# Patient Record
Sex: MALE | Race: BLACK OR AFRICAN AMERICAN | NOT HISPANIC OR LATINO | URBAN - METROPOLITAN AREA
[De-identification: names, ages, dates, MRNs, and addresses within clinical notes are randomized per-mention and may not be internally consistent; named-entity substitution may affect disease eponyms.]

---

## 2021-03-02 ENCOUNTER — EMERGENCY (EMERGENCY)
Facility: HOSPITAL | Age: 36
LOS: 1 days | Discharge: HOME | End: 2021-03-02
Attending: EMERGENCY MEDICINE
Payer: COMMERCIAL

## 2021-03-02 VITALS
SYSTOLIC BLOOD PRESSURE: 196 MMHG | TEMPERATURE: 98 F | DIASTOLIC BLOOD PRESSURE: 137 MMHG | WEIGHT: 235.01 LBS | RESPIRATION RATE: 16 BRPM | OXYGEN SATURATION: 100 % | HEIGHT: 73 IN | HEART RATE: 83 BPM

## 2021-03-02 VITALS
RESPIRATION RATE: 18 BRPM | TEMPERATURE: 98 F | DIASTOLIC BLOOD PRESSURE: 111 MMHG | HEART RATE: 80 BPM | OXYGEN SATURATION: 98 % | SYSTOLIC BLOOD PRESSURE: 164 MMHG

## 2021-03-02 LAB
ALBUMIN SERPL ELPH-MCNC: 4.5 G/DL — SIGNIFICANT CHANGE UP (ref 3.3–5)
ALP SERPL-CCNC: 81 U/L — SIGNIFICANT CHANGE UP (ref 40–120)
ALT FLD-CCNC: 27 U/L — SIGNIFICANT CHANGE UP (ref 10–45)
ANION GAP SERPL CALC-SCNC: 10 MMOL/L — SIGNIFICANT CHANGE UP (ref 5–17)
ANION GAP SERPL CALC-SCNC: 8 MMOL/L — SIGNIFICANT CHANGE UP (ref 5–17)
APPEARANCE UR: CLEAR — SIGNIFICANT CHANGE UP
AST SERPL-CCNC: 29 U/L — SIGNIFICANT CHANGE UP (ref 10–40)
BASOPHILS # BLD AUTO: 0.02 K/UL — SIGNIFICANT CHANGE UP (ref 0–0.2)
BASOPHILS NFR BLD AUTO: 0.2 % — SIGNIFICANT CHANGE UP (ref 0–2)
BILIRUB SERPL-MCNC: 0.5 MG/DL — SIGNIFICANT CHANGE UP (ref 0.2–1.2)
BILIRUB UR-MCNC: NEGATIVE — SIGNIFICANT CHANGE UP
BUN SERPL-MCNC: 15 MG/DL — SIGNIFICANT CHANGE UP (ref 7–23)
BUN SERPL-MCNC: 16 MG/DL — SIGNIFICANT CHANGE UP (ref 7–23)
CALCIUM SERPL-MCNC: 10 MG/DL — SIGNIFICANT CHANGE UP (ref 8.4–10.5)
CALCIUM SERPL-MCNC: 9.4 MG/DL — SIGNIFICANT CHANGE UP (ref 8.4–10.5)
CHLORIDE SERPL-SCNC: 100 MMOL/L — SIGNIFICANT CHANGE UP (ref 96–108)
CHLORIDE SERPL-SCNC: 103 MMOL/L — SIGNIFICANT CHANGE UP (ref 96–108)
CO2 SERPL-SCNC: 25 MMOL/L — SIGNIFICANT CHANGE UP (ref 22–31)
CO2 SERPL-SCNC: 27 MMOL/L — SIGNIFICANT CHANGE UP (ref 22–31)
COLOR SPEC: SIGNIFICANT CHANGE UP
CREAT SERPL-MCNC: 1.46 MG/DL — HIGH (ref 0.5–1.3)
CREAT SERPL-MCNC: 1.53 MG/DL — HIGH (ref 0.5–1.3)
DIFF PNL FLD: NEGATIVE — SIGNIFICANT CHANGE UP
EOSINOPHIL # BLD AUTO: 0.02 K/UL — SIGNIFICANT CHANGE UP (ref 0–0.5)
EOSINOPHIL NFR BLD AUTO: 0.2 % — SIGNIFICANT CHANGE UP (ref 0–6)
GAS PNL BLDV: SIGNIFICANT CHANGE UP
GLUCOSE SERPL-MCNC: 112 MG/DL — HIGH (ref 70–99)
GLUCOSE SERPL-MCNC: 114 MG/DL — HIGH (ref 70–99)
GLUCOSE UR QL: NEGATIVE — SIGNIFICANT CHANGE UP
HCT VFR BLD CALC: 44.4 % — SIGNIFICANT CHANGE UP (ref 39–50)
HGB BLD-MCNC: 14.5 G/DL — SIGNIFICANT CHANGE UP (ref 13–17)
IMM GRANULOCYTES NFR BLD AUTO: 0.2 % — SIGNIFICANT CHANGE UP (ref 0–1.5)
KETONES UR-MCNC: NEGATIVE — SIGNIFICANT CHANGE UP
LEUKOCYTE ESTERASE UR-ACNC: NEGATIVE — SIGNIFICANT CHANGE UP
LIDOCAIN IGE QN: 27 U/L — SIGNIFICANT CHANGE UP (ref 7–60)
LYMPHOCYTES # BLD AUTO: 0.86 K/UL — LOW (ref 1–3.3)
LYMPHOCYTES # BLD AUTO: 9.5 % — LOW (ref 13–44)
MCHC RBC-ENTMCNC: 27.3 PG — SIGNIFICANT CHANGE UP (ref 27–34)
MCHC RBC-ENTMCNC: 32.7 GM/DL — SIGNIFICANT CHANGE UP (ref 32–36)
MCV RBC AUTO: 83.6 FL — SIGNIFICANT CHANGE UP (ref 80–100)
MONOCYTES # BLD AUTO: 0.59 K/UL — SIGNIFICANT CHANGE UP (ref 0–0.9)
MONOCYTES NFR BLD AUTO: 6.5 % — SIGNIFICANT CHANGE UP (ref 2–14)
NEUTROPHILS # BLD AUTO: 7.56 K/UL — HIGH (ref 1.8–7.4)
NEUTROPHILS NFR BLD AUTO: 83.4 % — HIGH (ref 43–77)
NITRITE UR-MCNC: NEGATIVE — SIGNIFICANT CHANGE UP
NRBC # BLD: 0 /100 WBCS — SIGNIFICANT CHANGE UP (ref 0–0)
PH UR: 8 — SIGNIFICANT CHANGE UP (ref 5–8)
PLATELET # BLD AUTO: 287 K/UL — SIGNIFICANT CHANGE UP (ref 150–400)
POTASSIUM SERPL-MCNC: 4.1 MMOL/L — SIGNIFICANT CHANGE UP (ref 3.5–5.3)
POTASSIUM SERPL-MCNC: 5.1 MMOL/L — SIGNIFICANT CHANGE UP (ref 3.5–5.3)
POTASSIUM SERPL-SCNC: 4.1 MMOL/L — SIGNIFICANT CHANGE UP (ref 3.5–5.3)
POTASSIUM SERPL-SCNC: 5.1 MMOL/L — SIGNIFICANT CHANGE UP (ref 3.5–5.3)
PROT SERPL-MCNC: 8.6 G/DL — HIGH (ref 6–8.3)
PROT UR-MCNC: ABNORMAL
RBC # BLD: 5.31 M/UL — SIGNIFICANT CHANGE UP (ref 4.2–5.8)
RBC # FLD: 13.4 % — SIGNIFICANT CHANGE UP (ref 10.3–14.5)
SODIUM SERPL-SCNC: 135 MMOL/L — SIGNIFICANT CHANGE UP (ref 135–145)
SODIUM SERPL-SCNC: 138 MMOL/L — SIGNIFICANT CHANGE UP (ref 135–145)
SP GR SPEC: 1.02 — SIGNIFICANT CHANGE UP (ref 1.01–1.02)
UROBILINOGEN FLD QL: NEGATIVE — SIGNIFICANT CHANGE UP
WBC # BLD: 9.07 K/UL — SIGNIFICANT CHANGE UP (ref 3.8–10.5)
WBC # FLD AUTO: 9.07 K/UL — SIGNIFICANT CHANGE UP (ref 3.8–10.5)

## 2021-03-02 PROCEDURE — 74176 CT ABD & PELVIS W/O CONTRAST: CPT

## 2021-03-02 PROCEDURE — 82330 ASSAY OF CALCIUM: CPT

## 2021-03-02 PROCEDURE — 81001 URINALYSIS AUTO W/SCOPE: CPT

## 2021-03-02 PROCEDURE — 80048 BASIC METABOLIC PNL TOTAL CA: CPT

## 2021-03-02 PROCEDURE — 83690 ASSAY OF LIPASE: CPT

## 2021-03-02 PROCEDURE — 96374 THER/PROPH/DIAG INJ IV PUSH: CPT

## 2021-03-02 PROCEDURE — 76705 ECHO EXAM OF ABDOMEN: CPT

## 2021-03-02 PROCEDURE — 83605 ASSAY OF LACTIC ACID: CPT

## 2021-03-02 PROCEDURE — 82803 BLOOD GASES ANY COMBINATION: CPT

## 2021-03-02 PROCEDURE — 85025 COMPLETE CBC W/AUTO DIFF WBC: CPT

## 2021-03-02 PROCEDURE — 85014 HEMATOCRIT: CPT

## 2021-03-02 PROCEDURE — 93010 ELECTROCARDIOGRAM REPORT: CPT

## 2021-03-02 PROCEDURE — 84132 ASSAY OF SERUM POTASSIUM: CPT

## 2021-03-02 PROCEDURE — 87086 URINE CULTURE/COLONY COUNT: CPT

## 2021-03-02 PROCEDURE — 76770 US EXAM ABDO BACK WALL COMP: CPT

## 2021-03-02 PROCEDURE — 82435 ASSAY OF BLOOD CHLORIDE: CPT

## 2021-03-02 PROCEDURE — 74176 CT ABD & PELVIS W/O CONTRAST: CPT | Mod: 26,ME

## 2021-03-02 PROCEDURE — 84295 ASSAY OF SERUM SODIUM: CPT

## 2021-03-02 PROCEDURE — G1004: CPT

## 2021-03-02 PROCEDURE — 93005 ELECTROCARDIOGRAM TRACING: CPT

## 2021-03-02 PROCEDURE — 82947 ASSAY GLUCOSE BLOOD QUANT: CPT

## 2021-03-02 PROCEDURE — 85018 HEMOGLOBIN: CPT

## 2021-03-02 PROCEDURE — 99285 EMERGENCY DEPT VISIT HI MDM: CPT

## 2021-03-02 PROCEDURE — 76705 ECHO EXAM OF ABDOMEN: CPT | Mod: 26

## 2021-03-02 PROCEDURE — 99284 EMERGENCY DEPT VISIT MOD MDM: CPT | Mod: 25

## 2021-03-02 PROCEDURE — 80053 COMPREHEN METABOLIC PANEL: CPT

## 2021-03-02 RX ORDER — KETOROLAC TROMETHAMINE 30 MG/ML
15 SYRINGE (ML) INJECTION ONCE
Refills: 0 | Status: DISCONTINUED | OUTPATIENT
Start: 2021-03-02 | End: 2021-03-02

## 2021-03-02 RX ORDER — SODIUM CHLORIDE 9 MG/ML
1000 INJECTION INTRAMUSCULAR; INTRAVENOUS; SUBCUTANEOUS ONCE
Refills: 0 | Status: COMPLETED | OUTPATIENT
Start: 2021-03-02 | End: 2021-03-02

## 2021-03-02 RX ORDER — HYDROCHLOROTHIAZIDE 25 MG
25 TABLET ORAL ONCE
Refills: 0 | Status: COMPLETED | OUTPATIENT
Start: 2021-03-02 | End: 2021-03-02

## 2021-03-02 RX ORDER — VALSARTAN 80 MG/1
320 TABLET ORAL ONCE
Refills: 0 | Status: COMPLETED | OUTPATIENT
Start: 2021-03-02 | End: 2021-03-02

## 2021-03-02 RX ORDER — AMLODIPINE BESYLATE 2.5 MG/1
10 TABLET ORAL ONCE
Refills: 0 | Status: COMPLETED | OUTPATIENT
Start: 2021-03-02 | End: 2021-03-02

## 2021-03-02 RX ADMIN — AMLODIPINE BESYLATE 10 MILLIGRAM(S): 2.5 TABLET ORAL at 13:37

## 2021-03-02 RX ADMIN — SODIUM CHLORIDE 1000 MILLILITER(S): 9 INJECTION INTRAMUSCULAR; INTRAVENOUS; SUBCUTANEOUS at 11:18

## 2021-03-02 RX ADMIN — Medication 15 MILLIGRAM(S): at 11:18

## 2021-03-02 RX ADMIN — Medication 25 MILLIGRAM(S): at 13:36

## 2021-03-02 RX ADMIN — VALSARTAN 320 MILLIGRAM(S): 80 TABLET ORAL at 14:20

## 2021-03-02 NOTE — ED PROVIDER NOTE - PHYSICAL EXAMINATION
PGY3/MD Suzanna.   VITALS: reviewed  GEN: No apparent distress, A & O x 4  HEAD/EYES: NC/AT, PERRL, EOMI, anicteric sclerae, no conjunctival pallor  ENT: mucus membranes moist, oropharynx WNL, trachea midline, no JVD, neck is supple  RESP: lungs CTA with equal breath sounds bilaterally, chest wall nontender and atraumatic  CV: heart with reg rhythm S1, S2, no murmur; distal pulses intact and symmetric bilaterally  ABDOMEN: normoactive bowel sounds, soft, nondistended, nontender + RUQ tenderness, no Tello's  : no CVA tenderness  MSK: extremities atraumatic and nontender, no edema, no asymmetry.  SKIN: warm, dry, no rash, no bruising, no cyanosis. color appropriate for ethnicity  NEURO: alert, mentating appropriately, no facial asymmetry.  PSYCH: Affect appropriate

## 2021-03-02 NOTE — ED PROVIDER NOTE - CLINICAL SUMMARY MEDICAL DECISION MAKING FREE TEXT BOX
PGY3/MD Suzanna. 36 yo M, with HTN, p/w right flank pain, RUQ, dxx includes: gb stone, cholecystitis, renal colick, pancreatitis, gastritis, PUD, but given high blood pressure and epigastric-RUQ pain, will get U/S to r/o AAA or rapture. ua, cbc, cmp, lipase,

## 2021-03-02 NOTE — ED ADULT NURSE REASSESSMENT NOTE - NS ED NURSE REASSESS COMMENT FT1
Tayo RN, pt verbalizes understanding to f/u with PCP and return to ED for any worsening symptoms. Patient d/c home w/ written and verbal instructions. Pt verbalized understanding. IV d/c - No redness or swelling. Pt hypertensive MD Driscoll aware and OK with DC

## 2021-03-02 NOTE — ED PROVIDER NOTE - PATIENT PORTAL LINK FT
You can access the FollowMyHealth Patient Portal offered by [facility] by registering at the following website: [email or web address]. By joining [employer]’s FollowMyHealth portal, you will also be able to view your health information using other applications (apps) compatible with our system.

## 2021-03-02 NOTE — ED PROVIDER NOTE - ATTENDING CONTRIBUTION TO CARE
Patient presenting complaining of sudden onset R flank pains which awoke him from sleep.  No similar prior pains.  Does not radiate, worse with movement, no fevers, chills, nausea, vomiting.  Reporting feels sharp "like someone kicked me in the side".  Has been noncompliant with antihypertensives for some time.    A 14 point review of systems is negative except as in HPI or otherwise documented.    Exam:  General: Patient well appearing but uncomfortable, significantly hypertensive otherwise vital signs within normal limits  HEENT: airway patent with moist mucous membranes  Cardiac: RRR S1/S2 with strong and equal peripheral pulses  Respiratory: lungs clear without respiratory distress  GI: abdomen soft, negative murphys, tender to palpation into R flank, non distended  Neuro: no gross neurologic deficits  Skin: warm, well perfused  Psych: normal mood and affect    Differential includes renal colic, biliary colic, less likely aortic catastrophe - plan for labs, UA, pain management, bedside sono, possible CT.

## 2021-03-02 NOTE — ED ADULT NURSE REASSESSMENT NOTE - NS ED NURSE REASSESS COMMENT FT1
Medication not available in pyxis. Spoke with pharmacist who states medication will be sent to  [facility] . Awaiting medication arrival.

## 2021-03-02 NOTE — ED PROVIDER NOTE - PROGRESS NOTE DETAILS
PGY3/MD Suzanna. passed stone, pt is not in pain now, not infectious, no indication for Uro consult or follow up. cre 1.5, likely from chronic change given mild hydro and passed stone, pt needs PCP follow up for high blood pressure and CKD, pt agrees with the plan for out pt follow up, return precaution was given. I have given the pt strict return and follow up precautions. The patient has been provided with a copy of all pertinent results. The patient has been informed of all concerning signs and symptoms to return to Emergency Department, the necessity to follow up with PMD/Clinic/follow up provided within 2-3 days was explained, and the patient reports understanding of above with capacity and insight.

## 2021-03-02 NOTE — ED PROVIDER NOTE - NSFOLLOWUPINSTRUCTIONS_ED_ALL_ED_FT
- high blood pressure, your diastolic pressure is higher than 120, needs medication to control the blood pressure to reduce the chance to have any cardiovascular complications. please follow up with your Primary Care Doctor regarding your pressure control  - Please follow up with your Creatinine level with your Primary Care Doctor too    (1) Follow up with your primary care physician as discussed. In addition, we did not find evidence of a life threatening illness on your testing here today, but listed below are the specialists that will be necessary to see as an outpatient to continue the workup.  Please call the numbers listed below or [phone] to set up the necessary appointments  or call [phone] to make an appointment with the clinic.  (2) You were seen for right side kidney stone, already passed. A copy of your resulted labs, imaging, and findings have been provided to you.  (3) Return immediately to the emergency department for new, persistent, or worsening symptoms or signs. Return immediately to the emergency department if you have chest pain, shortness of breath, loss of consciousness, or any other new concerns.

## 2021-03-02 NOTE — ED ADULT NURSE NOTE - FINAL NURSING ELECTRONIC SIGNATURE
Increased Pain/Transportation Risk (There is always a risk of traffic delays resulting in deterioration of condition.)/Deterioration of Condition En Route 02-Mar-2021 15:27

## 2021-03-02 NOTE — ED PROVIDER NOTE - OBJECTIVE STATEMENT
PGY3/MD Suzanna. 34 yo M with HTN, not compliant, p/w right flank pain, started this morning, pt woke up with the pain. No nausoeus or vomit, no improvement after taking alleve, denies association with foods, no dark stool. No fever, chills. denies cough or uri symptoms.    PCP: Angelic Lemus, PGY3/MD Suzanna. 36 yo M with HTN (amlodipine 10mg, HCTZ 25mg, Losartan 320mg, Atrovastatin), not compliant, p/w right flank pain, started this morning, pt woke up with the pain. No nausoeus or vomit, no improvement after taking alleve, denies association with foods, no dark stool. No fever, chills. denies cough or uri symptoms.    PCP: Angelic Lemus, PGY3/MD Suzanna. 36 yo M with HTN (amlodipine 10mg, HCTZ 25mg, Valsartan 320mg, Atrovastatin), not compliant, p/w right flank pain, started this morning, pt woke up with the pain. No nausoeus or vomit, no improvement after taking alleve, denies association with foods, no dark stool. No fever, chills. denies cough or uri symptoms.    PCP: Angelic Lemus,

## 2021-03-02 NOTE — ED ADULT NURSE NOTE - OBJECTIVE STATEMENT
Opt out
34y/o male aaox4 hx HTN  presents to the ED ambulatory  from home c/o R flank pain . Pt states that this morning sudden began  flank pain w/ radiation to the abdominal area, took Advil at 3 am and Motrin x1tab 500mg at 9 am w/o any relief, at this time pt  Denies no hematuria, no burning/painful urination, fever, chills, n/v, weakness, abd pain, diarrhea/constipation, numbness/tingling, in no respiratory distress, no CP,  Safety and comfort measures initiated- bed placed in lowest position and side rails raised. Pt oriented to call bell system.

## 2021-03-03 LAB
CULTURE RESULTS: NO GROWTH — SIGNIFICANT CHANGE UP
SPECIMEN SOURCE: SIGNIFICANT CHANGE UP

## 2021-09-06 ENCOUNTER — INPATIENT (INPATIENT)
Facility: HOSPITAL | Age: 36
LOS: 1 days | Discharge: HOME | DRG: 178 | End: 2021-09-08
Attending: STUDENT IN AN ORGANIZED HEALTH CARE EDUCATION/TRAINING PROGRAM | Admitting: HOSPITALIST
Payer: COMMERCIAL

## 2021-09-06 VITALS
DIASTOLIC BLOOD PRESSURE: 91 MMHG | TEMPERATURE: 103 F | HEIGHT: 73 IN | RESPIRATION RATE: 18 BRPM | WEIGHT: 244.93 LBS | SYSTOLIC BLOOD PRESSURE: 142 MMHG | OXYGEN SATURATION: 97 % | HEART RATE: 116 BPM

## 2021-09-06 DIAGNOSIS — N17.9 ACUTE KIDNEY FAILURE, UNSPECIFIED: ICD-10-CM

## 2021-09-06 LAB
ALBUMIN SERPL ELPH-MCNC: 4.3 G/DL — SIGNIFICANT CHANGE UP (ref 3.3–5)
ALP SERPL-CCNC: 60 U/L — SIGNIFICANT CHANGE UP (ref 40–120)
ALT FLD-CCNC: 40 U/L — SIGNIFICANT CHANGE UP (ref 10–45)
ANION GAP SERPL CALC-SCNC: 13 MMOL/L — SIGNIFICANT CHANGE UP (ref 5–17)
APPEARANCE UR: CLEAR — SIGNIFICANT CHANGE UP
APTT BLD: 29.5 SEC — SIGNIFICANT CHANGE UP (ref 27.5–35.5)
AST SERPL-CCNC: 35 U/L — SIGNIFICANT CHANGE UP (ref 10–40)
BACTERIA # UR AUTO: NEGATIVE — SIGNIFICANT CHANGE UP
BASE EXCESS BLDV CALC-SCNC: 4 MMOL/L — HIGH (ref -2–2)
BASOPHILS # BLD AUTO: 0.01 K/UL — SIGNIFICANT CHANGE UP (ref 0–0.2)
BASOPHILS NFR BLD AUTO: 0.3 % — SIGNIFICANT CHANGE UP (ref 0–2)
BILIRUB SERPL-MCNC: 0.4 MG/DL — SIGNIFICANT CHANGE UP (ref 0.2–1.2)
BILIRUB UR-MCNC: NEGATIVE — SIGNIFICANT CHANGE UP
BUN SERPL-MCNC: 20 MG/DL — SIGNIFICANT CHANGE UP (ref 7–23)
CA-I SERPL-SCNC: 1.17 MMOL/L — SIGNIFICANT CHANGE UP (ref 1.15–1.33)
CALCIUM SERPL-MCNC: 9.8 MG/DL — SIGNIFICANT CHANGE UP (ref 8.4–10.5)
CHLORIDE BLDV-SCNC: 96 MMOL/L — SIGNIFICANT CHANGE UP (ref 96–108)
CHLORIDE SERPL-SCNC: 96 MMOL/L — SIGNIFICANT CHANGE UP (ref 96–108)
CO2 BLDV-SCNC: 33 MMOL/L — HIGH (ref 22–26)
CO2 SERPL-SCNC: 24 MMOL/L — SIGNIFICANT CHANGE UP (ref 22–31)
COLOR SPEC: YELLOW — SIGNIFICANT CHANGE UP
CREAT SERPL-MCNC: 2.21 MG/DL — HIGH (ref 0.5–1.3)
DIFF PNL FLD: ABNORMAL
EOSINOPHIL # BLD AUTO: 0 K/UL — SIGNIFICANT CHANGE UP (ref 0–0.5)
EOSINOPHIL NFR BLD AUTO: 0 % — SIGNIFICANT CHANGE UP (ref 0–6)
EPI CELLS # UR: 1 /HPF — SIGNIFICANT CHANGE UP
GAS PNL BLDV: 132 MMOL/L — LOW (ref 136–145)
GAS PNL BLDV: SIGNIFICANT CHANGE UP
GLUCOSE BLDV-MCNC: 155 MG/DL — HIGH (ref 70–99)
GLUCOSE SERPL-MCNC: 152 MG/DL — HIGH (ref 70–99)
GLUCOSE UR QL: NEGATIVE — SIGNIFICANT CHANGE UP
HCO3 BLDV-SCNC: 31 MMOL/L — HIGH (ref 22–29)
HCT VFR BLD CALC: 46.7 % — SIGNIFICANT CHANGE UP (ref 39–50)
HCT VFR BLDA CALC: 47 % — SIGNIFICANT CHANGE UP (ref 39–51)
HGB BLD CALC-MCNC: 15.5 G/DL — SIGNIFICANT CHANGE UP (ref 12.6–17.4)
HGB BLD-MCNC: 14.9 G/DL — SIGNIFICANT CHANGE UP (ref 13–17)
HYALINE CASTS # UR AUTO: 0 /LPF — SIGNIFICANT CHANGE UP (ref 0–7)
INR BLD: 1.15 RATIO — SIGNIFICANT CHANGE UP (ref 0.88–1.16)
KETONES UR-MCNC: NEGATIVE — SIGNIFICANT CHANGE UP
LACTATE BLDV-MCNC: 1.8 MMOL/L — SIGNIFICANT CHANGE UP (ref 0.7–2)
LEUKOCYTE ESTERASE UR-ACNC: NEGATIVE — SIGNIFICANT CHANGE UP
LIDOCAIN IGE QN: 43 U/L — SIGNIFICANT CHANGE UP (ref 7–60)
LYMPHOCYTES # BLD AUTO: 1.13 K/UL — SIGNIFICANT CHANGE UP (ref 1–3.3)
LYMPHOCYTES # BLD AUTO: 29.9 % — SIGNIFICANT CHANGE UP (ref 13–44)
MCHC RBC-ENTMCNC: 26 PG — LOW (ref 27–34)
MCHC RBC-ENTMCNC: 31.9 GM/DL — LOW (ref 32–36)
MCV RBC AUTO: 81.6 FL — SIGNIFICANT CHANGE UP (ref 80–100)
MONOCYTES # BLD AUTO: 0.37 K/UL — SIGNIFICANT CHANGE UP (ref 0–0.9)
MONOCYTES NFR BLD AUTO: 9.8 % — SIGNIFICANT CHANGE UP (ref 2–14)
NEUTROPHILS # BLD AUTO: 2.27 K/UL — SIGNIFICANT CHANGE UP (ref 1.8–7.4)
NEUTROPHILS NFR BLD AUTO: 60 % — SIGNIFICANT CHANGE UP (ref 43–77)
NITRITE UR-MCNC: NEGATIVE — SIGNIFICANT CHANGE UP
NRBC # BLD: 0 /100 WBCS — SIGNIFICANT CHANGE UP (ref 0–0)
PCO2 BLDV: 55 MMHG — SIGNIFICANT CHANGE UP (ref 42–55)
PH BLDV: 7.36 — SIGNIFICANT CHANGE UP (ref 7.32–7.43)
PH UR: 6 — SIGNIFICANT CHANGE UP (ref 5–8)
PLATELET # BLD AUTO: 229 K/UL — SIGNIFICANT CHANGE UP (ref 150–400)
PO2 BLDV: 16 MMHG — LOW (ref 25–45)
POTASSIUM BLDV-SCNC: 3.8 MMOL/L — SIGNIFICANT CHANGE UP (ref 3.5–5.1)
POTASSIUM SERPL-MCNC: 4.1 MMOL/L — SIGNIFICANT CHANGE UP (ref 3.5–5.3)
POTASSIUM SERPL-SCNC: 4.1 MMOL/L — SIGNIFICANT CHANGE UP (ref 3.5–5.3)
PROT SERPL-MCNC: 8.2 G/DL — SIGNIFICANT CHANGE UP (ref 6–8.3)
PROT UR-MCNC: >600
PROTHROM AB SERPL-ACNC: 13.7 SEC — HIGH (ref 10.6–13.6)
RAPID RVP RESULT: DETECTED
RBC # BLD: 5.72 M/UL — SIGNIFICANT CHANGE UP (ref 4.2–5.8)
RBC # FLD: 13.8 % — SIGNIFICANT CHANGE UP (ref 10.3–14.5)
RBC CASTS # UR COMP ASSIST: 1 /HPF — SIGNIFICANT CHANGE UP (ref 0–4)
SAO2 % BLDV: 17.5 % — LOW (ref 67–88)
SARS-COV-2 RNA SPEC QL NAA+PROBE: DETECTED
SODIUM SERPL-SCNC: 133 MMOL/L — LOW (ref 135–145)
SP GR SPEC: 1.03 — HIGH (ref 1.01–1.02)
UROBILINOGEN FLD QL: NEGATIVE — SIGNIFICANT CHANGE UP
WBC # BLD: 3.78 K/UL — LOW (ref 3.8–10.5)
WBC # FLD AUTO: 3.78 K/UL — LOW (ref 3.8–10.5)
WBC UR QL: 3 /HPF — SIGNIFICANT CHANGE UP (ref 0–5)

## 2021-09-06 PROCEDURE — 93010 ELECTROCARDIOGRAM REPORT: CPT

## 2021-09-06 PROCEDURE — 74176 CT ABD & PELVIS W/O CONTRAST: CPT | Mod: 26,MA

## 2021-09-06 PROCEDURE — 71045 X-RAY EXAM CHEST 1 VIEW: CPT | Mod: 26

## 2021-09-06 PROCEDURE — 99285 EMERGENCY DEPT VISIT HI MDM: CPT

## 2021-09-06 RX ORDER — SODIUM CHLORIDE 9 MG/ML
1000 INJECTION, SOLUTION INTRAVENOUS ONCE
Refills: 0 | Status: COMPLETED | OUTPATIENT
Start: 2021-09-06 | End: 2021-09-06

## 2021-09-06 RX ORDER — KETOROLAC TROMETHAMINE 30 MG/ML
15 SYRINGE (ML) INJECTION ONCE
Refills: 0 | Status: DISCONTINUED | OUTPATIENT
Start: 2021-09-06 | End: 2021-09-06

## 2021-09-06 RX ORDER — ACETAMINOPHEN 500 MG
975 TABLET ORAL ONCE
Refills: 0 | Status: COMPLETED | OUTPATIENT
Start: 2021-09-06 | End: 2021-09-06

## 2021-09-06 RX ADMIN — SODIUM CHLORIDE 1000 MILLILITER(S): 9 INJECTION, SOLUTION INTRAVENOUS at 16:15

## 2021-09-06 RX ADMIN — Medication 15 MILLIGRAM(S): at 16:15

## 2021-09-06 RX ADMIN — Medication 975 MILLIGRAM(S): at 17:24

## 2021-09-06 NOTE — ED PROVIDER NOTE - CLINICAL SUMMARY MEDICAL DECISION MAKING FREE TEXT BOX
Patient is a 35 year old male with past medical history of nephrolithiasis and hypertension presents to the Emergency Department with chief complaint of fever.  Patient received EKG, plain films of chest, CT abdomen and pelvis without contrast, and labs.  Labs were significant for acute kidney injury.  Plain films suspicious for left lower lobe opacity. Patient is a 35 year old male with past medical history of nephrolithiasis and hypertension presents to the Emergency Department with chief complaint of fever.  Patient received EKG, plain films of chest, CT abdomen and pelvis without contrast, and labs.  Labs were significant for acute kidney injury.  Plain films suspicious for left lower lobe opacity.  Patient COVID-19 positive.

## 2021-09-06 NOTE — ED PROVIDER NOTE - OBJECTIVE STATEMENT
Patient is a 35 year old male with past medical history significant for hypertension and nephrolithiasis presents to the Emergency Department with chief complaint of fever.  Patient states for the past 1-2 days he has had diffuse body aches, most prominent in his bilateral lower back.  Patient describes symptoms as mild and associated with tactile fever.  Patient rates symptoms a 4/10 in severity and denies any alleviating or exacerbating factors, such as exertion.  Patient states he is unvaccinated against COVID-19.  Patient denies any nausea, vomiting, or other physical complaints.  No sick contacts or recent travel.

## 2021-09-06 NOTE — ED PROVIDER NOTE - ATTENDING CONTRIBUTION TO CARE
Attending MD Kumar: I personally have seen and examined this patient.  Fellow note reviewed and agree on plan of care and except where noted.  See below for details.     seen in Gold 4    35M with PMH/PSH including HTN nephrolithiasis presents to the ED with fever, body aches.  Reports unvaccinated for COVID.  Denies abdominal pain, nausea, vomiting, diarrhea, bloody or black stools. Denies dysuria, hematuria, change in urinary habits including frequency, urgency. Reports lower back pain, indicates across lumbar area.  Denies shortness of breath.  A ten (10) point review of systems was negative other than as stated in the HPI or elsewhere in the chart.     Exam:   General: NAD, obese  HENT: head NCAT, airway patent with moist mucous membranes  Eyes: no conjunctival injection, anicteric  Lungs: lungs CTAB with good inspiratory effort, no wheezing, no rhonchi, no rales  Cardiac: +S1S2, no m/r/g  GI: abdomen soft with +BS, NT, exam limited secondary to body habitus  : no CVAT  MSK: FROM at neck, no tenderness to midline palpation, no stepoffs along length of spine, no calf tenderness, swelling, erythema or warmth  Neuro: moving all extremities spontaneously, sensory grossly intact, no gross neuro deficits  Psych: normal mood and affect     A/P: 35M with fevers, lower back pain, suspect COVID/viral illness, will obtain labs for renal function, will obtain CTAP for stone hunt but lower suspicion, will keep iso, will obtain CXR, will give IVFs, if COVID will give dexa, will keep on monitor, obtain COVID labs, EKG, reassess

## 2021-09-06 NOTE — ED ADULT NURSE NOTE - ED CARDIAC RHYTHM
Pt arrives to ED with fall 1 weeks ago. Since there continued right flank pain which increases with attempt to get out of bed or moving, no pain when sitting. Pt also stated has constipation x1 week and difficulty urinating since fall, urination \"comes in spurts\". Pt has hearing aids with him, jeans and shoes. Only article removed is shirt.    regular

## 2021-09-06 NOTE — ED PROVIDER NOTE - WET READ LAUNCH FT
There are no Wet Read(s) to document. Electrodesiccation Text: The wound bed was treated with electrodesiccation after the biopsy was performed.

## 2021-09-06 NOTE — ED PROVIDER NOTE - PHYSICAL EXAMINATION
PHYSICAL EXAM:  GENERAL: NAD, lying in bed comfortably  HEAD:  Atraumatic, Normocephalic  EYES: EOMI, PERRLA, conjunctiva and sclera clear  ENT: No erythema/pallor/petechiae/lesions; TMs clear b/l  NECK: Supple, No JVD  LUNG: CTA b/l; no r/r/w  HEART: RRR, +S1/S2; No m/r/g  ABDOMEN: soft, NT/ND; BS audible   EXTREMITIES:  2+ Peripheral Pulses, brisk cap refill. No clubbing, cyanosis, or edema  NERVOUS SYSTEM:  AAOx3, speech clear. No sensory/motor deficits   MSK: FROM all 4 extremities, full and equal strength  SKIN: No rashes or lesions

## 2021-09-06 NOTE — ED ADULT NURSE REASSESSMENT NOTE - NS ED NURSE REASSESS COMMENT FT1
pt c/o sudden onset of lightheadedness, drop in BP noted, pt taken to gold, dampened cloth given to pt, pale lips

## 2021-09-06 NOTE — ED PROVIDER NOTE - NS ED ROS FT
CONSTITUTIONAL: + fever  EYES/ENT: No visual changes;  No vertigo or throat pain   NECK: No pain or stiffness  RESPIRATORY: No cough, wheezing, hemoptysis; No shortness of breath  CARDIOVASCULAR: No chest pain or palpitations  GASTROINTESTINAL: No abdominal or epigastric pain. No nausea, vomiting, or hematemesis; No diarrhea or constipation. No melena or hematochezia.  GENITOURINARY: No dysuria, frequency or hematuria  NEUROLOGICAL: No numbness or weakness  SKIN: No itching, burning, rashes, or lesions   MSK: +back pain  All other review of systems is negative unless indicated above.

## 2021-09-06 NOTE — ED PROVIDER NOTE - CARE PLAN
1 Principal Discharge DX:	MEHDI (acute kidney injury)  Secondary Diagnosis:	COVID-19 virus infection

## 2021-09-06 NOTE — ED ADULT NURSE NOTE - OBJECTIVE STATEMENT
34 yo male PMH HTN, unvaccinated for COVID, presents to ED c/o of HA and back pain x 2 days. Denies trauma or vigorous exercise, states HA is more on rt side, and that back pain feels similar to the last time he had a kidney stone ( which passed on it's own.) Also confirms mild cough with green sputum. PERRL 3R bilaterally, EOMI, motor, strength and sensation intact in upper and lower extremities, abd soft, nondistended nontender, negative CVA tenderness. CM applied, EKG done, code sepsis order set started. Denies CP, SOB, n/v/d, , abdominal pain, urinary symptoms, weakness, numbness, tingling in upper and lower extremities, blurry vision. VSS updated on plan of care.

## 2021-09-07 ENCOUNTER — TRANSCRIPTION ENCOUNTER (OUTPATIENT)
Age: 36
End: 2021-09-07

## 2021-09-07 DIAGNOSIS — Z29.9 ENCOUNTER FOR PROPHYLACTIC MEASURES, UNSPECIFIED: ICD-10-CM

## 2021-09-07 DIAGNOSIS — R51.9 HEADACHE, UNSPECIFIED: ICD-10-CM

## 2021-09-07 DIAGNOSIS — N17.9 ACUTE KIDNEY FAILURE, UNSPECIFIED: ICD-10-CM

## 2021-09-07 DIAGNOSIS — I10 ESSENTIAL (PRIMARY) HYPERTENSION: ICD-10-CM

## 2021-09-07 DIAGNOSIS — U07.1 COVID-19: ICD-10-CM

## 2021-09-07 LAB
A1C WITH ESTIMATED AVERAGE GLUCOSE RESULT: 5.8 % — HIGH (ref 4–5.6)
ANION GAP SERPL CALC-SCNC: 12 MMOL/L — SIGNIFICANT CHANGE UP (ref 5–17)
BUN SERPL-MCNC: 18 MG/DL — SIGNIFICANT CHANGE UP (ref 7–23)
CALCIUM SERPL-MCNC: 9.1 MG/DL — SIGNIFICANT CHANGE UP (ref 8.4–10.5)
CHLORIDE SERPL-SCNC: 98 MMOL/L — SIGNIFICANT CHANGE UP (ref 96–108)
CK MB BLD-MCNC: 0.3 % — SIGNIFICANT CHANGE UP (ref 0–3.5)
CK MB CFR SERPL CALC: 1.1 NG/ML — SIGNIFICANT CHANGE UP (ref 0–6.7)
CK SERPL-CCNC: 405 U/L — HIGH (ref 30–200)
CO2 SERPL-SCNC: 28 MMOL/L — SIGNIFICANT CHANGE UP (ref 22–31)
CREAT ?TM UR-MCNC: 221 MG/DL — SIGNIFICANT CHANGE UP
CREAT SERPL-MCNC: 1.56 MG/DL — HIGH (ref 0.5–1.3)
CULTURE RESULTS: NO GROWTH — SIGNIFICANT CHANGE UP
ESTIMATED AVERAGE GLUCOSE: 120 MG/DL — HIGH (ref 68–114)
GLUCOSE SERPL-MCNC: 97 MG/DL — SIGNIFICANT CHANGE UP (ref 70–99)
HCT VFR BLD CALC: 44.1 % — SIGNIFICANT CHANGE UP (ref 39–50)
HGB BLD-MCNC: 14.2 G/DL — SIGNIFICANT CHANGE UP (ref 13–17)
MAGNESIUM SERPL-MCNC: 2 MG/DL — SIGNIFICANT CHANGE UP (ref 1.6–2.6)
MCHC RBC-ENTMCNC: 25.9 PG — LOW (ref 27–34)
MCHC RBC-ENTMCNC: 32.2 GM/DL — SIGNIFICANT CHANGE UP (ref 32–36)
MCV RBC AUTO: 80.5 FL — SIGNIFICANT CHANGE UP (ref 80–100)
NRBC # BLD: 0 /100 WBCS — SIGNIFICANT CHANGE UP (ref 0–0)
OSMOLALITY UR: 843 MOS/KG — SIGNIFICANT CHANGE UP (ref 300–900)
PHOSPHATE SERPL-MCNC: 3.1 MG/DL — SIGNIFICANT CHANGE UP (ref 2.5–4.5)
PLATELET # BLD AUTO: 214 K/UL — SIGNIFICANT CHANGE UP (ref 150–400)
POTASSIUM SERPL-MCNC: 3.7 MMOL/L — SIGNIFICANT CHANGE UP (ref 3.5–5.3)
POTASSIUM SERPL-SCNC: 3.7 MMOL/L — SIGNIFICANT CHANGE UP (ref 3.5–5.3)
PROT ?TM UR-MCNC: 147 MG/DL — HIGH (ref 0–12)
PROT/CREAT UR-RTO: 0.7 RATIO — HIGH (ref 0–0.2)
RBC # BLD: 5.48 M/UL — SIGNIFICANT CHANGE UP (ref 4.2–5.8)
RBC # FLD: 13.7 % — SIGNIFICANT CHANGE UP (ref 10.3–14.5)
SODIUM SERPL-SCNC: 138 MMOL/L — SIGNIFICANT CHANGE UP (ref 135–145)
SODIUM UR-SCNC: 117 MMOL/L — SIGNIFICANT CHANGE UP
SPECIMEN SOURCE: SIGNIFICANT CHANGE UP
TROPONIN T, HIGH SENSITIVITY RESULT: 12 NG/L — SIGNIFICANT CHANGE UP (ref 0–51)
UUN UR-MCNC: 1476 MG/DL — SIGNIFICANT CHANGE UP
WBC # BLD: 4.31 K/UL — SIGNIFICANT CHANGE UP (ref 3.8–10.5)
WBC # FLD AUTO: 4.31 K/UL — SIGNIFICANT CHANGE UP (ref 3.8–10.5)

## 2021-09-07 PROCEDURE — 99223 1ST HOSP IP/OBS HIGH 75: CPT

## 2021-09-07 PROCEDURE — 93010 ELECTROCARDIOGRAM REPORT: CPT

## 2021-09-07 PROCEDURE — 70450 CT HEAD/BRAIN W/O DYE: CPT | Mod: 26

## 2021-09-07 RX ORDER — AZITHROMYCIN 500 MG/1
500 TABLET, FILM COATED ORAL ONCE
Refills: 0 | Status: COMPLETED | OUTPATIENT
Start: 2021-09-07 | End: 2021-09-07

## 2021-09-07 RX ORDER — CEFTRIAXONE 500 MG/1
1000 INJECTION, POWDER, FOR SOLUTION INTRAMUSCULAR; INTRAVENOUS EVERY 24 HOURS
Refills: 0 | Status: DISCONTINUED | OUTPATIENT
Start: 2021-09-07 | End: 2021-09-07

## 2021-09-07 RX ORDER — SODIUM CHLORIDE 9 MG/ML
1000 INJECTION INTRAMUSCULAR; INTRAVENOUS; SUBCUTANEOUS
Refills: 0 | Status: DISCONTINUED | OUTPATIENT
Start: 2021-09-07 | End: 2021-09-08

## 2021-09-07 RX ORDER — ENOXAPARIN SODIUM 100 MG/ML
40 INJECTION SUBCUTANEOUS EVERY 12 HOURS
Refills: 0 | Status: DISCONTINUED | OUTPATIENT
Start: 2021-09-07 | End: 2021-09-08

## 2021-09-07 RX ORDER — ALBUTEROL 90 UG/1
2 AEROSOL, METERED ORAL EVERY 4 HOURS
Refills: 0 | Status: DISCONTINUED | OUTPATIENT
Start: 2021-09-07 | End: 2021-09-08

## 2021-09-07 RX ORDER — INFLUENZA VIRUS VACCINE 15; 15; 15; 15 UG/.5ML; UG/.5ML; UG/.5ML; UG/.5ML
0.5 SUSPENSION INTRAMUSCULAR ONCE
Refills: 0 | Status: DISCONTINUED | OUTPATIENT
Start: 2021-09-07 | End: 2021-09-08

## 2021-09-07 RX ORDER — AMLODIPINE BESYLATE 2.5 MG/1
10 TABLET ORAL DAILY
Refills: 0 | Status: DISCONTINUED | OUTPATIENT
Start: 2021-09-08 | End: 2021-09-08

## 2021-09-07 RX ORDER — AMLODIPINE BESYLATE 2.5 MG/1
5 TABLET ORAL ONCE
Refills: 0 | Status: COMPLETED | OUTPATIENT
Start: 2021-09-07 | End: 2021-09-07

## 2021-09-07 RX ORDER — ACETAMINOPHEN 500 MG
2 TABLET ORAL
Qty: 0 | Refills: 0 | DISCHARGE
Start: 2021-09-07

## 2021-09-07 RX ORDER — ACETAMINOPHEN 500 MG
650 TABLET ORAL EVERY 4 HOURS
Refills: 0 | Status: DISCONTINUED | OUTPATIENT
Start: 2021-09-07 | End: 2021-09-08

## 2021-09-07 RX ORDER — AMLODIPINE BESYLATE 2.5 MG/1
5 TABLET ORAL DAILY
Refills: 0 | Status: DISCONTINUED | OUTPATIENT
Start: 2021-09-07 | End: 2021-09-07

## 2021-09-07 RX ADMIN — ENOXAPARIN SODIUM 40 MILLIGRAM(S): 100 INJECTION SUBCUTANEOUS at 06:30

## 2021-09-07 RX ADMIN — AMLODIPINE BESYLATE 5 MILLIGRAM(S): 2.5 TABLET ORAL at 12:13

## 2021-09-07 RX ADMIN — Medication 600 MILLIGRAM(S): at 18:10

## 2021-09-07 RX ADMIN — Medication 650 MILLIGRAM(S): at 17:57

## 2021-09-07 RX ADMIN — AMLODIPINE BESYLATE 5 MILLIGRAM(S): 2.5 TABLET ORAL at 06:30

## 2021-09-07 RX ADMIN — CEFTRIAXONE 100 MILLIGRAM(S): 500 INJECTION, POWDER, FOR SOLUTION INTRAMUSCULAR; INTRAVENOUS at 02:28

## 2021-09-07 RX ADMIN — Medication 650 MILLIGRAM(S): at 18:50

## 2021-09-07 RX ADMIN — SODIUM CHLORIDE 1000 MILLILITER(S): 9 INJECTION, SOLUTION INTRAVENOUS at 00:00

## 2021-09-07 RX ADMIN — SODIUM CHLORIDE 125 MILLILITER(S): 9 INJECTION INTRAMUSCULAR; INTRAVENOUS; SUBCUTANEOUS at 17:58

## 2021-09-07 RX ADMIN — ENOXAPARIN SODIUM 40 MILLIGRAM(S): 100 INJECTION SUBCUTANEOUS at 17:58

## 2021-09-07 RX ADMIN — AZITHROMYCIN 500 MILLIGRAM(S): 500 TABLET, FILM COATED ORAL at 02:28

## 2021-09-07 NOTE — H&P ADULT - PROBLEM SELECTOR PLAN 1
w/o hypoxia and dyspnea; CXR w/ hazy opacities within the left lower lung field.   - currently maintaining spO2 on room temp; does not qualify for dexamethasone or remdesivir  - VTE ppx: enoxaparin subq bid  - monitor, supplemental spO2 if needed  - albuterol prn if short of breath

## 2021-09-07 NOTE — PROVIDER CONTACT NOTE (OTHER) - BACKGROUND
34y/o male admitted for Acute renal failure found to be Covid positive
Pt p/w fever, headache. Medhx HTN.

## 2021-09-07 NOTE — PROVIDER CONTACT NOTE (OTHER) - ACTION/TREATMENT ORDERED:
Pt will be monitored overnight, concerns will be addressed
Hospitalist at bedside at this time. Will continue to monitor Pt.

## 2021-09-07 NOTE — H&P ADULT - PROBLEM SELECTOR PLAN 5
possible CAP w/ pt's presenting sx, unclear if only covid. Not hypoxic, but has had productive cough, chills and febrile here w/ tachycardia, meeting sepsis criteria. [facility] elev.  - will give one time dose azithromycin/ceftriaxone for CAP coverage, consider ID consult in am DVT ppx: enoxaparin subq bid

## 2021-09-07 NOTE — CHART NOTE - NSCHARTNOTEFT_GEN_A_CORE
35 yr old male w/ PMH of hypertension and nephrolithiasis presents w/ productive cough, fatigue, and headaches admitted for MEHDI now resolved    MEHDI  -Resolved with holding hctz/ARB  -Resume Valsartan-Amlodipine on Dc  -Hold HCTZ for 5 days  -Outpt PMD f/u after covid resolves and then get renal referral    Abnormal EKG-read as early repolarization normal variant, no CP and trops negative and no family history  -Outpt f/u with PMD for echo, not urgent     Headaches  Likely covid related but has HTN and need to hold antihypertensive on Dc so will f/u head CT read to ensure no stroke/bleed.  Increased risk for this with covid      If head Ct negative would DC home holding HCTZ for 5 days and f/u with PMD for renal referral and echo as outpt.    Discussed with med NP and pt.   Discharge time 45 minutes 35 yr old male w/ PMH of hypertension and nephrolithiasis presents w/ productive cough, fatigue, and headaches admitted for MEHDI now resolved    MEHDI  -Resolved with holding hctz/ARB  -Resume Valsartan-Amlodipine on Dc  -Hold HCTZ for 5 days  -Outpt PMD f/u after covid resolves and then get renal referral    Abnormal EKG-read as early repolarization normal variant, no CP and trops negative and no family history  -Outpt f/u with PMD for echo, not urgent     Headaches  Likely covid related but has HTN and need to hold antihypertensive on Dc so will f/u head CT read to ensure no stroke/bleed.  Increased risk for this with covid      If head Ct negative would DC home holding HCTZ for 5 days and f/u with PMD for renal referral and echo as outpt.  PT ended up staying due to lack of reliable transportation and needing food at home.  Will likely DC in am    Discussed with med NP and pt.

## 2021-09-07 NOTE — DISCHARGE NOTE PROVIDER - HOSPITAL COURSE
35 yr old male w/ PMH of hypertension and nephrolithiasis   presented w/ productive cough, fatigue, and headaches.  Pt reported he was in his usual state of health until 9/3/21 when he started feeling congested and productive coughing.  Pt also notes onset of R temporal-parietal headache on same day as other symptoms (9/3/21) He described it as "sharp" pain that lasts a few seconds but is high in intensity, rating the pain a 10/10. Denied radiation of pain elsewhere. Reported pain has been coming and going since onset nothing makes it worse. No photophobia. No changes in vision, no focal weakness. No prior history of headaches.   Covid unvaccinated.  Also endorsed low back pain in ED. Felt like his prior pain w/ a kidney stone. No changes in urine, no inc frequency or pain w/ urination. No blood in urine. No recent trauma, falls. - CT Abdomen / Pelvis done  hypoxia and dyspnea; CXR w/ hazy opacities within the left lower lung field.   ·  R/O Community acquired pneumonia.   -  possible CAP w/ pt's presenting sx, unclear if only covid. Not hypoxic, but has had productive cough, chills and febrile here w/ tachycardia, meeting sepsis criteria. [facility] elev.  - gave one time dose azithromycin/ceftriaxone for CAP coverage  - COVID PCR + (patient reports Unvaccinated)  - maintained spO2 on room air; did not qualify for dexamethasone or remdesivir    · AL (acute kidney injury) - Likely has underlying CKD stage 3; prior sCr 1.53, on admission 2.23; unclear etiology of CKD, may have component of hypertensive nephropathy, and al secondary to reduced po intake.   - received IVF in ED, encouraged oral hydration - follow up creat (next day) 1.56  - held home valsartan and hctz secondary to AL    · Headache.- severe, sharp pain, R parieto-temporal region of new onset. No associated symptoms. New, no prior hx of headache.   - CT head to r/o intracranial pathology completed: [   ]  - Pain resolved, no acute changes in neuro exam including visual changes    ·  Hypertension.   ·  Plan: - home med: norvasc-valsartan  mg in ED  - holding valsartan and hctz due to AL  -            35 yr old male w/ PMH of hypertension and nephrolithiasis   presented w/ productive cough, fatigue, and headaches.  Pt reported he was in his usual state of health until 9/3/21 when he started feeling congested and productive coughing.  Pt also notes onset of R temporal-parietal headache on same day as other symptoms (9/3/21) He described it as "sharp" pain that lasts a few seconds but is high in intensity, rating the pain a 10/10. Denied radiation of pain elsewhere. Reported pain has been coming and going since onset nothing makes it worse. No photophobia. No changes in vision, no focal weakness. No prior history of headaches.   Covid unvaccinated.  Also endorsed low back pain in ED. Felt like his prior pain w/ a kidney stone. No changes in urine, no inc frequency or pain w/ urination. No blood in urine. No recent trauma, falls. - CT Abdomen / Pelvis done  hypoxia and dyspnea; CXR w/ hazy opacities within the left lower lung field.   ·  R/O Community acquired pneumonia.   -  possible CAP w/ pt's presenting sx, unclear if only covid. Not hypoxic, but has had productive cough, chills and febrile here w/ tachycardia, meeting sepsis criteria. [facility] elev.  - gave one time dose azithromycin/ceftriaxone for CAP coverage  - COVID PCR + (patient reports Unvaccinated)  - maintained spO2 on room air; did not qualify for dexamethasone or remdesivir    · AL (acute kidney injury) - Likely has underlying CKD stage 3; prior sCr 1.53, on admission 2.23; unclear etiology of CKD, may have component of hypertensive nephropathy, and al secondary to reduced po intake.   - received IVF in ED, encouraged oral hydration - follow up creat (next day) 1.56  - held home valsartan and hctz secondary to AL    · Headache.- severe, sharp pain, R parieto-temporal region of new onset. No associated symptoms. New, no prior hx of headache.   - CT head to r/o intracranial pathology completed: Unremarkable noncontrast head CT done on 9/7/21  - Pain resolved, no acute changes in neuro exam including visual changes    ·  Hypertension.   ·  Plan: - home med: norvasc-valsartan  mg in ED  - holding valsartan and hctz due to AL  -            35 yr old male w/ PMH of hypertension and nephrolithiasis   presented w/ productive cough, fatigue, and headaches.  Pt reported he was in his usual state of health until 9/3/21 when he started feeling congested and productive coughing.  Pt also notes onset of R temporal-parietal headache on same day as other symptoms (9/3/21) He described it as "sharp" pain that lasts a few seconds but is high in intensity, rating the pain a 10/10. Denied radiation of pain elsewhere. Reported pain has been coming and going since onset nothing makes it worse. No photophobia. No changes in vision, no focal weakness. No prior history of headaches.   Covid unvaccinated.  Also endorsed low back pain in ED. Felt like his prior pain w/ a kidney stone. No changes in urine, no inc frequency or pain w/ urination. No blood in urine. No recent trauma, falls. - CT Abdomen / Pelvis done  hypoxia and dyspnea; CXR w/ hazy opacities within the left lower lung field.   ·  R/O Community acquired pneumonia.   -  possible CAP w/ pt's presenting sx, unclear if only covid. Not hypoxic, but has had productive cough, chills and febrile here w/ tachycardia, meeting sepsis criteria. [facility] elev.  - gave one time dose azithromycin/ceftriaxone for CAP coverage  - COVID PCR + (patient reports Unvaccinated)  - maintained spO2 on room air; did not qualify for dexamethasone or remdesivir    · AL (acute kidney injury) - Likely has underlying CKD stage 3; prior sCr 1.53, on admission 2.23; unclear etiology of CKD, may have component of hypertensive nephropathy, and al secondary to reduced po intake.   - received IVF in ED, encouraged oral hydration - follow up creat (next day) 1.56  - held home valsartan and hctz secondary to AL ok to resume on DC    · Headache.- severe, sharp pain, R parieto-temporal region of new onset. No associated symptoms. New, no prior hx of headache.   - CT head to r/o intracranial pathology completed: Unremarkable noncontrast head CT done on 9/7/21  - Pain resolved, no acute changes in neuro exam including visual changes    ·  Hypertension.   ·  Plan: - home med: norvasc-valsartan  mg in ED  - holding valsartan and hctz due to AL  - resume on DC

## 2021-09-07 NOTE — H&P ADULT - PROBLEM SELECTOR PLAN 2
Likely has underlying CKD stage 3; prior sCr 1.53, now 2.23; uncler etiology, has underlying hypertension  - will obtain urine studies, urine protein/creatine ratio  - received IVF in ED, encouraged oral hydration  - repeat BMP in am  - holding home valsartan and hctz Likely has underlying CKD stage 3; prior sCr 1.53, now 2.23; unclear etiology of CKD, may have component of hypertensive nephropathy, and al secondary to reduced po intake.   - will obtain urine studies, urine protein/creatine ratio  - received IVF in ED, encouraged oral hydration  - repeat BMP in am  - holding home valsartan and hctz

## 2021-09-07 NOTE — PATIENT PROFILE ADULT - NSPROEXTENSIONSOFSELF_GEN_A_NUR
Patient Seen in: [facility] AND [facility] Emergency Department      History   Patient presents with:  Cough/URI    Stated Complaint: r/o coronavirus    HPI    59-year-old female presents the ER with complaint of cough and congestion.   Patient also states that s and Rhythm: Normal rate and regular rhythm. Pulses: Normal pulses. Heart sounds: Normal heart sounds. Pulmonary:      Effort: Pulmonary effort is normal.      Breath sounds: Normal breath sounds.    Abdominal:      General: Bowel sounds are norm orders.    RAINBOW DRAW BLUE   RAINBOW DRAW LAVENDER   RAINBOW DRAW LIGHT GREEN   RAINBOW DRAW GOLD   URINE CULTURE, ROUTINE                  MDM     59-year-old female with multiple complaints presents the ER because she thinks she has exposed to [employer] none

## 2021-09-07 NOTE — H&P ADULT - NSHPPHYSICALEXAM_GEN_ALL_CORE
PHYSICAL EXAM:   GENERAL: Alert. Not confused. No acute distress. Not thin. Not cachectic. Not obese.  HEAD:  Atraumatic. Normocephalic.  EYES: EOMI. PERRLA. Normal conjunctiva/sclera.  ENT: Neck supple. No JVD. Moist oral mucosa. Not edentulous. No thrush.  LYMPH: Normal supraclavicular/cervical lymph nodes.   CARDIAC: Mildly tachycardic. Regular rhythm. Not irregularly irregular. S1. S2. No murmur. No rub. No distant heart sounds.  LUNG/CHEST: CTAB. BS equal bilaterally. No wheezes. No rales. No rhonchi.  ABDOMEN: Soft. No tenderness. No distension. No fluid wave. Normal bowel sounds.  BACK: No midline/vertebral tenderness. No flank tenderness.  VASCULAR: +2 b/l radial pulses. Palpable DP pulses.  EXTREMITIES:  No clubbing. No cyanosis. No edema. Moving all 4.  NEUROLOGY: A&Ox3. Non-focal exam. Cranial nerves intact. Normal speech. Sensation intact.  PSYCH: Normal behavior. Normal affect.  SKIN: No jaundice. No erythema. No rash/lesion.  Vascular Access:     ICU Vital Signs Last 24 Hrs  T(C): 37.5 (07 Sep 2021 01:06), Max: 39.3 (06 Sep 2021 14:13)  T(F): 99.5 (07 Sep 2021 01:06), Max: 102.8 (06 Sep 2021 14:13)  HR: 99 (07 Sep 2021 01:06) (88 - 116)  BP: 169/106 (07 Sep 2021 01:06) (109/67 - 169/106)  BP(mean): --  ABP: --  ABP(mean): --  RR: 18 (07 Sep 2021 01:06) (16 - 20)  SpO2: 98% (07 Sep 2021 01:06) (95% - 100%)      I&O's Summary

## 2021-09-07 NOTE — DISCHARGE NOTE PROVIDER - NSDCCPCAREPLAN_GEN_ALL_CORE_FT
PRINCIPAL DISCHARGE DIAGNOSIS  Diagnosis: MEHDI (acute kidney injury)  Assessment and Plan of Treatment:       SECONDARY DISCHARGE DIAGNOSES  Diagnosis: Hypertension  Assessment and Plan of Treatment:     Diagnosis: Headache  Assessment and Plan of Treatment:     Diagnosis: COVID-19 virus infection  Assessment and Plan of Treatment:      PRINCIPAL DISCHARGE DIAGNOSIS  Diagnosis: MEHDI (acute kidney injury)  Assessment and Plan of Treatment: Resolved. OK to resume home meds      SECONDARY DISCHARGE DIAGNOSES  Diagnosis: COVID-19 virus infection  Assessment and Plan of Treatment: Quarantine until next Thursday 9/16.    Diagnosis: Hypertension  Assessment and Plan of Treatment: Resume home meds as before    Diagnosis: Headache  Assessment and Plan of Treatment: CT head negative, likely related to COVID.     PRINCIPAL DISCHARGE DIAGNOSIS  Diagnosis: MEHDI (acute kidney injury)  Assessment and Plan of Treatment: Resolved. OK to resume home meds      SECONDARY DISCHARGE DIAGNOSES  Diagnosis: COVID-19 virus infection  Assessment and Plan of Treatment: Quarantine until next Thursday 9/16.    Diagnosis: Hypertension  Assessment and Plan of Treatment: Resume home meds as before    Diagnosis: Headache  Assessment and Plan of Treatment: CT head negative, likely related to COVID and fevers.

## 2021-09-07 NOTE — PROVIDER CONTACT NOTE (OTHER) - ASSESSMENT
Pt a&ox4. Pt complaining of slight dizziness/ headache. No n/v, denies any other pain or discomfort.
Pt is asymptomatic, denies chest pain, repeated heart rate 104BPM, POC was to discharge home today

## 2021-09-07 NOTE — DISCHARGE NOTE PROVIDER - NSDCPNSUBOBJ_GEN_ALL_CORE
PT admitted for MEHDI on CKD from dehydration and HCTZ while having COVID19.  Now resolved with IVF and holding HCTZ.  OK to resume BP meds, CT head negative.  Advised strict quarantine until next Thursday which he agrees to.  Also recommended getting the vaccine after he recovers which he is not amenable to. Discharge time 45 minutes.

## 2021-09-07 NOTE — H&P ADULT - HISTORY OF PRESENT ILLNESS
35 yr old male w/ PMH of hypertension and nephrolithiasis presents w/ productive cough, fatigue, and headaches.  Pt notes he was in his usual state of health until Friday when he started feeling like he has congestion in his chest and trouble clearing it. He notes onset of cough then which has persisted to today. He denies associated sore throat, shortness of breath, chest pain. Initially, cough productive of green sputum. Also denies sinus pressure.  Pt also notes onset of R temporal-parietal headache on Friday. It feels like a "sharp" pain that lasts a few seconds but is high in intensity, rating the pain a 10/10. Denies radiation of pain elsewhere. Notes pain has been coming and going since Friday, nothing makes it worse. No photophobia. No changes in vision, no focal weakness. No prior history of headaches.   Notes today he has not eaten until he was in the ED (had a turkey sandwich) and felt dehydrated and also had reduced po intake but denies lack of appetite, N, V/D. Also denies abdominal pain. Notes in ED felt very lightheaded and dizzy but currently feels improved.   Has not taken his BP medications today.   No recent travel. No known sick contacts. Covid unvaccinated.    Also endorsed low back pain in ED. Felt like his prior pain w/ a kidney stone. No changes in urine, no inc frequency or pain w/ urination. No blood in urine. No recent trauma, falls.

## 2021-09-07 NOTE — H&P ADULT - PROBLEM SELECTOR PLAN 3
- home med: norvasc-valsartan  mg in ED  - holding valsartan and hctz due to MEHDI  - will start norvasc 5 mg; will uptitrate as pt had transient hypotension in ED in setting of infection severe, sharp pain, R parieto-temporal region of new onset. No associated symptoms. New, no prior hx of headache.   - CT head to r/o intracranial pathology  - tylenol prn pain

## 2021-09-07 NOTE — H&P ADULT - PROBLEM SELECTOR PLAN 4
DVT ppx: enoxaparin subq bid - home med: norvasc-valsartan  mg in ED  - holding valsartan and hctz due to MEHDI  - will start norvasc 5 mg; will uptitrate as pt had transient hypotension in ED in setting of infection

## 2021-09-07 NOTE — DISCHARGE NOTE PROVIDER - NSDCFUADDINST_GEN_ALL_CORE_FT
Make appointment(s) to follow up with your healthcare provider(s)  If you do not have a "kidney specialist" (nephrologist) - please follow up at the [facility] nephrology clinic (address/ phone above)  Bring all discharge paperwork including discharge medication list to follow up appointment(s)  You tested POSITIVE for COVID on 9/6/21 - you should quarantine for at least 10 days   Please consider getting the COVID Vaccine 28 days after you initially tested positive (Positive 9/6/21 so would be eligible on November 4th 2021 for vaccine) Make appointment(s) to follow up with your healthcare provider(s)  If you do not have a "kidney specialist" (nephrologist) - please follow up at the [facility] nephrology clinic (address/ phone above)  Your blood pressure medications can effect your kidney function - it is important to have them checked.  Bring all discharge paperwork including discharge medication list to follow up appointment(s)  You tested POSITIVE for COVID on 9/6/21 - you should quarantine for at least 10 days   Please consider getting the COVID Vaccine 28 days after you initially tested positive (Positive 9/6/21 so would be eligible on November 4th 2021 for vaccine)

## 2021-09-07 NOTE — H&P ADULT - NSHPLABSRESULTS_GEN_ALL_CORE
Personally reviewed labs and imaging.                          14.9   3.78  )-----------( 229      ( 06 Sep 2021 16:23 )             46.7     133<L>  |  96  |  20  ----------------------------<  152<H>  4.1   |  24  |  2.21<H>    Ca    9.8      06 Sep 2021 16:23    TPro  8.2  /  Alb  4.3  /  TBili  0.4  /  DBili  x   /  AST  35  /  ALT  40  /  AlkPhos  60  09-06    LIVER FUNCTIONS - ( 06 Sep 2021 16:23 )  Alb: 4.3 g/dL / Pro: 8.2 g/dL / ALK PHOS: 60 U/L / ALT: 40 U/L / AST: 35 U/L / GGT: x           PT/INR - ( 06 Sep 2021 16:23 )   PT: 13.7 sec;   INR: 1.15 ratio    PTT - ( 06 Sep 2021 16:23 )  PTT:29.5 sec    Urinalysis Basic - ( 06 Sep 2021 17:33 )  Color: Yellow / Appearance: Clear / S.032 / pH: x  Gluc: x / Ketone: Negative  / Bili: Negative / Urobili: Negative   Blood: x / Protein: >600 / Nitrite: Negative   Leuk Esterase: Negative / RBC: 1 /hpf / WBC 3 /HPF   Sq Epi: x / Non Sq Epi: 1 /hpf / Bacteria: Negative    CT abd/pel:   No hydroureteronephrosis or radiopaque urinary tract stone. Recommend clinical correlation to assess urinary tract infection.  Nonspecific groundglass opacities at the visualized lung bases, which can be seen in infectious (including COVID-19) and noninfectious processes. Recommend correlation with history and clinical testing for further evaluation.  Additional findings as described.    CXR: Hazy opacities within the left lower lung field, which may present atelectasis or pneumonia. Personally reviewed labs and imaging.                          14.9   3.78  )-----------( 229      ( 06 Sep 2021 16:23 )             46.7     133<L>  |  96  |  20  ----------------------------<  152<H>  4.1   |  24  |  2.21<H>    Ca    9.8      06 Sep 2021 16:23    TPro  8.2  /  Alb  4.3  /  TBili  0.4  /  DBili  x   /  AST  35  /  ALT  40  /  AlkPhos  60  09-    LIVER FUNCTIONS - ( 06 Sep 2021 16:23 )  Alb: 4.3 g/dL / Pro: 8.2 g/dL / ALK PHOS: 60 U/L / ALT: 40 U/L / AST: 35 U/L / GGT: x           PT/INR - ( 06 Sep 2021 16:23 )   PT: 13.7 sec;   INR: 1.15 ratio    PTT - ( 06 Sep 2021 16:23 )  PTT:29.5 sec    Urinalysis Basic - ( 06 Sep 2021 17:33 )  Color: Yellow / Appearance: Clear / S.032 / pH: x  Gluc: x / Ketone: Negative  / Bili: Negative / Urobili: Negative   Blood: x / Protein: >600 / Nitrite: Negative   Leuk Esterase: Negative / RBC: 1 /hpf / WBC 3 /HPF   Sq Epi: x / Non Sq Epi: 1 /hpf / Bacteria: Negative    CT abd/pel:   No hydroureteronephrosis or radiopaque urinary tract stone. Recommend clinical correlation to assess urinary tract infection.  Nonspecific groundglass opacities at the visualized lung bases, which can be seen in infectious (including COVID-19) and noninfectious processes. Recommend correlation with history and clinical testing for further evaluation.  Additional findings as described.    CXR: Hazy opacities within the left lower lung field, which may present atelectasis or pneumonia.    EKG w/ deep QRS in V1-V4, LVH pattern associated with ST segment alteration/ elevation in anterior-lateral leads V1-V4, no J point elevation, similar to EKG in 2021

## 2021-09-07 NOTE — DISCHARGE NOTE PROVIDER - NSFOLLOWUPCLINICS_GEN_ALL_ED_FT
[facility]  Nephrology  [address] [place] [address], [address] [place] [address]  Phone: [phone]  Fax:

## 2021-09-07 NOTE — H&P ADULT - PROBLEM SELECTOR PLAN 6
possible CAP w/ pt's presenting sx, unclear if only covid. Not hypoxic, but has had productive cough, chills and febrile here w/ tachycardia, meeting sepsis criteria. Pro monica elev.  - will give one time dose azithromycin/ceftriaxone for CAP coverage, consider ID consult in am

## 2021-09-07 NOTE — H&P ADULT - NSHPREVIEWOFSYSTEMS_GEN_ALL_CORE
REVIEW OF SYSTEMS:  CONSTITUTIONAL: No weakness. No fevers. No chills. No rigors. No weight loss. No night sweats. No poor appetite.  EYES: No blurry or double vision. No eye pain.  ENT: No hearing difficulty. No vertigo. No dysphagia. No sore throat. No Sinusitis/rhinorrhea.   NECK: No pain. No stiffness/rigidity.  CARDIAC: No chest pain. No palpitations. No lightheadedness. No syncope.  RESPIRATORY: + productive cough. No SOB. No hemoptysis.  GASTROINTESTINAL: No abdominal pain. No nausea. No vomiting. No hematemesis. No diarrhea. No constipation. No melena. No hematochezia.  GENITOURINARY: No dysuria. No frequency. No hesitancy. No hematuria. No oliguria.  NEUROLOGICAL: No numbness/tingling. No focal weakness. No urinary or fecal incontinence. No headache. No unsteady gait.  BACK: No back pain. No flank pain.  EXTREMITIES: No lower extremity edema. Full ROM. No joint pain.  SKIN: No rashes. No itching. No other lesions.  PSYCHIATRIC: No depression. No anxiety. No SI/HI.  ALLERGIC: No lip swelling. No hives.  All other review of systems is negative unless indicated above.  Unless indicated above, unable to assess ROS 2/2

## 2021-09-07 NOTE — DISCHARGE NOTE PROVIDER - NSDCMRMEDTOKEN_GEN_ALL_CORE_FT
amlodipine-valsartan 10 mg-320 mg oral tablet: 1 tab(s) orally once a day  hydroCHLOROthiazide 25 mg oral tablet: 1 tab(s) orally once a day   acetaminophen 325 mg oral tablet: 2 tab(s) orally every 4 hours, As needed, Temp greater or equal to 38C (100.4F)  amlodipine-valsartan 10 mg-320 mg oral tablet: 1 tab(s) orally once a day  hydroCHLOROthiazide 25 mg oral tablet: 1 tab(s) orally once a day

## 2021-09-08 ENCOUNTER — TRANSCRIPTION ENCOUNTER (OUTPATIENT)
Age: 36
End: 2021-09-08

## 2021-09-08 VITALS — TEMPERATURE: 99 F

## 2021-09-08 LAB
ANION GAP SERPL CALC-SCNC: 14 MMOL/L — SIGNIFICANT CHANGE UP (ref 5–17)
BUN SERPL-MCNC: 13 MG/DL — SIGNIFICANT CHANGE UP (ref 7–23)
CALCIUM SERPL-MCNC: 9.1 MG/DL — SIGNIFICANT CHANGE UP (ref 8.4–10.5)
CHLORIDE SERPL-SCNC: 98 MMOL/L — SIGNIFICANT CHANGE UP (ref 96–108)
CO2 SERPL-SCNC: 23 MMOL/L — SIGNIFICANT CHANGE UP (ref 22–31)
COVID-19 SPIKE DOMAIN AB INTERP: NEGATIVE — SIGNIFICANT CHANGE UP
COVID-19 SPIKE DOMAIN ANTIBODY RESULT: 0.4 U/ML — SIGNIFICANT CHANGE UP
CREAT SERPL-MCNC: 1.4 MG/DL — HIGH (ref 0.5–1.3)
GLUCOSE SERPL-MCNC: 93 MG/DL — SIGNIFICANT CHANGE UP (ref 70–99)
HCT VFR BLD CALC: 43.3 % — SIGNIFICANT CHANGE UP (ref 39–50)
HGB BLD-MCNC: 14 G/DL — SIGNIFICANT CHANGE UP (ref 13–17)
MCHC RBC-ENTMCNC: 26.1 PG — LOW (ref 27–34)
MCHC RBC-ENTMCNC: 32.3 GM/DL — SIGNIFICANT CHANGE UP (ref 32–36)
MCV RBC AUTO: 80.8 FL — SIGNIFICANT CHANGE UP (ref 80–100)
NRBC # BLD: 0 /100 WBCS — SIGNIFICANT CHANGE UP (ref 0–0)
PLATELET # BLD AUTO: 197 K/UL — SIGNIFICANT CHANGE UP (ref 150–400)
POTASSIUM SERPL-MCNC: 4.5 MMOL/L — SIGNIFICANT CHANGE UP (ref 3.5–5.3)
POTASSIUM SERPL-SCNC: 4.5 MMOL/L — SIGNIFICANT CHANGE UP (ref 3.5–5.3)
RBC # BLD: 5.36 M/UL — SIGNIFICANT CHANGE UP (ref 4.2–5.8)
RBC # FLD: 13.7 % — SIGNIFICANT CHANGE UP (ref 10.3–14.5)
SARS-COV-2 IGG+IGM SERPL QL IA: 0.4 U/ML — SIGNIFICANT CHANGE UP
SARS-COV-2 IGG+IGM SERPL QL IA: NEGATIVE — SIGNIFICANT CHANGE UP
SODIUM SERPL-SCNC: 135 MMOL/L — SIGNIFICANT CHANGE UP (ref 135–145)
WBC # BLD: 3.97 K/UL — SIGNIFICANT CHANGE UP (ref 3.8–10.5)
WBC # FLD AUTO: 3.97 K/UL — SIGNIFICANT CHANGE UP (ref 3.8–10.5)

## 2021-09-08 PROCEDURE — 99239 HOSP IP/OBS DSCHRG MGMT >30: CPT

## 2021-09-08 RX ORDER — ACETAMINOPHEN 500 MG
1000 TABLET ORAL ONCE
Refills: 0 | Status: COMPLETED | OUTPATIENT
Start: 2021-09-08 | End: 2021-09-08

## 2021-09-08 RX ADMIN — Medication 1000 MILLIGRAM(S): at 06:53

## 2021-09-08 RX ADMIN — AMLODIPINE BESYLATE 10 MILLIGRAM(S): 2.5 TABLET ORAL at 05:27

## 2021-09-08 RX ADMIN — Medication 650 MILLIGRAM(S): at 12:55

## 2021-09-08 RX ADMIN — Medication 400 MILLIGRAM(S): at 05:27

## 2021-09-08 RX ADMIN — ENOXAPARIN SODIUM 40 MILLIGRAM(S): 100 INJECTION SUBCUTANEOUS at 05:27

## 2021-09-08 RX ADMIN — Medication 650 MILLIGRAM(S): at 12:25

## 2021-09-08 NOTE — DISCHARGE NOTE NURSING/CASE MANAGEMENT/SOCIAL WORK - PATIENT PORTAL LINK FT
You can access the FollowMyHealth Patient Portal offered by [facility] by registering at the following website: [email or web address]. By joining [employer]’s FollowMyHealth portal, you will also be able to view your health information using other applications (apps) compatible with our system.

## 2021-09-08 NOTE — CHART NOTE - NSCHARTNOTEFT_GEN_A_CORE
Medicine PA Fever Note    Notified by RN patient with temperature of 102F orally. Pt is without complaints at this time. Patient is alert, NAD. Denies HA, CP, SOB, cough, N/V, or abd pain. Of note, patient with low grade fever yesterday (9/7) to 100.2F.    VITAL SIGNS:  T(C): 38.9 (09-08-21 @ 04:43), Max: 38.9 (09-08-21 @ 04:43)  HR: 104 (09-08-21 @ 04:43) (86 - 187)  BP: 157/90 (09-08-21 @ 04:43) (140/93 - 157/90)  RR: 18 (09-08-21 @ 04:43) (18 - 18)  SpO2: 97% (09-08-21 @ 04:43) (97% - 98%)  Wt(kg): --      LABORATORY:                        14.2   4.31  )-----------( 214      ( 07 Sep 2021 07:00 )             44.1       09-07    138  |  98  |  18  ----------------------------<  97  3.7   |  28  |  1.56<H>    Ca    9.1      07 Sep 2021 13:42  Phos  3.1     09-07  Mg     2.0     09-07    TPro  8.2  /  Alb  4.3  /  TBili  0.4  /  DBili  x   /  AST  35  /  ALT  40  /  AlkPhos  60  09-06      MICROBIOLOGY:   Culture - Blood (09.06.21 @ 21:02)   Specimen Source: .Blood Blood-Peripheral   Culture Results: No growth to date.     `Culture - Urine (09.06.21 @ 21:06)   Specimen Source: Clean Catch Clean Catch (Midstream)   Culture Results: No growth     RADIOLOGY:  `< from: Xray Chest 1 View- PORTABLE-Urgent (09.06.21 @ 16:46) >    IMPRESSION:  Clear lungs.    < end of copied text >      PHYSICAL EXAM:  GENERAL: Alert. Not confused. No acute distress. Not thin. Not cachectic. Not obese.  CARDIAC: Mildly tachycardic. Regular rhythm. Not irregularly irregular. S1. S2. No murmur. No rub. No distant heart sounds.  LUNG/CHEST: CTABL. BS equal bilaterally. No wheezes. No rales. No rhonchi.  ABDOMEN: Soft. No tenderness. No distension. No fluid wave. Normal bowel sounds.  BACK: No midline/vertebral tenderness. No flank tenderness.  VASCULAR: +2 b/l radial pulses. Palpable DP pulses.  EXTREMITIES:  No clubbing. No cyanosis. No edema. Moving all 4.  NEUROLOGY: A&Ox3. Non-focal exam. Cranial nerves intact. Normal speech. Sensation intact.  SKIN: No jaundice. No erythema. No rash/lesion.      ASSESSMENT/PLAN:   HPI:  35 yr old male w/ PMH of hypertension and nephrolithiasis presents w/ productive cough, fatigue, and headaches. Found to be COVID positive on admission. Patient now with fever to 102F.     1. Fever d/t COVID-19 infection  - Tylenol and cooling measures PRN for pyrexia  - BC x2 sent at 9:30PM on 9/6 with no growth to date, UA/UC from 9/6 with no growth  - CXR reviewed from 9/6 showing clear lungs  - Will continue to monitor patient overnight  - F/U primary team in [place] Basilio PA-C   Medicine  [id number] Medicine PA Fever Note    Notified by RN patient with temperature of 102F orally. Pt is without complaints at this time. Patient is alert, NAD. Denies HA, CP, SOB, cough, N/V, or abd pain. Of note, patient with low grade fever yesterday (9/7) to 100.2F.    VITAL SIGNS:  T(C): 38.9 (09-08-21 @ 04:43), Max: 38.9 (09-08-21 @ 04:43)  HR: 104 (09-08-21 @ 04:43) (86 - 187)  BP: 157/90 (09-08-21 @ 04:43) (140/93 - 157/90)  RR: 18 (09-08-21 @ 04:43) (18 - 18)  SpO2: 97% (09-08-21 @ 04:43) (97% - 98%)  Wt(kg): --      LABORATORY:                        14.2   4.31  )-----------( 214      ( 07 Sep 2021 07:00 )             44.1       09-07    138  |  98  |  18  ----------------------------<  97  3.7   |  28  |  1.56<H>    Ca    9.1      07 Sep 2021 13:42  Phos  3.1     09-07  Mg     2.0     09-07    TPro  8.2  /  Alb  4.3  /  TBili  0.4  /  DBili  x   /  AST  35  /  ALT  40  /  AlkPhos  60  09-06      MICROBIOLOGY:   Culture - Blood (09.06.21 @ 21:02)   Specimen Source: .Blood Blood-Peripheral   Culture Results: No growth to date.     `Culture - Urine (09.06.21 @ 21:06)   Specimen Source: Clean Catch Clean Catch (Midstream)   Culture Results: No growth     RADIOLOGY:  `< from: Xray Chest 1 View- PORTABLE-Urgent (09.06.21 @ 16:46) >    IMPRESSION:  Clear lungs.    < end of copied text >      PHYSICAL EXAM:  GENERAL: Alert. Not confused. No acute distress. Not thin. Not cachectic. Not obese.  CARDIAC: Mildly tachycardic. Regular rhythm. Not irregularly irregular. S1. S2. No murmur. No rub. No distant heart sounds.  LUNG/CHEST: CTABL. BS equal bilaterally. No wheezes. No rales. No rhonchi.  ABDOMEN: Soft. No tenderness. No distension. No fluid wave. Normal bowel sounds.  BACK: No midline/vertebral tenderness. No flank tenderness.  VASCULAR: +2 b/l radial pulses. Palpable DP pulses.  EXTREMITIES:  No clubbing. No cyanosis. No edema. Moving all 4.  NEUROLOGY: A&Ox3. Non-focal exam. Cranial nerves intact. Normal speech. Sensation intact.  SKIN: No jaundice. No erythema. No rash/lesion.      ASSESSMENT/PLAN:   HPI:  35 yr old male w/ PMH of hypertension and nephrolithiasis presents w/ productive cough, fatigue, and headaches. Found to be COVID positive on admission. Patient now with fever to 102F.     1. Fever d/t COVID-19 infection  - Tylenol and cooling measures PRN for pyrexia  - BC x2 sent at 9:00PM on 9/6 with no growth to date, UA/UC from 9/6 with no growth  - CXR reviewed from 9/6 showing clear lungs  - Will continue to monitor patient overnight  - F/U primary team in [place] Basilio PA-C   Medicine  [id number] Medicine PA Fever Note    Notified by RN patient with temperature of 102F orally. Pt is without complaints at this time. Patient is alert, NAD. Denies HA, CP, SOB, cough, N/V, or abd pain. Additional vital signs stable. Of note, patient with low grade fever yesterday (9/7) to 100.2F.    VITAL SIGNS:  T(C): 38.9 (09-08-21 @ 04:43), Max: 38.9 (09-08-21 @ 04:43)  HR: 104 (09-08-21 @ 04:43) (86 - 187)  BP: 157/90 (09-08-21 @ 04:43) (140/93 - 157/90)  RR: 18 (09-08-21 @ 04:43) (18 - 18)  SpO2: 97% (09-08-21 @ 04:43) (97% - 98%)  Wt(kg): --      LABORATORY:                        14.2   4.31  )-----------( 214      ( 07 Sep 2021 07:00 )             44.1       09-07    138  |  98  |  18  ----------------------------<  97  3.7   |  28  |  1.56<H>    Ca    9.1      07 Sep 2021 13:42  Phos  3.1     09-07  Mg     2.0     09-07    TPro  8.2  /  Alb  4.3  /  TBili  0.4  /  DBili  x   /  AST  35  /  ALT  40  /  AlkPhos  60  09-06      MICROBIOLOGY:   Culture - Blood (09.06.21 @ 21:02)   Specimen Source: .Blood Blood-Peripheral   Culture Results: No growth to date.     `Culture - Urine (09.06.21 @ 21:06)   Specimen Source: Clean Catch Clean Catch (Midstream)   Culture Results: No growth     RADIOLOGY:  `< from: Xray Chest 1 View- PORTABLE-Urgent (09.06.21 @ 16:46) >    IMPRESSION:  Clear lungs.    < end of copied text >      PHYSICAL EXAM:  GENERAL: Alert. Not confused. No acute distress. Not thin. Not cachectic. Not obese.  CARDIAC: Mildly tachycardic. Regular rhythm. Not irregularly irregular. S1. S2. No murmur. No rub. No distant heart sounds.  LUNG/CHEST: CTABL. BS equal bilaterally. No wheezes. No rales. No rhonchi.  ABDOMEN: Soft. No tenderness. No distension. No fluid wave. Normal bowel sounds.  BACK: No midline/vertebral tenderness. No flank tenderness.  VASCULAR: +2 b/l radial pulses. Palpable DP pulses.  EXTREMITIES:  No clubbing. No cyanosis. No edema. Moving all 4.  NEUROLOGY: A&Ox3. Non-focal exam. Cranial nerves intact. Normal speech. Sensation intact.  SKIN: No jaundice. No erythema. No rash/lesion.      ASSESSMENT/PLAN:   HPI:  35 yr old male w/ PMH of hypertension and nephrolithiasis presents w/ productive cough, fatigue, and headaches. Found to be COVID positive on admission. Patient now with fever to 102F.     1. Fever d/t COVID-19 infection  - Tylenol and cooling measures PRN for pyrexia  - BC x2 sent at 9:00PM on 9/6 with no growth to date, UA/UC from 9/6 with no growth  - CXR reviewed from 9/6 showing clear lungs  - Will continue to monitor patient overnight  - F/U primary team in AM    Breanna Basilio PA-C   Medicine  [id number]

## 2021-09-09 ENCOUNTER — TRANSCRIPTION ENCOUNTER (OUTPATIENT)
Age: 36
End: 2021-09-09

## 2021-09-09 PROCEDURE — 85014 HEMATOCRIT: CPT

## 2021-09-09 PROCEDURE — 85610 PROTHROMBIN TIME: CPT

## 2021-09-09 PROCEDURE — 85018 HEMOGLOBIN: CPT

## 2021-09-09 PROCEDURE — 83735 ASSAY OF MAGNESIUM: CPT

## 2021-09-09 PROCEDURE — 82962 GLUCOSE BLOOD TEST: CPT

## 2021-09-09 PROCEDURE — G0378: CPT

## 2021-09-09 PROCEDURE — 83605 ASSAY OF LACTIC ACID: CPT

## 2021-09-09 PROCEDURE — 84156 ASSAY OF PROTEIN URINE: CPT

## 2021-09-09 PROCEDURE — 85379 FIBRIN DEGRADATION QUANT: CPT

## 2021-09-09 PROCEDURE — 84295 ASSAY OF SERUM SODIUM: CPT

## 2021-09-09 PROCEDURE — 83036 HEMOGLOBIN GLYCOSYLATED A1C: CPT

## 2021-09-09 PROCEDURE — 85027 COMPLETE CBC AUTOMATED: CPT

## 2021-09-09 PROCEDURE — 82550 ASSAY OF CK (CPK): CPT

## 2021-09-09 PROCEDURE — 87040 BLOOD CULTURE FOR BACTERIA: CPT

## 2021-09-09 PROCEDURE — 82435 ASSAY OF BLOOD CHLORIDE: CPT

## 2021-09-09 PROCEDURE — 82570 ASSAY OF URINE CREATININE: CPT

## 2021-09-09 PROCEDURE — 84300 ASSAY OF URINE SODIUM: CPT

## 2021-09-09 PROCEDURE — 87086 URINE CULTURE/COLONY COUNT: CPT

## 2021-09-09 PROCEDURE — 74176 CT ABD & PELVIS W/O CONTRAST: CPT | Mod: MA

## 2021-09-09 PROCEDURE — 99285 EMERGENCY DEPT VISIT HI MDM: CPT | Mod: 25

## 2021-09-09 PROCEDURE — 84145 PROCALCITONIN (PCT): CPT

## 2021-09-09 PROCEDURE — 83615 LACTATE (LD) (LDH) ENZYME: CPT

## 2021-09-09 PROCEDURE — 93005 ELECTROCARDIOGRAM TRACING: CPT

## 2021-09-09 PROCEDURE — 84540 ASSAY OF URINE/UREA-N: CPT

## 2021-09-09 PROCEDURE — 82330 ASSAY OF CALCIUM: CPT

## 2021-09-09 PROCEDURE — 82947 ASSAY GLUCOSE BLOOD QUANT: CPT

## 2021-09-09 PROCEDURE — 80053 COMPREHEN METABOLIC PANEL: CPT

## 2021-09-09 PROCEDURE — 96374 THER/PROPH/DIAG INJ IV PUSH: CPT

## 2021-09-09 PROCEDURE — 80048 BASIC METABOLIC PNL TOTAL CA: CPT

## 2021-09-09 PROCEDURE — 82553 CREATINE MB FRACTION: CPT

## 2021-09-09 PROCEDURE — 71045 X-RAY EXAM CHEST 1 VIEW: CPT

## 2021-09-09 PROCEDURE — 84132 ASSAY OF SERUM POTASSIUM: CPT

## 2021-09-09 PROCEDURE — 84100 ASSAY OF PHOSPHORUS: CPT

## 2021-09-09 PROCEDURE — 85025 COMPLETE CBC W/AUTO DIFF WBC: CPT

## 2021-09-09 PROCEDURE — 83935 ASSAY OF URINE OSMOLALITY: CPT

## 2021-09-09 PROCEDURE — 81001 URINALYSIS AUTO W/SCOPE: CPT

## 2021-09-09 PROCEDURE — 85730 THROMBOPLASTIN TIME PARTIAL: CPT

## 2021-09-09 PROCEDURE — 82803 BLOOD GASES ANY COMBINATION: CPT

## 2021-09-09 PROCEDURE — 84484 ASSAY OF TROPONIN QUANT: CPT

## 2021-09-09 PROCEDURE — 86140 C-REACTIVE PROTEIN: CPT

## 2021-09-09 PROCEDURE — 83690 ASSAY OF LIPASE: CPT

## 2021-09-09 PROCEDURE — 0225U NFCT DS DNA&RNA 21 SARSCOV2: CPT

## 2021-09-09 PROCEDURE — 70450 CT HEAD/BRAIN W/O DYE: CPT

## 2021-09-09 PROCEDURE — 86769 SARS-COV-2 COVID-19 ANTIBODY: CPT

## 2021-09-11 LAB
CULTURE RESULTS: SIGNIFICANT CHANGE UP
CULTURE RESULTS: SIGNIFICANT CHANGE UP
SPECIMEN SOURCE: SIGNIFICANT CHANGE UP
SPECIMEN SOURCE: SIGNIFICANT CHANGE UP

## 2021-09-17 ENCOUNTER — TRANSCRIPTION ENCOUNTER (OUTPATIENT)
Age: 36
End: 2021-09-17

## 2022-09-16 ENCOUNTER — EMERGENCY (EMERGENCY)
Facility: HOSPITAL | Age: 37
LOS: 1 days | Discharge: HOME | End: 2022-09-16
Attending: EMERGENCY MEDICINE | Admitting: EMERGENCY MEDICINE

## 2022-09-16 VITALS
SYSTOLIC BLOOD PRESSURE: 215 MMHG | HEIGHT: 72.1 IN | DIASTOLIC BLOOD PRESSURE: 148 MMHG | RESPIRATION RATE: 18 BRPM | TEMPERATURE: 99 F | OXYGEN SATURATION: 100 % | HEART RATE: 97 BPM

## 2022-09-16 PROCEDURE — 99284 EMERGENCY DEPT VISIT MOD MDM: CPT | Mod: 25

## 2022-09-16 PROCEDURE — 93010 ELECTROCARDIOGRAM REPORT: CPT

## 2022-09-16 RX ORDER — METOPROLOL TARTRATE 50 MG
25 TABLET ORAL ONCE
Refills: 0 | Status: COMPLETED | OUTPATIENT
Start: 2022-09-16 | End: 2022-09-16

## 2022-09-16 RX ADMIN — Medication 25 MILLIGRAM(S): at 23:54

## 2022-09-16 NOTE — ED PROVIDER NOTE - PATIENT PORTAL LINK FT
You can access the FollowMyHealth Patient Portal offered by [facility] by registering at the following website: [email or web address]. By joining [employer]’s FollowMyHealth portal, you will also be able to view your health information using other applications (apps) compatible with our system.

## 2022-09-16 NOTE — ED PROVIDER NOTE - NSFOLLOWUPINSTRUCTIONS_ED_ALL_ED_FT
Viral Respiratory Infection    A viral respiratory infection is an illness that affects parts of the body used for breathing, like the lungs, nose, and throat. It is caused by a germ called a virus. Symptoms can include runny nose, coughing, sneezing, fatigue, body aches, sore throat, fever, or headache. Over the counter medicine can be used to manage the symptoms but the infection typically goes away on its own in 5 to 10 days.     SEEK IMMEDIATE MEDICAL CARE IF YOU HAVE ANY OF THE FOLLOWING SYMPTOMS: shortness of breath, chest pain, fever over 10 days, or lightheadedness/dizziness. The results of any blood tests and imaging studies completed during your visit today were discussed and explained to you and a copy provided with your discharge instructions. Please follow up with your primary care doctor within 48 hours.     Hypertension    Hypertension, commonly called high blood pressure, is when the force of blood pumping through your arteries is too strong. Hypertension forces your heart to work harder to pump blood. Your arteries may become narrow or stiff. Having untreated or uncontrolled hypertension for a long period of time can cause heart attack, stroke, kidney disease, and other problems. If started on a medication, take exactly as prescribed by your health care professional. Maintain a healthy lifestyle and follow up with your primary care physician.    SEEK IMMEDIATE MEDICAL CARE IF YOU HAVE ANY OF THE FOLLOWING SYMPTOMS: severe headache, confusion, chest pain, abdominal pain, vomiting, or shortness of breath.

## 2022-09-16 NOTE — ED PROVIDER NOTE - OBJECTIVE STATEMENT
37 y/o male w/ PMH HTN c/o 2 day history of generalized body aches, nausea, fevers, and chills. Denies vomiting, dizziness, chest pain, SOB, abdominal pain, dysuria, hematuria. Pt has been non-compliant w/ his BP meds.

## 2022-09-16 NOTE — ED PROVIDER NOTE - CLINICAL SUMMARY MEDICAL DECISION MAKING FREE TEXT BOX
37 y/o male w/ PMH HTN c/o 2 day history of generalized body aches, nausea, fevers, and chills. Likely URI/COVID. Will screen for PNA, provide tylenol/motrin, RVP, and provide BP meds w/ likely d/c w/ close PCP f/u.

## 2022-09-16 NOTE — ED PROVIDER NOTE - ATTENDING CONTRIBUTION TO CARE
Afebrile. Awake and Alert. Lungs CTA. Heart RRR. Abdomen soft NTND. CN II-XII grossly intact. Moves all extremities without lateralization.    Subjective fever, no clear source by Sx  r/o viral syndrome  r/o PNA    HTN  No CP, HA, SOB, no clinical signs of urgency  Pt uncertain about BP meds, per system has been prescribe amlodipine, valsartan, HCTZ in past

## 2022-09-16 NOTE — ED ADULT NURSE NOTE - OBJECTIVE STATEMENT
Pt arrives to ED rm 28 A&Ox4 and ambulatory c/o headaches and generalized body aches for the past week. Pt endorses 2 episodes of vomiting in the last 24 hours, pt is not actively vomiting at this point in time, abd is nontender and nondistended upon palpation. Pt is noted to be hypertensive, has not taken medication in several months, NSR on cardiac monitor. Pt denies blurred vision, sick contact, chest pain, SOB. NSR on cardiac monitor. Respirations are even and unlabored.

## 2022-09-16 NOTE — ED PROVIDER NOTE - PROGRESS NOTE DETAILS
Grey DEUTSCH: Pt is stable and ready for discharge. Strict return precautions given. All questions answered.

## 2022-09-16 NOTE — ED PROVIDER NOTE - NS ED ROS FT
GENERAL: + fever or chills  EYES: no change in vision   HEENT: no trouble swallowing or speaking   CARDIAC: no chest pain   PULMONARY: no cough or SOB  GI:  No abdominal pain  : No changes in urination   SKIN: no rashes   NEURO: no headache   MSK: No joint pain. +Body aches    All other ROS negative unless otherwise specified in HPI.

## 2022-09-16 NOTE — ED PROVIDER NOTE - PHYSICAL EXAMINATION
GENERAL: NAD  HEENT:  Atraumatic  CHEST/LUNG: Chest rise equal bilaterally  HEART: Regular rate and rhythm  ABDOMEN: Soft, Nontender, Nondistended  EXTREMITIES:  Extremities warm  PSYCH: A&Ox3  SKIN: No obvious rashes or lesions  NEUROLOGY: strength and sensation intact in all extremities. CN 2 - 12 intact. Finger to nose test intact. No pronator drift. Ambulatory without difficulty.

## 2022-09-16 NOTE — ED ADULT TRIAGE NOTE - CHIEF COMPLAINT QUOTE
general body aches    pt c/o generalized body aches and subjective fever for 2 days.  denies sob, cp, abd pain, n/v/d/.  pmhx htn and hi chol.  is covid vaccinated.. hasn't taken meds for 2-3 months.  bp 215/148

## 2022-09-17 VITALS — SYSTOLIC BLOOD PRESSURE: 222 MMHG | DIASTOLIC BLOOD PRESSURE: 154 MMHG

## 2022-09-17 PROBLEM — I10 ESSENTIAL (PRIMARY) HYPERTENSION: Chronic | Status: ACTIVE | Noted: 2021-09-07

## 2022-09-17 LAB
ALBUMIN SERPL ELPH-MCNC: 4.5 G/DL — SIGNIFICANT CHANGE UP (ref 3.3–5)
ALP SERPL-CCNC: 82 U/L — SIGNIFICANT CHANGE UP (ref 40–120)
ALT FLD-CCNC: 20 U/L — SIGNIFICANT CHANGE UP (ref 4–41)
ANION GAP SERPL CALC-SCNC: 14 MMOL/L — SIGNIFICANT CHANGE UP (ref 7–14)
APPEARANCE UR: CLEAR — SIGNIFICANT CHANGE UP
AST SERPL-CCNC: 21 U/L — SIGNIFICANT CHANGE UP (ref 4–40)
B PERT DNA SPEC QL NAA+PROBE: SIGNIFICANT CHANGE UP
B PERT+PARAPERT DNA PNL SPEC NAA+PROBE: SIGNIFICANT CHANGE UP
BACTERIA # UR AUTO: NEGATIVE — SIGNIFICANT CHANGE UP
BASOPHILS # BLD AUTO: 0.02 K/UL — SIGNIFICANT CHANGE UP (ref 0–0.2)
BASOPHILS NFR BLD AUTO: 0.2 % — SIGNIFICANT CHANGE UP (ref 0–2)
BILIRUB SERPL-MCNC: 0.7 MG/DL — SIGNIFICANT CHANGE UP (ref 0.2–1.2)
BILIRUB UR-MCNC: NEGATIVE — SIGNIFICANT CHANGE UP
BORDETELLA PARAPERTUSSIS (RAPRVP): SIGNIFICANT CHANGE UP
BUN SERPL-MCNC: 22 MG/DL — SIGNIFICANT CHANGE UP (ref 7–23)
C PNEUM DNA SPEC QL NAA+PROBE: SIGNIFICANT CHANGE UP
CALCIUM SERPL-MCNC: 9.8 MG/DL — SIGNIFICANT CHANGE UP (ref 8.4–10.5)
CHLORIDE SERPL-SCNC: 94 MMOL/L — LOW (ref 98–107)
CO2 SERPL-SCNC: 24 MMOL/L — SIGNIFICANT CHANGE UP (ref 22–31)
COLOR SPEC: YELLOW — SIGNIFICANT CHANGE UP
CREAT SERPL-MCNC: 1.59 MG/DL — HIGH (ref 0.5–1.3)
DIFF PNL FLD: ABNORMAL
EGFR: 57 ML/MIN/1.73M2 — LOW
EOSINOPHIL # BLD AUTO: 0 K/UL — SIGNIFICANT CHANGE UP (ref 0–0.5)
EOSINOPHIL NFR BLD AUTO: 0 % — SIGNIFICANT CHANGE UP (ref 0–6)
EPI CELLS # UR: 8 /HPF — HIGH (ref 0–5)
FLUAV SUBTYP SPEC NAA+PROBE: SIGNIFICANT CHANGE UP
FLUBV RNA SPEC QL NAA+PROBE: SIGNIFICANT CHANGE UP
GLUCOSE SERPL-MCNC: 110 MG/DL — HIGH (ref 70–99)
GLUCOSE UR QL: NEGATIVE — SIGNIFICANT CHANGE UP
HADV DNA SPEC QL NAA+PROBE: SIGNIFICANT CHANGE UP
HCOV 229E RNA SPEC QL NAA+PROBE: SIGNIFICANT CHANGE UP
HCOV HKU1 RNA SPEC QL NAA+PROBE: SIGNIFICANT CHANGE UP
HCOV NL63 RNA SPEC QL NAA+PROBE: SIGNIFICANT CHANGE UP
HCOV OC43 RNA SPEC QL NAA+PROBE: SIGNIFICANT CHANGE UP
HCT VFR BLD CALC: 48.8 % — SIGNIFICANT CHANGE UP (ref 39–50)
HGB BLD-MCNC: 15.8 G/DL — SIGNIFICANT CHANGE UP (ref 13–17)
HMPV RNA SPEC QL NAA+PROBE: SIGNIFICANT CHANGE UP
HPIV1 RNA SPEC QL NAA+PROBE: SIGNIFICANT CHANGE UP
HPIV2 RNA SPEC QL NAA+PROBE: SIGNIFICANT CHANGE UP
HPIV3 RNA SPEC QL NAA+PROBE: SIGNIFICANT CHANGE UP
HPIV4 RNA SPEC QL NAA+PROBE: SIGNIFICANT CHANGE UP
HYALINE CASTS # UR AUTO: 1 /LPF — SIGNIFICANT CHANGE UP (ref 0–7)
IANC: 8.43 K/UL — HIGH (ref 1.8–7.4)
IMM GRANULOCYTES NFR BLD AUTO: 0.4 % — SIGNIFICANT CHANGE UP (ref 0–0.9)
KETONES UR-MCNC: ABNORMAL
LEUKOCYTE ESTERASE UR-ACNC: NEGATIVE — SIGNIFICANT CHANGE UP
LYMPHOCYTES # BLD AUTO: 1.67 K/UL — SIGNIFICANT CHANGE UP (ref 1–3.3)
LYMPHOCYTES # BLD AUTO: 14.9 % — SIGNIFICANT CHANGE UP (ref 13–44)
M PNEUMO DNA SPEC QL NAA+PROBE: SIGNIFICANT CHANGE UP
MCHC RBC-ENTMCNC: 26.6 PG — LOW (ref 27–34)
MCHC RBC-ENTMCNC: 32.4 GM/DL — SIGNIFICANT CHANGE UP (ref 32–36)
MCV RBC AUTO: 82.3 FL — SIGNIFICANT CHANGE UP (ref 80–100)
MONOCYTES # BLD AUTO: 1.05 K/UL — HIGH (ref 0–0.9)
MONOCYTES NFR BLD AUTO: 9.4 % — SIGNIFICANT CHANGE UP (ref 2–14)
NEUTROPHILS # BLD AUTO: 8.43 K/UL — HIGH (ref 1.8–7.4)
NEUTROPHILS NFR BLD AUTO: 75.1 % — SIGNIFICANT CHANGE UP (ref 43–77)
NITRITE UR-MCNC: NEGATIVE — SIGNIFICANT CHANGE UP
NRBC # BLD: 0 /100 WBCS — SIGNIFICANT CHANGE UP (ref 0–0)
NRBC # FLD: 0 K/UL — SIGNIFICANT CHANGE UP (ref 0–0)
PH UR: 6.5 — SIGNIFICANT CHANGE UP (ref 5–8)
PLATELET # BLD AUTO: 331 K/UL — SIGNIFICANT CHANGE UP (ref 150–400)
POTASSIUM SERPL-MCNC: 4 MMOL/L — SIGNIFICANT CHANGE UP (ref 3.5–5.3)
POTASSIUM SERPL-SCNC: 4 MMOL/L — SIGNIFICANT CHANGE UP (ref 3.5–5.3)
PROT SERPL-MCNC: 8.9 G/DL — HIGH (ref 6–8.3)
PROT UR-MCNC: ABNORMAL
RAPID RVP RESULT: SIGNIFICANT CHANGE UP
RBC # BLD: 5.93 M/UL — HIGH (ref 4.2–5.8)
RBC # FLD: 13.7 % — SIGNIFICANT CHANGE UP (ref 10.3–14.5)
RBC CASTS # UR COMP ASSIST: 21 /HPF — HIGH (ref 0–4)
RSV RNA SPEC QL NAA+PROBE: SIGNIFICANT CHANGE UP
RV+EV RNA SPEC QL NAA+PROBE: SIGNIFICANT CHANGE UP
SARS-COV-2 RNA SPEC QL NAA+PROBE: SIGNIFICANT CHANGE UP
SODIUM SERPL-SCNC: 132 MMOL/L — LOW (ref 135–145)
SP GR SPEC: 1.03 — SIGNIFICANT CHANGE UP (ref 1.01–1.05)
UROBILINOGEN FLD QL: ABNORMAL
WBC # BLD: 11.21 K/UL — HIGH (ref 3.8–10.5)
WBC # FLD AUTO: 11.21 K/UL — HIGH (ref 3.8–10.5)
WBC UR QL: 3 /HPF — SIGNIFICANT CHANGE UP (ref 0–5)

## 2022-09-17 PROCEDURE — 71046 X-RAY EXAM CHEST 2 VIEWS: CPT | Mod: 26

## 2022-09-17 RX ORDER — AMLODIPINE AND VALSARTAN 5; 320 MG/1; MG/1
1 TABLET, FILM COATED ORAL
Qty: 14 | Refills: 0
Start: 2022-09-17 | End: 2022-09-30

## 2022-09-17 RX ORDER — VALSARTAN 80 MG/1
320 TABLET ORAL ONCE
Refills: 0 | Status: COMPLETED | OUTPATIENT
Start: 2022-09-17 | End: 2022-09-17

## 2022-09-17 RX ORDER — AMLODIPINE AND VALSARTAN 5; 320 MG/1; MG/1
1 TABLET, FILM COATED ORAL
Qty: 0 | Refills: 0 | DISCHARGE

## 2022-09-17 RX ORDER — AMLODIPINE BESYLATE 2.5 MG/1
10 TABLET ORAL ONCE
Refills: 0 | Status: COMPLETED | OUTPATIENT
Start: 2022-09-17 | End: 2022-09-17

## 2022-09-17 RX ORDER — HYDROCHLOROTHIAZIDE 25 MG
25 TABLET ORAL ONCE
Refills: 0 | Status: COMPLETED | OUTPATIENT
Start: 2022-09-17 | End: 2022-09-17

## 2022-09-17 RX ADMIN — Medication 25 MILLIGRAM(S): at 02:48

## 2022-09-17 RX ADMIN — AMLODIPINE BESYLATE 10 MILLIGRAM(S): 2.5 TABLET ORAL at 02:48

## 2022-09-17 RX ADMIN — VALSARTAN 320 MILLIGRAM(S): 80 TABLET ORAL at 02:49

## 2022-09-17 NOTE — ED ADULT NURSE REASSESSMENT NOTE - NS ED NURSE REASSESS COMMENT FT1
Upon reassessment of BP, pt remains hypertensive. MD Edmonds and MD Vora notified of updated BP. Pt denies chest pain, headache, SOB, blurred vision. Respirations are even and unlabored. Pending dispo
Pt resting comfortably in stretcher, vitals as charted. Pt noted to be hypertensive, MD Edmonds notified. Pt is NSR on cardiac monitor, respirations are even and unlabored. Pt medicated as ordered

## 2022-09-18 ENCOUNTER — INPATIENT (INPATIENT)
Facility: HOSPITAL | Age: 37
LOS: 7 days | Discharge: HOME | End: 2022-09-26
Attending: NEUROLOGICAL SURGERY | Admitting: NEUROLOGICAL SURGERY
Payer: COMMERCIAL

## 2022-09-18 VITALS
TEMPERATURE: 96 F | SYSTOLIC BLOOD PRESSURE: 169 MMHG | HEIGHT: 72.1 IN | OXYGEN SATURATION: 96 % | DIASTOLIC BLOOD PRESSURE: 109 MMHG | RESPIRATION RATE: 18 BRPM | HEART RATE: 110 BPM

## 2022-09-18 DIAGNOSIS — I10 ESSENTIAL (PRIMARY) HYPERTENSION: ICD-10-CM

## 2022-09-18 DIAGNOSIS — I61.5 NONTRAUMATIC INTRACEREBRAL HEMORRHAGE, INTRAVENTRICULAR: ICD-10-CM

## 2022-09-18 DIAGNOSIS — I61.0 NONTRAUMATIC INTRACEREBRAL HEMORRHAGE IN HEMISPHERE, SUBCORTICAL: ICD-10-CM

## 2022-09-18 DIAGNOSIS — G91.9 HYDROCEPHALUS, UNSPECIFIED: ICD-10-CM

## 2022-09-18 DIAGNOSIS — E78.5 HYPERLIPIDEMIA, UNSPECIFIED: ICD-10-CM

## 2022-09-18 LAB
ALBUMIN SERPL ELPH-MCNC: 4.6 G/DL — SIGNIFICANT CHANGE UP (ref 3.3–5)
ALP SERPL-CCNC: 93 U/L — SIGNIFICANT CHANGE UP (ref 40–120)
ALT FLD-CCNC: 41 U/L — SIGNIFICANT CHANGE UP (ref 4–41)
ANION GAP SERPL CALC-SCNC: 14 MMOL/L — SIGNIFICANT CHANGE UP (ref 7–14)
APTT BLD: 27 SEC — SIGNIFICANT CHANGE UP (ref 27–36.3)
APTT BLD: 27.4 SEC — SIGNIFICANT CHANGE UP (ref 27–36.3)
AST SERPL-CCNC: 25 U/L — SIGNIFICANT CHANGE UP (ref 4–40)
B PERT DNA SPEC QL NAA+PROBE: SIGNIFICANT CHANGE UP
B PERT+PARAPERT DNA PNL SPEC NAA+PROBE: SIGNIFICANT CHANGE UP
BASE EXCESS BLDV CALC-SCNC: 4.3 MMOL/L — HIGH (ref -2–3)
BASOPHILS # BLD AUTO: 0.03 K/UL — SIGNIFICANT CHANGE UP (ref 0–0.2)
BASOPHILS NFR BLD AUTO: 0.2 % — SIGNIFICANT CHANGE UP (ref 0–2)
BILIRUB SERPL-MCNC: 1.2 MG/DL — SIGNIFICANT CHANGE UP (ref 0.2–1.2)
BLOOD GAS VENOUS COMPREHENSIVE RESULT: SIGNIFICANT CHANGE UP
BORDETELLA PARAPERTUSSIS (RAPRVP): SIGNIFICANT CHANGE UP
BUN SERPL-MCNC: 33 MG/DL — HIGH (ref 7–23)
C PNEUM DNA SPEC QL NAA+PROBE: SIGNIFICANT CHANGE UP
CALCIUM SERPL-MCNC: 10.1 MG/DL — SIGNIFICANT CHANGE UP (ref 8.4–10.5)
CHLORIDE BLDV-SCNC: 98 MMOL/L — SIGNIFICANT CHANGE UP (ref 96–108)
CHLORIDE SERPL-SCNC: 99 MMOL/L — SIGNIFICANT CHANGE UP (ref 98–107)
CO2 BLDV-SCNC: 31.9 MMOL/L — HIGH (ref 22–26)
CO2 SERPL-SCNC: 25 MMOL/L — SIGNIFICANT CHANGE UP (ref 22–31)
CREAT SERPL-MCNC: 2.26 MG/DL — HIGH (ref 0.5–1.3)
CULTURE RESULTS: SIGNIFICANT CHANGE UP
EGFR: 38 ML/MIN/1.73M2 — LOW
EOSINOPHIL # BLD AUTO: 0.01 K/UL — SIGNIFICANT CHANGE UP (ref 0–0.5)
EOSINOPHIL NFR BLD AUTO: 0.1 % — SIGNIFICANT CHANGE UP (ref 0–6)
FLUAV SUBTYP SPEC NAA+PROBE: SIGNIFICANT CHANGE UP
FLUBV RNA SPEC QL NAA+PROBE: SIGNIFICANT CHANGE UP
GAS PNL BLDV: 135 MMOL/L — LOW (ref 136–145)
GLUCOSE BLDV-MCNC: 125 MG/DL — HIGH (ref 70–99)
GLUCOSE SERPL-MCNC: 117 MG/DL — HIGH (ref 70–99)
HADV DNA SPEC QL NAA+PROBE: SIGNIFICANT CHANGE UP
HCO3 BLDV-SCNC: 30 MMOL/L — HIGH (ref 22–29)
HCOV 229E RNA SPEC QL NAA+PROBE: SIGNIFICANT CHANGE UP
HCOV HKU1 RNA SPEC QL NAA+PROBE: SIGNIFICANT CHANGE UP
HCOV NL63 RNA SPEC QL NAA+PROBE: SIGNIFICANT CHANGE UP
HCOV OC43 RNA SPEC QL NAA+PROBE: SIGNIFICANT CHANGE UP
HCT VFR BLD CALC: 51.2 % — HIGH (ref 39–50)
HCT VFR BLDA CALC: 50 % — SIGNIFICANT CHANGE UP (ref 39–51)
HGB BLD CALC-MCNC: 16.7 G/DL — SIGNIFICANT CHANGE UP (ref 13–17)
HGB BLD-MCNC: 16.4 G/DL — SIGNIFICANT CHANGE UP (ref 13–17)
HMPV RNA SPEC QL NAA+PROBE: SIGNIFICANT CHANGE UP
HPIV1 RNA SPEC QL NAA+PROBE: SIGNIFICANT CHANGE UP
HPIV2 RNA SPEC QL NAA+PROBE: SIGNIFICANT CHANGE UP
HPIV3 RNA SPEC QL NAA+PROBE: SIGNIFICANT CHANGE UP
HPIV4 RNA SPEC QL NAA+PROBE: SIGNIFICANT CHANGE UP
IANC: 9.67 K/UL — HIGH (ref 1.8–7.4)
IMM GRANULOCYTES NFR BLD AUTO: 0.5 % — SIGNIFICANT CHANGE UP (ref 0–0.9)
INR BLD: 1.18 RATIO — HIGH (ref 0.88–1.16)
INR BLD: 1.19 RATIO — HIGH (ref 0.88–1.16)
LACTATE BLDV-MCNC: 3.1 MMOL/L — HIGH (ref 0.5–2)
LACTATE SERPL-SCNC: 2.3 MMOL/L — HIGH (ref 0.5–2)
LYMPHOCYTES # BLD AUTO: 1.73 K/UL — SIGNIFICANT CHANGE UP (ref 1–3.3)
LYMPHOCYTES # BLD AUTO: 13.1 % — SIGNIFICANT CHANGE UP (ref 13–44)
M PNEUMO DNA SPEC QL NAA+PROBE: SIGNIFICANT CHANGE UP
MCHC RBC-ENTMCNC: 26.5 PG — LOW (ref 27–34)
MCHC RBC-ENTMCNC: 32 GM/DL — SIGNIFICANT CHANGE UP (ref 32–36)
MCV RBC AUTO: 82.8 FL — SIGNIFICANT CHANGE UP (ref 80–100)
MONOCYTES # BLD AUTO: 1.73 K/UL — HIGH (ref 0–0.9)
MONOCYTES NFR BLD AUTO: 13.1 % — SIGNIFICANT CHANGE UP (ref 2–14)
NEUTROPHILS # BLD AUTO: 9.67 K/UL — HIGH (ref 1.8–7.4)
NEUTROPHILS NFR BLD AUTO: 73 % — SIGNIFICANT CHANGE UP (ref 43–77)
NRBC # BLD: 0 /100 WBCS — SIGNIFICANT CHANGE UP (ref 0–0)
NRBC # FLD: 0 K/UL — SIGNIFICANT CHANGE UP (ref 0–0)
PCO2 BLDV: 49 MMHG — SIGNIFICANT CHANGE UP (ref 42–55)
PH BLDV: 7.4 — SIGNIFICANT CHANGE UP (ref 7.32–7.43)
PLATELET # BLD AUTO: 357 K/UL — SIGNIFICANT CHANGE UP (ref 150–400)
PO2 BLDV: 27 MMHG — SIGNIFICANT CHANGE UP
POTASSIUM BLDV-SCNC: 3.4 MMOL/L — LOW (ref 3.5–5.1)
POTASSIUM SERPL-MCNC: 3.3 MMOL/L — LOW (ref 3.5–5.3)
POTASSIUM SERPL-SCNC: 3.3 MMOL/L — LOW (ref 3.5–5.3)
PROCALCITONIN SERPL-MCNC: 0.16 NG/ML — HIGH (ref 0.02–0.1)
PROT SERPL-MCNC: 9.1 G/DL — HIGH (ref 6–8.3)
PROTHROM AB SERPL-ACNC: 13.7 SEC — HIGH (ref 10.5–13.4)
PROTHROM AB SERPL-ACNC: 13.8 SEC — HIGH (ref 10.5–13.4)
RAPID RVP RESULT: SIGNIFICANT CHANGE UP
RBC # BLD: 6.18 M/UL — HIGH (ref 4.2–5.8)
RBC # FLD: 14.3 % — SIGNIFICANT CHANGE UP (ref 10.3–14.5)
RSV RNA SPEC QL NAA+PROBE: SIGNIFICANT CHANGE UP
RV+EV RNA SPEC QL NAA+PROBE: SIGNIFICANT CHANGE UP
SAO2 % BLDV: 36.1 % — SIGNIFICANT CHANGE UP
SARS-COV-2 RNA SPEC QL NAA+PROBE: SIGNIFICANT CHANGE UP
SODIUM SERPL-SCNC: 138 MMOL/L — SIGNIFICANT CHANGE UP (ref 135–145)
SPECIMEN SOURCE: SIGNIFICANT CHANGE UP
TSH SERPL-MCNC: 0.82 UIU/ML — SIGNIFICANT CHANGE UP (ref 0.27–4.2)
WBC # BLD: 13.24 K/UL — HIGH (ref 3.8–10.5)
WBC # FLD AUTO: 13.24 K/UL — HIGH (ref 3.8–10.5)

## 2022-09-18 PROCEDURE — 93010 ELECTROCARDIOGRAM REPORT: CPT

## 2022-09-18 PROCEDURE — 99291 CRITICAL CARE FIRST HOUR: CPT | Mod: GC

## 2022-09-18 PROCEDURE — 70496 CT ANGIOGRAPHY HEAD: CPT | Mod: 26,MA

## 2022-09-18 PROCEDURE — 70450 CT HEAD/BRAIN W/O DYE: CPT | Mod: 26,MA

## 2022-09-18 PROCEDURE — 99291 CRITICAL CARE FIRST HOUR: CPT | Mod: 25

## 2022-09-18 PROCEDURE — 70498 CT ANGIOGRAPHY NECK: CPT | Mod: 26,MA

## 2022-09-18 PROCEDURE — 72125 CT NECK SPINE W/O DYE: CPT | Mod: 26,MA

## 2022-09-18 PROCEDURE — 71045 X-RAY EXAM CHEST 1 VIEW: CPT | Mod: 26

## 2022-09-18 PROCEDURE — 99223 1ST HOSP IP/OBS HIGH 75: CPT

## 2022-09-18 RX ORDER — DEXTROSE 50 % IN WATER 50 %
15 SYRINGE (ML) INTRAVENOUS ONCE
Refills: 0 | Status: DISCONTINUED | OUTPATIENT
Start: 2022-09-18 | End: 2022-09-18

## 2022-09-18 RX ORDER — DEXTROSE 50 % IN WATER 50 %
12.5 SYRINGE (ML) INTRAVENOUS ONCE
Refills: 0 | Status: DISCONTINUED | OUTPATIENT
Start: 2022-09-18 | End: 2022-09-18

## 2022-09-18 RX ORDER — SODIUM CHLORIDE 9 MG/ML
1000 INJECTION INTRAMUSCULAR; INTRAVENOUS; SUBCUTANEOUS
Refills: 0 | Status: DISCONTINUED | OUTPATIENT
Start: 2022-09-18 | End: 2022-09-21

## 2022-09-18 RX ORDER — LEVETIRACETAM 250 MG/1
500 TABLET, FILM COATED ORAL EVERY 12 HOURS
Refills: 0 | Status: DISCONTINUED | OUTPATIENT
Start: 2022-09-18 | End: 2022-09-19

## 2022-09-18 RX ORDER — DEXTROSE 50 % IN WATER 50 %
25 SYRINGE (ML) INTRAVENOUS ONCE
Refills: 0 | Status: DISCONTINUED | OUTPATIENT
Start: 2022-09-18 | End: 2022-09-18

## 2022-09-18 RX ORDER — GLUCAGON INJECTION, SOLUTION 0.5 MG/.1ML
1 INJECTION, SOLUTION SUBCUTANEOUS ONCE
Refills: 0 | Status: DISCONTINUED | OUTPATIENT
Start: 2022-09-18 | End: 2022-09-18

## 2022-09-18 RX ORDER — SODIUM CHLORIDE 9 MG/ML
1000 INJECTION, SOLUTION INTRAVENOUS ONCE
Refills: 0 | Status: COMPLETED | OUTPATIENT
Start: 2022-09-18 | End: 2022-09-18

## 2022-09-18 RX ORDER — INFLUENZA VIRUS VACCINE 15; 15; 15; 15 UG/.5ML; UG/.5ML; UG/.5ML; UG/.5ML
0.5 SUSPENSION INTRAMUSCULAR ONCE
Refills: 0 | Status: COMPLETED | OUTPATIENT
Start: 2022-09-18 | End: 2022-09-18

## 2022-09-18 RX ORDER — ACETAMINOPHEN 500 MG
1000 TABLET ORAL ONCE
Refills: 0 | Status: COMPLETED | OUTPATIENT
Start: 2022-09-18 | End: 2022-09-18

## 2022-09-18 RX ORDER — NICARDIPINE HYDROCHLORIDE 30 MG/1
5 CAPSULE, EXTENDED RELEASE ORAL
Qty: 40 | Refills: 0 | Status: DISCONTINUED | OUTPATIENT
Start: 2022-09-18 | End: 2022-09-22

## 2022-09-18 RX ORDER — LABETALOL HCL 100 MG
10 TABLET ORAL ONCE
Refills: 0 | Status: COMPLETED | OUTPATIENT
Start: 2022-09-18 | End: 2022-09-18

## 2022-09-18 RX ORDER — INSULIN LISPRO 100/ML
VIAL (ML) SUBCUTANEOUS EVERY 6 HOURS
Refills: 0 | Status: DISCONTINUED | OUTPATIENT
Start: 2022-09-18 | End: 2022-09-18

## 2022-09-18 RX ORDER — SODIUM CHLORIDE 9 MG/ML
1000 INJECTION, SOLUTION INTRAVENOUS
Refills: 0 | Status: DISCONTINUED | OUTPATIENT
Start: 2022-09-18 | End: 2022-09-18

## 2022-09-18 RX ORDER — LEVETIRACETAM 250 MG/1
1000 TABLET, FILM COATED ORAL ONCE
Refills: 0 | Status: COMPLETED | OUTPATIENT
Start: 2022-09-18 | End: 2022-09-18

## 2022-09-18 RX ADMIN — NICARDIPINE HYDROCHLORIDE 25 MG/HR: 30 CAPSULE, EXTENDED RELEASE ORAL at 18:52

## 2022-09-18 RX ADMIN — NICARDIPINE HYDROCHLORIDE 25 MG/HR: 30 CAPSULE, EXTENDED RELEASE ORAL at 22:31

## 2022-09-18 RX ADMIN — LEVETIRACETAM 400 MILLIGRAM(S): 250 TABLET, FILM COATED ORAL at 23:58

## 2022-09-18 RX ADMIN — SODIUM CHLORIDE 100 MILLILITER(S): 9 INJECTION INTRAMUSCULAR; INTRAVENOUS; SUBCUTANEOUS at 19:37

## 2022-09-18 RX ADMIN — SODIUM CHLORIDE 100 MILLILITER(S): 9 INJECTION INTRAMUSCULAR; INTRAVENOUS; SUBCUTANEOUS at 22:31

## 2022-09-18 RX ADMIN — Medication 400 MILLIGRAM(S): at 18:53

## 2022-09-18 NOTE — PATIENT PROFILE ADULT - FUNCTIONAL ASSESSMENT - BASIC MOBILITY 6.
3-calculated by average/Not able to assess (calculate score using [facility] averaging method)

## 2022-09-18 NOTE — ED PROVIDER NOTE - NS ED ROS FT
CONST: +fevers, +chills  EYES:  +vision changes  ENT: no sore throat  CV: no chest pain, no leg swelling  RESP: no shortness of breath, no cough  ABD: no abdominal pain, no nausea, no vomiting, no diarrhea  : no dysuria, no flank pain, no hematuria  MSK: no back pain, no extremity pain  NEURO: +syncope  SKIN:  no rash

## 2022-09-18 NOTE — H&P ADULT - HISTORY OF PRESENT ILLNESS
Patient is a 37 YO right handed male with PMH of HTN, HLD, who has not taken his prescribed medications for approximately 3 months. On 9/16, patient presented to [facility] ED with 2 day history of generalized body aches, nausea, fevers, and chills. He was treated symptomatically and advised to resume his antihypertensive agents. Patient was at the pharmacy earlier today filling his prescriptions when he suffered a syncopal episode, patient noted to have had an episode of urinary incontinence. Patient brought to ED by EMS, BP on arrival was 194/143. Patient C/O neck pain and blurry vision. Per patient's sister he has been complaining of fatigue.

## 2022-09-18 NOTE — ED ADULT TRIAGE NOTE - CHIEF COMPLAINT QUOTE
EMS called to [facility] for syncopal episode. States he was seen at [facility] yesterday for HTN and was going to get his medications. c/o blurry vision, headache and extreme weakness. Pt. appears very lethargic in triage. Denies cp/sob.  Pmhx: HTN

## 2022-09-18 NOTE — H&P ADULT - ATTENDING COMMENTS
Spontaneous left BG IPH with intraventricular extension. While he is very young for hypertensive hemorrhage, he has a long-standing history of uncontrolled HTN and was severely hypertensive on presentation. Would still get CTA and MRI/A to rule out underlying vascular lesion. Will perform formal cerebral angiogram at follow up as an outpatient. Currently does not appear to need surgical intervention, would monitor in ICU setting and pressure controlled. Possibility of needing EVD.

## 2022-09-18 NOTE — H&P ADULT - NSHPPHYSICALEXAM_GEN_ALL_CORE
WDWN male, alert, uncomfortable appearing  Vital Signs Last 24 Hrs  T(C): 35.8 (18 Sep 2022 16:46), Max: 35.8 (18 Sep 2022 16:46)  T(F): 96.5 (18 Sep 2022 16:46), Max: 96.5 (18 Sep 2022 16:46)  HR: 112 (18 Sep 2022 21:17) (101 - 120)  BP: 131/72 (18 Sep 2022 21:17) (124/91 - 210/148)  BP(mean): 120 (18 Sep 2022 19:30) (120 - 164)  RR: 20 (18 Sep 2022 19:25) (16 - 22)  SpO2: 100% (18 Sep 2022 19:25) (96% - 100%)    Parameters below as of 18 Sep 2022 19:25  Patient On (Oxygen Delivery Method): room air    Neurologic: AAO X 3  PERRLA, EOMI  CN 2-12 grossly intact  RODRIGUEZ strength 5/5  No drift or dysmetria  SILT  Gait normal, no ataxia  HEENT: NC/AT/NT  Neck: Supple, nontender  Heart: RRR  Lungs: CTA, good air entry  Abdomen: Soft, NT, ND, no rebound or guarding  MSK: FROM in all extremities, nontender  Skin: Normal color and turgor  Vascular: Good pulses, cap refill< 2 seconds  Psychiatric: Cooperative, normal affect

## 2022-09-18 NOTE — ED PROVIDER NOTE - PHYSICAL EXAMINATION
Physical Exam:  Gen: tired appearing, awake alert   HEENT: normal conjunctiva, oral mucosa dry  Lung: CTAB, no respiratory distress, no wheezes/rhonchi/rales B/L, speaking in full sentences  CV: Regular tachycardic  Abd: soft, NT, ND, no guarding, no rigidity, no rebound tenderness  MSK: no visible deformities  Skin: Warm, well perfused, no rash, no leg swelling  ~Leonard Watt MD (PGY-3)

## 2022-09-18 NOTE — CONSULT NOTE ADULT - ASSESSMENT
36y M with uncontrolled HTN (non-compliant w/ meds), who presented for syncope and found to have L BG hemorrhage w/ associated IVH and hydrocephalus      LKW: 9/14/22  NIHSS:  0  Baseline MRS: 0   Not a tPA candidate due to acute hemorrhage  Not a thrombectomy candidate due to  pending vessel imaging    CT head: acute L BG hemorrhage w/ extension in L lateral ventricle and associated hydrocephalus  CTA/P: pending    Impression: hypertensive emergency w/ renal and brain end-organ damage. Brain damage manifested as primary hemorrhage and was symptomatic w/ blurry vision.    Mechanism: likely small vessel disease    Recommendations:  [] aggressive BP control (goal SBP 120s-150s per neurosurgery). C/w nicardipine gtt.  [] avoid any anti-platelet or anti-coagulation agents i/s/o acute hemorrhage  [] HgbA1C, fasting lipid panel, CBC, CMP, troponin  [] fu CTA head/neck w/ contrast (r/o vascular malformations)  [] obtain MR brain w/ w/o contrast   [] keep HOB at 30 degrees  [] appreciate ICU and neurosurgery evaluations  [] load w/ Keppra 1g IV 1x stat. Start on 500mg BID.    - Tight glucose control (long-term goal HgbA1c < 6%)  - Stroke education and counseling  - Neuro-checks q1h while awaiting repeat head imaging  - Dysphagia screen. If fails, speech/swallow eval  - aspiration, fall precautions  - STAT CT head non-contrast for change in neuro exam.   - PT/ OT / DVT ppx per primary team     Discussed with stroke fellow  Dr. Fritz Lockett     and under supervision of attending Dr. Florencio Boyer       regarding decision against candidacy for tPA/ thrombectomy. Will be formally staffed on morning rounds with attending. Recommendations will be complete once signed by attending. 36y M with uncontrolled HTN (non-compliant w/ meds), who presented for syncope and found to have L BG hemorrhage w/ associated IVH and hydrocephalus      LKW: 9/14/22  NIHSS:  0  Baseline MRS: 0   Not a tPA candidate due to acute hemorrhage  Not a thrombectomy candidate due to  pending vessel imaging    CT head: acute L BG hemorrhage w/ extension in L lateral ventricle and associated hydrocephalus  CTA/P: pending    Impression: hypertensive emergency w/ renal and brain end-organ damage. Brain damage manifested as primary hemorrhage and was symptomatic w/ blurry vision.    Mechanism: likely small vessel disease 2/2 uncontrolled HTN    Recommendations:  [] aggressive BP control (goal SBP 120s-150s per neurosurgery). C/w nicardipine gtt.  [] avoid any anti-platelet or anti-coagulation agents i/s/o acute hemorrhage  [] HgbA1C, fasting lipid panel, CBC, CMP, troponin  [] fu CTA head/neck w/ contrast (r/o vascular malformations). Needs repeat head imaging in 4h.  [] obtain MR brain w/ w/o contrast   [] keep HOB at 30 degrees  [] appreciate ICU and neurosurgery evaluations  [] load w/ Keppra 1g IV 1x stat. Start on 500mg BID.  [] obtain UTox    - Tight glucose control (long-term goal HgbA1c < 6%)  - Stroke education and counseling  - Neuro-checks q1h while awaiting repeat head imaging  - Dysphagia screen. If fails, speech/swallow eval  - aspiration, fall precautions  - STAT CT head non-contrast for change in neuro exam.   - PT/ OT / DVT ppx per primary team     Discussed with stroke fellow  Dr. Fritz Lockett     and under supervision of attending Dr. Florencio Boyer       regarding decision against candidacy for tPA/ thrombectomy. Will be formally staffed on morning rounds with attending. Recommendations will be complete once signed by attending. 36y M with uncontrolled HTN (non-compliant w/ meds), who presented for syncope and found to have L BG hemorrhage w/ associated IVH and hydrocephalus      LKW: 9/14/22  NIHSS:  0  Baseline MRS: 0   Not a tPA candidate due to acute hemorrhage  Not a thrombectomy candidate due to  pending vessel imaging    CT head: acute L BG hemorrhage w/ extension in L lateral ventricle and associated hydrocephalus  CTA/P: pending    Impression: hypertensive emergency w/ renal and brain end-organ damage. Brain damage manifested as primary hemorrhage and was symptomatic w/ blurry vision.    Mechanism: likely small vessel disease 2/2 uncontrolled HTN    Recommendations:  [] aggressive BP control (goal SBP 120s-150s per neurosurgery). C/w nicardipine gtt.  [] avoid any anti-platelet or anti-coagulation agents i/s/o acute hemorrhage  [] AVOID anti-cholesterol medication, as can WORSEN primary hemorrhage  [] HgbA1C, fasting lipid panel, CBC, CMP, troponin  [] fu CTA head/neck w/ contrast (r/o vascular malformations). Needs repeat head imaging in 4h.  [] obtain MR brain w/ w/o contrast   [] keep HOB at 30 degrees  [] appreciate ICU and neurosurgery evaluations  [] load w/ Keppra 1g IV 1x stat. Start on 500mg BID.  [] obtain UTox  [] would advise transfer to [facility] NSC[facility] for close monitoring    - Tight glucose control (long-term goal HgbA1c < 6%)  - Stroke education and counseling  - Neuro-checks q1h while awaiting repeat head imaging  - Dysphagia screen. If fails, speech/swallow eval  - aspiration, fall precautions  - STAT CT head non-contrast for change in neuro exam.   - PT/ OT / DVT ppx per primary team     Discussed with stroke fellow  Dr. Fritz Lockett     and under supervision of attending Dr. Florencio Boyer       regarding decision against candidacy for tPA/ thrombectomy. Will be formally staffed on morning rounds with attending. Recommendations will be complete once signed by attending.

## 2022-09-18 NOTE — ED ADULT NURSE NOTE - NSIMPLEMENTINTERV_GEN_ALL_ED
Implemented All Fall Risk Interventions:  [place] to call system. Call bell, personal items and telephone within reach. Instruct patient to call for assistance. Room bathroom lighting operational. Non-slip footwear when patient is off stretcher. Physically safe environment: no spills, clutter or unnecessary equipment. Stretcher in lowest position, wheels locked, appropriate side rails in place. Provide visual cue, wrist band, yellow gown, etc. Monitor gait and stability. Monitor for mental status changes and reorient to person, place, and time. Review medications for side effects contributing to fall risk. Reinforce activity limits and safety measures with patient and family.

## 2022-09-18 NOTE — H&P ADULT - PROBLEM SELECTOR PLAN 1
Admit to SICU  Neurologic checks and vital signs Q1H  Maintain SBP<150  Maintain serum Na 140-150  Stroke neurology consult called  Interval CT in AM, sooner if any deterioration in neurologic exam  Keep NPO overnight

## 2022-09-18 NOTE — H&P ADULT - NSHPLABSRESULTS_GEN_ALL_CORE
16.4   13.24 )-----------( 357      ( 18 Sep 2022 17:50 )             51.2     09-18    138  |  99  |  33<H>  ----------------------------<  117<H>  3.3<L>   |  25  |  2.26<H>    Ca    10.1      18 Sep 2022 17:50    TPro  9.1<H>  /  Alb  4.6  /  TBili  1.2  /  DBili  x   /  AST  25  /  ALT  41  /  AlkPhos  93  09-18    PT/INR - ( 18 Sep 2022 19:40 )   PT: 13.8 sec;   INR: 1.19 ratio         PTT - ( 18 Sep 2022 19:40 )  PTT:27.0 sec    < from: CT Head No Cont (09.18.22 @ 18:40) >    CT HEAD:    An acute parenchymal hematoma involving the left basal ganglia centered   in the left caudate head is present, measuring 2.6 cm transverse, 1.7 cm   AP, 2 cm cephalocaudad. The hemorrhage extends into the left lentiform   nucleus. Surrounding edema and mass effect is present.    Associated extensive acute intraventricular hemorrhage fills the left   lateral ventricle. Moderate intraventricular hemorrhage involves the   third ventricle. The lateral ventricles are mildly dilated consistent   with early acute hydrocephalus.    The regional soft tissues and osseous structures are unremarkable. The   visualized paranasal sinuses and tympanic/mastoid cavities are clear.    There is no evidence for displaced skull fracture.    < end of copied text >    CTA Head, preliminary report: No aneurysms or AVM

## 2022-09-18 NOTE — ED PROVIDER NOTE - OBJECTIVE STATEMENT
36M PMH HTN presenting after syncope. Was at [facility] ED last night for chills/fevers, nausea. Was at pharmacy today to  meds when he felt tired and had LOC with urinary incontinence. No tongue/cheek biting or bowel incontinence. Has some neck pain and blurry vision currently. No chest pain, SOB, abd pain. No etoh, drug, tobacco use 36M PMH HTN presenting after syncope. Was at [facility] ED last night for chills/fevers, nausea. Was at pharmacy today to  meds when he felt tired and had LOC with urinary incontinence. No tongue/cheek biting or bowel incontinence. Has some neck pain and blurry vision currently. No chest pain, SOB, abd pain. No etoh, drug, tobacco use    Pt lives w/ his sister Dana Wang who stated pt has been very tired recently. No hx of syncope or siezures in the past. [phone]

## 2022-09-18 NOTE — PATIENT PROFILE ADULT - FALL HARM RISK - HARM RISK INTERVENTIONS

## 2022-09-18 NOTE — CONSULT NOTE ADULT - ASSESSMENT
Assessment  36y M with uncontrolled HTN (non-compliant w/ meds), who presented for syncope and found to have L BG hemorrhage w/ associated IVH and hydrocephalus. SICU consulted for blood pressure control and q1hr neuro checks.    PLAN:  Neuro:   - q1hr neuro checks  - repeat ct head w/o contrast tomorrow am  - CTA shows no vasc malformations  - MRI brain w/ w/o contrast  - Keppra 1g iv x1 stat. 500mg bid    Respiratory:  - Saturating >93% on ra    Cardiovascular:  - c/w nicardipine gtt.   - transition to po anti-htn as tolerated  - maintain sbp 120-150's    Gastrointestinal:  - NPO    /Renal:  - NS @ 100cc/hr  - monitor Na. Na goal 140-150.  - Strict I&O's  - Replete electrolytes prn    HEmatologic:  - Monitor H/H    Infectious DIsease:  - Afebrile    Endo:  - Monitor blood sugar. FS q6hr.  - f/u A1C    Dispo: SICU

## 2022-09-18 NOTE — H&P ADULT - PROBLEM SELECTOR PLAN 3
Admit to SICU  Neurologic checks and vital signs Q1H  Maintain SBP<150  Maintain serum Na 140-150  Stroke neurology consult called  Interval CT in AM, sooner if any deterioration in neurologic exam  Keep NPO overnight  Will place external ventricular drain if hydrocephalus worsens

## 2022-09-18 NOTE — ED PROVIDER NOTE - PROGRESS NOTE DETAILS
Shukri DEUTSCH (PGY-3)  CT showing ventricular hemorrhage. spoke to nsgy who is recommending BP control sBP<140. requesting CTA head/neck to eval for vascular etiology. nsgy team will come see pt in ED Bp improved, nrsg, sicu neur have seen patient.  CTA required to eval for aneurysm or avm which will .  Aware of elevated Cr, benefits outweigh risks

## 2022-09-18 NOTE — CONSULT NOTE ADULT - SUBJECTIVE AND OBJECTIVE BOX
Neurology - Consult Note    -  Spectra: [phone] ([facility]), [phone] ([facility])  -    HPI: Patient ABHISHEK RIDDLE is a 36y (1985) man with a PMHx significant for HTN      Review of Systems:  INCOMPLETE   CONSTITUTIONAL: No fevers or chills  EYES AND ENT: No visual changes or no throat pain   NECK: No pain or stiffness  RESPIRATORY: No hemoptysis or shortness of breath  CARDIOVASCULAR: No chest pain or palpitations  GASTROINTESTINAL: No melena or hematochezia  GENITOURINARY: No dysuria or hematuria  NEUROLOGICAL: +As stated in HPI above  SKIN: No itching, burning, rashes, or lesions   All other review of systems is negative unless indicated above.    Allergies:      PMHx/PSHx/Family Hx: As above, otherwise see below   High cholesterol    Hypertension        Social Hx:  No current use of tobacco, alcohol, or illicit drugs  Lives with ***    Medications:  MEDICATIONS  (STANDING):  niCARdipine Infusion 5 mG/Hr (25 mL/Hr) IV Continuous <Continuous>  sodium chloride 0.9%. 1000 milliLiter(s) (100 mL/Hr) IV Continuous <Continuous>    MEDICATIONS  (PRN):      Vitals:  T(C): 35.8 (09-18-22 @ 16:46), Max: 35.8 (09-18-22 @ 16:46)  HR: 117 (09-18-22 @ 19:25) (102 - 118)  BP: 171/115 (09-18-22 @ 19:25) (169/109 - 210/148)  RR: 20 (09-18-22 @ 19:25) (16 - 22)  SpO2: 100% (09-18-22 @ 19:25) (96% - 100%)    Physical Examination: INCOMPLETE  General - NAD  Cardiovascular - Peripheral pulses palpable, no edema  Eyes - Fundoscopy with flat, sharp optic discs and no hemorrhage or exudates; Fundoscopy not well visualized; Fundoscopy not performed due to safety precautions in the setting of the COVID-19 pandemic    Neurologic Exam:  Mental status - Awake, Alert, Oriented to person, place, and time. Speech fluent, repetition and naming intact. Follows simple and complex commands. Attention/concentration, recent and remote memory (including registration and recall), and fund of knowledge intact    Cranial nerves - PERRLA, VFF, EOMI, face sensation (V1-V3) intact b/l, facial strength intact without asymmetry b/l, hearing intact b/l, palate with symmetric elevation, trapezius OR sternocleidomastiod 5/5 strength b/l, tongue midline on protrusion with full lateral movement    Motor - Normal bulk and tone throughout. No pronator drift.  Strength testing            Deltoid      Biceps      Triceps     Wrist Extension    Wrist Flexion     Interossei         R            5                 5               5                     5                              5                        5                 5  L             5                 5               5                     5                              5                        5                 5              Hip Flexion    Hip Extension    Knee Flexion    Knee Extension    Dorsiflexion    Plantar Flexion  R              5                           5                       5                           5                            5                          5  L              5                           5                        5                           5                            5                          5    Sensation - Light touch/temperature OR pain/vibration intact throughout    DTR's -             Biceps      Triceps     Brachioradialis      Patellar    Ankle    Toes/plantar response  R             2+             2+                  2+                       2+            2+                 Down  L              2+             2+                 2+                        2+           2+                 Down    Coordination - Finger to Nose intact b/l. No tremors appreciated    Gait and station - Normal casual gait. Romberg (-)    Labs:                        16.4   13.24 )-----------( 357      ( 18 Sep 2022 17:50 )             51.2     09-16    132<L>  |  94<L>  |  22  ----------------------------<  110<H>  4.0   |  24  |  1.59<H>    Ca    9.8      16 Sep 2022 23:56    TPro  8.9<H>  /  Alb  4.5  /  TBili  0.7  /  DBili  x   /  AST  21  /  ALT  20  /  AlkPhos  82  09-16    CAPILLARY BLOOD GLUCOSE      POCT Blood Glucose.: 103 mg/dL (18 Sep 2022 18:18)    LIVER FUNCTIONS - ( 16 Sep 2022 23:56 )  Alb: 4.5 g/dL / Pro: 8.9 g/dL / ALK PHOS: 82 U/L / ALT: 20 U/L / AST: 21 U/L / GGT: x             Culture - Urine (collected 17 Sep 2022 00:22)  Source: Clean Catch Clean Catch (Midstream)  Final Report (18 Sep 2022 05:32):    <10,000 CFU/mL Normal Urogenital [place]      PT/INR - ( 18 Sep 2022 17:50 )   PT: 13.7 sec;   INR: 1.18 ratio         PTT - ( 18 Sep 2022 17:50 )  PTT:27.4 sec  CSF:                  Radiology:     Neurology - Consult Note    -  Spectra: [phone] ([facility]), [phone] ([facility])  -    HPI: Patient ABHISHEK RIDDLE is a 36y (1985) man with a PMHx significant for uncontrolled HTN, who presented w/ CC of syncope. Patient started feeling unwell 4 days ago. He was getting onto a train and noticed b/l blurry vision. These symptoms eventually subsided. However, over the next few days, he became concerned by body aches, vomiting, nausea, fevers/chills. Presented to [facility] ED 2 nights ago and work-up was significant for hyponatremia 132, elevated Cr 1.6, urine with significant protein and trace ketones, negative RVP, negative UCx, clear lungs on CXR. Vital signs showed . Patient was discharged w/ close PCP follow-up and instructions to take anti-hypertensives as prescribed. Patient has been non-compliant w/ meds for past 3 months.     Today, he was at [facility] to  prescriptions. He had been having b/l blurry vision and also severe stabbing posterior neck pain all day. He was speaking to a lady at the counter and heard people shouting as he experienced LOC. No head trauma. Patient had urinary incontinence but no tongue biting. No preceding aura. Staff called EMS and patient regained consciousness while in the ambulance. Brought to ED for further evaluation. CTH showed L BG hemorrhage w/ extension in L lateral ventricle and associated hydrocephalus. Vitals were hypertensive w/ BP peaking at 210/148 and tachycardic to 110. Labs significant for CBC w/ leukocytosis 13, VBG w/ lactate 3.1, awaiting CMP, coag wnl. Patient on nicardipine gtt and BP carefully downtrended, now wnl.    Went to bedside to assess patient. Sister and aunt also present. Patient providing HPI as above. Reporting 10/10 stabbing posterior neck pain; got some relief w/ IV Tylenol. Vision has now normalized. Prefers to keep eyes closed and feeling very tired. Denies personal or family history of seizure or stroke.      Review of Systems:    CONSTITUTIONAL: No fevers or chills  EYES AND ENT: no throat pain; +blurry vision   NECK: +posterior neck pain  RESPIRATORY: No hemoptysis or shortness of breath  CARDIOVASCULAR: No chest pain or palpitations  GASTROINTESTINAL: No melena or hematochezia  GENITOURINARY: No dysuria or hematuria; +urinary incontinence  NEUROLOGICAL: +As stated in HPI above  SKIN: No itching, burning, rashes, or lesions   All other review of systems is negative unless indicated above.    Allergies: NKDA      PMHx/PSHx/Family Hx: As above, otherwise see below   High cholesterol    Hypertension        Social Hx:  Smokes tobacco once per month  Occasional alcohol drinker  Denies illicit drug use  Works in [employer] as manager    Medications:  MEDICATIONS  (STANDING):  niCARdipine Infusion 5 mG/Hr (25 mL/Hr) IV Continuous <Continuous>  sodium chloride 0.9%. 1000 milliLiter(s) (100 mL/Hr) IV Continuous <Continuous>    MEDICATIONS  (PRN):      Vitals:  T(C): 35.8 (09-18-22 @ 16:46), Max: 35.8 (09-18-22 @ 16:46)  HR: 117 (09-18-22 @ 19:25) (102 - 118)  BP: 171/115 (09-18-22 @ 19:25) (169/109 - 210/148)  RR: 20 (09-18-22 @ 19:25) (16 - 22)  SpO2: 100% (09-18-22 @ 19:25) (96% - 100%)    Physical Examination:   General - NAD, lethargic appearing young male  Cardiovascular - Peripheral pulses palpable, no edema  Eyes -  Fundoscopy not performed due to safety precautions in the setting of the COVID-19 pandemic    Neurologic Exam:  Mental status - Awake, Alert, Oriented to person, place, and time. Speech fluent, repetition and naming intact. Follows simple and complex commands. Attention/concentration, recent and remote memory (including registration and recall), and fund of knowledge intact    Cranial nerves - PERRLA, VFF, EOMI, face sensation (V1-V3) intact b/l, facial strength intact without asymmetry b/l, hearing intact b/l, palate with symmetric elevation, trapezius 5/5 strength b/l, tongue midline on protrusion with full lateral movement    Motor - Normal bulk and tone throughout. No pronator drift.  Strength testing            Deltoid      Biceps      Triceps     Wrist Extension    Wrist Flexion     Interossei         R            5                 5               5                     5                              5                        5                 5  L             5                 5               5                     5                              5                        5                 5              Hip Flexion    Hip Extension    Knee Flexion    Knee Extension    Dorsiflexion    Plantar Flexion  R              5                           5                       5                           5                            5                          5  L              5                           5                        5                           5                            5                          5    Sensation - Light touch/temperature intact throughout    DTR's -             Biceps      Triceps     Brachioradialis      Patellar    Ankle    Toes/plantar response  R             2+             2+                  2+                       2+            2+                 Down  L              2+             2+                 2+                        2+           2+                 Down    Coordination - Finger to Nose intact b/l. No tremors appreciated    Gait and station - slow and steady gait, slight swaying and wide-based appearance, ambulates unassisted    Labs:                        16.4   13.24 )-----------( 357      ( 18 Sep 2022 17:50 )             51.2     09-16    132<L>  |  94<L>  |  22  ----------------------------<  110<H>  4.0   |  24  |  1.59<H>    Ca    9.8      16 Sep 2022 23:56    TPro  8.9<H>  /  Alb  4.5  /  TBili  0.7  /  DBili  x   /  AST  21  /  ALT  20  /  AlkPhos  82  09-16    CAPILLARY BLOOD GLUCOSE      POCT Blood Glucose.: 103 mg/dL (18 Sep 2022 18:18)    LIVER FUNCTIONS - ( 16 Sep 2022 23:56 )  Alb: 4.5 g/dL / Pro: 8.9 g/dL / ALK PHOS: 82 U/L / ALT: 20 U/L / AST: 21 U/L / GGT: x             Culture - Urine (collected 17 Sep 2022 00:22)  Source: Clean Catch Clean Catch (Midstream)  Final Report (18 Sep 2022 05:32):    <10,000 CFU/mL Normal Urogenital [place]      PT/INR - ( 18 Sep 2022 17:50 )   PT: 13.7 sec;   INR: 1.18 ratio         PTT - ( 18 Sep 2022 17:50 )  PTT:27.4 sec                  Radiology:  HEAD CT:    An acute parenchymal hematoma involves the left basal ganglia with   associated acute intraventricular hemorrhage and hydrocephalus.    This could reflect a hypertensive hemorrhage, hemorrhagic transformation   of an infarct, or hemorrhage into an underlying tumoror vascular   malformation. A follow-up brain MRI with and without contrast and brain   MRA are recommended for further workup.    CERVICAL SPINE CT:    No acute cervical spine fracture or evidence of traumatic malalignment.

## 2022-09-18 NOTE — CONSULT NOTE ADULT - SUBJECTIVE AND OBJECTIVE BOX
SICU Consultation Note  =====================================================  HPI: 36y y/o male with PMHx of HTN, HLD who has not taken his anti-hypertensive medications for approximately 3 months, presents to [facility] ED after a syncopal episode. Patient noted to have an episode of urinary incontinence at the time. Lost consciousness for few minutes and woke up in the ambulance, no head trauma. BP on arrival was 194/143, and was started on a cardene gtt. Patient has been c/o neck pain, headache, and b/l blurry vision for 4 days as per sister. Vision has now normalized. 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 SICU was consulted for bp control and q1hr neuro checks.       Allergies:   PAST MEDICAL & SURGICAL HISTORY:  High cholesterol  Hypertension      FAMILY HISTORY:  FH: diabetes mellitus      SOCIAL HISTORY: Patient is engaged. States rarely uses tobacco, alcohol. Denies recreational drug use.     ADVANCE DIRECTIVES: Full Code      REVIEW OF SYSTEMS:     Constitutional Symptoms: Denies weight loss/gain, fever/chills, sweats, appetite changes. c/o headache, fatigue.   Integumentary: Denies color changes, itch, rash, sores, hives, moles, alopecia  Eyes: Denies dryness, pain, cataracts, glasses/contacts, photophobia. + blurry vision  ENT: Denies tinnitus, hearing difficulty, ear discharge/pain/obstruction, sinusitis, PND, epistaxis, sore throat, dysphagia, canker sores +neck pain.  Cardiovascular: Denies chest pain upon exertion, palpitations.  Respiratory: Denies hemoptysis or sob.   Musculoskeletal: Denies pain, weakness, cramps, joint pain, swelling, arthritis, strain/sprain/fracture, scoliosis, atrophy  Gastrointestinal: Denies bloating, nausea, vomiting, heartburn, diarrhea, constipation, abdominal pain, hematemesis, BRBPR  Neurological: Denies seizures, vertigo, coordination, memory loss, paralysis, tremors, ataxia, numbness. +syncope, black out  Genitourinary: Denies dysuria, hematuria, frequency, burning, urgency. + urinary incontinence.  Endocrine: Denies diabetes, excessive hunger/thirst/urination.  Hematologic/Lymphatic: Denies bleeding disorders, anemia  Psychiatric: Denies depression, anxiety, hallucinations, suicidal ideation, tension, mood changes, substance abuse, addictions    HOME MEDICATIONS:   --------------------------------------------------------------------------------------  Home Medications:  acetaminophen 325 mg oral tablet: 2 tab(s) orally every 4 hours, As needed, Temp greater or equal to 38C (100.4F) (07 Sep 2021 16:11)    --------------------------------------------------------------------------------------    CURRENT MEDICATIONS:   --------------------------------------------------------------------------------------  Neurologic Medications    Respiratory Medications    Cardiovascular Medications  niCARdipine Infusion 5 mG/Hr IV Continuous <Continuous>    Gastrointestinal Medications  sodium chloride 0.9%. 1000 milliLiter(s) IV Continuous <Continuous>    Genitourinary Medications    Hematologic/Oncologic Medications    Antimicrobial/Immunologic Medications    Endocrine/Metabolic Medications    Topical/Other Medications    --------------------------------------------------------------------------------------    VITAL SIGNS, INS/OUTS (last 24 hours):  --------------------------------------------------------------------------------------  I&O's Detail    --------------------------------------------------------------------------------------    EXAM  NEUROLOGY  Exam: Normal, NAD, lethargic appearing. A&Ox3. Follow commands. Face sensation (V1-V3) intact b/l, facial strength intact without asymmetry b/l, hearing intact b/l, palate with symmetric elevation, trapezius 5/5 strength b/l, tongue midline on protrusion with full lateral movement    HEENT  Exam: Normocephalic, atraumatic.  EOMI. PERRLA.     RESPIRATORY  Exam: Lungs clear to auscultation, Normal expansion/effort.      CARDIOVASCULAR  Exam: S1, S2.  sinus tachy    GI/NUTRITION  Exam: Abdomen soft, Non-tender, Non-distended.     VASCULAR  Exam: Extremities warm, pink, well-perfused.     MUSCULOSKELETAL  Exam: All extremities moving spontaneously without limitations. Strength 5/5 in all extremities. No pronator drift.     SKIN:  Exam: Good skin turgor, no skin breakdown.     METABOLIC/FLUIDS/ELECTROLYTES  sodium chloride 0.9%. 1000 milliLiter(s) IV Continuous <Continuous>      HEMATOLOGIC  [x] DVT Prophylaxis:   Transfusions:	[] PRBC	[] Platelets		[] FFP	[] Cryoprecipitate    INFECTIOUS DISEASE  Antimicrobials/Immunologic Medications:      Tubes/Lines/Drains   [x] Peripheral IV  [] Central Venous Line     	[] R	[] L	[] IJ	[] Fem	[] SC	Date Placed:   [] Arterial Line		[] R	[] L	[] Fem	[] Rad	[] Ax	Date Placed:   [] PICC:         	[] Midline		[] Mediport  [] Urinary Catheter		Date Placed:     LABS  --------------------------------------------------------------------------------------  CBC ( 17:50)                          16.4                     13.24<H>  )--------------(  357        73.0  % Neuts, 13.1  % Lymphs, ANC: 9.67<H>                          51.2<H>    BMP ( 17:50)       138     |  99      |  33<H> 			Ca++ --      Ca 10.1         ---------------------------------( 117<H>		Mg --           3.3<L>  |  25      |  2.26<H>			Ph --        LFTs ( @ 17:50)      TPro 9.1<H> / Alb 4.6 / TBili 1.2 / DBili -- / AST 25 / ALT 41 / AlkPhos 93    Coags ( @ 19:40)  aPTT 27.0 / INR 1.19<H> / PT 13.8<H>  Coags ( @ 17:50)  aPTT 27.4 / INR 1.18<H> / PT 13.7<H>        VBG ( 18:20)     7.40 / 49 / 27 / 30<H> / 4.3<H> / 36.1%      Lactate: 3.1<H>    Urinalysis ( @ 00:22):     Color: Yellow / Appearance: Clear / S.032 / pH: 6.5 / Gluc: Negative / Ketones: Trace<!> / Bili: Negative / Urobili: 3 mg/dL<!> / Protein :300 mg/dL<!> / Nitrites: Negative / Leuk.Est: Negative / RBC: 21<H> / WBC: 3 / Sq Epi:  / Non Sq Epi: 8<H> / Bacteria Negative       -> Clean Catch Clean Catch (Midstream) Culture ( @ 00:22)     NG    NG    <10,000 CFU/mL Normal Urogenital [place]      --------------------------------------------------------------------------------------    OTHER LABS    IMAGING RESULTS    CTA BRAIN/NECK    Redemonstrated acute left basal ganglia parenchymal hematoma with associated intraventricular hemorrhage and hydrocephalus.    INTERNAL CAROTID ARTERIES: The intracranial segments of the ICA are patent bilaterally without flow limiting stenosis or occlusion.    ANTERIOR CEREBRAL ARTERIES: No flow-limiting stenosis or occlusion. Anterior communicating artery is unremarkable without aneurysm.    MIDDLE CEREBRAL ARTERIES: No flow-limiting stenosis or occlusion. Motion limited evaluation of the left carotid bifurcation.    POSTERIOR CEREBRAL ARTERIES: No flow-limiting stenosis or occlusion. Posterior communicating arteries are visualized bilaterally.    VERTEBRAL ARTERIES: Normal in course and caliber without flow-limiting stenosis or occlusion. Origin of the vertebral arteries are unremarkable.    CAROTID ARTERIES:  Right: Normal in course and caliber without flow-limiting stenosis or occlusion. Motion limited evaluation of the carotid bifurcation.  Left: Normal in course and caliber without flow-limiting stenosis or occlusion. Motion limited evaluation of the carotid bifurcation    AORTIC ARCH: Origin of the great vessels are unremarkable.    SOFT TISSUE: Within normal limits.    VISUALIZED LUNG APICES: Within normal limits.    IMPRESSION:    CTA BRAIN/NECK: No significant flow-limiting stenosis or evidence of acute dissection within the cervical carotid or vertebral arteries.    No intracranial large vessel occlusion or overt evidence of vascular malformation.

## 2022-09-18 NOTE — ED ADULT NURSE REASSESSMENT NOTE - NS ED NURSE REASSESS COMMENT FT1
Report received from juancarlos Wang @20:10. Patient A&O, lethargic, resting on stretcher, on Nicardipine infusion, Float RADA Gonzalez with patient for 1:1 care. Report given to SICU RAAD Rowe.

## 2022-09-18 NOTE — ED ADULT NURSE NOTE - OBJECTIVE STATEMENT
Received pt to [facility] s/p syncopal episode. Pt was at pharmacy today when he felt tired and had LOC with urinary incontinence. PT denies hx of seizures. pt denies chest pain, abdominal pain, etoh or drug use. Pt c/o neck pain, back of head pain and blurry vision. Pt reports hx of HTN. Pt changed into gown and placed on cardiac monitor showing sinus tachycardia. Pt arrives with #18g IV to LAC, additional #18g IV placed to RAC, lab work collected as ordered. MD at bedside for eval, will continue to monitor.

## 2022-09-18 NOTE — H&P ADULT - ASSESSMENT
35 YO male with Hx of HTN, noncompliant with prescribed medications, HLD brought to ED following a syncopal episode, markedly hypertensive on arrival to ED, with a left basal ganglia hemorrhage, intraventricular extension, and early hydrocephalus. Patient does not require neurosurgical intervention at this time, however he is at high risk of worsening hydrocephalus and may require placement of an external ventricular drain.

## 2022-09-18 NOTE — ED ADULT NURSE NOTE - CHIEF COMPLAINT QUOTE
EMS called to [facility] for syncopal episode. States he was seen at [facility] yesterday for HTN and was going to get his medications. c/o blurry vision, headache and extreme weakness. Pt. appears very lethargic in triage. Denies cp/sob.  Pmhx: HTN

## 2022-09-18 NOTE — ED PROVIDER NOTE - CLINICAL SUMMARY MEDICAL DECISION MAKING FREE TEXT BOX
36M PMH HTN presenting after syncope w/ urinary incontinence. Was at [facility] ED last night for chills/fevers, nausea. Neuro intact, has blurry vision curently, lungs ctab, abd NT  Eval for possible intracranial lesion vs. sepsis vs. postictal from seizure  Plan: sepsis labs, tsh, prolactin, CTH, ivf, ua/ucx, reassess symptoms

## 2022-09-18 NOTE — ED PROVIDER NOTE - ATTENDING CONTRIBUTION TO CARE
36 year old presents with syncope from [facility]. patient sleepy but answering questions appropriately.  appears to have urinated on himself. concern for seizure vs infections vs ich vs subdural renal failure covid. cmp cbc ua cxr, head ct. Patient found to have intraventricular hemorrhage.  will start cardene drip with bp goal of 140's.  Nrsg cx.  admit to ICU

## 2022-09-19 DIAGNOSIS — N18.9 CHRONIC KIDNEY DISEASE, UNSPECIFIED: ICD-10-CM

## 2022-09-19 LAB
ANION GAP SERPL CALC-SCNC: 11 MMOL/L — SIGNIFICANT CHANGE UP (ref 7–14)
ANION GAP SERPL CALC-SCNC: 12 MMOL/L — SIGNIFICANT CHANGE UP (ref 7–14)
APPEARANCE UR: CLEAR — SIGNIFICANT CHANGE UP
BACTERIA # UR AUTO: NEGATIVE — SIGNIFICANT CHANGE UP
BILIRUB UR-MCNC: NEGATIVE — SIGNIFICANT CHANGE UP
BLD GP AB SCN SERPL QL: NEGATIVE — SIGNIFICANT CHANGE UP
BUN SERPL-MCNC: 28 MG/DL — HIGH (ref 7–23)
BUN SERPL-MCNC: 34 MG/DL — HIGH (ref 7–23)
CALCIUM SERPL-MCNC: 8.9 MG/DL — SIGNIFICANT CHANGE UP (ref 8.4–10.5)
CALCIUM SERPL-MCNC: 9 MG/DL — SIGNIFICANT CHANGE UP (ref 8.4–10.5)
CHLORIDE SERPL-SCNC: 103 MMOL/L — SIGNIFICANT CHANGE UP (ref 98–107)
CHLORIDE SERPL-SCNC: 104 MMOL/L — SIGNIFICANT CHANGE UP (ref 98–107)
CHLORIDE UR-SCNC: 47 MMOL/L — SIGNIFICANT CHANGE UP
CO2 SERPL-SCNC: 22 MMOL/L — SIGNIFICANT CHANGE UP (ref 22–31)
CO2 SERPL-SCNC: 23 MMOL/L — SIGNIFICANT CHANGE UP (ref 22–31)
COLOR SPEC: YELLOW — SIGNIFICANT CHANGE UP
CREAT ?TM UR-MCNC: 240 MG/DL — SIGNIFICANT CHANGE UP
CREAT SERPL-MCNC: 1.64 MG/DL — HIGH (ref 0.5–1.3)
CREAT SERPL-MCNC: 1.75 MG/DL — HIGH (ref 0.5–1.3)
DIFF PNL FLD: NEGATIVE — SIGNIFICANT CHANGE UP
EGFR: 51 ML/MIN/1.73M2 — LOW
EGFR: 55 ML/MIN/1.73M2 — LOW
EPI CELLS # UR: 1 /HPF — SIGNIFICANT CHANGE UP (ref 0–5)
GLUCOSE SERPL-MCNC: 110 MG/DL — HIGH (ref 70–99)
GLUCOSE SERPL-MCNC: 110 MG/DL — HIGH (ref 70–99)
GLUCOSE UR QL: NEGATIVE — SIGNIFICANT CHANGE UP
HCT VFR BLD CALC: 45.9 % — SIGNIFICANT CHANGE UP (ref 39–50)
HGB BLD-MCNC: 15.2 G/DL — SIGNIFICANT CHANGE UP (ref 13–17)
HYALINE CASTS # UR AUTO: 3 /LPF — SIGNIFICANT CHANGE UP (ref 0–7)
KETONES UR-MCNC: NEGATIVE — SIGNIFICANT CHANGE UP
LACTATE SERPL-SCNC: 0.9 MMOL/L — SIGNIFICANT CHANGE UP (ref 0.5–2)
LEUKOCYTE ESTERASE UR-ACNC: NEGATIVE — SIGNIFICANT CHANGE UP
MAGNESIUM SERPL-MCNC: 2.4 MG/DL — SIGNIFICANT CHANGE UP (ref 1.6–2.6)
MAGNESIUM SERPL-MCNC: 2.5 MG/DL — SIGNIFICANT CHANGE UP (ref 1.6–2.6)
MCHC RBC-ENTMCNC: 26.8 PG — LOW (ref 27–34)
MCHC RBC-ENTMCNC: 33.1 GM/DL — SIGNIFICANT CHANGE UP (ref 32–36)
MCV RBC AUTO: 80.8 FL — SIGNIFICANT CHANGE UP (ref 80–100)
NITRITE UR-MCNC: NEGATIVE — SIGNIFICANT CHANGE UP
NRBC # BLD: 0 /100 WBCS — SIGNIFICANT CHANGE UP (ref 0–0)
NRBC # FLD: 0 K/UL — SIGNIFICANT CHANGE UP (ref 0–0)
PH UR: 6 — SIGNIFICANT CHANGE UP (ref 5–8)
PHOSPHATE SERPL-MCNC: 3 MG/DL — SIGNIFICANT CHANGE UP (ref 2.5–4.5)
PHOSPHATE SERPL-MCNC: 3.8 MG/DL — SIGNIFICANT CHANGE UP (ref 2.5–4.5)
PLATELET # BLD AUTO: 316 K/UL — SIGNIFICANT CHANGE UP (ref 150–400)
POTASSIUM SERPL-MCNC: 3.4 MMOL/L — LOW (ref 3.5–5.3)
POTASSIUM SERPL-MCNC: 3.6 MMOL/L — SIGNIFICANT CHANGE UP (ref 3.5–5.3)
POTASSIUM SERPL-SCNC: 3.4 MMOL/L — LOW (ref 3.5–5.3)
POTASSIUM SERPL-SCNC: 3.6 MMOL/L — SIGNIFICANT CHANGE UP (ref 3.5–5.3)
POTASSIUM UR-SCNC: 21.7 MMOL/L — SIGNIFICANT CHANGE UP
PROLACTIN SERPL-MCNC: 16.7 NG/ML — SIGNIFICANT CHANGE UP (ref 4.1–18.4)
PROT ?TM UR-MCNC: 45 MG/DL — SIGNIFICANT CHANGE UP
PROT UR-MCNC: ABNORMAL
PROT/CREAT UR-RTO: 0.2 RATIO — SIGNIFICANT CHANGE UP (ref 0–0.2)
RBC # BLD: 5.68 M/UL — SIGNIFICANT CHANGE UP (ref 4.2–5.8)
RBC # FLD: 14.3 % — SIGNIFICANT CHANGE UP (ref 10.3–14.5)
RBC CASTS # UR COMP ASSIST: 4 /HPF — SIGNIFICANT CHANGE UP (ref 0–4)
RH IG SCN BLD-IMP: POSITIVE — SIGNIFICANT CHANGE UP
SODIUM SERPL-SCNC: 137 MMOL/L — SIGNIFICANT CHANGE UP (ref 135–145)
SODIUM SERPL-SCNC: 138 MMOL/L — SIGNIFICANT CHANGE UP (ref 135–145)
SODIUM UR-SCNC: 67 MMOL/L — SIGNIFICANT CHANGE UP
SP GR SPEC: >1.05 (ref 1.01–1.05)
UROBILINOGEN FLD QL: ABNORMAL
WBC # BLD: 11.98 K/UL — HIGH (ref 3.8–10.5)
WBC # FLD AUTO: 11.98 K/UL — HIGH (ref 3.8–10.5)
WBC UR QL: 4 /HPF — SIGNIFICANT CHANGE UP (ref 0–5)

## 2022-09-19 PROCEDURE — 70450 CT HEAD/BRAIN W/O DYE: CPT | Mod: 26,77

## 2022-09-19 PROCEDURE — 70450 CT HEAD/BRAIN W/O DYE: CPT | Mod: 26

## 2022-09-19 PROCEDURE — 99222 1ST HOSP IP/OBS MODERATE 55: CPT

## 2022-09-19 PROCEDURE — 99291 CRITICAL CARE FIRST HOUR: CPT | Mod: 25

## 2022-09-19 PROCEDURE — 99232 SBSQ HOSP IP/OBS MODERATE 35: CPT

## 2022-09-19 RX ORDER — POTASSIUM CHLORIDE 20 MEQ
20 PACKET (EA) ORAL
Refills: 0 | Status: COMPLETED | OUTPATIENT
Start: 2022-09-19 | End: 2022-09-19

## 2022-09-19 RX ORDER — AMLODIPINE BESYLATE 2.5 MG/1
10 TABLET ORAL DAILY
Refills: 0 | Status: DISCONTINUED | OUTPATIENT
Start: 2022-09-19 | End: 2022-09-26

## 2022-09-19 RX ORDER — ACETAMINOPHEN 500 MG
1000 TABLET ORAL EVERY 6 HOURS
Refills: 0 | Status: DISCONTINUED | OUTPATIENT
Start: 2022-09-19 | End: 2022-09-19

## 2022-09-19 RX ORDER — VALSARTAN 80 MG/1
320 TABLET ORAL DAILY
Refills: 0 | Status: DISCONTINUED | OUTPATIENT
Start: 2022-09-19 | End: 2022-09-26

## 2022-09-19 RX ORDER — LEVETIRACETAM 250 MG/1
500 TABLET, FILM COATED ORAL
Refills: 0 | Status: DISCONTINUED | OUTPATIENT
Start: 2022-09-19 | End: 2022-09-26

## 2022-09-19 RX ORDER — ACETAMINOPHEN 500 MG
1000 TABLET ORAL EVERY 6 HOURS
Refills: 0 | Status: COMPLETED | OUTPATIENT
Start: 2022-09-19 | End: 2022-09-20

## 2022-09-19 RX ORDER — SODIUM CHLORIDE 9 MG/ML
1 INJECTION INTRAMUSCULAR; INTRAVENOUS; SUBCUTANEOUS THREE TIMES A DAY
Refills: 0 | Status: DISCONTINUED | OUTPATIENT
Start: 2022-09-19 | End: 2022-09-21

## 2022-09-19 RX ADMIN — AMLODIPINE BESYLATE 10 MILLIGRAM(S): 2.5 TABLET ORAL at 09:53

## 2022-09-19 RX ADMIN — LEVETIRACETAM 400 MILLIGRAM(S): 250 TABLET, FILM COATED ORAL at 05:23

## 2022-09-19 RX ADMIN — LEVETIRACETAM 500 MILLIGRAM(S): 250 TABLET, FILM COATED ORAL at 17:08

## 2022-09-19 RX ADMIN — Medication 1000 MILLIGRAM(S): at 12:00

## 2022-09-19 RX ADMIN — VALSARTAN 320 MILLIGRAM(S): 80 TABLET ORAL at 09:48

## 2022-09-19 RX ADMIN — Medication 1000 MILLIGRAM(S): at 18:00

## 2022-09-19 RX ADMIN — Medication 1000 MILLIGRAM(S): at 11:29

## 2022-09-19 RX ADMIN — Medication 20 MILLIEQUIVALENT(S): at 09:56

## 2022-09-19 RX ADMIN — Medication 20 MILLIEQUIVALENT(S): at 05:23

## 2022-09-19 RX ADMIN — SODIUM CHLORIDE 100 MILLILITER(S): 9 INJECTION INTRAMUSCULAR; INTRAVENOUS; SUBCUTANEOUS at 20:35

## 2022-09-19 RX ADMIN — SODIUM CHLORIDE 1 GRAM(S): 9 INJECTION INTRAMUSCULAR; INTRAVENOUS; SUBCUTANEOUS at 20:32

## 2022-09-19 RX ADMIN — Medication 1000 MILLIGRAM(S): at 17:07

## 2022-09-19 RX ADMIN — NICARDIPINE HYDROCHLORIDE 25 MG/HR: 30 CAPSULE, EXTENDED RELEASE ORAL at 20:36

## 2022-09-19 NOTE — CONSULT NOTE ADULT - SUBJECTIVE AND OBJECTIVE BOX
[facility] DIVISION OF KIDNEY DISEASES AND HYPERTENSION -- [phone]  -- INITIAL CONSULT NOTE  --------------------------------------------------------------------------------  HPI:    Pt. is a 36 y.o. M w/ PMHx of HTN, HLD who had not taken his anti-hypertensive medications for approximately 3 months, presents to [facility] ED after a syncopal episode. Nephrology consulted for history of CKD. Pt. denies any history of CKD. Denies ever being referred to see a kidney doctor. Denies any blood or bubbles in the urine. Pt. denies taking any NSAIDs. Pt. being monitored in SICU for frequent neurochecks. given CT showing basal ganglia infarct with intraventricular extension and hydrocephalus and BP control on Nicardipine gtt.        PAST HISTORY  --------------------------------------------------------------------------------  PAST MEDICAL & SURGICAL HISTORY:  High cholesterol      Hypertension        FAMILY HISTORY:  FH: diabetes mellitus      PAST SOCIAL HISTORY:    ALLERGIES & MEDICATIONS  --------------------------------------------------------------------------------  Allergies    No Known Allergies    Intolerances      Standing Inpatient Medications  amLODIPine   Tablet 10 milliGRAM(s) Oral daily  influenza   Vaccine 0.5 milliLiter(s) IntraMuscular once  levETIRAcetam 500 milliGRAM(s) Oral two times a day  niCARdipine Infusion 5 mG/Hr IV Continuous <Continuous>  sodium chloride 0.9%. 1000 milliLiter(s) IV Continuous <Continuous>  valsartan 320 milliGRAM(s) Oral daily    PRN Inpatient Medications  acetaminophen     Tablet .. 1000 milliGRAM(s) Oral every 6 hours PRN      REVIEW OF SYSTEMS  --------------------------------------------------------------------------------  Gen: No fevers/chills  Skin: No rashes  Head/Eyes/Ears: Normal hearing,   Respiratory: No dyspnea, cough  CV: No chest pain  GI: No abdominal pain, diarrhea  : No dysuria, hematuria  MSK: No edema    All other systems were reviewed and are negative, except as noted.    VITALS/PHYSICAL EXAM  --------------------------------------------------------------------------------  T(C): 36.9 (09-19-22 @ 12:00), Max: 37.2 (09-19-22 @ 08:00)  HR: 102 (09-19-22 @ 15:00) (98 - 120)  BP: 155/99 (09-19-22 @ 15:00) (124/91 - 210/148)  RR: 20 (09-19-22 @ 15:00) (9 - 22)  SpO2: 95% (09-19-22 @ 15:00) (91% - 100%)  Wt(kg): --  Height (cm): 183.1 (09-18-22 @ 16:46)  Weight (kg): 102 (09-18-22 @ 22:00)  BMI (kg/m2): 30.4 (09-18-22 @ 22:00)  BSA (m2): 2.24 (09-18-22 @ 22:00)      09-18-22 @ 07:01  -  09-19-22 @ 07:00  --------------------------------------------------------  IN: 1387.5 mL / OUT: 600 mL / NET: 787.5 mL    09-19-22 @ 07:01  -  09-19-22 @ 15:12  --------------------------------------------------------  IN: 1325 mL / OUT: 450 mL / NET: 875 mL      Physical Exam:  	Gen: NAD  	HEENT: MMM  	Pulm: CTA B/L- anteriroly  	CV: S1S2  	Abd: Soft, +BS   	Ext: No LE edema B/L  	Neuro: Awake  	Skin: Warm and dry  	Vascular access: peripheral    LABS/STUDIES  --------------------------------------------------------------------------------              15.2   11.98 >-----------<  316      [09-19-22 @ 03:24]              45.9     137  |  103  |  34  ----------------------------<  110      [09-19-22 @ 03:24]  3.4   |  22  |  1.75        Ca     9.0     [09-19-22 @ 03:24]      Mg     2.40     [09-19-22 @ 03:24]      Phos  3.8     [09-19-22 @ 03:24]    TPro  9.1  /  Alb  4.6  /  TBili  1.2  /  DBili  x   /  AST  25  /  ALT  41  /  AlkPhos  93  [09-18-22 @ 17:50]    PT/INR: PT 13.8 , INR 1.19       [09-18-22 @ 19:40]  PTT: 27.0       [09-18-22 @ 19:40]      Creatinine Trend:  SCr 1.75 [09-19 @ 03:24]  SCr 2.26 [09-18 @ 17:50]  SCr 1.59 [09-16 @ 23:56]    Urinalysis - [09-19-22 @ 04:27]      Color Yellow / Appearance Clear / SG >1.050 / pH 6.0      Gluc Negative / Ketone Negative  / Bili Negative / Urobili 3 mg/dL       Blood Negative / Protein 100 mg/dL / Leuk Est Negative / Nitrite Negative      RBC 4 / WBC 4 / Hyaline 3 / Gran  / Sq Epi  / Non Sq Epi 1 / Bacteria Negative      TSH 0.82      [09-18-22 @ 17:50]         [facility] DIVISION OF KIDNEY DISEASES AND HYPERTENSION -- [phone]  -- INITIAL CONSULT NOTE  --------------------------------------------------------------------------------  HPI:    Pt. is a 36 y.o. M w/ PMHx of HTN, HLD who had not taken his anti-hypertensive medications for approximately 3 months, presents to [facility] ED after a syncopal episode. Nephrology consulted for history of CKD. Pt. denies any history of CKD. Denies ever being referred to see a kidney doctor. Denies any blood or bubbles in the urine. Pt. denies taking any NSAIDs. Pt. being monitored in SICU for frequent neurochecks. given CT showing basal ganglia infarct with intraventricular extension and hydrocephalus and BP control on Nicardipine gtt.        PAST HISTORY  --------------------------------------------------------------------------------  PAST MEDICAL & SURGICAL HISTORY:  High cholesterol      Hypertension        FAMILY HISTORY:  FH: diabetes mellitus      PAST SOCIAL HISTORY: No smoking, drugs or alcohol use.    ALLERGIES & MEDICATIONS  --------------------------------------------------------------------------------  Allergies    No Known Allergies    Intolerances      Standing Inpatient Medications  amLODIPine   Tablet 10 milliGRAM(s) Oral daily  influenza   Vaccine 0.5 milliLiter(s) IntraMuscular once  levETIRAcetam 500 milliGRAM(s) Oral two times a day  niCARdipine Infusion 5 mG/Hr IV Continuous <Continuous>  sodium chloride 0.9%. 1000 milliLiter(s) IV Continuous <Continuous>  valsartan 320 milliGRAM(s) Oral daily    PRN Inpatient Medications  acetaminophen     Tablet .. 1000 milliGRAM(s) Oral every 6 hours PRN      REVIEW OF SYSTEMS  --------------------------------------------------------------------------------  Gen: No fevers/chills  Skin: No rashes  Head/Eyes/Ears: Normal hearing,   Respiratory: No dyspnea, cough  CV: No chest pain  GI: No abdominal pain, diarrhea  : No dysuria, hematuria  MSK: No edema    All other systems were reviewed and are negative, except as noted.    VITALS/PHYSICAL EXAM  --------------------------------------------------------------------------------  T(C): 36.9 (09-19-22 @ 12:00), Max: 37.2 (09-19-22 @ 08:00)  HR: 102 (09-19-22 @ 15:00) (98 - 120)  BP: 155/99 (09-19-22 @ 15:00) (124/91 - 210/148)  RR: 20 (09-19-22 @ 15:00) (9 - 22)  SpO2: 95% (09-19-22 @ 15:00) (91% - 100%)  Wt(kg): --  Height (cm): 183.1 (09-18-22 @ 16:46)  Weight (kg): 102 (09-18-22 @ 22:00)  BMI (kg/m2): 30.4 (09-18-22 @ 22:00)  BSA (m2): 2.24 (09-18-22 @ 22:00)      09-18-22 @ 07:01  -  09-19-22 @ 07:00  --------------------------------------------------------  IN: 1387.5 mL / OUT: 600 mL / NET: 787.5 mL    09-19-22 @ 07:01  -  09-19-22 @ 15:12  --------------------------------------------------------  IN: 1325 mL / OUT: 450 mL / NET: 875 mL      Physical Exam:  	Gen: NAD  	HEENT: CHERELLE  	Pulm: CTA B/L- anteriroly  	CV: S1S2  	Abd: Soft, +BS   	Ext: No LE edema B/L  	Neuro: Awake  	Skin: Warm and dry  	Vascular access: peripheral    LABS/STUDIES  --------------------------------------------------------------------------------              15.2   11.98 >-----------<  316      [09-19-22 @ 03:24]              45.9     137  |  103  |  34  ----------------------------<  110      [09-19-22 @ 03:24]  3.4   |  22  |  1.75        Ca     9.0     [09-19-22 @ 03:24]      Mg     2.40     [09-19-22 @ 03:24]      Phos  3.8     [09-19-22 @ 03:24]    TPro  9.1  /  Alb  4.6  /  TBili  1.2  /  DBili  x   /  AST  25  /  ALT  41  /  AlkPhos  93  [09-18-22 @ 17:50]    PT/INR: PT 13.8 , INR 1.19       [09-18-22 @ 19:40]  PTT: 27.0       [09-18-22 @ 19:40]      Creatinine Trend:  SCr 1.75 [09-19 @ 03:24]  SCr 2.26 [09-18 @ 17:50]  SCr 1.59 [09-16 @ 23:56]    Urinalysis - [09-19-22 @ 04:27]      Color Yellow / Appearance Clear / SG >1.050 / pH 6.0      Gluc Negative / Ketone Negative  / Bili Negative / Urobili 3 mg/dL       Blood Negative / Protein 100 mg/dL / Leuk Est Negative / Nitrite Negative      RBC 4 / WBC 4 / Hyaline 3 / Gran  / Sq Epi  / Non Sq Epi 1 / Bacteria Negative      TSH 0.82      [09-18-22 @ 17:50]

## 2022-09-19 NOTE — PROGRESS NOTE ADULT - ASSESSMENT
35 YO male with Hx of HTN, noncompliant with prescribed medications, HLD brought to ED following a syncopal episode, markedly hypertensive on arrival to ED, with a left basal ganglia hemorrhage, intraventricular extension, and early hydrocephalus. Patient does not require neurosurgical intervention at this time, however he is at high risk of worsening hydrocephalus and may require placement of an external ventricular drain.    9/19: Alert and nonfocal exam but not as well oriented as on initial exam. BP controlled on nicardipine infusion. Repeat head CT this AM

## 2022-09-19 NOTE — PROGRESS NOTE ADULT - SUBJECTIVE AND OBJECTIVE BOX
HPI:  Patient is a 37 YO right handed male with PMH of HTN, HLD, who has not taken his prescribed medications for approximately 3 months. On 9/16, patient presented to [facility] ED with 2 day history of generalized body aches, nausea, fevers, and chills. He was treated symptomatically and advised to resume his antihypertensive agents. Patient was at the pharmacy earlier today filling his prescriptions when he suffered a syncopal episode, patient noted to have had an episode of urinary incontinence. Patient brought to ED by EMS, BP on arrival was 194/143. Patient C/O neck pain and blurry vision. Per patient's sister he has been complaining of fatigue.   (18 Sep 2022 21:07)    Patient less oriented  No complaints  ICU Vital Signs Last 24 Hrs  T(C): 36.6 (19 Sep 2022 00:00), Max: 36.6 (19 Sep 2022 00:00)  T(F): 97.8 (19 Sep 2022 00:00), Max: 97.8 (19 Sep 2022 00:00)  HR: 108 (19 Sep 2022 02:00) (101 - 120)  BP: 143/96 (19 Sep 2022 02:00) (124/91 - 210/148)  BP(mean): 110 (19 Sep 2022 02:00) (98 - 164)  ABP: --  ABP(mean): --  RR: 16 (19 Sep 2022 02:00) (9 - 22)  SpO2: 95% (19 Sep 2022 02:00) (91% - 100%)    O2 Parameters below as of 19 Sep 2022 00:00  Patient On (Oxygen Delivery Method): room air    Awake and alert  Oriented to self, hospital, year. Unable to name month without prompt  PERRLA, EOMI  CN 2-12 grossly intact  RODRIGUEZ strength 5/5  No dysmetria or drift    MEDICATIONS  (STANDING):  influenza   Vaccine 0.5 milliLiter(s) IntraMuscular once  levETIRAcetam  IVPB 500 milliGRAM(s) IV Intermittent every 12 hours  niCARdipine Infusion 5 mG/Hr (25 mL/Hr) IV Continuous <Continuous>  sodium chloride 0.9%. 1000 milliLiter(s) (100 mL/Hr) IV Continuous <Continuous>    MEDICATIONS  (PRN):                          16.4   13.24 )-----------( 357      ( 18 Sep 2022 17:50 )             51.2     09-18    138  |  99  |  33<H>  ----------------------------<  117<H>  3.3<L>   |  25  |  2.26<H>    Ca    10.1      18 Sep 2022 17:50    TPro  9.1<H>  /  Alb  4.6  /  TBili  1.2  /  DBili  x   /  AST  25  /  ALT  41  /  AlkPhos  93  09-18    < from: CT Angio Neck w/ IV Cont (09.18.22 @ 20:44) >  CTA BRAIN/NECK    Redemonstrated acute left basal ganglia parenchymal hematoma with   associated intraventricular hemorrhage and hydrocephalus.    INTERNAL CAROTID ARTERIES:  The intracranial segments of the ICA are   patent bilaterally without flow limiting stenosis or occlusion.    ANTERIOR CEREBRAL ARTERIES: No flow-limiting stenosis or occlusion.   Anterior communicating artery is unremarkable without aneurysm.    MIDDLE CEREBRAL ARTERIES: No flow-limiting stenosis or occlusion. Motion   limited evaluation of the left carotid bifurcation.    POSTERIOR CEREBRAL ARTERIES: No flow-limiting stenosis or occlusion.   Posterior communicating arteries are visualized bilaterally.    VERTEBRAL ARTERIES:  Normal in course and caliber without flow-limiting   stenosis or occlusion. Origin of the vertebral arteries are unremarkable.    CAROTID ARTERIES:  Right: Normal in course and caliber without flow-limiting stenosis or   occlusion. Motion limited evaluation of the carotid bifurcation.  Left: Normal in course and caliber without flow-limiting stenosis or   occlusion. Motion limited evaluation of the carotid bifurcation    AORTIC ARCH: Origin of the great vessels are unremarkable.    SOFT TISSUE: Within normal limits.    VISUALIZED LUNG APICES: Within normal limits.    IMPRESSION:    CTA BRAIN/NECK: No significant flow-limiting stenosis or evidence of   acute dissection within the cervical carotid or vertebral arteries.    No intracranial large vessel occlusion or overt evidence of vascular   malformation.    < end of copied text >

## 2022-09-19 NOTE — CONSULT NOTE ADULT - PROBLEM SELECTOR RECOMMENDATION 9
Pt. admitted with SCr. of 1.5 which increased to 2.2 and then trended to 1.7 today[facility] Pt. has had SCr. in the 1.5 range since March of 2021 and was last noted to be 1.4 in September of 2021. Follow up urine electrolytes. Please obtain Renal Duplex to assess for echogenicity as well as Renal artery stenosis for secondary hypertension as Pt. on many medications for HTN. Remains on ARB inpatient which is ok for now. On Norvasc as well ad being titrated off Nicardipine gtt? (double CCB?) Would suggest obtaining PRA and PAC first and then starting on Aldactone 12.5mg BID (as starting aldactone before obtaining these levels will greatly alter the results). Continue to monitor Na levels.    If you have any questions, please feel free to contact me  Antoni Livingston  Nephrology Fellow  [phone]; Prefer [employer] TEAMS  (After 5pm or on weekends please page the on-call fellow) Pt. admitted with SCr. of 1.5 which increased to 2.2 and then trended to 1.7 today. [facility] Pt. has had SCr. in the 1.5 range since March of 2021 and was last noted to be 1.4 in September of 2021. Follow up urine electrolytes. Please obtain Renal Duplex to assess for echogenicity as well as Renal artery stenosis for secondary hypertension as Pt. on many medications for HTN. Please obtain UPCr ratio. Remains on ARB inpatient which is ok for now. On Norvasc as well ad being titrated off Nicardipine gtt? (double CCB?) Would suggest obtaining PRA and PAC first and then starting on Aldactone 12.5mg BID (as starting aldactone before obtaining these levels will greatly alter the results). Continue to monitor Na levels.    If you have any questions, please feel free to contact me  Antoni Livingston  Nephrology Fellow  [phone]; Prefer [employer] TEAMS  (After 5pm or on weekends please page the on-call fellow)

## 2022-09-19 NOTE — PROGRESS NOTE ADULT - SUBJECTIVE AND OBJECTIVE BOX
INTERVAL EVENTS/24H EVENTS  - MRI brain w/ w/o contrast  - CTA shows no vasc malformations  - F/U repeat Non contrast Head CT in the AM  - SBP BP goal 120-150  - Keppra 500mg PO BID    HPI: 36y y/o male with PMHx of HTN, HLD who has not taken his anti-hypertensive medications for approximately 3 months, presents to [facility] ED after a syncopal episode. Patient noted to have an episode of urinary incontinence at the time. Lost consciousness for few minutes and woke up in the ambulance, no head trauma. BP on arrival was 194/143, and was started on a cardene gtt. Patient has been c/o neck pain, headache, and b/l blurry vision for 4 days as per sister. Vision has now normalized. 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 SICU was consulted for bp control and q1hr neuro checks.       Allergies:   PAST MEDICAL & SURGICAL HISTORY:  High cholesterol  Hypertension      FAMILY HISTORY:  FH: diabetes mellitus      SOCIAL HISTORY: Patient is engaged. States rarely uses tobacco, alcohol. Denies recreational drug use.     ADVANCE DIRECTIVES: Full Code      REVIEW OF SYSTEMS:     Constitutional Symptoms: Denies weight loss/gain, fever/chills, sweats, appetite changes. c/o headache, fatigue.   Integumentary: Denies color changes, itch, rash, sores, hives, moles, alopecia  Eyes: Denies dryness, pain, cataracts, glasses/contacts, photophobia. + blurry vision  ENT: Denies tinnitus, hearing difficulty, ear discharge/pain/obstruction, sinusitis, PND, epistaxis, sore throat, dysphagia, canker sores +neck pain.  Cardiovascular: Denies chest pain upon exertion, palpitations.  Respiratory: Denies hemoptysis or sob.   Musculoskeletal: Denies pain, weakness, cramps, joint pain, swelling, arthritis, strain/sprain/fracture, scoliosis, atrophy  Gastrointestinal: Denies bloating, nausea, vomiting, heartburn, diarrhea, constipation, abdominal pain, hematemesis, BRBPR  Neurological: Denies seizures, vertigo, coordination, memory loss, paralysis, tremors, ataxia, numbness. +syncope, black out  Genitourinary: Denies dysuria, hematuria, frequency, burning, urgency. + urinary incontinence.  Endocrine: Denies diabetes, excessive hunger/thirst/urination.  Hematologic/Lymphatic: Denies bleeding disorders, anemia  Psychiatric: Denies depression, anxiety, hallucinations, suicidal ideation, tension, mood changes, substance abuse, addictions    HOME MEDICATIONS:   --------------------------------------------------------------------------------------  Home Medications:  acetaminophen 325 mg oral tablet: 2 tab(s) orally every 4 hours, As needed, Temp greater or equal to 38C (100.4F) (07 Sep 2021 16:11)    --------------------------------------------------------------------------------------    CURRENT MEDICATIONS:   --------------------------------------------------------------------------------------  Neurologic Medications    Respiratory Medications    Cardiovascular Medications  niCARdipine Infusion 5 mG/Hr IV Continuous <Continuous>    Gastrointestinal Medications  sodium chloride 0.9%. 1000 milliLiter(s) IV Continuous <Continuous>    Genitourinary Medications    Hematologic/Oncologic Medications    Antimicrobial/Immunologic Medications    Endocrine/Metabolic Medications    Topical/Other Medications    --------------------------------------------------------------------------------------    VITAL SIGNS, INS/OUTS (last 24 hours):  --------------------------------------------------------------------------------------  I&O's Detail    --------------------------------------------------------------------------------------    EXAM  NEUROLOGY  Exam: Normal, NAD, lethargic appearing. A&Ox3. Follow commands. Face sensation (V1-V3) intact b/l, facial strength intact without asymmetry b/l, hearing intact b/l, palate with symmetric elevation, trapezius 5/5 strength b/l, tongue midline on protrusion with full lateral movement    HEENT  Exam: Normocephalic, atraumatic.  EOMI. PERRLA.     RESPIRATORY  Exam: Lungs clear to auscultation, Normal expansion/effort.      CARDIOVASCULAR  Exam: S1, S2.  sinus tachy    GI/NUTRITION  Exam: Abdomen soft, Non-tender, Non-distended.     VASCULAR  Exam: Extremities warm, pink, well-perfused.     MUSCULOSKELETAL  Exam: All extremities moving spontaneously without limitations. Strength 5/5 in all extremities. No pronator drift.     SKIN:  Exam: Good skin turgor, no skin breakdown.     METABOLIC/FLUIDS/ELECTROLYTES  sodium chloride 0.9%. 1000 milliLiter(s) IV Continuous <Continuous>      HEMATOLOGIC  [x] DVT Prophylaxis:   Transfusions:	[] PRBC	[] Platelets		[] FFP	[] Cryoprecipitate    INFECTIOUS DISEASE  Antimicrobials/Immunologic Medications:      Tubes/Lines/Drains   [x] Peripheral IV  [] Central Venous Line     	[] R	[] L	[] IJ	[] Fem	[] SC	Date Placed:   [] Arterial Line		[] R	[] L	[] Fem	[] Rad	[] Ax	Date Placed:   [] PICC:         	[] Midline		[] Mediport  [] Urinary Catheter		Date Placed:     LABS  --------------------------------------------------------------------------------------  CBC ( 17:50)                          16.4                     13.24<H>  )--------------(  357        73.0  % Neuts, 13.1  % Lymphs, ANC: 9.67<H>                          51.2<H>    BMP ( 17:50)       138     |  99      |  33<H> 			Ca++ --      Ca 10.1         ---------------------------------( 117<H>		Mg --           3.3<L>  |  25      |  2.26<H>			Ph --        LFTs ( 17:50)      TPro 9.1<H> / Alb 4.6 / TBili 1.2 / DBili -- / AST 25 / ALT 41 / AlkPhos 93    Coags ( @ 19:40)  aPTT 27.0 / INR 1.19<H> / PT 13.8<H>  Coags ( 17:50)  aPTT 27.4 / INR 1.18<H> / PT 13.7<H>        VBG ( 18:20)     7.40 / 49 / 27 / 30<H> / 4.3<H> / 36.1%      Lactate: 3.1<H>    Urinalysis ( @ 00:22):     Color: Yellow / Appearance: Clear / S.032 / pH: 6.5 / Gluc: Negative / Ketones: Trace<!> / Bili: Negative / Urobili: 3 mg/dL<!> / Protein :300 mg/dL<!> / Nitrites: Negative / Leuk.Est: Negative / RBC: 21<H> / WBC: 3 / Sq Epi:  / Non Sq Epi: 8<H> / Bacteria Negative       -> Clean Catch Clean Catch (Midstream) Culture (09-17 @ 00:22)     NG    NG    <10,000 CFU/mL Normal Urogenital [place]      --------------------------------------------------------------------------------------    OTHER LABS    IMAGING RESULTS    CTA BRAIN/NECK    Redemonstrated acute left basal ganglia parenchymal hematoma with associated intraventricular hemorrhage and hydrocephalus.    INTERNAL CAROTID ARTERIES: The intracranial segments of the ICA are patent bilaterally without flow limiting stenosis or occlusion.    ANTERIOR CEREBRAL ARTERIES: No flow-limiting stenosis or occlusion. Anterior communicating artery is unremarkable without aneurysm.    MIDDLE CEREBRAL ARTERIES: No flow-limiting stenosis or occlusion. Motion limited evaluation of the left carotid bifurcation.    POSTERIOR CEREBRAL ARTERIES: No flow-limiting stenosis or occlusion. Posterior communicating arteries are visualized bilaterally.    VERTEBRAL ARTERIES: Normal in course and caliber without flow-limiting stenosis or occlusion. Origin of the vertebral arteries are unremarkable.    CAROTID ARTERIES:  Right: Normal in course and caliber without flow-limiting stenosis or occlusion. Motion limited evaluation of the carotid bifurcation.  Left: Normal in course and caliber without flow-limiting stenosis or occlusion. Motion limited evaluation of the carotid bifurcation    AORTIC ARCH: Origin of the great vessels are unremarkable.    SOFT TISSUE: Within normal limits.    VISUALIZED LUNG APICES: Within normal limits.    IMPRESSION:    CTA BRAIN/NECK: No significant flow-limiting stenosis or evidence of acute dissection within the cervical carotid or vertebral arteries.    No intracranial large vessel occlusion or overt evidence of vascular malformation.     INTERVAL EVENTS/24H EVENTS  - CTA shows no vasc malformations  - CTH non-con in the AM demonstrated stable L basal ganglia and L intraventricular hemorrhage, unchanged hydrocephalus  - Repeat CTH non-con @ 8PM today  - Keppra 500mg PO BID started  - Nephro consulted  - To elevate head of bed    HPI: 36y y/o male with PMHx of HTN, HLD who has not taken his anti-hypertensive medications for approximately 3 months, presents to [facility] ED after a syncopal episode. Patient noted to have an episode of urinary incontinence at the time. Lost consciousness for few minutes and woke up in the ambulance, no head trauma. BP on arrival was 194/143, and was started on a cardene gtt. Patient has been c/o neck pain, headache, and b/l blurry vision for 4 days as per sister. Vision has now normalized. 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 SICU was consulted for bp control and q1hr neuro checks.       Allergies:   PAST MEDICAL & SURGICAL HISTORY:  High cholesterol  Hypertension      FAMILY HISTORY:  FH: diabetes mellitus      SOCIAL HISTORY: Patient is engaged. States rarely uses tobacco, alcohol. Denies recreational drug use.     ADVANCE DIRECTIVES: Full Code      REVIEW OF SYSTEMS:     Constitutional Symptoms: Denies weight loss/gain, fever/chills, sweats, appetite changes. c/o headache, fatigue.   Integumentary: Denies color changes, itch, rash, sores, hives, moles, alopecia  Eyes: Denies dryness, pain, cataracts, glasses/contacts, photophobia. + blurry vision  ENT: Denies tinnitus, hearing difficulty, ear discharge/pain/obstruction, sinusitis, PND, epistaxis, sore throat, dysphagia, canker sores +neck pain.  Cardiovascular: Denies chest pain upon exertion, palpitations.  Respiratory: Denies hemoptysis or sob.   Musculoskeletal: Denies pain, weakness, cramps, joint pain, swelling, arthritis, strain/sprain/fracture, scoliosis, atrophy  Gastrointestinal: Denies bloating, nausea, vomiting, heartburn, diarrhea, constipation, abdominal pain, hematemesis, BRBPR  Neurological: Denies seizures, vertigo, coordination, memory loss, paralysis, tremors, ataxia, numbness. +syncope, black out  Genitourinary: Denies dysuria, hematuria, frequency, burning, urgency. + urinary incontinence.  Endocrine: Denies diabetes, excessive hunger/thirst/urination.  Hematologic/Lymphatic: Denies bleeding disorders, anemia  Psychiatric: Denies depression, anxiety, hallucinations, suicidal ideation, tension, mood changes, substance abuse, addictions    HOME MEDICATIONS:   --------------------------------------------------------------------------------------  Home Medications:  acetaminophen 325 mg oral tablet: 2 tab(s) orally every 4 hours, As needed, Temp greater or equal to 38C (100.4F) (07 Sep 2021 16:11)    --------------------------------------------------------------------------------------    CURRENT MEDICATIONS:   --------------------------------------------------------------------------------------  Neurologic Medications    Respiratory Medications    Cardiovascular Medications  niCARdipine Infusion 5 mG/Hr IV Continuous <Continuous>    Gastrointestinal Medications  sodium chloride 0.9%. 1000 milliLiter(s) IV Continuous <Continuous>    Genitourinary Medications    Hematologic/Oncologic Medications    Antimicrobial/Immunologic Medications    Endocrine/Metabolic Medications    Topical/Other Medications    --------------------------------------------------------------------------------------    VITAL SIGNS, INS/OUTS (last 24 hours):  --------------------------------------------------------------------------------------  I&O's Detail    --------------------------------------------------------------------------------------    EXAM  NEUROLOGY  Exam: Normal, NAD, lethargic appearing. A&Ox3. Follow commands. Face sensation (V1-V3) intact b/l, facial strength intact without asymmetry b/l, hearing intact b/l, palate with symmetric elevation, trapezius 5/5 strength b/l, tongue midline on protrusion with full lateral movement    HEENT  Exam: Normocephalic, atraumatic.  EOMI. PERRLA.     RESPIRATORY  Exam: Lungs clear to auscultation, Normal expansion/effort.      CARDIOVASCULAR  Exam: S1, S2.  sinus tachy    GI/NUTRITION  Exam: Abdomen soft, Non-tender, Non-distended.     VASCULAR  Exam: Extremities warm, pink, well-perfused.     MUSCULOSKELETAL  Exam: All extremities moving spontaneously without limitations. Strength 5/5 in all extremities. No pronator drift.     SKIN:  Exam: Good skin turgor, no skin breakdown.     METABOLIC/FLUIDS/ELECTROLYTES  sodium chloride 0.9%. 1000 milliLiter(s) IV Continuous <Continuous>      HEMATOLOGIC  [x] DVT Prophylaxis:   Transfusions:	[] PRBC	[] Platelets		[] FFP	[] Cryoprecipitate    INFECTIOUS DISEASE  Antimicrobials/Immunologic Medications:      Tubes/Lines/Drains   [x] Peripheral IV  [] Central Venous Line     	[] R	[] L	[] IJ	[] Fem	[] SC	Date Placed:   [] Arterial Line		[] R	[] L	[] Fem	[] Rad	[] Ax	Date Placed:   [] PICC:         	[] Midline		[] Mediport  [] Urinary Catheter		Date Placed:     LABS  --------------------------------------------------------------------------------------  CBC ( 17:50)                          16.4                     13.24<H>  )--------------(  357        73.0  % Neuts, 13.1  % Lymphs, ANC: 9.67<H>                          51.2<H>    BMP ( @ 17:50)       138     |  99      |  33<H> 			Ca++ --      Ca 10.1         ---------------------------------( 117<H>		Mg --           3.3<L>  |  25      |  2.26<H>			Ph --        LFTs ( @ 17:50)      TPro 9.1<H> / Alb 4.6 / TBili 1.2 / DBili -- / AST 25 / ALT 41 / AlkPhos 93    Coags ( @ 19:40)  aPTT 27.0 / INR 1.19<H> / PT 13.8<H>  Coags ( @ 17:50)  aPTT 27.4 / INR 1.18<H> / PT 13.7<H>        VBG ( 18:20)     7.40 / 49 / 27 / 30<H> / 4.3<H> / 36.1%      Lactate: 3.1<H>    Urinalysis ( @ 00:22):     Color: Yellow / Appearance: Clear / S.032 / pH: 6.5 / Gluc: Negative / Ketones: Trace<!> / Bili: Negative / Urobili: 3 mg/dL<!> / Protein :300 mg/dL<!> / Nitrites: Negative / Leuk.Est: Negative / RBC: 21<H> / WBC: 3 / Sq Epi:  / Non Sq Epi: 8<H> / Bacteria Negative       -> Clean Catch Clean Catch (Midstream) Culture ( @ 00:22)     NG    NG    <10,000 CFU/mL Normal Urogenital [place]      --------------------------------------------------------------------------------------    OTHER LABS    IMAGING RESULTS    CTA BRAIN/NECK    Redemonstrated acute left basal ganglia parenchymal hematoma with associated intraventricular hemorrhage and hydrocephalus.    INTERNAL CAROTID ARTERIES: The intracranial segments of the ICA are patent bilaterally without flow limiting stenosis or occlusion.    ANTERIOR CEREBRAL ARTERIES: No flow-limiting stenosis or occlusion. Anterior communicating artery is unremarkable without aneurysm.    MIDDLE CEREBRAL ARTERIES: No flow-limiting stenosis or occlusion. Motion limited evaluation of the left carotid bifurcation.    POSTERIOR CEREBRAL ARTERIES: No flow-limiting stenosis or occlusion. Posterior communicating arteries are visualized bilaterally.    VERTEBRAL ARTERIES: Normal in course and caliber without flow-limiting stenosis or occlusion. Origin of the vertebral arteries are unremarkable.    CAROTID ARTERIES:  Right: Normal in course and caliber without flow-limiting stenosis or occlusion. Motion limited evaluation of the carotid bifurcation.  Left: Normal in course and caliber without flow-limiting stenosis or occlusion. Motion limited evaluation of the carotid bifurcation    AORTIC ARCH: Origin of the great vessels are unremarkable.    SOFT TISSUE: Within normal limits.    VISUALIZED LUNG APICES: Within normal limits.    IMPRESSION:    CTA BRAIN/NECK: No significant flow-limiting stenosis or evidence of acute dissection within the cervical carotid or vertebral arteries.    No intracranial large vessel occlusion or overt evidence of vascular malformation.

## 2022-09-19 NOTE — CONSULT NOTE ADULT - ATTENDING COMMENTS
I agree with the history, physical, and plan, which I have reviewed and edited where appropriate.  I agree with notes/assessment of health care providers on my service.  I have personally examined the patient.  I was physically present for the key portions of the evaluation and management (E/M) service provided.  I reviewed data and laboratory tests/x-rays and all pertinent electronic images.  The patient is a critical care patient with life threatening hemodynamic and metabolic instability in SICU.  Risk benefit analyses discussed.    The patient is in SICU with diagnosis mentioned in the note.    The plan is specified below.      Assessment  36y M with uncontrolled HTN (non-compliant w/ meds), who presented for syncope and found to have L BG hemorrhage w/ associated IVH and hydrocephalus. SICU consulted for blood pressure control and q1hr neuro checks.    PLAN:  Neuro: hemorrhagic stroke  - q1hr neuro checks  - repeat ct head w/o contrast tomorrow am  - CTA shows no vasc malformations  - MRI brain w/ w/o contrast  - Keppra 1g iv x1 stat. 500mg bid    Respiratory:  - On ra    Cardiovascular: hypertensive emergency   - c/w nicardipine gtt.   - resume home antihypertensive medications  - maintain sbp 120-150's    Gastrointestinal:  - NPO    /Renal:  - NS @ 100cc/hr  - monitor Na. Na goal 140-150.  - Trend BMP Q8     Hematologic:  - Monitor H/H    Infectious DIsease:  - observe off abx     Endo:  - Monitor blood sugar. FS q6hr.  - f/u A1C
VASCULAR NEUROLOGY ATTENDING  The patient is seen and examined the history and imaging are reviewed. I agree with the resident note unless otherwise noted. Patient with presumed hypertensive ICH with IVH stable on repeat imaging. Exam is non lateralizing with exception of reported ataxic gait. Agree with SBP control below 160. Avoid hyponatremia. Will need MRI brain w/wo. Consideration for conventional angiography at that time. Would check utox. Aggressive vascular risk factor modification.
CKD?  Hypertensive emergency    Cont plan per fellows note above  Monitor Is/Os, daily weights and labs  Avoid nephrotoxic agents

## 2022-09-19 NOTE — PROGRESS NOTE ADULT - ASSESSMENT
36y M with uncontrolled HTN (non-compliant w/ meds), who presented for syncope and found to have L BG hemorrhage w/ associated IVH and hydrocephalus. SICU consulted for blood pressure control and q1hr neuro checks.    PLAN:  Neuro:   - q1hr neuro checks  - repeat ct head w/o contrast tomorrow am  - CTA shows no vasc malformations  - MRI brain w/ w/o contrast  - Keppra 1g iv x1 stat. 500mg bid    Respiratory:  - Saturating >93% on ra    Cardiovascular:  - c/w nicardipine gtt.   - transition to po anti-htn as tolerated  - maintain sbp 120-150's    Gastrointestinal:  - NPO    /Renal:  - NS @ 100cc/hr  - monitor Na. Na goal 140-150.  - Strict I&O's  - Replete electrolytes prn    HEmatologic:  - Monitor H/H    Infectious DIsease:  - Afebrile    Endo:  - Monitor blood sugar. FS q6hr.  - f/u A1C    Dispo: SICU 36y M with uncontrolled HTN (non-compliant w/ meds), who presented for syncope and found to have L BG hemorrhage w/ associated IVH and hydrocephalus. SICU consulted for blood pressure control and q1hr neuro checks.    PLAN:  Neuro:   - q1h neuro checks  - CTA shows no vasc malformations  - CTH non-con in the AM demonstrated stable L basal ganglia and L intraventricular hemorrhage, unchanged hydrocephalus  - Repeat CTH non-con @ 8PM today  - Keppra 500mg PO BID   - Keep head of bed elevated    Respiratory:  - Saturating >93% on ra    Cardiovascular:  - c/w nicardipine gtt   - transition to po anti-htn as tolerated  - maintain sbp 120-150's    Gastrointestinal:  - NPO    /Renal:  - NS @ 100cc/hr  - monitor Na. Na goal 140-150.  - Strict I&O's  - Replete electrolytes prn  - Nephro consulted    Hematologic:  - Monitor H/H    Infectious DIsease:  - Afebrile    Endo:  - Monitor blood sugar. FS q6h  - f/u A1C    Dispo: SICU

## 2022-09-19 NOTE — CONSULT NOTE ADULT - ASSESSMENT
Pt. is a 36 y.o. M w/ PMHx of HTN, HLD who had not taken his anti-hypertensive medications for approximately 3 months, presents to [facility] ED after a syncopal episode. Nephrology consulted for history of CKD.

## 2022-09-19 NOTE — PROGRESS NOTE ADULT - PROBLEM SELECTOR PLAN 1
Neurologic checks and vital signs Q1H  Maintain SBP<150  Maintain serum Na 140-150  Stroke neurology consult done  Interval CT in AM, sooner if any deterioration in neurologic exam  Keep NPO overnight

## 2022-09-20 LAB
ALDOST SERPL-MCNC: 7.8 NG/DL — SIGNIFICANT CHANGE UP
AMPHETAMINES UR QL SCN: NEGATIVE NG/ML — SIGNIFICANT CHANGE UP
ANION GAP SERPL CALC-SCNC: 9 MMOL/L — SIGNIFICANT CHANGE UP (ref 7–14)
BARBITURATES, URINE.: NEGATIVE NG/ML — SIGNIFICANT CHANGE UP
BASOPHILS # BLD AUTO: 0.04 K/UL — SIGNIFICANT CHANGE UP (ref 0–0.2)
BASOPHILS NFR BLD AUTO: 0.3 % — SIGNIFICANT CHANGE UP (ref 0–2)
BENZODIAZ UR QL: NEGATIVE NG/ML — SIGNIFICANT CHANGE UP
BUN SERPL-MCNC: 20 MG/DL — SIGNIFICANT CHANGE UP (ref 7–23)
BZE UR QL: NEGATIVE NG/ML — SIGNIFICANT CHANGE UP
CALCIUM SERPL-MCNC: 8.7 MG/DL — SIGNIFICANT CHANGE UP (ref 8.4–10.5)
CANNABINOIDS UR QL SCN: NEGATIVE NG/ML — SIGNIFICANT CHANGE UP
CHLORIDE SERPL-SCNC: 108 MMOL/L — HIGH (ref 98–107)
CO2 SERPL-SCNC: 22 MMOL/L — SIGNIFICANT CHANGE UP (ref 22–31)
CREAT SERPL-MCNC: 1.33 MG/DL — HIGH (ref 0.5–1.3)
EGFR: 71 ML/MIN/1.73M2 — SIGNIFICANT CHANGE UP
EOSINOPHIL # BLD AUTO: 0.05 K/UL — SIGNIFICANT CHANGE UP (ref 0–0.5)
EOSINOPHIL NFR BLD AUTO: 0.4 % — SIGNIFICANT CHANGE UP (ref 0–6)
ETHANOL UR-MCNC: NEGATIVE % — SIGNIFICANT CHANGE UP
GLUCOSE BLDC GLUCOMTR-MCNC: 115 MG/DL — HIGH (ref 70–99)
GLUCOSE SERPL-MCNC: 112 MG/DL — HIGH (ref 70–99)
HCT VFR BLD CALC: 41.9 % — SIGNIFICANT CHANGE UP (ref 39–50)
HGB BLD-MCNC: 13.9 G/DL — SIGNIFICANT CHANGE UP (ref 13–17)
IANC: 7.39 K/UL — SIGNIFICANT CHANGE UP (ref 1.8–7.4)
IMM GRANULOCYTES NFR BLD AUTO: 0.6 % — SIGNIFICANT CHANGE UP (ref 0–0.9)
LYMPHOCYTES # BLD AUTO: 2.58 K/UL — SIGNIFICANT CHANGE UP (ref 1–3.3)
LYMPHOCYTES # BLD AUTO: 22.5 % — SIGNIFICANT CHANGE UP (ref 13–44)
MAGNESIUM SERPL-MCNC: 2.2 MG/DL — SIGNIFICANT CHANGE UP (ref 1.6–2.6)
MCHC RBC-ENTMCNC: 27.4 PG — SIGNIFICANT CHANGE UP (ref 27–34)
MCHC RBC-ENTMCNC: 33.2 GM/DL — SIGNIFICANT CHANGE UP (ref 32–36)
MCV RBC AUTO: 82.5 FL — SIGNIFICANT CHANGE UP (ref 80–100)
METHADONE UR QL SCN: NEGATIVE NG/ML — SIGNIFICANT CHANGE UP
METHAQUALONE UR QL: NEGATIVE NG/ML — SIGNIFICANT CHANGE UP
METHAQUALONE UR-MCNC: NEGATIVE NG/ML — SIGNIFICANT CHANGE UP
MONOCYTES # BLD AUTO: 1.34 K/UL — HIGH (ref 0–0.9)
MONOCYTES NFR BLD AUTO: 11.7 % — SIGNIFICANT CHANGE UP (ref 2–14)
NEUTROPHILS # BLD AUTO: 7.39 K/UL — SIGNIFICANT CHANGE UP (ref 1.8–7.4)
NEUTROPHILS NFR BLD AUTO: 64.5 % — SIGNIFICANT CHANGE UP (ref 43–77)
NRBC # BLD: 0 /100 WBCS — SIGNIFICANT CHANGE UP (ref 0–0)
NRBC # FLD: 0 K/UL — SIGNIFICANT CHANGE UP (ref 0–0)
OPIATES UR QL: NEGATIVE NG/ML — SIGNIFICANT CHANGE UP
PCP UR QL: NEGATIVE NG/ML — SIGNIFICANT CHANGE UP
PHOSPHATE SERPL-MCNC: 2.6 MG/DL — SIGNIFICANT CHANGE UP (ref 2.5–4.5)
PLATELET # BLD AUTO: 276 K/UL — SIGNIFICANT CHANGE UP (ref 150–400)
POTASSIUM SERPL-MCNC: 3.9 MMOL/L — SIGNIFICANT CHANGE UP (ref 3.5–5.3)
POTASSIUM SERPL-SCNC: 3.9 MMOL/L — SIGNIFICANT CHANGE UP (ref 3.5–5.3)
PROPOXYPH UR QL: NEGATIVE NG/ML — SIGNIFICANT CHANGE UP
RBC # BLD: 5.08 M/UL — SIGNIFICANT CHANGE UP (ref 4.2–5.8)
RBC # FLD: 14 % — SIGNIFICANT CHANGE UP (ref 10.3–14.5)
SODIUM SERPL-SCNC: 139 MMOL/L — SIGNIFICANT CHANGE UP (ref 135–145)
WBC # BLD: 11.47 K/UL — HIGH (ref 3.8–10.5)
WBC # FLD AUTO: 11.47 K/UL — HIGH (ref 3.8–10.5)

## 2022-09-20 PROCEDURE — 70450 CT HEAD/BRAIN W/O DYE: CPT | Mod: 26

## 2022-09-20 PROCEDURE — 84165 PROTEIN E-PHORESIS SERUM: CPT | Mod: 26

## 2022-09-20 PROCEDURE — 99233 SBSQ HOSP IP/OBS HIGH 50: CPT | Mod: GC

## 2022-09-20 PROCEDURE — 99291 CRITICAL CARE FIRST HOUR: CPT | Mod: 25

## 2022-09-20 PROCEDURE — 99232 SBSQ HOSP IP/OBS MODERATE 35: CPT

## 2022-09-20 RX ORDER — LABETALOL HCL 100 MG
100 TABLET ORAL EVERY 8 HOURS
Refills: 0 | Status: DISCONTINUED | OUTPATIENT
Start: 2022-09-20 | End: 2022-09-20

## 2022-09-20 RX ORDER — GABAPENTIN 400 MG/1
300 CAPSULE ORAL ONCE
Refills: 0 | Status: COMPLETED | OUTPATIENT
Start: 2022-09-20 | End: 2022-09-20

## 2022-09-20 RX ORDER — ACETAMINOPHEN 500 MG
1000 TABLET ORAL EVERY 6 HOURS
Refills: 0 | Status: DISCONTINUED | OUTPATIENT
Start: 2022-09-20 | End: 2022-09-26

## 2022-09-20 RX ORDER — LABETALOL HCL 100 MG
100 TABLET ORAL EVERY 8 HOURS
Refills: 0 | Status: DISCONTINUED | OUTPATIENT
Start: 2022-09-20 | End: 2022-09-21

## 2022-09-20 RX ORDER — SPIRONOLACTONE 25 MG/1
12.5 TABLET, FILM COATED ORAL
Refills: 0 | Status: DISCONTINUED | OUTPATIENT
Start: 2022-09-20 | End: 2022-09-22

## 2022-09-20 RX ORDER — HYDROMORPHONE HYDROCHLORIDE 2 MG/ML
0.25 INJECTION INTRAMUSCULAR; INTRAVENOUS; SUBCUTANEOUS EVERY 4 HOURS
Refills: 0 | Status: DISCONTINUED | OUTPATIENT
Start: 2022-09-20 | End: 2022-09-22

## 2022-09-20 RX ORDER — HYDROMORPHONE HYDROCHLORIDE 2 MG/ML
0.25 INJECTION INTRAMUSCULAR; INTRAVENOUS; SUBCUTANEOUS ONCE
Refills: 0 | Status: DISCONTINUED | OUTPATIENT
Start: 2022-09-20 | End: 2022-09-20

## 2022-09-20 RX ORDER — HYDROMORPHONE HYDROCHLORIDE 2 MG/ML
0.5 INJECTION INTRAMUSCULAR; INTRAVENOUS; SUBCUTANEOUS ONCE
Refills: 0 | Status: DISCONTINUED | OUTPATIENT
Start: 2022-09-20 | End: 2022-09-20

## 2022-09-20 RX ORDER — LABETALOL HCL 100 MG
10 TABLET ORAL
Refills: 0 | Status: DISCONTINUED | OUTPATIENT
Start: 2022-09-20 | End: 2022-09-26

## 2022-09-20 RX ADMIN — HYDROMORPHONE HYDROCHLORIDE 0.25 MILLIGRAM(S): 2 INJECTION INTRAMUSCULAR; INTRAVENOUS; SUBCUTANEOUS at 18:14

## 2022-09-20 RX ADMIN — SODIUM CHLORIDE 1 GRAM(S): 9 INJECTION INTRAMUSCULAR; INTRAVENOUS; SUBCUTANEOUS at 05:41

## 2022-09-20 RX ADMIN — Medication 1000 MILLIGRAM(S): at 01:19

## 2022-09-20 RX ADMIN — HYDROMORPHONE HYDROCHLORIDE 0.5 MILLIGRAM(S): 2 INJECTION INTRAMUSCULAR; INTRAVENOUS; SUBCUTANEOUS at 03:43

## 2022-09-20 RX ADMIN — Medication 400 MILLIGRAM(S): at 11:51

## 2022-09-20 RX ADMIN — VALSARTAN 320 MILLIGRAM(S): 80 TABLET ORAL at 05:40

## 2022-09-20 RX ADMIN — LEVETIRACETAM 500 MILLIGRAM(S): 250 TABLET, FILM COATED ORAL at 17:08

## 2022-09-20 RX ADMIN — Medication 400 MILLIGRAM(S): at 17:09

## 2022-09-20 RX ADMIN — Medication 400 MILLIGRAM(S): at 00:05

## 2022-09-20 RX ADMIN — HYDROMORPHONE HYDROCHLORIDE 0.5 MILLIGRAM(S): 2 INJECTION INTRAMUSCULAR; INTRAVENOUS; SUBCUTANEOUS at 02:19

## 2022-09-20 RX ADMIN — Medication 10 MILLIGRAM(S): at 11:51

## 2022-09-20 RX ADMIN — LEVETIRACETAM 500 MILLIGRAM(S): 250 TABLET, FILM COATED ORAL at 05:41

## 2022-09-20 RX ADMIN — GABAPENTIN 300 MILLIGRAM(S): 400 CAPSULE ORAL at 17:08

## 2022-09-20 RX ADMIN — HYDROMORPHONE HYDROCHLORIDE 0.5 MILLIGRAM(S): 2 INJECTION INTRAMUSCULAR; INTRAVENOUS; SUBCUTANEOUS at 16:16

## 2022-09-20 RX ADMIN — HYDROMORPHONE HYDROCHLORIDE 0.25 MILLIGRAM(S): 2 INJECTION INTRAMUSCULAR; INTRAVENOUS; SUBCUTANEOUS at 19:16

## 2022-09-20 RX ADMIN — NICARDIPINE HYDROCHLORIDE 25 MG/HR: 30 CAPSULE, EXTENDED RELEASE ORAL at 19:42

## 2022-09-20 RX ADMIN — Medication 100 MILLIGRAM(S): at 22:17

## 2022-09-20 RX ADMIN — Medication 400 MILLIGRAM(S): at 05:40

## 2022-09-20 RX ADMIN — Medication 10 MILLIGRAM(S): at 13:52

## 2022-09-20 RX ADMIN — SPIRONOLACTONE 12.5 MILLIGRAM(S): 25 TABLET, FILM COATED ORAL at 17:45

## 2022-09-20 RX ADMIN — AMLODIPINE BESYLATE 10 MILLIGRAM(S): 2.5 TABLET ORAL at 05:40

## 2022-09-20 RX ADMIN — Medication 1000 MILLIGRAM(S): at 06:17

## 2022-09-20 RX ADMIN — SODIUM CHLORIDE 1 GRAM(S): 9 INJECTION INTRAMUSCULAR; INTRAVENOUS; SUBCUTANEOUS at 21:34

## 2022-09-20 RX ADMIN — Medication 1000 MILLIGRAM(S): at 23:53

## 2022-09-20 RX ADMIN — Medication 1000 MILLIGRAM(S): at 17:45

## 2022-09-20 RX ADMIN — Medication 1000 MILLIGRAM(S): at 12:26

## 2022-09-20 RX ADMIN — HYDROMORPHONE HYDROCHLORIDE 0.5 MILLIGRAM(S): 2 INJECTION INTRAMUSCULAR; INTRAVENOUS; SUBCUTANEOUS at 14:31

## 2022-09-20 NOTE — PROGRESS NOTE ADULT - ASSESSMENT
Pt. is a 36 y.o. M w/ PMHx of HTN, HLD who had not taken his anti-hypertensive medications for approximately 3 months, presents to [facility] ED after a syncopal episode. Nephrology consulted for history of CKD.

## 2022-09-20 NOTE — PROGRESS NOTE ADULT - ASSESSMENT
37 YO male with Hx of HTN, noncompliant with prescribed medications, HLD brought to ED following a syncopal episode, markedly hypertensive on arrival to ED, with a left basal ganglia hemorrhage, intraventricular extension, and early hydrocephalus. Patient does not require neurosurgical intervention at this time, however he is at high risk of worsening hydrocephalus and may require placement of an external ventricular drain.    9/19: Alert and nonfocal exam but not as well oriented as on initial exam. BP controlled on nicardipine infusion. Repeat head CT this AM  9/20: Neurologically intact. SBP maintained<150, on nicardipine gtt. Increased headache yesterday with stable Head CT.

## 2022-09-20 NOTE — PROGRESS NOTE ADULT - SUBJECTIVE AND OBJECTIVE BOX
[facility] DIVISION OF KIDNEY DISEASES AND HYPERTENSION -- FOLLOW UP NOTE  --------------------------------------------------------------------------------  HPI:    Pt. is a 36 y.o. M w/ PMHx of HTN, HLD who had not taken his anti-hypertensive medications for approximately 3 months, presents to [facility] ED after a syncopal episode. Nephrology consulted for history of CKD. Pt. denies any history of CKD. Denies ever being referred to see a kidney doctor. Denies any blood or bubbles in the urine. Pt. denies taking any NSAIDs. Pt. being monitored in SICU for frequent neurochecks. given CT showing basal ganglia infarct with intraventricular extension and hydrocephalus and BP control on Nicardipine gtt    Pt. seen this AM, remains on Nicardipine gtt. SNa increased to 139. Pt. endorses some headache and blurry vision that is unchanged.         PAST HISTORY  --------------------------------------------------------------------------------  No significant changes to PMH, PSH, FHx, SHx, unless otherwise noted    ALLERGIES & MEDICATIONS  --------------------------------------------------------------------------------  Allergies    No Known Allergies    Intolerances      Standing Inpatient Medications  acetaminophen   IVPB .. 1000 milliGRAM(s) IV Intermittent every 6 hours  amLODIPine   Tablet 10 milliGRAM(s) Oral daily  influenza   Vaccine 0.5 milliLiter(s) IntraMuscular once  levETIRAcetam 500 milliGRAM(s) Oral two times a day  niCARdipine Infusion 5 mG/Hr IV Continuous <Continuous>  sodium chloride 1 Gram(s) Oral three times a day  sodium chloride 0.9%. 1000 milliLiter(s) IV Continuous <Continuous>  spironolactone 12.5 milliGRAM(s) Oral two times a day  valsartan 320 milliGRAM(s) Oral daily    PRN Inpatient Medications  labetalol Injectable 10 milliGRAM(s) IV Push every 2 hours PRN      REVIEW OF SYSTEMS  --------------------------------------------------------------------------------  Gen: No fevers/chills  Skin: No rashes  Head/Eyes/Ears: Normal hearing; blurry vision+, H/A+  Respiratory: No dyspnea, cough  CV: No chest pain  GI: No abdominal pain, diarrhea  : No urinary changes  MSK: No edema    All other systems were reviewed and are negative, except as noted.    VITALS/PHYSICAL EXAM  --------------------------------------------------------------------------------  T(C): 37.5 (09-20-22 @ 08:00), Max: 37.5 (09-20-22 @ 08:00)  HR: 102 (09-20-22 @ 09:00) (87 - 116)  BP: 153/95 (09-20-22 @ 09:00) (98/61 - 169/103)  RR: 15 (09-20-22 @ 09:00) (9 - 27)  SpO2: 95% (09-20-22 @ 09:00) (94% - 99%)  Wt(kg): --  Height (cm): 183.1 (09-18-22 @ 16:46)  Weight (kg): 102 (09-18-22 @ 22:00)  BMI (kg/m2): 30.4 (09-18-22 @ 22:00)  BSA (m2): 2.24 (09-18-22 @ 22:00)      09-19-22 @ 07:01  -  09-20-22 @ 07:00  --------------------------------------------------------  IN: 3750 mL / OUT: 2400 mL / NET: 1350 mL    09-20-22 @ 07:01  -  09-20-22 @ 09:56  --------------------------------------------------------  IN: 137.5 mL / OUT: 0 mL / NET: 137.5 mL      Physical Exam:  	Gen: NAD  	HEENT: MMM  	Pulm: CTA B/L- anteriorly  	CV: S1S2  	Abd: Soft, +BS   	Ext: No LE edema B/L  	Neuro: Awake, denies any numbness/tingling or extremity weakness.   	Skin: Warm and dry  	Vascular access: peripheral    LABS/STUDIES  --------------------------------------------------------------------------------              13.9   11.47 >-----------<  276      [09-20-22 @ 02:20]              41.9     139  |  108  |  20  ----------------------------<  112      [09-20-22 @ 02:20]  3.9   |  22  |  1.33        Ca     8.7     [09-20-22 @ 02:20]      Mg     2.20     [09-20-22 @ 02:20]      Phos  2.6     [09-20-22 @ 02:20]    TPro  9.1  /  Alb  4.6  /  TBili  1.2  /  DBili  x   /  AST  25  /  ALT  41  /  AlkPhos  93  [09-18-22 @ 17:50]    PT/INR: PT 13.8 , INR 1.19       [09-18-22 @ 19:40]  PTT: 27.0       [09-18-22 @ 19:40]      Creatinine Trend:  SCr 1.33 [09-20 @ 02:20]  SCr 1.64 [09-19 @ 16:00]  SCr 1.75 [09-19 @ 03:24]  SCr 2.26 [09-18 @ 17:50]  SCr 1.59 [09-16 @ 23:56]    Urinalysis - [09-19-22 @ 04:27]      Color Yellow / Appearance Clear / SG >1.050 / pH 6.0      Gluc Negative / Ketone Negative  / Bili Negative / Urobili 3 mg/dL       Blood Negative / Protein 100 mg/dL / Leuk Est Negative / Nitrite Negative      RBC 4 / WBC 4 / Hyaline 3 / Gran  / Sq Epi  / Non Sq Epi 1 / Bacteria Negative    Urine Creatinine 240      [09-19-22 @ 16:00]  Urine Protein 45      [09-19-22 @ 16:00]  Urine Sodium 67      [09-19-22 @ 16:00]  Urine Potassium 21.7      [09-19-22 @ 16:00]  Urine Chloride 47      [09-19-22 @ 16:00]    TSH 0.82      [09-18-22 @ 17:50]

## 2022-09-20 NOTE — PROGRESS NOTE ADULT - ASSESSMENT
· Assessment      Assessment  36y M with uncontrolled HTN (non-compliant w/ meds), who presented for syncope and found to have L BG hemorrhage w/ associated IVH and hydrocephalus. SICU consulted for blood pressure control and q1hr neuro checks.    PLAN:  Neuro:   - AOx4   - q2hr neuro checks  - repeat ct head w/o contrast tomorrow am  - to undergo MRI/MRA Wednesday 09/21  - CTA shows no vasc malformations  - MRI brain w/ w/o contrast  - Keppra 1g iv x1 stat. 500mg bid    Respiratory:  - Saturating >93% on ra    Cardiovascular:  - c/w nicardipine gtt. BP Goal is 120-150   - transition to po anti-htn as tolerated  - maintain sbp 120-150's    Gastrointestinal:  - NPO    /Renal:  - NS @ 100cc/hr  - monitor Na. Na goal 140-150.  - Strict I&O's  - Replete electrolytes prn    HEmatologic:  - Monitor H/H    Infectious DIsease:  - Afebrile    Endo:  - Monitor blood sugar. FS q6hr.  - f/u A1C    Dispo: SICU     · Assessment      Assessment  36y M with uncontrolled HTN (non-compliant w/ meds), who presented for syncope and found to have L BG hemorrhage w/ associated IVH and hydrocephalus. SICU consulted for blood pressure control and q1hr neuro checks.    PLAN:  Neuro:   - AOx4   - q2hr neuro checks  - CT head w/o contrast today 09/20  - to undergo MRI/MRA tomorrow 09/21  - CTA shows no vasc malformations  - Keppra 500mg bid    Respiratory:  - Saturating >93% on ra    Cardiovascular:  - c/w nicardipine gtt. BP Goal is 120-150   - transition to po anti-htn as tolerated  - maintain sbp 120-150's    Gastrointestinal:  - Clear liquid diet    /Renal:   - NS @ 50cc/hr   - monitor Na. Na goal 140-150.  - Strict I&O's   - Replete electrolytes prn  - f/u abdomen/pelvis duplex    Hematologic:  - Monitor H/H     Infectious DIsease:  - Afebrile    Endo:  - Monitor blood sugar. FS q6hr.  - f/u A1C    Dispo: SICU     · Assessment      Assessment  36y M with uncontrolled HTN (non-compliant w/ meds), who presented for syncope and found to have L BG hemorrhage w/ associated IVH and hydrocephalus. SICU consulted for blood pressure control and q1hr neuro checks.    24 HOUR EVENTS:  - Given 0.5 Dilaudid for 10/10 neck pain at 2 pm   - Follow up CTH - labetolol 10 mg IV push given while in CT scan     PLAN:  Neuro:   - AOx4   - q2hr neuro checks  - CT head w/o contrast today 09/20  - to undergo MRI/MRA tomorrow 09/21  - CTA shows no vasc malformations  - Keppra 500mg bid    Respiratory:  - Saturating >93% on ra    Cardiovascular:  - c/w nicardipine gtt. BP Goal is 120-150   - transition to po anti-htn as tolerated  - maintain sbp 120-150's    Gastrointestinal:  - Clear liquid diet    /Renal:   - NS @ 50cc/hr   - monitor Na. Na goal 140-150.  - Strict I&O's   - Replete electrolytes prn  - f/u abdomen/pelvis duplex    Hematologic:  - Monitor H/H     Infectious DIsease:  - Afebrile    Endo:  - Monitor blood sugar. FS q6hr.  - f/u A1C    Dispo: SICU

## 2022-09-20 NOTE — PROGRESS NOTE ADULT - SUBJECTIVE AND OBJECTIVE BOX
HISTORY  36y Male y/o male with PMHx of HTN, HLD who has not taken his anti-hypertensive medications for approximately 3 months, presents to [facility] ED after a syncopal episode. Patient noted to have an episode of urinary incontinence at the time. Lost consciousness for few minutes and woke up in the ambulance, no head trauma. BP on arrival was 194/143, and was started on a cardene gtt. Patient has been c/o neck pain, headache, and b/l blurry vision for 4 days as per sister. Vision has now normalized. 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 SICU was consulted for bp control and q1hr neuro checks.     24 HOUR EVENTS:  -   - CTA shows no vasc malformations  - CTH non-con in the AM demonstrated stable L basal ganglia and L intraventricular hemorrhage, unchanged hydrocephalus  - Repeat CTH non-con @ 8PM today  - Keppra 500mg PO BID started  - Nephro consulted  - To elevate head of bed    SUBJECTIVE/ROS:  [ ] A ten-point review of systems was otherwise negative except as noted.  [ ] Due to altered mental status/intubation, subjective information were not able to be obtained from the patient. History was obtained, to the extent possible, from review of the chart and collateral sources of information.      NEURO  RASS:     GCS:     CAM ICU:  Exam: awake, alert, oriented  Meds: acetaminophen   IVPB .. 1000 milliGRAM(s) IV Intermittent every 6 hours  levETIRAcetam 500 milliGRAM(s) Oral two times a day    [x] Adequacy of sedation and pain control has been assessed and adjusted      RESPIRATORY  RR: 0 (09-20-22 @ 01:00) (0 - 27)  SpO2: 96% (09-20-22 @ 01:00) (92% - 97%)  Wt(kg): --  Exam: unlabored, clear to auscultation bilaterally  Mechanical Ventilation:   ABG - ( 19 Sep 2022 03:24 )  pH: x     /  pCO2: x     /  pO2: x     / HCO3: x     / Base Excess: x     /  SaO2: x       Lactate: 0.9              [N/A] Extubation Readiness Assessed  Meds:       CARDIOVASCULAR  HR: 92 (09-20-22 @ 01:00) (92 - 118)  BP: 137/73 (09-20-22 @ 01:00) (98/61 - 171/114)  BP(mean): 89 (09-20-22 @ 01:00) (72 - 128)  ABP: --  ABP(mean): --  Wt(kg): --  CVP(cm H2O): --  VBG - ( 18 Sep 2022 18:20 )  pH: 7.40  /  pCO2: 49    /  pO2: 27    / HCO3: 30    / Base Excess: 4.3   /  SaO2: 36.1   Lactate: 3.1              Lactate, Blood: 0.9 mmol/L (09-19 @ 03:24)    Exam: regular rate and rhythm  Cardiac Rhythm: sinus  Perfusion     [x]Adequate   [ ]Inadequate  Mentation   [x]Normal       [ ]Reduced  Extremities  [x]Warm         [ ]Cool  Volume Status [ ]Hypervolemic [x]Euvolemic [ ]Hypovolemic  Meds: amLODIPine   Tablet 10 milliGRAM(s) Oral daily  niCARdipine Infusion 5 mG/Hr IV Continuous <Continuous>  valsartan 320 milliGRAM(s) Oral daily        GI/NUTRITION  Exam: soft, nontender, nondistended, incision C/D/I  Diet:  Meds:     GENITOURINARY  I&O's Detail    09-18 @ 07:01  -  09-19 @ 07:00  --------------------------------------------------------  IN:    NiCARdipine: 487.5 mL    sodium chloride 0.9%: 900 mL  Total IN: 1387.5 mL    OUT:    Voided (mL): 600 mL  Total OUT: 600 mL    Total NET: 787.5 mL      09-19 @ 07:01  -  09-20 @ 01:24  --------------------------------------------------------  IN:    NiCARdipine: 662.5 mL    sodium chloride 0.9%: 1200 mL  Total IN: 1862.5 mL    OUT:    Voided (mL): 1850 mL  Total OUT: 1850 mL    Total NET: 12.5 mL          09-19    138  |  104  |  28<H>  ----------------------------<  110<H>  3.6   |  23  |  1.64<H>    Ca    8.9      19 Sep 2022 16:00  Phos  3.0     09-19  Mg     2.50     09-19    TPro  9.1<H>  /  Alb  4.6  /  TBili  1.2  /  DBili  x   /  AST  25  /  ALT  41  /  AlkPhos  93  09-18    [ ] Frankel catheter, indication: N/A  Meds: sodium chloride 1 Gram(s) Oral three times a day  sodium chloride 0.9%. 1000 milliLiter(s) IV Continuous <Continuous>        HEMATOLOGIC  Meds:   [x] VTE Prophylaxis                        15.2   11.98 )-----------( 316      ( 19 Sep 2022 03:24 )             45.9     PT/INR - ( 18 Sep 2022 19:40 )   PT: 13.8 sec;   INR: 1.19 ratio         PTT - ( 18 Sep 2022 19:40 )  PTT:27.0 sec  Transfusion     [ ] PRBC   [ ] Platelets   [ ] FFP   [ ] Cryoprecipitate      INFECTIOUS DISEASES  WBC Count: 11.98 K/uL (09-19 @ 03:24)    RECENT CULTURES:  Specimen Source: .Blood Blood-Peripheral  Date/Time: 09-18 @ 17:50  Culture Results:   No growth to date.  Gram Stain: --  Organism: --  Specimen Source: Clean Catch Clean Catch (Midstream)  Date/Time: 09-17 @ 00:22  Culture Results:   <10,000 CFU/mL Normal Urogenital [place]  Gram Stain: --  Organism: --    Meds: influenza   Vaccine 0.5 milliLiter(s) IntraMuscular once        ENDOCRINE  CAPILLARY BLOOD GLUCOSE        Meds:       ACCESS DEVICES:  [ ] Peripheral IV  [ ] Central Venous Line	[ ] R	[ ] L	[ ] IJ	[ ] Fem	[ ] SC	Placed:   [ ] Arterial Line		[ ] R	[ ] L	[ ] Fem	[ ] Rad	[ ] Ax	Placed:   [ ] PICC:					[ ] Mediport  [ ] Urinary Catheter, Date Placed:   [x] Necessity of urinary, arterial, and venous catheters discussed    OTHER MEDICATIONS:      CODE STATUS:      IMAGING:   HISTORY  36y Male y/o male with PMHx of HTN, HLD who has not taken his anti-hypertensive medications for approximately 3 months, presents to [facility] ED after a syncopal episode. Patient noted to have an episode of urinary incontinence at the time. Lost consciousness for few minutes and woke up in the ambulance, no head trauma. BP on arrival was 194/143, and was started on a cardene gtt. Patient has been c/o neck pain, headache, and b/l blurry vision for 4 days as per sister. Vision has now normalized. 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 SICU was consulted for bp control and q1hr neuro checks.     24 HOUR EVENTS:  - To undergo CTH non contrast 9/20  - q1 neurochecks --> q2  - To undergo MRI/MRA Wednesday 09/21 as per neuro  - CTA showed no vasc malformations  - CTH non-con in the AM demonstrated stable L basal ganglia and L intraventricular hemorrhage, unchanged hydrocephalus  - Repeat CTH non-con @ 8PM today  - Keppra 500mg PO BID started  - Nephro consulted  - To elevate head of bed    SUBJECTIVE/ROS:  [ ] A ten-point review of systems was otherwise negative except as noted.  [ ] Due to altered mental status/intubation, subjective information were not able to be obtained from the patient. History was obtained, to the extent possible, from review of the chart and collateral sources of information.      NEURO  RASS:     GCS:     CAM ICU:  Exam: awake, alert, oriented  Meds: acetaminophen   IVPB .. 1000 milliGRAM(s) IV Intermittent every 6 hours  levETIRAcetam 500 milliGRAM(s) Oral two times a day    [x] Adequacy of sedation and pain control has been assessed and adjusted      RESPIRATORY  RR: 0 (09-20-22 @ 01:00) (0 - 27)  SpO2: 96% (09-20-22 @ 01:00) (92% - 97%)  Wt(kg): --  Exam: unlabored, clear to auscultation bilaterally  Mechanical Ventilation:   ABG - ( 19 Sep 2022 03:24 )  pH: x     /  pCO2: x     /  pO2: x     / HCO3: x     / Base Excess: x     /  SaO2: x       Lactate: 0.9              [N/A] Extubation Readiness Assessed  Meds:       CARDIOVASCULAR  HR: 92 (09-20-22 @ 01:00) (92 - 118)  BP: 137/73 (09-20-22 @ 01:00) (98/61 - 171/114)  BP(mean): 89 (09-20-22 @ 01:00) (72 - 128)  ABP: --  ABP(mean): --  Wt(kg): --  CVP(cm H2O): --  VBG - ( 18 Sep 2022 18:20 )  pH: 7.40  /  pCO2: 49    /  pO2: 27    / HCO3: 30    / Base Excess: 4.3   /  SaO2: 36.1   Lactate: 3.1              Lactate, Blood: 0.9 mmol/L (09-19 @ 03:24)    Exam: regular rate and rhythm  Cardiac Rhythm: sinus  Perfusion     [x]Adequate   [ ]Inadequate  Mentation   [x]Normal       [ ]Reduced  Extremities  [x]Warm         [ ]Cool  Volume Status [ ]Hypervolemic [x]Euvolemic [ ]Hypovolemic  Meds: amLODIPine   Tablet 10 milliGRAM(s) Oral daily  niCARdipine Infusion 5 mG/Hr IV Continuous <Continuous>  valsartan 320 milliGRAM(s) Oral daily        GI/NUTRITION  Exam: soft, nontender, nondistended, incision C/D/I  Diet:  Meds:     GENITOURINARY  I&O's Detail    09-18 @ 07:01  -  09-19 @ 07:00  --------------------------------------------------------  IN:    NiCARdipine: 487.5 mL    sodium chloride 0.9%: 900 mL  Total IN: 1387.5 mL    OUT:    Voided (mL): 600 mL  Total OUT: 600 mL    Total NET: 787.5 mL      09-19 @ 07:01  -  09-20 @ 01:24  --------------------------------------------------------  IN:    NiCARdipine: 662.5 mL    sodium chloride 0.9%: 1200 mL  Total IN: 1862.5 mL    OUT:    Voided (mL): 1850 mL  Total OUT: 1850 mL    Total NET: 12.5 mL          09-19    138  |  104  |  28<H>  ----------------------------<  110<H>  3.6   |  23  |  1.64<H>    Ca    8.9      19 Sep 2022 16:00  Phos  3.0     09-19  Mg     2.50     09-19    TPro  9.1<H>  /  Alb  4.6  /  TBili  1.2  /  DBili  x   /  AST  25  /  ALT  41  /  AlkPhos  93  09-18    [ ] Frankel catheter, indication: N/A  Meds: sodium chloride 1 Gram(s) Oral three times a day  sodium chloride 0.9%. 1000 milliLiter(s) IV Continuous <Continuous>        HEMATOLOGIC  Meds:   [x] VTE Prophylaxis                        15.2   11.98 )-----------( 316      ( 19 Sep 2022 03:24 )             45.9     PT/INR - ( 18 Sep 2022 19:40 )   PT: 13.8 sec;   INR: 1.19 ratio         PTT - ( 18 Sep 2022 19:40 )  PTT:27.0 sec  Transfusion     [ ] PRBC   [ ] Platelets   [ ] FFP   [ ] Cryoprecipitate      INFECTIOUS DISEASES  WBC Count: 11.98 K/uL (09-19 @ 03:24)    RECENT CULTURES:  Specimen Source: .Blood Blood-Peripheral  Date/Time: 09-18 @ 17:50  Culture Results:   No growth to date.  Gram Stain: --  Organism: --  Specimen Source: Clean Catch Clean Catch (Midstream)  Date/Time: 09-17 @ 00:22  Culture Results:   <10,000 CFU/mL Normal Urogenital [place]  Gram Stain: --  Organism: --    Meds: influenza   Vaccine 0.5 milliLiter(s) IntraMuscular once        ENDOCRINE  CAPILLARY BLOOD GLUCOSE        Meds:       ACCESS DEVICES:  [ ] Peripheral IV  [ ] Central Venous Line	[ ] R	[ ] L	[ ] IJ	[ ] Fem	[ ] SC	Placed:   [ ] Arterial Line		[ ] R	[ ] L	[ ] Fem	[ ] Rad	[ ] Ax	Placed:   [ ] PICC:					[ ] Mediport  [ ] Urinary Catheter, Date Placed:   [x] Necessity of urinary, arterial, and venous catheters discussed    OTHER MEDICATIONS:      CODE STATUS:      IMAGING:   HISTORY  36y Male y/o male with PMHx of HTN, HLD who has not taken his anti-hypertensive medications for approximately 3 months, presents to [facility] ED after a syncopal episode. Patient noted to have an episode of urinary incontinence at the time. Lost consciousness for few minutes and woke up in the ambulance, no head trauma. BP on arrival was 194/143, and was started on a cardene gtt. Patient has been c/o neck pain, headache, and b/l blurry vision for 4 days as per sister. Vision has now normalized. 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 SICU was consulted for bp control and q1hr neuro checks.     24 HOUR EVENTS:  - CTA showed no vasc malformations  - CTH non-con in the AM demonstrated stable L basal ganglia and L intraventricular hemorrhage, unchanged hydrocephalus  - To undergo CTH non contrast today 09/20  - To undergo MRI/MRA tomorrow 09/21 as per neuro  - Spironolactone 12.5mg BID  - Cut fluids down to 50cc/hr   - Labetalol 10mg IVP q2h PRN for systolic > 160  - Clear liquid diet    SUBJECTIVE/ROS:  [ ] A ten-point review of systems was otherwise negative except as noted.  [ ] Due to altered mental status/intubation, subjective information were not able to be obtained from the patient. History was obtained, to the extent possible, from review of the chart and collateral sources of information.      NEURO  RASS:     GCS:     CAM ICU:  Exam: awake, alert, oriented  Meds: acetaminophen   IVPB .. 1000 milliGRAM(s) IV Intermittent every 6 hours  levETIRAcetam 500 milliGRAM(s) Oral two times a day    [x] Adequacy of sedation and pain control has been assessed and adjusted      RESPIRATORY  RR: 0 (09-20-22 @ 01:00) (0 - 27)  SpO2: 96% (09-20-22 @ 01:00) (92% - 97%)  Wt(kg): --  Exam: unlabored, clear to auscultation bilaterally  Mechanical Ventilation:   ABG - ( 19 Sep 2022 03:24 )  pH: x     /  pCO2: x     /  pO2: x     / HCO3: x     / Base Excess: x     /  SaO2: x       Lactate: 0.9              [N/A] Extubation Readiness Assessed  Meds:       CARDIOVASCULAR  HR: 92 (09-20-22 @ 01:00) (92 - 118)  BP: 137/73 (09-20-22 @ 01:00) (98/61 - 171/114)  BP(mean): 89 (09-20-22 @ 01:00) (72 - 128)  ABP: --  ABP(mean): --  Wt(kg): --  CVP(cm H2O): --  VBG - ( 18 Sep 2022 18:20 )  pH: 7.40  /  pCO2: 49    /  pO2: 27    / HCO3: 30    / Base Excess: 4.3   /  SaO2: 36.1   Lactate: 3.1              Lactate, Blood: 0.9 mmol/L (09-19 @ 03:24)    Exam: regular rate and rhythm  Cardiac Rhythm: sinus  Perfusion     [x]Adequate   [ ]Inadequate  Mentation   [x]Normal       [ ]Reduced  Extremities  [x]Warm         [ ]Cool  Volume Status [ ]Hypervolemic [x]Euvolemic [ ]Hypovolemic  Meds: amLODIPine   Tablet 10 milliGRAM(s) Oral daily  niCARdipine Infusion 5 mG/Hr IV Continuous <Continuous>  valsartan 320 milliGRAM(s) Oral daily        GI/NUTRITION  Exam: soft, nontender, nondistended, incision C/D/I  Diet:  Meds:     GENITOURINARY  I&O's Detail    09-18 @ 07:01  -  09-19 @ 07:00  --------------------------------------------------------  IN:    NiCARdipine: 487.5 mL    sodium chloride 0.9%: 900 mL  Total IN: 1387.5 mL    OUT:    Voided (mL): 600 mL  Total OUT: 600 mL    Total NET: 787.5 mL      09-19 @ 07:01  -  09-20 @ 01:24  --------------------------------------------------------  IN:    NiCARdipine: 662.5 mL    sodium chloride 0.9%: 1200 mL  Total IN: 1862.5 mL    OUT:    Voided (mL): 1850 mL  Total OUT: 1850 mL    Total NET: 12.5 mL          09-19    138  |  104  |  28<H>  ----------------------------<  110<H>  3.6   |  23  |  1.64<H>    Ca    8.9      19 Sep 2022 16:00  Phos  3.0     09-19  Mg     2.50     09-19    TPro  9.1<H>  /  Alb  4.6  /  TBili  1.2  /  DBili  x   /  AST  25  /  ALT  41  /  AlkPhos  93  09-18    [ ] Frankel catheter, indication: N/A  Meds: sodium chloride 1 Gram(s) Oral three times a day  sodium chloride 0.9%. 1000 milliLiter(s) IV Continuous <Continuous>        HEMATOLOGIC  Meds:   [x] VTE Prophylaxis                        15.2   11.98 )-----------( 316      ( 19 Sep 2022 03:24 )             45.9     PT/INR - ( 18 Sep 2022 19:40 )   PT: 13.8 sec;   INR: 1.19 ratio         PTT - ( 18 Sep 2022 19:40 )  PTT:27.0 sec  Transfusion     [ ] PRBC   [ ] Platelets   [ ] FFP   [ ] Cryoprecipitate      INFECTIOUS DISEASES  WBC Count: 11.98 K/uL (09-19 @ 03:24)    RECENT CULTURES:  Specimen Source: .Blood Blood-Peripheral  Date/Time: 09-18 @ 17:50  Culture Results:   No growth to date.  Gram Stain: --  Organism: --  Specimen Source: Clean Catch Clean Catch (Midstream)  Date/Time: 09-17 @ 00:22  Culture Results:   <10,000 CFU/mL Normal Urogenital [place]  Gram Stain: --  Organism: --    Meds: influenza   Vaccine 0.5 milliLiter(s) IntraMuscular once        ENDOCRINE  CAPILLARY BLOOD GLUCOSE        Meds:       ACCESS DEVICES:  [ ] Peripheral IV  [ ] Central Venous Line	[ ] R	[ ] L	[ ] IJ	[ ] Fem	[ ] SC	Placed:   [ ] Arterial Line		[ ] R	[ ] L	[ ] Fem	[ ] Rad	[ ] Ax	Placed:   [ ] PICC:					[ ] Mediport  [ ] Urinary Catheter, Date Placed:   [x] Necessity of urinary, arterial, and venous catheters discussed    OTHER MEDICATIONS:      CODE STATUS:      IMAGING:

## 2022-09-20 NOTE — PROGRESS NOTE ADULT - PROBLEM SELECTOR PLAN 1
Neurologic checks and vital signs Q2H  Maintain SBP<150  Maintain serum Na 140-150  Noncontrast head CT in AM  Brain MRI/MRA in 1-2 days  Follow up with stroke neurology

## 2022-09-20 NOTE — PROGRESS NOTE ADULT - PROBLEM SELECTOR PLAN 1
Pt. admitted with SCr. of 1.5 which increased to 2.2 and then trended to 1.7 today. Per [facility] review Pt. has had SCr. in the 1.5 range since March of 2021 and was last noted to be 1.4 in September of 2021. Follow up urine electrolytes. Please obtain Renal Duplex to assess for echogenicity as well as Renal artery stenosis for secondary hypertension as Pt. on many medications for HTN. UPCr ratio wnl. total protein markedly elevated to 9.1 with Albumin of 4.6. F/u SPEP and sFLC. Remains on ARB inpatient which is ok for now. On Norvasc as well and being titrated off Nicardipine gtt? (double CCB?) F/u PRA and PAC. c/w Aldactone 12.5mg BID/ Continue to monitor Na levels almost at recommended goal with Salt tabs and NS. Unfortunately salt and water will make BP more difficult to control and thus when off this regimen BP medications will need to be adjusted again.     If you have any questions, please feel free to contact me  Antoni Livingston  Nephrology Fellow  [phone]; Prefer [employer] TEAMS  (After 5pm or on weekends please page the on-call fellow).

## 2022-09-20 NOTE — PROGRESS NOTE ADULT - PROBLEM SELECTOR PLAN 2
Neurologic checks and vital signs Q2H  Maintain SBP<150  Maintain serum Na 140-150  Noncontrast head CT in AM  Brain MRI/MRA in 1-2 days

## 2022-09-20 NOTE — PROGRESS NOTE ADULT - PROBLEM SELECTOR PLAN 3
Neurologic checks and vital signs Q2H  Maintain SBP<150  Maintain serum Na 140-150  Place EVD if hydrocephalus worsens

## 2022-09-20 NOTE — PROVIDER CONTACT NOTE (OTHER) - ACTION/TREATMENT ORDERED:
SICU team at bedside to assess. Neuro status intact. 0.5mg Dilaudid given for pain as ordered. POCT . Will continue to monitor.

## 2022-09-21 LAB
ANION GAP SERPL CALC-SCNC: 10 MMOL/L — SIGNIFICANT CHANGE UP (ref 7–14)
ANION GAP SERPL CALC-SCNC: 10 MMOL/L — SIGNIFICANT CHANGE UP (ref 7–14)
ANION GAP SERPL CALC-SCNC: 11 MMOL/L — SIGNIFICANT CHANGE UP (ref 7–14)
BUN SERPL-MCNC: 15 MG/DL — SIGNIFICANT CHANGE UP (ref 7–23)
CALCIUM SERPL-MCNC: 8.8 MG/DL — SIGNIFICANT CHANGE UP (ref 8.4–10.5)
CALCIUM SERPL-MCNC: 8.9 MG/DL — SIGNIFICANT CHANGE UP (ref 8.4–10.5)
CALCIUM SERPL-MCNC: 9.1 MG/DL — SIGNIFICANT CHANGE UP (ref 8.4–10.5)
CHLORIDE SERPL-SCNC: 102 MMOL/L — SIGNIFICANT CHANGE UP (ref 98–107)
CHLORIDE SERPL-SCNC: 103 MMOL/L — SIGNIFICANT CHANGE UP (ref 98–107)
CHLORIDE SERPL-SCNC: 103 MMOL/L — SIGNIFICANT CHANGE UP (ref 98–107)
CK MB BLD-MCNC: 1.8 % — SIGNIFICANT CHANGE UP (ref 0–2.5)
CK MB CFR SERPL CALC: 2 NG/ML — SIGNIFICANT CHANGE UP
CK SERPL-CCNC: 112 U/L — SIGNIFICANT CHANGE UP (ref 30–200)
CO2 SERPL-SCNC: 22 MMOL/L — SIGNIFICANT CHANGE UP (ref 22–31)
CO2 SERPL-SCNC: 23 MMOL/L — SIGNIFICANT CHANGE UP (ref 22–31)
CO2 SERPL-SCNC: 23 MMOL/L — SIGNIFICANT CHANGE UP (ref 22–31)
CREAT SERPL-MCNC: 1.2 MG/DL — SIGNIFICANT CHANGE UP (ref 0.5–1.3)
CREAT SERPL-MCNC: 1.22 MG/DL — SIGNIFICANT CHANGE UP (ref 0.5–1.3)
CREAT SERPL-MCNC: 1.34 MG/DL — HIGH (ref 0.5–1.3)
EGFR: 70 ML/MIN/1.73M2 — SIGNIFICANT CHANGE UP
EGFR: 79 ML/MIN/1.73M2 — SIGNIFICANT CHANGE UP
EGFR: 80 ML/MIN/1.73M2 — SIGNIFICANT CHANGE UP
GLUCOSE SERPL-MCNC: 105 MG/DL — HIGH (ref 70–99)
GLUCOSE SERPL-MCNC: 145 MG/DL — HIGH (ref 70–99)
GLUCOSE SERPL-MCNC: 99 MG/DL — SIGNIFICANT CHANGE UP (ref 70–99)
HCT VFR BLD CALC: 41.6 % — SIGNIFICANT CHANGE UP (ref 39–50)
HGB BLD-MCNC: 13.4 G/DL — SIGNIFICANT CHANGE UP (ref 13–17)
MAGNESIUM SERPL-MCNC: 2 MG/DL — SIGNIFICANT CHANGE UP (ref 1.6–2.6)
MAGNESIUM SERPL-MCNC: 2.1 MG/DL — SIGNIFICANT CHANGE UP (ref 1.6–2.6)
MAGNESIUM SERPL-MCNC: 2.2 MG/DL — SIGNIFICANT CHANGE UP (ref 1.6–2.6)
MCHC RBC-ENTMCNC: 27 PG — SIGNIFICANT CHANGE UP (ref 27–34)
MCHC RBC-ENTMCNC: 32.2 GM/DL — SIGNIFICANT CHANGE UP (ref 32–36)
MCV RBC AUTO: 83.7 FL — SIGNIFICANT CHANGE UP (ref 80–100)
NRBC # BLD: 0 /100 WBCS — SIGNIFICANT CHANGE UP (ref 0–0)
NRBC # FLD: 0 K/UL — SIGNIFICANT CHANGE UP (ref 0–0)
PHOSPHATE SERPL-MCNC: 3 MG/DL — SIGNIFICANT CHANGE UP (ref 2.5–4.5)
PHOSPHATE SERPL-MCNC: 3 MG/DL — SIGNIFICANT CHANGE UP (ref 2.5–4.5)
PHOSPHATE SERPL-MCNC: 3.4 MG/DL — SIGNIFICANT CHANGE UP (ref 2.5–4.5)
PLATELET # BLD AUTO: 266 K/UL — SIGNIFICANT CHANGE UP (ref 150–400)
POTASSIUM SERPL-MCNC: 4 MMOL/L — SIGNIFICANT CHANGE UP (ref 3.5–5.3)
POTASSIUM SERPL-MCNC: 4.4 MMOL/L — SIGNIFICANT CHANGE UP (ref 3.5–5.3)
POTASSIUM SERPL-MCNC: 5.1 MMOL/L — SIGNIFICANT CHANGE UP (ref 3.5–5.3)
POTASSIUM SERPL-SCNC: 4 MMOL/L — SIGNIFICANT CHANGE UP (ref 3.5–5.3)
POTASSIUM SERPL-SCNC: 4.4 MMOL/L — SIGNIFICANT CHANGE UP (ref 3.5–5.3)
POTASSIUM SERPL-SCNC: 5.1 MMOL/L — SIGNIFICANT CHANGE UP (ref 3.5–5.3)
RBC # BLD: 4.97 M/UL — SIGNIFICANT CHANGE UP (ref 4.2–5.8)
RBC # FLD: 13.7 % — SIGNIFICANT CHANGE UP (ref 10.3–14.5)
SODIUM SERPL-SCNC: 135 MMOL/L — SIGNIFICANT CHANGE UP (ref 135–145)
SODIUM SERPL-SCNC: 136 MMOL/L — SIGNIFICANT CHANGE UP (ref 135–145)
SODIUM SERPL-SCNC: 136 MMOL/L — SIGNIFICANT CHANGE UP (ref 135–145)
TROPONIN T, HIGH SENSITIVITY RESULT: 65 NG/L — CRITICAL HIGH
WBC # BLD: 10.79 K/UL — HIGH (ref 3.8–10.5)
WBC # FLD AUTO: 10.79 K/UL — HIGH (ref 3.8–10.5)

## 2022-09-21 PROCEDURE — 99233 SBSQ HOSP IP/OBS HIGH 50: CPT

## 2022-09-21 PROCEDURE — 70553 MRI BRAIN STEM W/O & W/DYE: CPT | Mod: 26

## 2022-09-21 PROCEDURE — 99291 CRITICAL CARE FIRST HOUR: CPT | Mod: 25

## 2022-09-21 PROCEDURE — 70450 CT HEAD/BRAIN W/O DYE: CPT | Mod: 26

## 2022-09-21 RX ORDER — LABETALOL HCL 100 MG
200 TABLET ORAL EVERY 8 HOURS
Refills: 0 | Status: DISCONTINUED | OUTPATIENT
Start: 2022-09-21 | End: 2022-09-22

## 2022-09-21 RX ORDER — SODIUM CHLORIDE 9 MG/ML
2 INJECTION INTRAMUSCULAR; INTRAVENOUS; SUBCUTANEOUS THREE TIMES A DAY
Refills: 0 | Status: DISCONTINUED | OUTPATIENT
Start: 2022-09-21 | End: 2022-09-22

## 2022-09-21 RX ORDER — LABETALOL HCL 100 MG
200 TABLET ORAL EVERY 8 HOURS
Refills: 0 | Status: DISCONTINUED | OUTPATIENT
Start: 2022-09-21 | End: 2022-09-21

## 2022-09-21 RX ORDER — SODIUM CHLORIDE 5 G/100ML
500 INJECTION, SOLUTION INTRAVENOUS
Refills: 0 | Status: DISCONTINUED | OUTPATIENT
Start: 2022-09-21 | End: 2022-09-22

## 2022-09-21 RX ADMIN — Medication 100 MILLIGRAM(S): at 21:33

## 2022-09-21 RX ADMIN — LEVETIRACETAM 500 MILLIGRAM(S): 250 TABLET, FILM COATED ORAL at 17:22

## 2022-09-21 RX ADMIN — HYDROMORPHONE HYDROCHLORIDE 0.25 MILLIGRAM(S): 2 INJECTION INTRAMUSCULAR; INTRAVENOUS; SUBCUTANEOUS at 10:45

## 2022-09-21 RX ADMIN — Medication 1000 MILLIGRAM(S): at 12:03

## 2022-09-21 RX ADMIN — AMLODIPINE BESYLATE 10 MILLIGRAM(S): 2.5 TABLET ORAL at 06:13

## 2022-09-21 RX ADMIN — HYDROMORPHONE HYDROCHLORIDE 0.25 MILLIGRAM(S): 2 INJECTION INTRAMUSCULAR; INTRAVENOUS; SUBCUTANEOUS at 04:11

## 2022-09-21 RX ADMIN — HYDROMORPHONE HYDROCHLORIDE 0.25 MILLIGRAM(S): 2 INJECTION INTRAMUSCULAR; INTRAVENOUS; SUBCUTANEOUS at 03:56

## 2022-09-21 RX ADMIN — Medication 1000 MILLIGRAM(S): at 00:23

## 2022-09-21 RX ADMIN — LEVETIRACETAM 500 MILLIGRAM(S): 250 TABLET, FILM COATED ORAL at 06:12

## 2022-09-21 RX ADMIN — HYDROMORPHONE HYDROCHLORIDE 0.25 MILLIGRAM(S): 2 INJECTION INTRAMUSCULAR; INTRAVENOUS; SUBCUTANEOUS at 20:48

## 2022-09-21 RX ADMIN — VALSARTAN 320 MILLIGRAM(S): 80 TABLET ORAL at 06:12

## 2022-09-21 RX ADMIN — HYDROMORPHONE HYDROCHLORIDE 0.25 MILLIGRAM(S): 2 INJECTION INTRAMUSCULAR; INTRAVENOUS; SUBCUTANEOUS at 11:00

## 2022-09-21 RX ADMIN — Medication 100 MILLIGRAM(S): at 12:03

## 2022-09-21 RX ADMIN — SODIUM CHLORIDE 1 GRAM(S): 9 INJECTION INTRAMUSCULAR; INTRAVENOUS; SUBCUTANEOUS at 12:03

## 2022-09-21 RX ADMIN — Medication 1000 MILLIGRAM(S): at 17:22

## 2022-09-21 RX ADMIN — SODIUM CHLORIDE 75 MILLILITER(S): 5 INJECTION, SOLUTION INTRAVENOUS at 19:07

## 2022-09-21 RX ADMIN — SPIRONOLACTONE 12.5 MILLIGRAM(S): 25 TABLET, FILM COATED ORAL at 17:24

## 2022-09-21 RX ADMIN — HYDROMORPHONE HYDROCHLORIDE 0.25 MILLIGRAM(S): 2 INJECTION INTRAMUSCULAR; INTRAVENOUS; SUBCUTANEOUS at 21:04

## 2022-09-21 RX ADMIN — SODIUM CHLORIDE 1 GRAM(S): 9 INJECTION INTRAMUSCULAR; INTRAVENOUS; SUBCUTANEOUS at 06:13

## 2022-09-21 RX ADMIN — NICARDIPINE HYDROCHLORIDE 25 MG/HR: 30 CAPSULE, EXTENDED RELEASE ORAL at 04:54

## 2022-09-21 RX ADMIN — Medication 1000 MILLIGRAM(S): at 06:17

## 2022-09-21 RX ADMIN — NICARDIPINE HYDROCHLORIDE 25 MG/HR: 30 CAPSULE, EXTENDED RELEASE ORAL at 03:57

## 2022-09-21 RX ADMIN — Medication 1000 MILLIGRAM(S): at 06:47

## 2022-09-21 RX ADMIN — SODIUM CHLORIDE 2 GRAM(S): 9 INJECTION INTRAMUSCULAR; INTRAVENOUS; SUBCUTANEOUS at 21:33

## 2022-09-21 RX ADMIN — NICARDIPINE HYDROCHLORIDE 25 MG/HR: 30 CAPSULE, EXTENDED RELEASE ORAL at 19:07

## 2022-09-21 RX ADMIN — SPIRONOLACTONE 12.5 MILLIGRAM(S): 25 TABLET, FILM COATED ORAL at 06:14

## 2022-09-21 RX ADMIN — Medication 100 MILLIGRAM(S): at 06:13

## 2022-09-21 NOTE — DIETITIAN INITIAL EVALUATION ADULT - OTHER INFO
35 y/o male PMHx of HTN, HLD who has not taken his anti-hypertensive medications for approximately 3 months, presents to [facility] ED after a syncopal episode. Patient noted to have an episode of urinary incontinence at the time. Lost consciousness for few minutes and woke up in the ambulance, no head trauma. BP on arrival was 194/143, and was started on a cardene gtt. Patient has been c/o neck pain, headache, and b/l blurry vision for 4 days as per sister. Vision has now normalized. 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 Visited with pt to obtain nutrition hx - he woke from sleeping but was very lethargic. He said he is eating "ok" and denies food allergies, nausea/vomiting/diarrhea/constipation, or issues with chewing/swallowing. No BMs - hypoactive bowel sounds noted. Given pt's hx HTN/HLD, recommend changing diet to DASH/TLC. Returned to patient's room with diet copy, however pt was sleeping soundly. Diet copy left at bedside; available to review with pt upon request. RDN services to remain available as needed.

## 2022-09-21 NOTE — PROGRESS NOTE ADULT - ASSESSMENT
35 YO male with Hx of HTN, noncompliant with prescribed medications, HLD brought to ED following a syncopal episode, markedly hypertensive on arrival to ED, with a left basal ganglia hemorrhage, intraventricular extension, and early hydrocephalus. Patient does not require neurosurgical intervention at this time, however he is at high risk of worsening hydrocephalus and may require placement of an external ventricular drain.    9/19: Alert and nonfocal exam but not as well oriented as on initial exam. BP controlled on nicardipine infusion. Repeat head CT this AM  9/20: Neurologically intact. SBP maintained<150, on nicardipine gtt. Increased headache yesterday with stable Head CT.   9/21: neuro intact, on nicardipine for SBP< 150, na 136 goal 145-150, CTH stable, awaiting mri brain.

## 2022-09-21 NOTE — DIETITIAN INITIAL EVALUATION ADULT - PERTINENT MEDS FT
MEDICATIONS  (STANDING):  acetaminophen     Tablet .. 1000 milliGRAM(s) Oral every 6 hours  amLODIPine   Tablet 10 milliGRAM(s) Oral daily  influenza   Vaccine 0.5 milliLiter(s) IntraMuscular once  labetalol 100 milliGRAM(s) Oral every 8 hours  levETIRAcetam 500 milliGRAM(s) Oral two times a day  niCARdipine Infusion 5 mG/Hr (25 mL/Hr) IV Continuous <Continuous>  sodium chloride 1 Gram(s) Oral three times a day  sodium chloride 1.5%. 500 milliLiter(s) (50 mL/Hr) IV Continuous <Continuous>  spironolactone 12.5 milliGRAM(s) Oral two times a day  valsartan 320 milliGRAM(s) Oral daily    MEDICATIONS  (PRN):  HYDROmorphone  Injectable 0.25 milliGRAM(s) IV Push every 4 hours PRN Moderate Pain (4 - 6)  labetalol Injectable 10 milliGRAM(s) IV Push every 2 hours PRN Systolic blood pressure > 160

## 2022-09-21 NOTE — OCCUPATIONAL THERAPY INITIAL EVALUATION ADULT - PERTINENT HX OF CURRENT PROBLEM, REHAB EVAL
36 year old male presented for syncope. Found to have left Basal Ganglia hemorrhage w/ associated IVH and hydrocephalus. SICU consulted for blood pressure control and q1hr neuro checks.

## 2022-09-21 NOTE — DIETITIAN INITIAL EVALUATION ADULT - PERTINENT LABORATORY DATA
09-21    136  |  103  |  15  ----------------------------<  145<H>  4.0   |  22  |  1.22    Ca    8.8      21 Sep 2022 00:43  Phos  3.0     09-21  Mg     2.00     09-21    POCT Blood Glucose.: 115 mg/dL (09-20-22 @ 14:14)  A1C with Estimated Average Glucose Result: 5.4 % (09-18-22 @ 17:50)

## 2022-09-21 NOTE — PROGRESS NOTE ADULT - SUBJECTIVE AND OBJECTIVE BOX
HPI:  Patient is a 35 YO right handed male with PMH of HTN, HLD, who has not taken his prescribed medications for approximately 3 months. On 9/16, patient presented to [facility] ED with 2 day history of generalized body aches, nausea, fevers, and chills. He was treated symptomatically and advised to resume his antihypertensive agents. Patient was at the pharmacy earlier today filling his prescriptions when he suffered a syncopal episode, patient noted to have had an episode of urinary incontinence. Patient brought to ED by EMS, BP on arrival was 194/143. Patient C/O neck pain and blurry vision. Per patient's sister he has been complaining of fatigue.   (18 Sep 2022 21:07)    Overnight events: no issues o/n    ICU Vital Signs Last 24 Hrs  T(C): 37.7 (21 Sep 2022 04:00), Max: 37.8 (20 Sep 2022 16:00)  T(F): 99.8 (21 Sep 2022 04:00), Max: 100 (20 Sep 2022 16:00)  HR: 98 (21 Sep 2022 05:00) (88 - 113)  BP: 149/84 (21 Sep 2022 05:00) (128/72 - 171/94)  BP(mean): 96 (21 Sep 2022 05:00) (84 - 117)  ABP: --  ABP(mean): --  RR: 22 (21 Sep 2022 05:00) (10 - 28)  SpO2: 96% (21 Sep 2022 05:00) (91% - 99%)    O2 Parameters below as of 20 Sep 2022 16:00  Patient On (Oxygen Delivery Method): room air      AAO X 3  PERRLA, EOMI  CN 2-12 grossly intact  RODRIGUEZ strength 5/5  No dysmetria or drift    MEDICATIONS  (STANDING):  acetaminophen   IVPB .. 1000 milliGRAM(s) IV Intermittent every 6 hours  amLODIPine   Tablet 10 milliGRAM(s) Oral daily  influenza   Vaccine 0.5 milliLiter(s) IntraMuscular once  levETIRAcetam 500 milliGRAM(s) Oral two times a day  niCARdipine Infusion 5 mG/Hr (25 mL/Hr) IV Continuous <Continuous>  sodium chloride 1 Gram(s) Oral three times a day  sodium chloride 0.9%. 1000 milliLiter(s) (100 mL/Hr) IV Continuous <Continuous>  valsartan 320 milliGRAM(s) Oral daily    MEDICATIONS  (PRN):                                          13.4   10.79 )-----------( 266      ( 21 Sep 2022 00:43 )             41.6     09-21    136  |  103  |  15  ----------------------------<  145<H>  4.0   |  22  |  1.22    Ca    8.8      21 Sep 2022 00:43  Phos  3.0     09-21  Mg     2.00     09-21        < from: CT Head No Cont (09.19.22 @ 15:09) >  COMPARISON: Most recent prior head CT study from earlier today. Prior CT   angiogram studies of the head and neck performed on 9/18/2022.    FINDINGS: Previously seen acute hemorrhage involving the left basal   ganglia with intraventricular extension appears grossly unchanged in size   and configuration. Associated mild hydrocephalus is again notable.    No new areas of acute intracranial hemorrhage areseen.    There is no shift of the midline structures or herniation.    The paranasal sinuses and mastoid air cells are clear. The calvarium   appears intact. The orbits appear unremarkable.    IMPRESSION: Stable follow-up CT examination when compared with earlier   today.    < end of copied text >

## 2022-09-21 NOTE — PHYSICAL THERAPY INITIAL EVALUATION ADULT - GENERAL OBSERVATIONS, REHAB EVAL
Patient received in semifowler position in bed in NAD, friend present. Patient denies chest pain, SOB, headache, and dizziness.

## 2022-09-21 NOTE — PROGRESS NOTE ADULT - ASSESSMENT
36y M with uncontrolled HTN (non-compliant w/ meds), who presented for syncope and found to have L BG hemorrhage w/ associated IVH and hydrocephalus. SICU consulted for blood pressure control and q1hr neuro checks.    Neuro:   - AOx4   - q2hr neuro checks  - CTH 9/20 unchanged  - MRI/MRA today 09/21  - CTA shows no vasc malformations  - Keppra 500mg bid  - Pain control PRN    Respiratory:  - Saturating >93% on RA    Cardiovascular:  - c/w nicardipine gtt. Wean as able. BP Goal is 120-150   - Transition to po anti-htn as tolerated: Valsartan, Amlodipine, Labetalol 100mg TID  - Maintain sbp 120-150's    Gastrointestinal:  - Clear liquid diet, ADAT    /Renal:   - NS @ 50cc/hr   - monitor Na. Na goal 140-150. [Na 139]  - Strict I&O's   - Replete electrolytes prn  - f/u renal duplex    Hematologic:  - H/H stable  - VTE ppx: SCDs, no chemoprophylaxis    Infectious Disease:  - Afebrile  - No ABX    Endo:  - AMRITA, A1C 5.4%    Dispo: SICU   36y M with uncontrolled HTN (non-compliant w/ meds), who presented for syncope and found to have L BG hemorrhage w/ associated IVH and hydrocephalus. SICU consulted for blood pressure control and q1hr neuro checks.    PLAN:  Neuro:   - AOx4   - q2hr neuro checks  - CTH 09/20 and 09/21 unchanged  - MRI/MRA today 09/21  - CTA 09/18 shows no vasc malformations  - Keppra 500mg bid  - Pain control w/ Tylenol and Dilaudid 0.25mg PRN    Respiratory:  - Saturating >93% on RA    Cardiovascular:  - c/w nicardipine gtt. Wean as able. SBP Goal is 120-150   - Transition to po anti-htn as tolerated: Valsartan 320mg qd, Amlodipine 10mg qd, Labetalol 100mg TID, Spironolactone 12.5mg BID    Gastrointestinal:  - Regular diet    /Renal:   - NS 1.5% @ 50ml/hr  - salt tabs TID  - monitor Na. Na goal 140-150. [Na 139--->136]  - Strict I&O's   - Replete electrolytes prn  - f/u renal duplex to r/o renal artery stenosis    Hematologic:  - H/H stable  - VTE ppx: SCDs, no chemoprophylaxis    Infectious Disease:  - Afebrile  - No ABX    Endo:  - AMRITA, A1C 5.4% 09/18    Dispo: SICU

## 2022-09-21 NOTE — PROGRESS NOTE ADULT - PROBLEM SELECTOR PLAN 1
Neurologic checks and vital signs Q2H  Maintain SBP<150  Maintain serum Na 140-150  Brain MRI/MRA   Follow up with stroke neurology

## 2022-09-21 NOTE — OCCUPATIONAL THERAPY INITIAL EVALUATION ADULT - GENERAL OBSERVATIONS, REHAB EVAL
Patient received semisupine in bed in NAD; agreeable to participate in OT evaluation. Friend at bedside.

## 2022-09-21 NOTE — OCCUPATIONAL THERAPY INITIAL EVALUATION ADULT - DIAGNOSIS, OT EVAL
s/p syncope, s/p left basal ganglia hemorrhage; decreased functional mobility, decreased ADL performance

## 2022-09-21 NOTE — OCCUPATIONAL THERAPY INITIAL EVALUATION ADULT - PHYSICAL ASSIST/NONPHYSICAL ASSIST: SUPINE/SIT, REHAB EVAL
----- Message from Annetta Nix sent at 8/7/2017 12:57 PM EDT -----  Contact: PATIENT  SAW MARIALUISA LAST Thursday FOR SPOTTING, TOOK PREGNANCY TEST THAT WAS NEGATIVE, SHE IS STILL SEEING BLOOD, LOOKS TO BE IN URINE. ANY SUGGESTIONS AS TO WHAT SHE SHOULD DO?   
Ailyn, could you call this patient.  I'm not clear on why it says I saw her for spotting last week.  I didn't.  Please find out what is going on.  
As long as pregnancy test negative and US normal, probably hormonal imbalance, I agree with GYN fu and no other tx recommended until seen by GYN  
Patient said that she had mentioned it to you and was told that if it continued, you'd like her to have labs done. She went to the ED and had an U/S. Told that she no longer had cysts on ovaries and thinks her body is trying to regulate menses again. Has an appointment with GYN at the end of September and they are aware of her situation.   
verbal cues/nonverbal cues (demo/gestures)/1 person assist

## 2022-09-21 NOTE — PHYSICAL THERAPY INITIAL EVALUATION ADULT - PERTINENT HX OF CURRENT PROBLEM, REHAB EVAL
patient is a 36 year old male presented for syncope and found to have L Basal Ganglia hemorrhage w/ associated IVH and hydrocephalus. SICU consulted for blood pressure control and q1hr neuro checks.

## 2022-09-21 NOTE — PHYSICAL THERAPY INITIAL EVALUATION ADULT - IMPAIRMENTS FOUND, PT EVAL
OFFICE NOTE    22  Name: Alyssa Junior  :1963   Sex:male   Age:59 y.o. SUBJECTIVE  Chief Complaint   Patient presents with    Congestion     Onset x1.5 weeks, Home covid test 1-2 days ago and was neg-    Drainage     Nasal drainage, yellow mucus       HPI Primarily sinus drainage, facial tenderness, feels might be settling into chest    Review of Systems   No fever, loss of taste or smell, no wheezing, no JAEGER      Current Outpatient Medications:     VITAMIN D PO, Take by mouth, Disp: , Rfl:     amoxicillin-clavulanate (AUGMENTIN) 875-125 MG per tablet, Take 1 tablet by mouth in the morning and 1 tablet before bedtime. Do all this for 7 days. , Disp: 14 tablet, Rfl: 0    methylPREDNISolone (MEDROL DOSEPACK) 4 MG tablet, Take by mouth., Disp: 1 kit, Rfl: 0    guaiFENesin (MUCINEX) 600 MG extended release tablet, Take 1 tablet by mouth in the morning and 1 tablet before bedtime. Do all this for 15 days. , Disp: 30 tablet, Rfl: 0    amLODIPine (NORVASC) 10 MG tablet, Take 1 tablet by mouth daily, Disp: 90 tablet, Rfl: 1    olmesartan (BENICAR) 20 MG tablet, Take 1 tablet by mouth daily, Disp: 90 tablet, Rfl: 1    atorvastatin (LIPITOR) 10 MG tablet, TAKE 1 TABLET EVERY DAY, Disp: 90 tablet, Rfl: 1    Handicap Placard MISC, by Does not apply route Patient cannot walk 200 ft without stopping to rest.   Expiration 2027, Disp: 1 each, Rfl: 0    famotidine (PEPCID) 20 MG tablet, Take 1 tablet by mouth daily (Patient not taking: Reported on 2022), Disp: 90 tablet, Rfl: 1    allopurinol (ZYLOPRIM) 300 MG tablet, Take 1 tablet by mouth daily (Patient not taking: Reported on 2022), Disp: 90 tablet, Rfl: 1    Probiotic Product (FLORAJEN DIGESTION) CAPS, take 1 capsule by mouth daily (Patient not taking: Reported on 2022), Disp: , Rfl:     clotrimazole-betamethasone (LOTRISONE) 1-0.05 % cream, Apply topically 2 times daily.  (Patient not taking: Reported on 2022), Disp: 45 g, Rfl: 1 tiZANidine (ZANAFLEX) 4 MG tablet, Take 1 tablet by mouth nightly as needed (sciatica and left hip pain) (Patient not taking: Reported on 7/18/2022), Disp: 10 tablet, Rfl: 1    vitamin D (ERGOCALCIFEROL) 1.25 MG (93502 UT) CAPS capsule, , Disp: , Rfl:   Allergies   Allergen Reactions    Codeine Other (See Comments)     Headache          Past Medical History:   Diagnosis Date    Agitation     Depression     Hyperlipidemia     Hypertension      Past Surgical History:   Procedure Laterality Date    ANKLE SURGERY       Family History   Problem Relation Age of Onset    Cancer Father     Other Other         significant for coronary artery disease     Social History       Tobacco History       Smoking Status  Never      Smokeless Tobacco Use  Never              Alcohol History       Alcohol Use Status  Not Currently              Drug Use       Drug Use Status  Never              Sexual Activity       Sexually Active  Not Currently Partners  Female                    OBJECTIVE  Vitals:    07/18/22 0807 07/18/22 0900   BP: (!) 138/98 136/86   Pulse: 78    Resp: 16    Temp: 97.9 °F (36.6 °C)    TempSrc: Temporal    SpO2: 97%    Weight: 236 lb 6.4 oz (107.2 kg)    Height: 5' 9\" (1.753 m)         Body mass index is 34.91 kg/m². No orders of the defined types were placed in this encounter. EXAM   Physical Exam  Vitals and nursing note reviewed. Constitutional:       Appearance: Normal appearance. He is well-developed. He is obese. HENT:      Right Ear: Tympanic membrane, ear canal and external ear normal.      Left Ear: Tympanic membrane, ear canal and external ear normal.      Nose: Congestion and rhinorrhea present. Mouth/Throat:      Pharynx: Posterior oropharyngeal erythema present. Eyes:      General: No scleral icterus. Conjunctiva/sclera: Conjunctivae normal.      Pupils: Pupils are equal, round, and reactive to light. Neck:      Thyroid: No thyroid mass or thyromegaly.       Vascular: No carotid bruit or JVD. Trachea: Trachea normal.   Cardiovascular:      Rate and Rhythm: Normal rate and regular rhythm. Heart sounds: Normal heart sounds. No murmur heard. No gallop. Pulmonary:      Effort: Pulmonary effort is normal.      Breath sounds: Normal breath sounds. No wheezing, rhonchi or rales. Abdominal:      General: Bowel sounds are normal. There is no distension. Palpations: Abdomen is soft. There is no mass. Tenderness: There is no abdominal tenderness. There is no guarding. Musculoskeletal:         General: No swelling or tenderness. Normal range of motion. Cervical back: Neck supple. No tenderness. Right lower leg: No edema. Left lower leg: No edema. Lymphadenopathy:      Cervical: No cervical adenopathy. Skin:     General: Skin is warm and dry. Coloration: Skin is not jaundiced. Findings: No bruising or rash. Neurological:      General: No focal deficit present. Mental Status: He is alert and oriented to person, place, and time. Sensory: Sensory deficit present. Motor: No weakness or abnormal muscle tone. Coordination: Coordination normal.      Gait: Gait normal.      Comments: RSD right foot and ankle   Psychiatric:         Mood and Affect: Mood normal.         Behavior: Behavior normal.         Javier Pino was seen today for congestion and drainage. Diagnoses and all orders for this visit:    Acute bacterial sinusitis    Other orders  -     amoxicillin-clavulanate (AUGMENTIN) 875-125 MG per tablet; Take 1 tablet by mouth in the morning and 1 tablet before bedtime. Do all this for 7 days. -     methylPREDNISolone (MEDROL DOSEPACK) 4 MG tablet; Take by mouth.  -     guaiFENesin (MUCINEX) 600 MG extended release tablet; Take 1 tablet by mouth in the morning and 1 tablet before bedtime. Do all this for 15 days. Covid negative on home test    No follow-ups on file.     Electronically signed by Brooklyn Ttae MD on 7/18/22 at 8:08 AM EDT aerobic capacity/endurance/gait, locomotion, and balance

## 2022-09-21 NOTE — PROGRESS NOTE ADULT - SUBJECTIVE AND OBJECTIVE BOX
SICU Daily Progress Note  =====================================================  Interval/Overnight Events:     ***    POD #          	SICU Day #    ***    HPI:  ((insert from previous note)) ***    PMH:   ***    Allergies: No Known Allergies      MEDICATIONS:   --------------------------------------------------------------------------------------  Neurologic Medications  acetaminophen     Tablet .. 1000 milliGRAM(s) Oral every 6 hours  HYDROmorphone  Injectable 0.25 milliGRAM(s) IV Push every 4 hours PRN Moderate Pain (4 - 6)  levETIRAcetam 500 milliGRAM(s) Oral two times a day    Respiratory Medications    Cardiovascular Medications  amLODIPine   Tablet 10 milliGRAM(s) Oral daily  labetalol 100 milliGRAM(s) Oral every 8 hours  labetalol Injectable 10 milliGRAM(s) IV Push every 2 hours PRN Systolic blood pressure > 160  niCARdipine Infusion 5 mG/Hr IV Continuous <Continuous>  spironolactone 12.5 milliGRAM(s) Oral two times a day  valsartan 320 milliGRAM(s) Oral daily    Gastrointestinal Medications  sodium chloride 1 Gram(s) Oral three times a day  sodium chloride 0.9%. 1000 milliLiter(s) IV Continuous <Continuous>    Genitourinary Medications    Hematologic/Oncologic Medications  influenza   Vaccine 0.5 milliLiter(s) IntraMuscular once    Antimicrobial/Immunologic Medications    Endocrine/Metabolic Medications    Topical/Other Medications    --------------------------------------------------------------------------------------    VITAL SIGNS, INS/OUTS (last 24 hours):  --------------------------------------------------------------------------------------  ((Insert SICU Vitals/Is+Os here))***  --------------------------------------------------------------------------------------    EXAM  NEUROLOGY   Exam: Normal, NAD, alert, oriented x3, no focal deficits. CN II-XII intact. RODRIGUEZ, SILT, Strength 5/5 all extremities.    HEENT  Exam: Normocephalic, atraumatic, EOMI.     RESPIRATORY  Exam: Lungs clear to auscultation, Normal expansion/effort.     CARDIOVASCULAR  Exam: S1, S2.  Regular rate and rhythm.       GI/NUTRITION  Exam: Abdomen soft, Non-tender, Non-distended.    Current Diet:  NPO    VASCULAR  Exam: Extremities warm, pink, well-perfused.     MUSCULOSKELETAL  Exam: All extremities moving spontaneously without limitations.     SKIN  Exam: Good skin turgor, no skin breakdown.    METABOLIC/FLUIDS/ELECTROLYTES  sodium chloride 1 Gram(s) Oral three times a day  sodium chloride 0.9%. 1000 milliLiter(s) IV Continuous <Continuous>      HEMATOLOGIC  [x] VTE Prophylaxis:   Transfusions:	[] PRBC	[] Platelets		[] FFP	[] Cryoprecipitate    INFECTIOUS DISEASE  Antimicrobials/Immunologic Medications:  influenza   Vaccine 0.5 milliLiter(s) IntraMuscular once    Tubes/Lines/Drains    [x] Peripheral IV  [] Central Venous Line     	[] R	[] L	[] IJ	[] Fem	[] SC	Date Placed:   [] Arterial Line		[] R	[] L	[] Fem	[] Rad	[] Ax	Date Placed:   [] PICC		[] Midline		[] Mediport  [] Urinary Catheter		Date Placed:   [x] Necessity of urinary, arterial, and venous catheters discussed    LABS  --------------------------------------------------------------------------------------  ((Insert SICU Labs here))***  --------------------------------------------------------------------------------------    IMAGING:  < from: CT Head No Cont (09.20.22 @ 13:04) >  ACC: [id number] EXAM:  CT BRAIN                          PROCEDURE DATE:  09/20/2022          INTERPRETATION:  Noncontrast CT of the brain.    CLINICAL INDICATION:  Syncope, left basal ganglia and intraventricular   hemorrhage, follow-up    TECHNIQUE: Axial CT scanning of the brain was obtained from the skull   base to the vertex without the administration of intravenous contrast.   Sagittal and coronal reformats were provided.    COMPARISON: CT brain 9/19/2022    FINDINGS:    Similar-appearing 2.3 x 2.3 cm left raheem-caudate acute parenchymal   hemorrhage.    Similar moderate extension of acute hemorrhage into the left lateral   ventricle.    Rightward midline shift of 7 mm is similar. Basal cisterns are still   visualized.    No hydrocephalus.    IMPRESSION:    No significant interval change from 9/19/2022.    --- End of Report ---    ARLYN JOHNSTON MD; Attending Radiologist  This document has been electronically signed. Sep 20 2022  1:09PM    < end of copied text >   SICU Daily Progress Note  =====================================================  Interval/Overnight Events:      - CTH yesterday without significant change from previous  - Pt c/o HA and neck pain, unchanged from before, improves slightly with pain medication  - Neuro exam unchanged, no focal deficits  - Labetalol 100mg PO TID added to facilitate weaning nicardipine gtt     HD #4        	SICU Day #4    HPI: 36y Male y/o male with PMHx of HTN, HLD who has not taken his anti-hypertensive medications for approximately 3 months, presents to [facility] ED after a syncopal episode. Patient noted to have an episode of urinary incontinence at the time. Lost consciousness for few minutes and woke up in the ambulance, no head trauma. BP on arrival was 194/143, and was started on a cardene gtt. Patient has been c/o neck pain, headache, and b/l blurry vision for 4 days as per sister. Vision has now normalized. 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 SICU was consulted for bp control and q1hr neuro checks.     Allergies: No Known Allergies      MEDICATIONS:   --------------------------------------------------------------------------------------  Neurologic Medications  acetaminophen     Tablet .. 1000 milliGRAM(s) Oral every 6 hours  HYDROmorphone  Injectable 0.25 milliGRAM(s) IV Push every 4 hours PRN Moderate Pain (4 - 6)  levETIRAcetam 500 milliGRAM(s) Oral two times a day    Respiratory Medications    Cardiovascular Medications  amLODIPine   Tablet 10 milliGRAM(s) Oral daily  labetalol 100 milliGRAM(s) Oral every 8 hours  labetalol Injectable 10 milliGRAM(s) IV Push every 2 hours PRN Systolic blood pressure > 160  niCARdipine Infusion 5 mG/Hr IV Continuous <Continuous>  spironolactone 12.5 milliGRAM(s) Oral two times a day  valsartan 320 milliGRAM(s) Oral daily    Gastrointestinal Medications  sodium chloride 1 Gram(s) Oral three times a day  sodium chloride 0.9%. 1000 milliLiter(s) IV Continuous <Continuous>    Genitourinary Medications    Hematologic/Oncologic Medications  influenza   Vaccine 0.5 milliLiter(s) IntraMuscular once    Antimicrobial/Immunologic Medications    Endocrine/Metabolic Medications    Topical/Other Medications    --------------------------------------------------------------------------------------  VITAL SIGNS, INS/OUTS (last 24 hours):  --------------------------------------------------------------------------------------  T(C): 37.1 (09-21-22 @ 00:00), Max: 37.8 (09-20-22 @ 16:00)  HR: 96 (09-21-22 @ 00:00) (87 - 113)  BP: 151/94 (09-21-22 @ 00:00) (129/81 - 169/99)  BP(mean): 109 (09-21-22 @ 00:00) (92 - 117)  ABP: --  ABP(mean): --  RR: 25 (09-21-22 @ 00:00) (9 - 28)  SpO2: 96% (09-21-22 @ 00:00) (91% - 99%)  Wt(kg): --  CVP(mm Hg): --  CI: --  CAPILLARY BLOOD GLUCOSE      POCT Blood Glucose.: 115 mg/dL (20 Sep 2022 14:14)   N/A      09-19 @ 07:01  -  09-20 @ 07:00  --------------------------------------------------------  IN:    IV PiggyBack: 200 mL    NiCARdipine: 1150 mL    sodium chloride 0.9%: 2400 mL  Total IN: 3750 mL    OUT:    Voided (mL): 2400 mL  Total OUT: 2400 mL    Total NET: 1350 mL      09-20 @ 07:01 - 09-21 @ 01:03  --------------------------------------------------------  IN:    NiCARdipine: 1062.5 mL    sodium chloride 0.9%: 900 mL  Total IN: 1962.5 mL    OUT:    Voided (mL): 2600 mL  Total OUT: 2600 mL    Total NET: -637.5 mL  --------------------------------------------------------------------------------------    EXAM  NEUROLOGY   Exam: Normal, NAD, alert, oriented x3, no focal deficits. CN II-XII intact. RODRIGUEZ, SILT, Strength 5/5 all extremities.    HEENT  Exam: Normocephalic, atraumatic, EOMI.     RESPIRATORY  Exam: Lungs clear to auscultation, Normal expansion/effort.     CARDIOVASCULAR  Exam: S1, S2.  Regular rate and rhythm.       GI/NUTRITION  Exam: Abdomen soft, Non-tender, Non-distended.    Current Diet:  NPO    VASCULAR  Exam: Extremities warm, pink, well-perfused.     MUSCULOSKELETAL  Exam: All extremities moving spontaneously without limitations.     SKIN  Exam: Good skin turgor, no skin breakdown.    METABOLIC/FLUIDS/ELECTROLYTES  sodium chloride 1 Gram(s) Oral three times a day  sodium chloride 0.9%. 1000 milliLiter(s) IV Continuous <Continuous>      HEMATOLOGIC  [x] VTE Prophylaxis:   Transfusions:	[] PRBC	[] Platelets		[] FFP	[] Cryoprecipitate    INFECTIOUS DISEASE  Antimicrobials/Immunologic Medications:  influenza   Vaccine 0.5 milliLiter(s) IntraMuscular once    Tubes/Lines/Drains    [x] Peripheral IV  [] Central Venous Line     	[] R	[] L	[] IJ	[] Fem	[] SC	Date Placed:   [] Arterial Line		[] R	[] L	[] Fem	[] Rad	[] Ax	Date Placed:   [] PICC		[] Midline		[] Mediport  [] Urinary Catheter		Date Placed:   [x] Necessity of urinary, arterial, and venous catheters discussed    LABS  --------------------------------------------------------------------------------------  ((Insert SICU Labs here))***  --------------------------------------------------------------------------------------    IMAGING:  < from: CT Head No Cont (09.20.22 @ 13:04) >  ACC: [id number] EXAM:  CT BRAIN                          PROCEDURE DATE:  09/20/2022          INTERPRETATION:  Noncontrast CT of the brain.    CLINICAL INDICATION:  Syncope, left basal ganglia and intraventricular   hemorrhage, follow-up    TECHNIQUE: Axial CT scanning of the brain was obtained from the skull   base to the vertex without the administration of intravenous contrast.   Sagittal and coronal reformats were provided.    COMPARISON: CT brain 9/19/2022    FINDINGS:    Similar-appearing 2.3 x 2.3 cm left raheem-caudate acute parenchymal   hemorrhage.    Similar moderate extension of acute hemorrhage into the left lateral   ventricle.    Rightward midline shift of 7 mm is similar. Basal cisterns are still   visualized.    No hydrocephalus.    IMPRESSION:    No significant interval change from 9/19/2022.    --- End of Report ---    ARLYN JOHNSTON MD; Attending Radiologist  This document has been electronically signed. Sep 20 2022  1:09PM    < end of copied text >     SICU Daily Progress Note  =====================================================  Interval/Overnight Events:      - CTH yesterday without significant change from previous  - Pt c/o HA and neck pain, unchanged from before, improves slightly with pain medication  - Neuro exam unchanged, no focal deficits  - Labetalol 100mg PO TID added to facilitate weaning nicardipine gtt   - Advanced to regular diet   - MRI today   - NS 1.5% @ 50ml/hr started  - Salt tabs TID started  - CTH repeated this morning, unchanged      HD #4        	SICU Day #4    HPI: 36y Male y/o male with PMHx of HTN, HLD who has not taken his anti-hypertensive medications for approximately 3 months, presents to [facility] ED after a syncopal episode. Patient noted to have an episode of urinary incontinence at the time. Lost consciousness for few minutes and woke up in the ambulance, no head trauma. BP on arrival was 194/143, and was started on a cardene gtt. Patient has been c/o neck pain, headache, and b/l blurry vision for 4 days as per sister. Vision has now normalized. 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 SICU was consulted for bp control and q1hr neuro checks.     Allergies: No Known Allergies      MEDICATIONS:   --------------------------------------------------------------------------------------  Neurologic Medications  acetaminophen     Tablet .. 1000 milliGRAM(s) Oral every 6 hours  HYDROmorphone  Injectable 0.25 milliGRAM(s) IV Push every 4 hours PRN Moderate Pain (4 - 6)  levETIRAcetam 500 milliGRAM(s) Oral two times a day    Respiratory Medications    Cardiovascular Medications  amLODIPine   Tablet 10 milliGRAM(s) Oral daily  labetalol 100 milliGRAM(s) Oral every 8 hours  labetalol Injectable 10 milliGRAM(s) IV Push every 2 hours PRN Systolic blood pressure > 160  niCARdipine Infusion 5 mG/Hr IV Continuous <Continuous>  spironolactone 12.5 milliGRAM(s) Oral two times a day  valsartan 320 milliGRAM(s) Oral daily    Gastrointestinal Medications  sodium chloride 1 Gram(s) Oral three times a day  sodium chloride 0.9%. 1000 milliLiter(s) IV Continuous <Continuous>    Genitourinary Medications    Hematologic/Oncologic Medications  influenza   Vaccine 0.5 milliLiter(s) IntraMuscular once    Antimicrobial/Immunologic Medications    Endocrine/Metabolic Medications    Topical/Other Medications    --------------------------------------------------------------------------------------  VITAL SIGNS, INS/OUTS (last 24 hours):  --------------------------------------------------------------------------------------  T(C): 37.1 (09-21-22 @ 00:00), Max: 37.8 (09-20-22 @ 16:00)  HR: 96 (09-21-22 @ 00:00) (87 - 113)  BP: 151/94 (09-21-22 @ 00:00) (129/81 - 169/99)  BP(mean): 109 (09-21-22 @ 00:00) (92 - 117)  ABP: --  ABP(mean): --  RR: 25 (09-21-22 @ 00:00) (9 - 28)  SpO2: 96% (09-21-22 @ 00:00) (91% - 99%)  Wt(kg): --  CVP(mm Hg): --  CI: --  CAPILLARY BLOOD GLUCOSE      POCT Blood Glucose.: 115 mg/dL (20 Sep 2022 14:14)   N/A      09-19 @ 07:01  -  09-20 @ 07:00  --------------------------------------------------------  IN:    IV PiggyBack: 200 mL    NiCARdipine: 1150 mL    sodium chloride 0.9%: 2400 mL  Total IN: 3750 mL    OUT:    Voided (mL): 2400 mL  Total OUT: 2400 mL    Total NET: 1350 mL      09-20 @ 07:01  -  09-21 @ 01:03  --------------------------------------------------------  IN:    NiCARdipine: 1062.5 mL    sodium chloride 0.9%: 900 mL  Total IN: 1962.5 mL    OUT:    Voided (mL): 2600 mL  Total OUT: 2600 mL    Total NET: -637.5 mL  --------------------------------------------------------------------------------------    EXAM  NEUROLOGY   Exam: Normal, NAD, alert, oriented x3, no focal deficits. CN II-XII intact. RODRIGUEZ, SILT, Strength 5/5 all extremities.    HEENT  Exam: Normocephalic, atraumatic, EOMI.     RESPIRATORY  Exam: Lungs clear to auscultation, Normal expansion/effort.     CARDIOVASCULAR  Exam: S1, S2.  Regular rate and rhythm.       GI/NUTRITION  Exam: Abdomen soft, Non-tender, Non-distended.    Current Diet:  NPO    VASCULAR  Exam: Extremities warm, pink, well-perfused.     MUSCULOSKELETAL  Exam: All extremities moving spontaneously without limitations.     SKIN  Exam: Good skin turgor, no skin breakdown.    METABOLIC/FLUIDS/ELECTROLYTES  sodium chloride 1 Gram(s) Oral three times a day  sodium chloride 0.9%. 1000 milliLiter(s) IV Continuous <Continuous>      HEMATOLOGIC  [x] VTE Prophylaxis:   Transfusions:	[] PRBC	[] Platelets		[] FFP	[] Cryoprecipitate    INFECTIOUS DISEASE  Antimicrobials/Immunologic Medications:  influenza   Vaccine 0.5 milliLiter(s) IntraMuscular once    Tubes/Lines/Drains    [x] Peripheral IV  [] Central Venous Line     	[] R	[] L	[] IJ	[] Fem	[] SC	Date Placed:   [] Arterial Line		[] R	[] L	[] Fem	[] Rad	[] Ax	Date Placed:   [] PICC		[] Midline		[] Mediport  [] Urinary Catheter		Date Placed:   [x] Necessity of urinary, arterial, and venous catheters discussed    LABS  --------------------------------------------------------------------------------------  ((Insert SICU Labs here))***  --------------------------------------------------------------------------------------    IMAGING:  < from: CT Head No Cont (09.20.22 @ 13:04) >  ACC: [id number] EXAM:  CT BRAIN                          PROCEDURE DATE:  09/20/2022          INTERPRETATION:  Noncontrast CT of the brain.    CLINICAL INDICATION:  Syncope, left basal ganglia and intraventricular   hemorrhage, follow-up    TECHNIQUE: Axial CT scanning of the brain was obtained from the skull   base to the vertex without the administration of intravenous contrast.   Sagittal and coronal reformats were provided.    COMPARISON: CT brain 9/19/2022    FINDINGS:    Similar-appearing 2.3 x 2.3 cm left raheem-caudate acute parenchymal   hemorrhage.    Similar moderate extension of acute hemorrhage into the left lateral   ventricle.    Rightward midline shift of 7 mm is similar. Basal cisterns are still   visualized.    No hydrocephalus.    IMPRESSION:    No significant interval change from 9/19/2022.    --- End of Report ---    ARLYN JOHNSTON MD; Attending Radiologist  This document has been electronically signed. Sep 20 2022  1:09PM    < end of copied text >

## 2022-09-22 LAB
ANION GAP SERPL CALC-SCNC: 11 MMOL/L — SIGNIFICANT CHANGE UP (ref 7–14)
ANION GAP SERPL CALC-SCNC: 11 MMOL/L — SIGNIFICANT CHANGE UP (ref 7–14)
ANION GAP SERPL CALC-SCNC: 9 MMOL/L — SIGNIFICANT CHANGE UP (ref 7–14)
BUN SERPL-MCNC: 14 MG/DL — SIGNIFICANT CHANGE UP (ref 7–23)
BUN SERPL-MCNC: 18 MG/DL — SIGNIFICANT CHANGE UP (ref 7–23)
BUN SERPL-MCNC: 19 MG/DL — SIGNIFICANT CHANGE UP (ref 7–23)
CALCIUM SERPL-MCNC: 8.6 MG/DL — SIGNIFICANT CHANGE UP (ref 8.4–10.5)
CALCIUM SERPL-MCNC: 8.6 MG/DL — SIGNIFICANT CHANGE UP (ref 8.4–10.5)
CALCIUM SERPL-MCNC: 9.3 MG/DL — SIGNIFICANT CHANGE UP (ref 8.4–10.5)
CHLORIDE SERPL-SCNC: 103 MMOL/L — SIGNIFICANT CHANGE UP (ref 98–107)
CHLORIDE SERPL-SCNC: 104 MMOL/L — SIGNIFICANT CHANGE UP (ref 98–107)
CHLORIDE SERPL-SCNC: 104 MMOL/L — SIGNIFICANT CHANGE UP (ref 98–107)
CO2 SERPL-SCNC: 19 MMOL/L — LOW (ref 22–31)
CO2 SERPL-SCNC: 20 MMOL/L — LOW (ref 22–31)
CO2 SERPL-SCNC: 20 MMOL/L — LOW (ref 22–31)
CREAT SERPL-MCNC: 1.12 MG/DL — SIGNIFICANT CHANGE UP (ref 0.5–1.3)
CREAT SERPL-MCNC: 1.23 MG/DL — SIGNIFICANT CHANGE UP (ref 0.5–1.3)
CREAT SERPL-MCNC: 1.25 MG/DL — SIGNIFICANT CHANGE UP (ref 0.5–1.3)
EGFR: 77 ML/MIN/1.73M2 — SIGNIFICANT CHANGE UP
EGFR: 78 ML/MIN/1.73M2 — SIGNIFICANT CHANGE UP
EGFR: 87 ML/MIN/1.73M2 — SIGNIFICANT CHANGE UP
GLUCOSE SERPL-MCNC: 111 MG/DL — HIGH (ref 70–99)
GLUCOSE SERPL-MCNC: 114 MG/DL — HIGH (ref 70–99)
GLUCOSE SERPL-MCNC: 137 MG/DL — HIGH (ref 70–99)
HCT VFR BLD CALC: 40.3 % — SIGNIFICANT CHANGE UP (ref 39–50)
HGB BLD-MCNC: 12.8 G/DL — LOW (ref 13–17)
MAGNESIUM SERPL-MCNC: 2.1 MG/DL — SIGNIFICANT CHANGE UP (ref 1.6–2.6)
MAGNESIUM SERPL-MCNC: 2.2 MG/DL — SIGNIFICANT CHANGE UP (ref 1.6–2.6)
MAGNESIUM SERPL-MCNC: 2.3 MG/DL — SIGNIFICANT CHANGE UP (ref 1.6–2.6)
MCHC RBC-ENTMCNC: 26.7 PG — LOW (ref 27–34)
MCHC RBC-ENTMCNC: 31.8 GM/DL — LOW (ref 32–36)
MCV RBC AUTO: 84 FL — SIGNIFICANT CHANGE UP (ref 80–100)
NRBC # BLD: 0 /100 WBCS — SIGNIFICANT CHANGE UP (ref 0–0)
NRBC # FLD: 0 K/UL — SIGNIFICANT CHANGE UP (ref 0–0)
PHOSPHATE SERPL-MCNC: 2.8 MG/DL — SIGNIFICANT CHANGE UP (ref 2.5–4.5)
PHOSPHATE SERPL-MCNC: 3.2 MG/DL — SIGNIFICANT CHANGE UP (ref 2.5–4.5)
PHOSPHATE SERPL-MCNC: 3.3 MG/DL — SIGNIFICANT CHANGE UP (ref 2.5–4.5)
PLATELET # BLD AUTO: 277 K/UL — SIGNIFICANT CHANGE UP (ref 150–400)
POTASSIUM SERPL-MCNC: 4.2 MMOL/L — SIGNIFICANT CHANGE UP (ref 3.5–5.3)
POTASSIUM SERPL-MCNC: 4.4 MMOL/L — SIGNIFICANT CHANGE UP (ref 3.5–5.3)
POTASSIUM SERPL-MCNC: 4.6 MMOL/L — SIGNIFICANT CHANGE UP (ref 3.5–5.3)
POTASSIUM SERPL-SCNC: 4.2 MMOL/L — SIGNIFICANT CHANGE UP (ref 3.5–5.3)
POTASSIUM SERPL-SCNC: 4.4 MMOL/L — SIGNIFICANT CHANGE UP (ref 3.5–5.3)
POTASSIUM SERPL-SCNC: 4.6 MMOL/L — SIGNIFICANT CHANGE UP (ref 3.5–5.3)
RBC # BLD: 4.8 M/UL — SIGNIFICANT CHANGE UP (ref 4.2–5.8)
RBC # FLD: 13.7 % — SIGNIFICANT CHANGE UP (ref 10.3–14.5)
SODIUM SERPL-SCNC: 133 MMOL/L — LOW (ref 135–145)
SODIUM SERPL-SCNC: 134 MMOL/L — LOW (ref 135–145)
SODIUM SERPL-SCNC: 134 MMOL/L — LOW (ref 135–145)
WBC # BLD: 12.53 K/UL — HIGH (ref 3.8–10.5)
WBC # FLD AUTO: 12.53 K/UL — HIGH (ref 3.8–10.5)

## 2022-09-22 PROCEDURE — 99291 CRITICAL CARE FIRST HOUR: CPT | Mod: 25

## 2022-09-22 PROCEDURE — 93976 VASCULAR STUDY: CPT | Mod: 26

## 2022-09-22 RX ORDER — LABETALOL HCL 100 MG
300 TABLET ORAL EVERY 8 HOURS
Refills: 0 | Status: DISCONTINUED | OUTPATIENT
Start: 2022-09-22 | End: 2022-09-26

## 2022-09-22 RX ORDER — SODIUM CHLORIDE 9 MG/ML
3 INJECTION INTRAMUSCULAR; INTRAVENOUS; SUBCUTANEOUS THREE TIMES A DAY
Refills: 0 | Status: DISCONTINUED | OUTPATIENT
Start: 2022-09-22 | End: 2022-09-26

## 2022-09-22 RX ORDER — HEPARIN SODIUM 5000 [USP'U]/ML
5000 INJECTION INTRAVENOUS; SUBCUTANEOUS EVERY 8 HOURS
Refills: 0 | Status: DISCONTINUED | OUTPATIENT
Start: 2022-09-22 | End: 2022-09-26

## 2022-09-22 RX ORDER — OXYCODONE HYDROCHLORIDE 5 MG/1
10 TABLET ORAL EVERY 6 HOURS
Refills: 0 | Status: DISCONTINUED | OUTPATIENT
Start: 2022-09-22 | End: 2022-09-23

## 2022-09-22 RX ORDER — LABETALOL HCL 100 MG
5 TABLET ORAL ONCE
Refills: 0 | Status: COMPLETED | OUTPATIENT
Start: 2022-09-22 | End: 2022-09-22

## 2022-09-22 RX ORDER — OXYCODONE HYDROCHLORIDE 5 MG/1
5 TABLET ORAL EVERY 4 HOURS
Refills: 0 | Status: DISCONTINUED | OUTPATIENT
Start: 2022-09-22 | End: 2022-09-26

## 2022-09-22 RX ORDER — HYDRALAZINE HCL 50 MG
50 TABLET ORAL EVERY 8 HOURS
Refills: 0 | Status: DISCONTINUED | OUTPATIENT
Start: 2022-09-22 | End: 2022-09-24

## 2022-09-22 RX ORDER — SODIUM CHLORIDE 5 G/100ML
500 INJECTION, SOLUTION INTRAVENOUS
Refills: 0 | Status: DISCONTINUED | OUTPATIENT
Start: 2022-09-22 | End: 2022-09-24

## 2022-09-22 RX ADMIN — Medication 300 MILLIGRAM(S): at 12:02

## 2022-09-22 RX ADMIN — HYDROMORPHONE HYDROCHLORIDE 0.25 MILLIGRAM(S): 2 INJECTION INTRAMUSCULAR; INTRAVENOUS; SUBCUTANEOUS at 18:00

## 2022-09-22 RX ADMIN — SODIUM CHLORIDE 3 GRAM(S): 9 INJECTION INTRAMUSCULAR; INTRAVENOUS; SUBCUTANEOUS at 12:02

## 2022-09-22 RX ADMIN — SODIUM CHLORIDE 2 GRAM(S): 9 INJECTION INTRAMUSCULAR; INTRAVENOUS; SUBCUTANEOUS at 05:23

## 2022-09-22 RX ADMIN — Medication 5 MILLIGRAM(S): at 04:42

## 2022-09-22 RX ADMIN — HEPARIN SODIUM 5000 UNIT(S): 5000 INJECTION INTRAVENOUS; SUBCUTANEOUS at 21:51

## 2022-09-22 RX ADMIN — Medication 1000 MILLIGRAM(S): at 05:22

## 2022-09-22 RX ADMIN — AMLODIPINE BESYLATE 10 MILLIGRAM(S): 2.5 TABLET ORAL at 05:21

## 2022-09-22 RX ADMIN — SPIRONOLACTONE 12.5 MILLIGRAM(S): 25 TABLET, FILM COATED ORAL at 17:31

## 2022-09-22 RX ADMIN — Medication 1000 MILLIGRAM(S): at 12:01

## 2022-09-22 RX ADMIN — HYDROMORPHONE HYDROCHLORIDE 0.25 MILLIGRAM(S): 2 INJECTION INTRAMUSCULAR; INTRAVENOUS; SUBCUTANEOUS at 17:32

## 2022-09-22 RX ADMIN — Medication 1000 MILLIGRAM(S): at 17:30

## 2022-09-22 RX ADMIN — SODIUM CHLORIDE 75 MILLILITER(S): 5 INJECTION, SOLUTION INTRAVENOUS at 02:38

## 2022-09-22 RX ADMIN — VALSARTAN 320 MILLIGRAM(S): 80 TABLET ORAL at 05:22

## 2022-09-22 RX ADMIN — Medication 300 MILLIGRAM(S): at 21:50

## 2022-09-22 RX ADMIN — Medication 200 MILLIGRAM(S): at 05:22

## 2022-09-22 RX ADMIN — LEVETIRACETAM 500 MILLIGRAM(S): 250 TABLET, FILM COATED ORAL at 17:31

## 2022-09-22 RX ADMIN — SODIUM CHLORIDE 3 GRAM(S): 9 INJECTION INTRAMUSCULAR; INTRAVENOUS; SUBCUTANEOUS at 21:50

## 2022-09-22 RX ADMIN — Medication 10 MILLIGRAM(S): at 04:05

## 2022-09-22 RX ADMIN — Medication 10 MILLIGRAM(S): at 01:09

## 2022-09-22 RX ADMIN — Medication 50 MILLIGRAM(S): at 21:51

## 2022-09-22 RX ADMIN — SODIUM CHLORIDE 30 MILLILITER(S): 5 INJECTION, SOLUTION INTRAVENOUS at 16:30

## 2022-09-22 RX ADMIN — SPIRONOLACTONE 12.5 MILLIGRAM(S): 25 TABLET, FILM COATED ORAL at 05:21

## 2022-09-22 RX ADMIN — HEPARIN SODIUM 5000 UNIT(S): 5000 INJECTION INTRAVENOUS; SUBCUTANEOUS at 12:02

## 2022-09-22 RX ADMIN — LEVETIRACETAM 500 MILLIGRAM(S): 250 TABLET, FILM COATED ORAL at 05:22

## 2022-09-22 RX ADMIN — Medication 1000 MILLIGRAM(S): at 01:07

## 2022-09-22 RX ADMIN — Medication 1000 MILLIGRAM(S): at 05:52

## 2022-09-22 RX ADMIN — Medication 50 MILLIGRAM(S): at 12:02

## 2022-09-22 RX ADMIN — Medication 1000 MILLIGRAM(S): at 00:37

## 2022-09-22 NOTE — PROGRESS NOTE ADULT - SUBJECTIVE AND OBJECTIVE BOX
INTERVAL HISTORY: Patient seen at bedside by stroke team. No acute events overnight. Patient reporting persisting HA over the past several days without improvement. Serial CT scans without change. Patient also reports lightheadedness, blurry vision, and generalized weakness. Denies one sided weakness, numbness, N/V, difficulty with speech. Discussed MR findings and plan moving forward. Spoke with nursing at bedside who reports patient is ambulating more but prefers to lie flat, as standing provokes the HA more. Patient has been transitioned to oral antihypertensives and off Nicardipine gtt.    PAST MEDICAL & SURGICAL HISTORY:  High cholesterol      Hypertension        FAMILY HISTORY:  FH: diabetes mellitus      SOCIAL HISTORY:   T/E/D:   Occupation:   Lives with:     MEDICATIONS (HOME):  Home Medications:  acetaminophen 325 mg oral tablet: 2 tab(s) orally every 4 hours, As needed, Temp greater or equal to 38C (100.4F) (07 Sep 2021 16:11)    MEDICATIONS  (STANDING):  acetaminophen     Tablet .. 1000 milliGRAM(s) Oral every 6 hours  amLODIPine   Tablet 10 milliGRAM(s) Oral daily  heparin   Injectable 5000 Unit(s) SubCutaneous every 8 hours  hydrALAZINE 50 milliGRAM(s) Oral every 8 hours  influenza   Vaccine 0.5 milliLiter(s) IntraMuscular once  labetalol 300 milliGRAM(s) Oral every 8 hours  levETIRAcetam 500 milliGRAM(s) Oral two times a day  niCARdipine Infusion 5 mG/Hr (25 mL/Hr) IV Continuous <Continuous>  sodium chloride 3 Gram(s) Oral three times a day  sodium chloride 1.5%. 500 milliLiter(s) (100 mL/Hr) IV Continuous <Continuous>  spironolactone 12.5 milliGRAM(s) Oral two times a day  valsartan 320 milliGRAM(s) Oral daily    MEDICATIONS  (PRN):  HYDROmorphone  Injectable 0.25 milliGRAM(s) IV Push every 4 hours PRN Moderate Pain (4 - 6)  labetalol Injectable 10 milliGRAM(s) IV Push every 2 hours PRN Systolic blood pressure > 160    ALLERGIES/INTOLERANCES:  Allergies  No Known Allergies    Intolerances    VITALS & EXAMINATION:  Vital Signs Last 24 Hrs  T(C): 36.9 (22 Sep 2022 12:00), Max: 37.9 (21 Sep 2022 16:00)  T(F): 98.4 (22 Sep 2022 12:00), Max: 100.2 (21 Sep 2022 16:00)  HR: 88 (22 Sep 2022 14:00) (87 - 110)  BP: 117/88 (22 Sep 2022 14:00) (117/88 - 186/103)  BP(mean): 98 (22 Sep 2022 14:00) (82 - 123)  RR: 18 (22 Sep 2022 14:00) (12 - 27)  SpO2: 99% (22 Sep 2022 14:00) (92% - 100%)        General:  Constitutional: Male, appears stated age, in no apparent distress including pain  Head: Normocephalic & Atraumatic.    Neurological:  MS: Eyes closed, open to voice but close again throughout the exam without verbal stimuli. Somnolent, alert, oriented to person, place, situation, time. Follows all commands. Speech is hypophonic, fluent with good repetition. Naming is intact.     CNs: PERRL (R = 3mm, L = 3mm). Blink to threat present bilaterally (does not maintain open eyes long enough for formal visual field testing). EOMI no nystagmus. V1-3 intact to LT b/l. No facial asymmetry b/l, full eye closure strength b/l.     Motor: Normal muscle bulk & tone. No noticeable tremor. No drifts in UE or LE b/l.      Sensation: Intact to LT b/l throughout.     Cortical: Extinction on DSS (neglect): none    Coordination: No dysmetria to FTN b/l.    Gait: Deferred.         LABORATORY:  CBC                       12.8   12.53 )-----------( 277      ( 22 Sep 2022 01:00 )             40.3     Chem 09-22    134<L>  |  103  |  14  ----------------------------<  111<H>  4.4   |  20<L>  |  1.12    Ca    9.3      22 Sep 2022 01:00  Phos  3.3     09-22  Mg     2.20     09-22      LFTs   Coagulopathy   Lipid Panel   A1c   Cardiac enzymes CARDIAC MARKERS ( 21 Sep 2022 11:53 )  x     / x     / 112 U/L / x     / 2.0 ng/mL      U/A   CSF  Immunological  Other    STUDIES & IMAGING:    Radiology (XR, CT, MR, U/S, TTE/MARY JANE):      MRI brain w/w/o contrast 9/22:     Late subacute intraparenchymal hemorrhage centered in the left basal   ganglia with intraventricular extension. The largest component of the   hemorrhage currently measuring 2.8 x 2.0 cm and is largely unchanged when   compared to prior day's study on 9/20/2022 where it measured 2.6 x 2.1 cm.    There is local mass effect with effacement of the left lateral ventricle   and slight rightward midline shift of approximately 5 mm, unchanged from   prior day CT head.There is T2/FLAIR hyperintense signal surrounding the   region of the hemorrhage, likely representing brain edema.    Focal areas of peripheral diffusion restriction in the left basal   ganglia, likely secondary to hemorrhagic change versus artifact.  Susceptibility in the known regions of hemorrhage including left basal   ganglia and left lateral ventricle. No additional areas of diffusion   restriction or susceptibility are seen.    No abnormal extra-axial fluid collections are present. Major flow-voids   at the base of the brain follow expected course and contour.    The calvarium is intact. The visualized intraorbital compartments,   paranasal sinuses and tympanomastoid cavities appear free of acute   disease.    IMPRESSION:    Late subacute intraparenchymal hemorrhage in the left basal ganglia with   intraventricular extension which is largely unchanged and size and   distribution when compared to same and prior day CT scans of the brain.   Unchanged degree of brain edema, mass effect and mild rightward midline   shift.    A short interval follow-up MRI of the brain with and without IV contrast   is recommended to ensure resolution of hemorrhage and exclude underlying   mass which may be obscured.    --- End of Report ---        < from: CT Head No Cont (09.21.22 @ 07:49) >  Acute parenchymal hemorrhage involving the left basal ganglia region is   again seen with surrounding edema. This finding measures approximately   1.9 x 2.8 cm and previously measured approximately 2.1 x 2.6 cm.    Intraventricular hemorrhage is again seen.    The size and configuration of ventricles appear unchanged when compared   with the prior exam    Intraventricular hemorrhage is again seen    The size and configuration of ventricles appear unchanged.    Evaluation of the osseous structures with the appropriate window appears   unremarkable    The visualized paranasal sinuses mastoid and middle ear regions appear   clear.    Impression: Acute parenchymal hemorrhage is again seen with   intraventricular hemorrhage is again identified.    --- End of Report ---    < end of copied text >          < from: CT Head No Cont (09.20.22 @ 13:04) >  Similar-appearing 2.3 x 2.3 cm left raheem-caudate acute parenchymal   hemorrhage.    Similar moderate extension of acute hemorrhage into the left lateral   ventricle.    Rightward midline shift of 7 mm is similar. Basal cisterns are still   visualized.    No hydrocephalus.    IMPRESSION:    No significant interval change from 9/19/2022.    --- End of Report ---    < end of copied text >        < from: CT Head No Cont (09.19.22 @ 06:12) >  Stable leftbasal ganglia and left intraventricular hemorrhage.  Slight dilatation of the bilateral frontal and temporal horns suggesting   hydrocephalus is unchanged.    --- End of Report ---    < end of copied text >          HEAD CT 9/18:    An acute parenchymal hematoma involves the left basal ganglia with   associated acute intraventricular hemorrhage and hydrocephalus.    This could reflect a hypertensive hemorrhage, hemorrhagic transformation   of an infarct, or hemorrhage into an underlying tumoror vascular   malformation. A follow-up brain MRI with and without contrast and brain   MRA are recommended for further workup.    CERVICAL SPINE CT:    No acute cervical spine fracture or evidence of traumatic malalignment.

## 2022-09-22 NOTE — PROGRESS NOTE ADULT - ASSESSMENT
35 YO male with Hx of HTN, noncompliant with prescribed medications, HLD brought to ED following a syncopal episode, markedly hypertensive on arrival to ED, with a left basal ganglia hemorrhage, intraventricular extension, and early hydrocephalus. Patient does not require neurosurgical intervention at this time, however he is at high risk of worsening hydrocephalus and may require placement of an external ventricular drain.    9/19: Alert and nonfocal exam but not as well oriented as on initial exam. BP controlled on nicardipine infusion. Repeat head CT this AM  9/20: Neurologically intact. SBP maintained<150, on nicardipine gtt. Increased headache yesterday with stable Head CT.   9/21: neuro intact, on nicardipine for SBP< 150, na 136 goal 145-150, CTH stable, awaiting mri brain.  9/22: neuro intact, CTH stable yesterday, mri brain done, Na 134 goal 145-150

## 2022-09-22 NOTE — PROGRESS NOTE ADULT - ASSESSMENT
36y M with uncontrolled HTN (non-compliant w/ meds), who presented for syncope and found to have L BG hemorrhage w/ associated IVH and hydrocephalus. SICU consulted for blood pressure control and q2hr neuro checks.    PLAN:    Neuro:   - AOx4   - q2hr neuro checks  - CTH 09/20 and 09/21 unchanged  - MRI/MRA 09/21, f/u read   - CTA 09/18 shows no vasc malformations  - Keppra 500mg bid  - Pain control w/ Tylenol and Dilaudid 0.25mg PRN    Respiratory:  - Saturating >93% on RA    Cardiovascular:  - c/w nicardipine gtt. Wean as able. SBP Goal is 120-150   - Transition to po anti-htn as tolerated: Valsartan 320mg qd, Amlodipine 10mg qd, Labetalol 200mg TID, Spironolactone 12.5mg BID  - ST elevation noted on monitor, troponins @ 65, follow up troponin     Gastrointestinal:  - Regular diet    /Renal:   - NS 1.5% @ 75ml/hr  - salt tabs TID  - monitor Na. Na goal 140-150. [Na 139--->136)]  - Strict I&O's   - Replete electrolytes prn  - f/u renal duplex to r/o renal artery stenosis    Hematologic:  - H/H stable  - VTE ppx: SCDs, no chemoprophylaxis    Infectious Disease:  - Afebrile  - No ABX    Endo:  - AMRITA, A1C 5.4% 09/18    Dispo: SICU     36y M with uncontrolled HTN (non-compliant w/ meds), who presented for syncope and found to have L BG hemorrhage w/ associated IVH and hydrocephalus. SICU consulted for blood pressure control and q2hr neuro checks.    PLAN:    Neuro:   - AOx4   - q2hr neuro checks  - CTH 09/20 and 09/21 unchanged  - MRI/MRA 09/21, f/u read   - CTA 09/18 shows no vasc malformations  - Keppra 500mg bid  - Pain control w/ Tylenol and Dilaudid 0.25mg PRN    Respiratory:  - Saturating >93% on RA    Cardiovascular:  - c/w nicardipine gtt. Wean as able. SBP Goal is 120-150   - Transition to po anti-htn as tolerated: Valsartan 320mg qd, Amlodipine 10mg qd, Labetalol 200mg TID, Spironolactone 12.5mg BID  - ST elevation noted on monitor, troponins @ 65, follow up troponin     Gastrointestinal:  - Regular diet    /Renal:   - NS 1.5% @ 75ml/hr  - salt tabs TID  - monitor Na. Na goal 140-150. [Na 139--->136 --> 135)]  - Strict I&O's   - Replete electrolytes prn  - f/u renal duplex to r/o renal artery stenosis    Hematologic:  - H/H stable  - VTE ppx: SCDs, no chemoprophylaxis    Infectious Disease:  - Afebrile  - No ABX    Endo:  - AMRITA, A1C 5.4% 09/18    Dispo: SICU     36y M with uncontrolled HTN (non-compliant w/ meds), who presented for syncope and found to have L BG hemorrhage w/ associated IVH and hydrocephalus. SICU consulted for blood pressure control and q2hr neuro checks.    PLAN:    Neuro:   - AOx4   - q2hr neuro checks  - CTH 09/20 and 09/21 unchanged  - MRI/MRA 09/21, f/u read   - CTA 09/18 shows no vasc malformations  - Keppra 500mg bid  - Pain control w/ Tylenol and Dilaudid 0.25mg PRN    Respiratory:  - Saturating >93% on RA    Cardiovascular:  - c/w nicardipine gtt. Wean as able. SBP Goal is 120-150   - Transition to po anti-htn as tolerated: Valsartan 320mg qd, Amlodipine 10mg qd, Labetalol 200mg TID, Spironolactone 12.5mg BID  - ST elevation noted on monitor, troponins @ 65, follow up troponin     Gastrointestinal:  - Regular diet    /Renal:   - NS 1.5% @ 75ml/hr  - salt tabs TID  - monitor Na. Na goal 140-150. [Na 139--->136 --> 135 --> 134)]  - Strict I&O's   - Replete electrolytes prn  - f/u renal duplex to r/o renal artery stenosis    Hematologic:  - H/H stable  - VTE ppx: SCDs, no chemoprophylaxis    Infectious Disease:  - Afebrile  - No ABX    Endo:  - AMRITA, A1C 5.4% 09/18    Dispo: SICU     36y M with uncontrolled HTN (non-compliant w/ meds), who presented for syncope and found to have L BG hemorrhage w/ associated IVH and hydrocephalus. SICU consulted for blood pressure control and q2hr neuro checks.    PLAN:  Neuro:   - AOx4   - q2hr neuro checks  - CTH 09/20 and 09/21 unchanged  - MRI/MRA 09/21, f/u read   - CTA 09/18 shows no vasc malformations  - Keppra 500mg bid  - Pain control w/ Tylenol and Dilaudid 0.25mg PRN    Respiratory:  - Saturating >93% on RA    Cardiovascular:  - c/w nicardipine gtt. Wean as able. SBP Goal is 120-150   - Transition to po anti-htn as tolerated: Valsartan 320mg qd, Amlodipine 10mg qd, Labetalol 300mg TID, Spironolactone 12.5mg BID, hydralazine 50tid    Gastrointestinal:  - Regular diet    /Renal:   - NS 1.5% @ 100ml/hr  - salt tabs 3 grams TID  - monitor Na. Na goal 140-150. [Na 139--->136 --> 135-->134)]  - Strict I&O's   - Replete electrolytes prn  - f/u renal duplex to r/o renal artery stenosis    Hematologic:  - H/H stable  - VTE ppx: SCDs, no chemoprophylaxis    Infectious Disease:  - Afebrile  - No ABX    Endo:  - AMRITA, A1C 5.4% 09/18    Dispo: SICU       36y M with uncontrolled HTN (non-compliant w/ meds), who presented for syncope and found to have L BG hemorrhage w/ associated IVH and hydrocephalus. SICU consulted for blood pressure control and q2hr neuro checks.    PLAN:  Neuro:   - AOx4   - q2hr neuro checks  - CTH 09/20 and 09/21 unchanged  - MRI/MRA 09/21, f/u read   - CTA 09/18 shows no vasc malformations  - Keppra 500mg bid  - Pain control w/ Tylenol and Dilaudid 0.25mg PRN    Respiratory:  - Saturating >93% on RA    Cardiovascular:  - c/w nicardipine gtt. Wean as able. SBP Goal is 120-150   - Transition to po anti-htn as tolerated: Valsartan 320mg qd, Amlodipine 10mg qd, Labetalol 300mg TID, Spironolactone 12.5mg BID, hydralazine 50TID    Gastrointestinal:  - Regular diet    /Renal:   - NS 1.5% @ 100ml/hr  - salt tabs 3 grams TID  - monitor Na. Na goal 140-150. [Na 139--->136 --> 135-->134)]  - Strict I&O's   - Replete electrolytes prn  - f/u renal duplex to r/o renal artery stenosis    Hematologic:  - H/H stable  - VTE ppx: SCDs, heparin subQ    Infectious Disease:  - Afebrile  - No ABX    Endo:  - AMRITA, A1C 5.4% 09/18    Dispo: SICU

## 2022-09-22 NOTE — PROGRESS NOTE ADULT - PROBLEM SELECTOR PLAN 1
Neurologic checks and vital signs Q2H  Maintain SBP<150  Maintain serum Na 140-150  Follow up with stroke neurology

## 2022-09-22 NOTE — PROGRESS NOTE ADULT - SUBJECTIVE AND OBJECTIVE BOX
HPI:  Patient is a 35 YO right handed male with PMH of HTN, HLD, who has not taken his prescribed medications for approximately 3 months. On 9/16, patient presented to [facility] ED with 2 day history of generalized body aches, nausea, fevers, and chills. He was treated symptomatically and advised to resume his antihypertensive agents. Patient was at the pharmacy earlier today filling his prescriptions when he suffered a syncopal episode, patient noted to have had an episode of urinary incontinence. Patient brought to ED by EMS, BP on arrival was 194/143. Patient C/O neck pain and blurry vision. Per patient's sister he has been complaining of fatigue.   (18 Sep 2022 21:07)    Overnight events: no issues o/n    ICU Vital Signs Last 24 Hrs  T(C): 37.4 (22 Sep 2022 00:00), Max: 37.9 (21 Sep 2022 16:00)  T(F): 99.4 (22 Sep 2022 00:00), Max: 100.2 (21 Sep 2022 16:00)  HR: 102 (22 Sep 2022 02:00) (87 - 105)  BP: 150/99 (22 Sep 2022 02:00) (122/65 - 174/101)  BP(mean): 113 (22 Sep 2022 02:00) (80 - 121)  ABP: --  ABP(mean): --  RR: 21 (22 Sep 2022 02:00) (11 - 27)  SpO2: 98% (22 Sep 2022 02:00) (94% - 99%)          AAO X 3  PERRLA, EOMI  CN 2-12 grossly intact  RODRIGUEZ strength 5/5  No dysmetria or drift    MEDICATIONS  (STANDING):  acetaminophen   IVPB .. 1000 milliGRAM(s) IV Intermittent every 6 hours  amLODIPine   Tablet 10 milliGRAM(s) Oral daily  influenza   Vaccine 0.5 milliLiter(s) IntraMuscular once  levETIRAcetam 500 milliGRAM(s) Oral two times a day  niCARdipine Infusion 5 mG/Hr (25 mL/Hr) IV Continuous <Continuous>  sodium chloride 1 Gram(s) Oral three times a day  sodium chloride 0.9%. 1000 milliLiter(s) (100 mL/Hr) IV Continuous <Continuous>  valsartan 320 milliGRAM(s) Oral daily    MEDICATIONS  (PRN):                                                        12.8   12.53 )-----------( 277      ( 22 Sep 2022 01:00 )             40.3     09-22    134<L>  |  103  |  14  ----------------------------<  111<H>  4.4   |  20<L>  |  1.12    Ca    9.3      22 Sep 2022 01:00  Phos  3.3     09-22  Mg     2.20     09-22            < from: CT Head No Cont (09.19.22 @ 15:09) >  COMPARISON: Most recent prior head CT study from earlier today. Prior CT   angiogram studies of the head and neck performed on 9/18/2022.    FINDINGS: Previously seen acute hemorrhage involving the left basal   ganglia with intraventricular extension appears grossly unchanged in size   and configuration. Associated mild hydrocephalus is again notable.    No new areas of acute intracranial hemorrhage areseen.    There is no shift of the midline structures or herniation.    The paranasal sinuses and mastoid air cells are clear. The calvarium   appears intact. The orbits appear unremarkable.    IMPRESSION: Stable follow-up CT examination when compared with earlier   today.    < end of copied text >

## 2022-09-22 NOTE — PROGRESS NOTE ADULT - ASSESSMENT
36y M with uncontrolled HTN (non-compliant w/ meds), who presented for syncope and found to have L BG hemorrhage w/ associated IVH and hydrocephalus. Serial CTH performed for persisting, sometimes worsening HA with stable exams each time. MRI brain w/o contrast demonstrates the acute L BG hemorrhage w/o significant change from prior scans; no underlying lesions. Patient's exam as above, grossly unchanged from prior encounter. He has recently been transitioned onto oral anti hypertensives and has been walking around but HA continues to persist despite IV Tylenol.     Impression: HA, lightheadedness, secondary to L basal ganglia intraparenchymal hemorrhage with intraventricular extension most likely due to chronic HTN. No evidence of underlying neoplasm, AVM, etc on MRI.     Recommendations    Imaging:  [] Can consider outpatient MRI brain w/w/o contrast in 4-6 weeks   [] Elective angiogram outpatient with neurointerventionalist, Dr. Pete Whitmore    Meds:  [] Avoid antiplatelets  [] Metoclopramide 10mg IV with Tylenol 1g IV for HA management. Could consider MgSO4 IV if refractory, but would avoid with active renal impairment.  [] C/w Keppra 500 mg BID     Other:  [] Neurosurgery on board for monitoring & potential intervention, appreciate recommendations  [] BP < 160/90 mmHg  [] PT/OT - no skilled needs  [] C/w regular diet as tolerated   [] Rest of workup as per primary team  [] Patient should follow up with Stroke NP, Alice Bryant or Marixa Woods, in clinic at [address] [place] [address]. Please email [email or web address] w/ basic PHI.     From neurovascular standpoint, no further inpatient workup required. Cleared for discharge.     Patient case discussed with stroke attending, Dr. Libman.    Please call with questions: [phone]    36y M with uncontrolled HTN (non-compliant w/ meds), who presented for syncope and found to have L BG hemorrhage w/ associated IVH and hydrocephalus. Serial CTH performed for persisting, sometimes worsening HA with stable exams each time. MRI brain w/o contrast demonstrates the acute L BG hemorrhage w/o significant change from prior scans; no underlying lesions. Patient's exam as above, grossly unchanged from prior encounter. He has recently been transitioned onto oral anti hypertensives and has been walking around but HA continues to persist despite IV Tylenol.      further screened for electively. .     Recommendations    Imaging:  [] Can consider outpatient MRI brain w/w/o contrast in 4-6 weeks   [] Elective angiogram outpatient with neurointerventionalist, Dr. Pete Whitmore    Meds:  [] Avoid antiplatelets  [] Metoclopramide 10mg IV with Tylenol 1g IV for HA management. Could consider MgSO4 IV if refractory, but would avoid with active renal impairment.  [] C/w Keppra 500 mg BID     Other:  [] Neurosurgery on board for monitoring & potential intervention, appreciate recommendations  [] BP < 140/90 mmHg  [] PT/OT - no skilled needs  [] C/w regular diet as tolerated   [] Rest of workup as per primary team  [] Patient should follow up with Stroke NP, Alice Bryant or Marixa Woods, in clinic at [address] [place] [address]. Please email [email or web address] w/ basic PHI.     From neurovascular standpoint, no further inpatient workup required. Cleared for discharge.     Patient case discussed with stroke attending, Dr. Libman.    Please call with questions: [phone]    36y M with uncontrolled HTN (non-compliant w/ meds), who presented for syncope and found to have L BG hemorrhage w/ associated IVH and hydrocephalus. Serial CTH performed for persisting, sometimes worsening HA with stable exams each time. MRI brain w/o contrast demonstrates the acute L BG hemorrhage w/o significant change from prior scans; no underlying lesions. Patient's exam as above, grossly unchanged from prior encounter. He has recently been transitioned onto oral anti hypertensives and has been walking around but HA continues to persist despite IV Tylenol.     Impression.  Left basal ganglia hemorrhage, most likely due to chronic hypertension.  No evidence of an underlying AVM or tumor on noninvasive imaging, but given his age, further screening for a small vascular malformation can be done electively.    Recommendations    Imaging:  [] Can consider outpatient MRI brain w/w/o contrast in 4-6 weeks   [] Elective angiogram outpatient with neurointerventionalist, Dr. Pete Whitmore    Meds:  [] Avoid antiplatelets  [] Metoclopramide 10mg IV with Tylenol 1g IV for HA management. Could consider MgSO4 IV if refractory, but would avoid with active renal impairment.  [] C/w Keppra 500 mg BID     Other:  [] Neurosurgery on board for monitoring & potential intervention, appreciate recommendations  [] BP < 140/90 mmHg  [] PT/OT - no skilled needs  [] C/w regular diet as tolerated   [] Rest of workup as per primary team  [] Patient should follow up with Stroke NP, Alice Bryant or Marixa Woods, in clinic at [address] [place] [address]. Please email [email or web address] w/ basic PHI.     From neurovascular standpoint, no further inpatient workup required. Cleared for discharge.     Patient case discussed with stroke attending, Dr. Libman.    Please call with questions: [phone]

## 2022-09-22 NOTE — CHART NOTE - NSCHARTNOTEFT_GEN_A_CORE
Blood pressure acceptable. Pt. with resolved MEHDI. Nephrology signing off. Please call for any questions.

## 2022-09-22 NOTE — PROGRESS NOTE ADULT - SUBJECTIVE AND OBJECTIVE BOX
SICU Daily Progress Note  =====================================================  Interval/Overnight Events:      - Labetalol increased to 200mg TID   - F/u MRI head with/without contrast read   - Repeat CT head unchanged from 9/20  - NS 1.5% @ 75ml/hr and salt tabs 2g TID for goal Na 140-150 --> BMP this am (?)   - ST elevation noted on monitor, troponin 65 --> repeat trop (?)   - To undergo renal duplex r/o renal artery stenosis     HD #5          	SICU Day # 5    HPI:    36y Male y/o male with PMHx of HTN, HLD who has not taken his anti-hypertensive medications for approximately 3 months, presents to [facility] ED after a syncopal episode. Patient noted to have an episode of urinary incontinence at the time. Lost consciousness for few minutes and woke up in the ambulance, no head trauma. BP on arrival was 194/143, and was started on a cardene gtt. Patient has been c/o neck pain, headache, and b/l blurry vision for 4 days as per sister. Vision has now normalized. 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 SICU was consulted for bp control and q1hr neuro checks.     PMH:       PAST MEDICAL & SURGICAL HISTORY:  High cholesterol      Hypertension      Allergies: No Known Allergies      MEDICATIONS:   --------------------------------------------------------------------------------------  Neurologic Medications  acetaminophen     Tablet .. 1000 milliGRAM(s) Oral every 6 hours  HYDROmorphone  Injectable 0.25 milliGRAM(s) IV Push every 4 hours PRN Moderate Pain (4 - 6)  levETIRAcetam 500 milliGRAM(s) Oral two times a day    Respiratory Medications    Cardiovascular Medications  amLODIPine   Tablet 10 milliGRAM(s) Oral daily  labetalol 200 milliGRAM(s) Oral every 8 hours  labetalol Injectable 10 milliGRAM(s) IV Push every 2 hours PRN Systolic blood pressure > 160  niCARdipine Infusion 5 mG/Hr IV Continuous <Continuous>  spironolactone 12.5 milliGRAM(s) Oral two times a day  valsartan 320 milliGRAM(s) Oral daily    Gastrointestinal Medications  sodium chloride 2 Gram(s) Oral three times a day  sodium chloride 1.5%. 500 milliLiter(s) IV Continuous <Continuous>    Genitourinary Medications    Hematologic/Oncologic Medications  influenza   Vaccine 0.5 milliLiter(s) IntraMuscular once    Antimicrobial/Immunologic Medications    Endocrine/Metabolic Medications    Topical/Other Medications    --------------------------------------------------------------------------------------    VITAL SIGNS, INS/OUTS (last 24 hours):  --------------------------------------------------------------------------------------  ICU Vital Signs Last 24 Hrs  T(C): 37.7 (21 Sep 2022 20:00), Max: 37.9 (21 Sep 2022 16:00)  T(F): 99.8 (21 Sep 2022 20:00), Max: 100.2 (21 Sep 2022 16:00)  HR: 96 (21 Sep 2022 23:00) (87 - 102)  BP: 154/81 (21 Sep 2022 23:00) (122/65 - 174/101)  BP(mean): 103 (21 Sep 2022 23:00) (80 - 120)  ABP: --  ABP(mean): --  RR: 14 (21 Sep 2022 23:00) (11 - 25)  SpO2: 97% (21 Sep 2022 23:00) (94% - 99%)    I&O's Summary    20 Sep 2022 07:01  -  21 Sep 2022 07:00  --------------------------------------------------------  IN: 3197.5 mL / OUT: 2600 mL / NET: 597.5 mL    21 Sep 2022 07:01  -  22 Sep 2022 00:26  --------------------------------------------------------  IN: 2670 mL / OUT: 2850 mL / NET: -180 mL      --------------------------------------------------------------------------------------    EXAM    NEUROLOGY   Exam: Normal, NAD, alert, oriented x3, no focal deficits. CN II-XII intact. RODRIGUEZ, SILT, Strength 5/5 all extremities.    HEENT  Exam: Normocephalic, atraumatic, EOMI.     RESPIRATORY  Exam: Lungs clear to auscultation, Normal expansion/effort.     CARDIOVASCULAR  Exam: S1, S2.  Regular rate and rhythm.       GI/NUTRITION  Exam: Abdomen soft, Non-tender, Non-distended.    Current Diet:  NPO    VASCULAR  Exam: Extremities warm, pink, well-perfused.     MUSCULOSKELETAL  Exam: All extremities moving spontaneously without limitations.     SKIN  Exam: Good skin turgor, no skin breakdown.    METABOLIC/FLUIDS/ELECTROLYTES  sodium chloride 1 Gram(s) Oral three times a day  sodium chloride 0.9%. 1000 milliLiter(s) IV Continuous <Continuous>      HEMATOLOGIC  [x] VTE Prophylaxis:   Transfusions:	[] PRBC	[] Platelets		[] FFP	[] Cryoprecipitate    INFECTIOUS DISEASE  Antimicrobials/Immunologic Medications:  influenza   Vaccine 0.5 milliLiter(s) IntraMuscular once    Tubes/Lines/Drains   [x] Peripheral IV  [] Central Venous Line     	[] R	[] L	[] IJ	[] Fem	[] SC	Date Placed:   [] Arterial Line		[] R	[] L	[] Fem	[] Rad	[] Ax	Date Placed:   [] PICC		[] Midline		[] Mediport  [] Urinary Catheter		Date Placed:   [x] Necessity of urinary, arterial, and venous catheters discussed    LABS  --------------------------------------------------------------------------------------  Vitals:  ============  T(F): 99.8 (21 Sep 2022 20:00), Max: 100.2 (21 Sep 2022 16:00)  HR: 96 (21 Sep 2022 23:00)  BP: 154/81 (21 Sep 2022 23:00)  RR: 14 (21 Sep 2022 23:00)  SpO2: 97% (21 Sep 2022 23:00) (94% - 99%)  temp max in last 48H T(F): , Max: 100.2 (09-21-22 @ 16:00)    =======================================================  Current Antibiotics:    Other medications:  acetaminophen     Tablet .. 1000 milliGRAM(s) Oral every 6 hours  amLODIPine   Tablet 10 milliGRAM(s) Oral daily  influenza   Vaccine 0.5 milliLiter(s) IntraMuscular once  labetalol 200 milliGRAM(s) Oral every 8 hours  levETIRAcetam 500 milliGRAM(s) Oral two times a day  niCARdipine Infusion 5 mG/Hr IV Continuous <Continuous>  sodium chloride 2 Gram(s) Oral three times a day  sodium chloride 1.5%. 500 milliLiter(s) IV Continuous <Continuous>  spironolactone 12.5 milliGRAM(s) Oral two times a day  valsartan 320 milliGRAM(s) Oral daily      =======================================================  Labs:                        13.4   10.79 )-----------( 266      ( 21 Sep 2022 00:43 )             41.6     09-21    135  |  102  |  15  ----------------------------<  99  5.1   |  23  |  1.34<H>    Ca    9.1      21 Sep 2022 17:20  Phos  3.0     09-21  Mg     2.20     09-21        Culture - Blood (collected 09-18-22 @ 17:50)  Source: .Blood Blood-Peripheral    Culture - Blood (collected 09-18-22 @ 17:50)  Source: .Blood Blood-Peripheral    Culture - Urine (collected 09-17-22 @ 00:22)  Source: Clean Catch Clean Catch (Midstream)  Final Report (09-18-22 @ 05:32):    <10,000 CFU/mL Normal Urogenital [place]      Creatinine, Serum: 1.34 mg/dL (09-21-22 @ 17:20)  Creatinine, Serum: 1.20 mg/dL (09-21-22 @ 11:53)  Creatinine, Serum: 1.22 mg/dL (09-21-22 @ 00:43)  Creatinine, Serum: 1.33 mg/dL (09-20-22 @ 02:20)  Creatinine, Serum: 1.64 mg/dL (09-19-22 @ 16:00)  Creatinine, Serum: 1.75 mg/dL (09-19-22 @ 03:24)  Creatinine, Serum: 2.26 mg/dL (09-18-22 @ 17:50)    Procalcitonin, Serum: 0.16 ng/mL (09-18-22 @ 17:50)    WBC Count: 10.79 K/uL (09-21-22 @ 00:43)  WBC Count: 11.47 K/uL (09-20-22 @ 02:20)  WBC Count: 11.98 K/uL (09-19-22 @ 03:24)  WBC Count: 13.24 K/uL (09-18-22 @ 17:50)    SARS-CoV-2: NotDetec (09-18-22 @ 18:36)  SARS-CoV-2: NotDetec (09-16-22 @ 23:59)      Alkaline Phosphatase, Serum: 93 U/L (09-18-22 @ 17:50)  Alanine Aminotransferase (ALT/SGPT): 41 U/L (09-18-22 @ 17:50)  Aspartate Aminotransferase (AST/SGOT): 25 U/L (09-18-22 @ 17:50)  Bilirubin Total, Serum: 1.2 mg/dL (09-18-22 @ 17:50)    --------------------------------------------------------------------------------------    IMAGING STUDIES:       ACC: [id number] EXAM:  CT BRAIN                          PROCEDURE DATE:  09/21/2022          INTERPRETATION:  Clinical indication: Altered mental status. Follow-up   hemorrhage.    Multiple axial sections were performed from the base of vertex without   contrast enhancement coronal and sagittal destructions were performed.    This exam is compared prior head CT performed on September 20, 2022.    Acute parenchymal hemorrhage involving the left basal ganglia region is   again seen with surrounding edema. This finding measures approximately   1.9 x 2.8 cm and previously measured approximately 2.1 x 2.6 cm.    Intraventricular hemorrhage is again seen.    The size and configuration of ventricles appear unchanged when compared   with the prior exam    Intraventricular hemorrhage is again seen    The size and configuration of ventricles appear unchanged.    Evaluation of the osseous structures with the appropriate window appears   unremarkable    The visualized paranasal sinuses mastoid and middle ear regions appear   clear.    Impression: Acute parenchymal hemorrhage is again seen with   intraventricular hemorrhage is again identified.    --- End of Report --      MEENAKSHI SERNA MD; Attending Radiologist  This document has been electronically signed. Sep 21 2022  8:13AM   SICU Daily Progress Note  =====================================================  Interval/Overnight Events:      - Labetalol increased to 200mg TID   - F/u MRI head with/without contrast read   - Repeat CT head unchanged from 9/20  - NS 1.5% @ 75ml/hr and salt tabs 2g TID for goal Na 140-150, f/u morning BMP   - ST elevation noted on monitor, troponin 65 --> f/u repeat trop   - To undergo renal duplex r/o renal artery stenosis     HD #5          	SICU Day # 5    HPI:    36y Male y/o male with PMHx of HTN, HLD who has not taken his anti-hypertensive medications for approximately 3 months, presents to [facility] ED after a syncopal episode. Patient noted to have an episode of urinary incontinence at the time. Lost consciousness for few minutes and woke up in the ambulance, no head trauma. BP on arrival was 194/143, and was started on a cardene gtt. Patient has been c/o neck pain, headache, and b/l blurry vision for 4 days as per sister. Vision has now normalized. 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 SICU was consulted for bp control and q1hr neuro checks.     PMH:       PAST MEDICAL & SURGICAL HISTORY:  High cholesterol      Hypertension      Allergies: No Known Allergies      MEDICATIONS:   --------------------------------------------------------------------------------------  Neurologic Medications  acetaminophen     Tablet .. 1000 milliGRAM(s) Oral every 6 hours  HYDROmorphone  Injectable 0.25 milliGRAM(s) IV Push every 4 hours PRN Moderate Pain (4 - 6)  levETIRAcetam 500 milliGRAM(s) Oral two times a day    Respiratory Medications    Cardiovascular Medications  amLODIPine   Tablet 10 milliGRAM(s) Oral daily  labetalol 200 milliGRAM(s) Oral every 8 hours  labetalol Injectable 10 milliGRAM(s) IV Push every 2 hours PRN Systolic blood pressure > 160  niCARdipine Infusion 5 mG/Hr IV Continuous <Continuous>  spironolactone 12.5 milliGRAM(s) Oral two times a day  valsartan 320 milliGRAM(s) Oral daily    Gastrointestinal Medications  sodium chloride 2 Gram(s) Oral three times a day  sodium chloride 1.5%. 500 milliLiter(s) IV Continuous <Continuous>    Genitourinary Medications    Hematologic/Oncologic Medications  influenza   Vaccine 0.5 milliLiter(s) IntraMuscular once    Antimicrobial/Immunologic Medications    Endocrine/Metabolic Medications    Topical/Other Medications    --------------------------------------------------------------------------------------    VITAL SIGNS, INS/OUTS (last 24 hours):  --------------------------------------------------------------------------------------  ICU Vital Signs Last 24 Hrs  T(C): 37.7 (21 Sep 2022 20:00), Max: 37.9 (21 Sep 2022 16:00)  T(F): 99.8 (21 Sep 2022 20:00), Max: 100.2 (21 Sep 2022 16:00)  HR: 96 (21 Sep 2022 23:00) (87 - 102)  BP: 154/81 (21 Sep 2022 23:00) (122/65 - 174/101)  BP(mean): 103 (21 Sep 2022 23:00) (80 - 120)  ABP: --  ABP(mean): --  RR: 14 (21 Sep 2022 23:00) (11 - 25)  SpO2: 97% (21 Sep 2022 23:00) (94% - 99%)    I&O's Summary    20 Sep 2022 07:01  -  21 Sep 2022 07:00  --------------------------------------------------------  IN: 3197.5 mL / OUT: 2600 mL / NET: 597.5 mL    21 Sep 2022 07:01  -  22 Sep 2022 00:26  --------------------------------------------------------  IN: 2670 mL / OUT: 2850 mL / NET: -180 mL      --------------------------------------------------------------------------------------    EXAM    NEUROLOGY   Exam: Normal, NAD, alert, oriented x3, no focal deficits. CN II-XII intact. RODRIGUEZ, SILT, Strength 5/5 all extremities.    HEENT  Exam: Normocephalic, atraumatic, EOMI.     RESPIRATORY  Exam: Lungs clear to auscultation, Normal expansion/effort.     CARDIOVASCULAR  Exam: S1, S2.  Regular rate and rhythm.       GI/NUTRITION  Exam: Abdomen soft, Non-tender, Non-distended.    Current Diet:  NPO    VASCULAR  Exam: Extremities warm, pink, well-perfused.     MUSCULOSKELETAL  Exam: All extremities moving spontaneously without limitations.     SKIN  Exam: Good skin turgor, no skin breakdown.    METABOLIC/FLUIDS/ELECTROLYTES  sodium chloride 1 Gram(s) Oral three times a day  sodium chloride 0.9%. 1000 milliLiter(s) IV Continuous <Continuous>      HEMATOLOGIC  [x] VTE Prophylaxis:   Transfusions:	[] PRBC	[] Platelets		[] FFP	[] Cryoprecipitate    INFECTIOUS DISEASE  Antimicrobials/Immunologic Medications:  influenza   Vaccine 0.5 milliLiter(s) IntraMuscular once    Tubes/Lines/Drains   [x] Peripheral IV  [] Central Venous Line     	[] R	[] L	[] IJ	[] Fem	[] SC	Date Placed:   [] Arterial Line		[] R	[] L	[] Fem	[] Rad	[] Ax	Date Placed:   [] PICC		[] Midline		[] Mediport  [] Urinary Catheter		Date Placed:   [x] Necessity of urinary, arterial, and venous catheters discussed    LABS  --------------------------------------------------------------------------------------  Vitals:  ============  T(F): 99.8 (21 Sep 2022 20:00), Max: 100.2 (21 Sep 2022 16:00)  HR: 96 (21 Sep 2022 23:00)  BP: 154/81 (21 Sep 2022 23:00)  RR: 14 (21 Sep 2022 23:00)  SpO2: 97% (21 Sep 2022 23:00) (94% - 99%)  temp max in last 48H T(F): , Max: 100.2 (09-21-22 @ 16:00)    =======================================================  Current Antibiotics:    Other medications:  acetaminophen     Tablet .. 1000 milliGRAM(s) Oral every 6 hours  amLODIPine   Tablet 10 milliGRAM(s) Oral daily  influenza   Vaccine 0.5 milliLiter(s) IntraMuscular once  labetalol 200 milliGRAM(s) Oral every 8 hours  levETIRAcetam 500 milliGRAM(s) Oral two times a day  niCARdipine Infusion 5 mG/Hr IV Continuous <Continuous>  sodium chloride 2 Gram(s) Oral three times a day  sodium chloride 1.5%. 500 milliLiter(s) IV Continuous <Continuous>  spironolactone 12.5 milliGRAM(s) Oral two times a day  valsartan 320 milliGRAM(s) Oral daily      =======================================================  Labs:                        13.4   10.79 )-----------( 266      ( 21 Sep 2022 00:43 )             41.6     09-21    135  |  102  |  15  ----------------------------<  99  5.1   |  23  |  1.34<H>    Ca    9.1      21 Sep 2022 17:20  Phos  3.0     09-21  Mg     2.20     09-21        Culture - Blood (collected 09-18-22 @ 17:50)  Source: .Blood Blood-Peripheral    Culture - Blood (collected 09-18-22 @ 17:50)  Source: .Blood Blood-Peripheral    Culture - Urine (collected 09-17-22 @ 00:22)  Source: Clean Catch Clean Catch (Midstream)  Final Report (09-18-22 @ 05:32):    <10,000 CFU/mL Normal Urogenital [place]      Creatinine, Serum: 1.34 mg/dL (09-21-22 @ 17:20)  Creatinine, Serum: 1.20 mg/dL (09-21-22 @ 11:53)  Creatinine, Serum: 1.22 mg/dL (09-21-22 @ 00:43)  Creatinine, Serum: 1.33 mg/dL (09-20-22 @ 02:20)  Creatinine, Serum: 1.64 mg/dL (09-19-22 @ 16:00)  Creatinine, Serum: 1.75 mg/dL (09-19-22 @ 03:24)  Creatinine, Serum: 2.26 mg/dL (09-18-22 @ 17:50)    Procalcitonin, Serum: 0.16 ng/mL (09-18-22 @ 17:50)    WBC Count: 10.79 K/uL (09-21-22 @ 00:43)  WBC Count: 11.47 K/uL (09-20-22 @ 02:20)  WBC Count: 11.98 K/uL (09-19-22 @ 03:24)  WBC Count: 13.24 K/uL (09-18-22 @ 17:50)    SARS-CoV-2: NotDetec (09-18-22 @ 18:36)  SARS-CoV-2: NotDetec (09-16-22 @ 23:59)      Alkaline Phosphatase, Serum: 93 U/L (09-18-22 @ 17:50)  Alanine Aminotransferase (ALT/SGPT): 41 U/L (09-18-22 @ 17:50)  Aspartate Aminotransferase (AST/SGOT): 25 U/L (09-18-22 @ 17:50)  Bilirubin Total, Serum: 1.2 mg/dL (09-18-22 @ 17:50)    --------------------------------------------------------------------------------------    IMAGING STUDIES:       ACC: [id number] EXAM:  CT BRAIN                          PROCEDURE DATE:  09/21/2022          INTERPRETATION:  Clinical indication: Altered mental status. Follow-up   hemorrhage.    Multiple axial sections were performed from the base of vertex without   contrast enhancement coronal and sagittal destructions were performed.    This exam is compared prior head CT performed on September 20, 2022.    Acute parenchymal hemorrhage involving the left basal ganglia region is   again seen with surrounding edema. This finding measures approximately   1.9 x 2.8 cm and previously measured approximately 2.1 x 2.6 cm.    Intraventricular hemorrhage is again seen.    The size and configuration of ventricles appear unchanged when compared   with the prior exam    Intraventricular hemorrhage is again seen    The size and configuration of ventricles appear unchanged.    Evaluation of the osseous structures with the appropriate window appears   unremarkable    The visualized paranasal sinuses mastoid and middle ear regions appear   clear.    Impression: Acute parenchymal hemorrhage is again seen with   intraventricular hemorrhage is again identified.    --- End of Report --      MEENAKSHI SERNA MD; Attending Radiologist  This document has been electronically signed. Sep 21 2022  8:13AM   SICU Daily Progress Note  =====================================================  Interval/Overnight Events:      - Labetalol increased to 200mg TID   - F/u MRI head with/without contrast read   - Repeat CT head unchanged from 9/20  - NS 1.5% @ 75ml/hr and salt tabs 2g TID for goal Na 140-150, continue to monitor BMP   - ST elevation noted on monitor, troponin 65 --> f/u repeat trop   - To undergo renal duplex r/o renal artery stenosis     HD #5          	SICU Day # 5    HPI:    36y Male y/o male with PMHx of HTN, HLD who has not taken his anti-hypertensive medications for approximately 3 months, presents to [facility] ED after a syncopal episode. Patient noted to have an episode of urinary incontinence at the time. Lost consciousness for few minutes and woke up in the ambulance, no head trauma. BP on arrival was 194/143, and was started on a cardene gtt. Patient has been c/o neck pain, headache, and b/l blurry vision for 4 days as per sister. Vision has now normalized. 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 SICU was consulted for bp control and q1hr neuro checks.     PMH:       PAST MEDICAL & SURGICAL HISTORY:  High cholesterol      Hypertension      Allergies: No Known Allergies      MEDICATIONS:   --------------------------------------------------------------------------------------  Neurologic Medications  acetaminophen     Tablet .. 1000 milliGRAM(s) Oral every 6 hours  HYDROmorphone  Injectable 0.25 milliGRAM(s) IV Push every 4 hours PRN Moderate Pain (4 - 6)  levETIRAcetam 500 milliGRAM(s) Oral two times a day    Respiratory Medications    Cardiovascular Medications  amLODIPine   Tablet 10 milliGRAM(s) Oral daily  labetalol 200 milliGRAM(s) Oral every 8 hours  labetalol Injectable 10 milliGRAM(s) IV Push every 2 hours PRN Systolic blood pressure > 160  niCARdipine Infusion 5 mG/Hr IV Continuous <Continuous>  spironolactone 12.5 milliGRAM(s) Oral two times a day  valsartan 320 milliGRAM(s) Oral daily    Gastrointestinal Medications  sodium chloride 2 Gram(s) Oral three times a day  sodium chloride 1.5%. 500 milliLiter(s) IV Continuous <Continuous>    Genitourinary Medications    Hematologic/Oncologic Medications  influenza   Vaccine 0.5 milliLiter(s) IntraMuscular once    Antimicrobial/Immunologic Medications    Endocrine/Metabolic Medications    Topical/Other Medications    --------------------------------------------------------------------------------------    VITAL SIGNS, INS/OUTS (last 24 hours):  --------------------------------------------------------------------------------------  ICU Vital Signs Last 24 Hrs  T(C): 37.7 (21 Sep 2022 20:00), Max: 37.9 (21 Sep 2022 16:00)  T(F): 99.8 (21 Sep 2022 20:00), Max: 100.2 (21 Sep 2022 16:00)  HR: 96 (21 Sep 2022 23:00) (87 - 102)  BP: 154/81 (21 Sep 2022 23:00) (122/65 - 174/101)  BP(mean): 103 (21 Sep 2022 23:00) (80 - 120)  ABP: --  ABP(mean): --  RR: 14 (21 Sep 2022 23:00) (11 - 25)  SpO2: 97% (21 Sep 2022 23:00) (94% - 99%)    I&O's Summary    20 Sep 2022 07:01  -  21 Sep 2022 07:00  --------------------------------------------------------  IN: 3197.5 mL / OUT: 2600 mL / NET: 597.5 mL    21 Sep 2022 07:01  -  22 Sep 2022 00:26  --------------------------------------------------------  IN: 2670 mL / OUT: 2850 mL / NET: -180 mL      --------------------------------------------------------------------------------------    EXAM    NEUROLOGY   Exam: Normal, NAD, alert, oriented x3, no focal deficits. CN II-XII intact. RODRIGUEZ, SILT, Strength 5/5 all extremities.    HEENT  Exam: Normocephalic, atraumatic, EOMI.     RESPIRATORY  Exam: Lungs clear to auscultation, Normal expansion/effort.     CARDIOVASCULAR  Exam: S1, S2.  Regular rate and rhythm.       GI/NUTRITION  Exam: Abdomen soft, Non-tender, Non-distended.    Current Diet:  NPO    VASCULAR  Exam: Extremities warm, pink, well-perfused.     MUSCULOSKELETAL  Exam: All extremities moving spontaneously without limitations.     SKIN  Exam: Good skin turgor, no skin breakdown.    METABOLIC/FLUIDS/ELECTROLYTES  sodium chloride 1 Gram(s) Oral three times a day  sodium chloride 0.9%. 1000 milliLiter(s) IV Continuous <Continuous>      HEMATOLOGIC  [x] VTE Prophylaxis:   Transfusions:	[] PRBC	[] Platelets		[] FFP	[] Cryoprecipitate    INFECTIOUS DISEASE  Antimicrobials/Immunologic Medications:  influenza   Vaccine 0.5 milliLiter(s) IntraMuscular once    Tubes/Lines/Drains   [x] Peripheral IV  [] Central Venous Line     	[] R	[] L	[] IJ	[] Fem	[] SC	Date Placed:   [] Arterial Line		[] R	[] L	[] Fem	[] Rad	[] Ax	Date Placed:   [] PICC		[] Midline		[] Mediport  [] Urinary Catheter		Date Placed:   [x] Necessity of urinary, arterial, and venous catheters discussed    LABS  --------------------------------------------------------------------------------------  Vitals:  ============  T(F): 99.8 (21 Sep 2022 20:00), Max: 100.2 (21 Sep 2022 16:00)  HR: 96 (21 Sep 2022 23:00)  BP: 154/81 (21 Sep 2022 23:00)  RR: 14 (21 Sep 2022 23:00)  SpO2: 97% (21 Sep 2022 23:00) (94% - 99%)  temp max in last 48H T(F): , Max: 100.2 (09-21-22 @ 16:00)    =======================================================  Current Antibiotics:    Other medications:  acetaminophen     Tablet .. 1000 milliGRAM(s) Oral every 6 hours  amLODIPine   Tablet 10 milliGRAM(s) Oral daily  influenza   Vaccine 0.5 milliLiter(s) IntraMuscular once  labetalol 200 milliGRAM(s) Oral every 8 hours  levETIRAcetam 500 milliGRAM(s) Oral two times a day  niCARdipine Infusion 5 mG/Hr IV Continuous <Continuous>  sodium chloride 2 Gram(s) Oral three times a day  sodium chloride 1.5%. 500 milliLiter(s) IV Continuous <Continuous>  spironolactone 12.5 milliGRAM(s) Oral two times a day  valsartan 320 milliGRAM(s) Oral daily      =======================================================  Labs:                        13.4   10.79 )-----------( 266      ( 21 Sep 2022 00:43 )             41.6     09-21    135  |  102  |  15  ----------------------------<  99  5.1   |  23  |  1.34<H>    Ca    9.1      21 Sep 2022 17:20  Phos  3.0     09-21  Mg     2.20     09-21        Culture - Blood (collected 09-18-22 @ 17:50)  Source: .Blood Blood-Peripheral    Culture - Blood (collected 09-18-22 @ 17:50)  Source: .Blood Blood-Peripheral    Culture - Urine (collected 09-17-22 @ 00:22)  Source: Clean Catch Clean Catch (Midstream)  Final Report (09-18-22 @ 05:32):    <10,000 CFU/mL Normal Urogenital [place]      Creatinine, Serum: 1.34 mg/dL (09-21-22 @ 17:20)  Creatinine, Serum: 1.20 mg/dL (09-21-22 @ 11:53)  Creatinine, Serum: 1.22 mg/dL (09-21-22 @ 00:43)  Creatinine, Serum: 1.33 mg/dL (09-20-22 @ 02:20)  Creatinine, Serum: 1.64 mg/dL (09-19-22 @ 16:00)  Creatinine, Serum: 1.75 mg/dL (09-19-22 @ 03:24)  Creatinine, Serum: 2.26 mg/dL (09-18-22 @ 17:50)    Procalcitonin, Serum: 0.16 ng/mL (09-18-22 @ 17:50)    WBC Count: 10.79 K/uL (09-21-22 @ 00:43)  WBC Count: 11.47 K/uL (09-20-22 @ 02:20)  WBC Count: 11.98 K/uL (09-19-22 @ 03:24)  WBC Count: 13.24 K/uL (09-18-22 @ 17:50)    SARS-CoV-2: NotDetec (09-18-22 @ 18:36)  SARS-CoV-2: NotDetec (09-16-22 @ 23:59)      Alkaline Phosphatase, Serum: 93 U/L (09-18-22 @ 17:50)  Alanine Aminotransferase (ALT/SGPT): 41 U/L (09-18-22 @ 17:50)  Aspartate Aminotransferase (AST/SGOT): 25 U/L (09-18-22 @ 17:50)  Bilirubin Total, Serum: 1.2 mg/dL (09-18-22 @ 17:50)    --------------------------------------------------------------------------------------    IMAGING STUDIES:       ACC: [id number] EXAM:  CT BRAIN                          PROCEDURE DATE:  09/21/2022          INTERPRETATION:  Clinical indication: Altered mental status. Follow-up   hemorrhage.    Multiple axial sections were performed from the base of vertex without   contrast enhancement coronal and sagittal destructions were performed.    This exam is compared prior head CT performed on September 20, 2022.    Acute parenchymal hemorrhage involving the left basal ganglia region is   again seen with surrounding edema. This finding measures approximately   1.9 x 2.8 cm and previously measured approximately 2.1 x 2.6 cm.    Intraventricular hemorrhage is again seen.    The size and configuration of ventricles appear unchanged when compared   with the prior exam    Intraventricular hemorrhage is again seen    The size and configuration of ventricles appear unchanged.    Evaluation of the osseous structures with the appropriate window appears   unremarkable    The visualized paranasal sinuses mastoid and middle ear regions appear   clear.    Impression: Acute parenchymal hemorrhage is again seen with   intraventricular hemorrhage is again identified.    --- End of Report --      MEENAKSHI SERNA MD; Attending Radiologist  This document has been electronically signed. Sep 21 2022  8:13AM   SICU Daily Progress Note  =====================================================  Interval/Overnight Events:      - Hypertensive at 4am 165-180 systolic, labetalol 10mg IVP x1, labetalol 5mg IVP x1   - Labetalol increased to 200mg TID   - F/u MRI head with/without contrast read   - Repeat CT head unchanged from 9/20  - NS 1.5% @ 75ml/hr and salt tabs 2g TID for goal Na 140-150, continue to monitor BMP   - ST elevation noted on monitor, troponin 65 --> f/u repeat trop   - To undergo renal duplex r/o renal artery stenosis     HD #5          	SICU Day # 5    HPI:    36y Male y/o male with PMHx of HTN, HLD who has not taken his anti-hypertensive medications for approximately 3 months, presents to [facility] ED after a syncopal episode. Patient noted to have an episode of urinary incontinence at the time. Lost consciousness for few minutes and woke up in the ambulance, no head trauma. BP on arrival was 194/143, and was started on a cardene gtt. Patient has been c/o neck pain, headache, and b/l blurry vision for 4 days as per sister. Vision has now normalized. 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 SICU was consulted for bp control and q1hr neuro checks.     PMH:       PAST MEDICAL & SURGICAL HISTORY:  High cholesterol      Hypertension      Allergies: No Known Allergies      MEDICATIONS:   --------------------------------------------------------------------------------------  Neurologic Medications  acetaminophen     Tablet .. 1000 milliGRAM(s) Oral every 6 hours  HYDROmorphone  Injectable 0.25 milliGRAM(s) IV Push every 4 hours PRN Moderate Pain (4 - 6)  levETIRAcetam 500 milliGRAM(s) Oral two times a day    Respiratory Medications    Cardiovascular Medications  amLODIPine   Tablet 10 milliGRAM(s) Oral daily  labetalol 200 milliGRAM(s) Oral every 8 hours  labetalol Injectable 10 milliGRAM(s) IV Push every 2 hours PRN Systolic blood pressure > 160  niCARdipine Infusion 5 mG/Hr IV Continuous <Continuous>  spironolactone 12.5 milliGRAM(s) Oral two times a day  valsartan 320 milliGRAM(s) Oral daily    Gastrointestinal Medications  sodium chloride 2 Gram(s) Oral three times a day  sodium chloride 1.5%. 500 milliLiter(s) IV Continuous <Continuous>    Genitourinary Medications    Hematologic/Oncologic Medications  influenza   Vaccine 0.5 milliLiter(s) IntraMuscular once    Antimicrobial/Immunologic Medications    Endocrine/Metabolic Medications    Topical/Other Medications    --------------------------------------------------------------------------------------    VITAL SIGNS, INS/OUTS (last 24 hours):  --------------------------------------------------------------------------------------  ICU Vital Signs Last 24 Hrs  T(C): 37.7 (21 Sep 2022 20:00), Max: 37.9 (21 Sep 2022 16:00)  T(F): 99.8 (21 Sep 2022 20:00), Max: 100.2 (21 Sep 2022 16:00)  HR: 96 (21 Sep 2022 23:00) (87 - 102)  BP: 154/81 (21 Sep 2022 23:00) (122/65 - 174/101)  BP(mean): 103 (21 Sep 2022 23:00) (80 - 120)  ABP: --  ABP(mean): --  RR: 14 (21 Sep 2022 23:00) (11 - 25)  SpO2: 97% (21 Sep 2022 23:00) (94% - 99%)    I&O's Summary    20 Sep 2022 07:01  -  21 Sep 2022 07:00  --------------------------------------------------------  IN: 3197.5 mL / OUT: 2600 mL / NET: 597.5 mL    21 Sep 2022 07:01  -  22 Sep 2022 00:26  --------------------------------------------------------  IN: 2670 mL / OUT: 2850 mL / NET: -180 mL      --------------------------------------------------------------------------------------    EXAM    NEUROLOGY   Exam: Normal, NAD, alert, oriented x3, no focal deficits. CN II-XII intact. RODRIGUEZ, SILT, Strength 5/5 all extremities.    HEENT  Exam: Normocephalic, atraumatic, EOMI.     RESPIRATORY  Exam: Lungs clear to auscultation, Normal expansion/effort.     CARDIOVASCULAR  Exam: S1, S2.  Regular rate and rhythm.       GI/NUTRITION  Exam: Abdomen soft, Non-tender, Non-distended.    Current Diet:  NPO    VASCULAR  Exam: Extremities warm, pink, well-perfused.     MUSCULOSKELETAL  Exam: All extremities moving spontaneously without limitations.     SKIN  Exam: Good skin turgor, no skin breakdown.    METABOLIC/FLUIDS/ELECTROLYTES  sodium chloride 1 Gram(s) Oral three times a day  sodium chloride 0.9%. 1000 milliLiter(s) IV Continuous <Continuous>      HEMATOLOGIC  [x] VTE Prophylaxis:   Transfusions:	[] PRBC	[] Platelets		[] FFP	[] Cryoprecipitate    INFECTIOUS DISEASE  Antimicrobials/Immunologic Medications:  influenza   Vaccine 0.5 milliLiter(s) IntraMuscular once    Tubes/Lines/Drains   [x] Peripheral IV  [] Central Venous Line     	[] R	[] L	[] IJ	[] Fem	[] SC	Date Placed:   [] Arterial Line		[] R	[] L	[] Fem	[] Rad	[] Ax	Date Placed:   [] PICC		[] Midline		[] Mediport  [] Urinary Catheter		Date Placed:   [x] Necessity of urinary, arterial, and venous catheters discussed    LABS  --------------------------------------------------------------------------------------  Vitals:  ============  T(F): 99.8 (21 Sep 2022 20:00), Max: 100.2 (21 Sep 2022 16:00)  HR: 96 (21 Sep 2022 23:00)  BP: 154/81 (21 Sep 2022 23:00)  RR: 14 (21 Sep 2022 23:00)  SpO2: 97% (21 Sep 2022 23:00) (94% - 99%)  temp max in last 48H T(F): , Max: 100.2 (09-21-22 @ 16:00)    =======================================================  Current Antibiotics:    Other medications:  acetaminophen     Tablet .. 1000 milliGRAM(s) Oral every 6 hours  amLODIPine   Tablet 10 milliGRAM(s) Oral daily  influenza   Vaccine 0.5 milliLiter(s) IntraMuscular once  labetalol 200 milliGRAM(s) Oral every 8 hours  levETIRAcetam 500 milliGRAM(s) Oral two times a day  niCARdipine Infusion 5 mG/Hr IV Continuous <Continuous>  sodium chloride 2 Gram(s) Oral three times a day  sodium chloride 1.5%. 500 milliLiter(s) IV Continuous <Continuous>  spironolactone 12.5 milliGRAM(s) Oral two times a day  valsartan 320 milliGRAM(s) Oral daily      =======================================================  Labs:                        13.4   10.79 )-----------( 266      ( 21 Sep 2022 00:43 )             41.6     09-21    135  |  102  |  15  ----------------------------<  99  5.1   |  23  |  1.34<H>    Ca    9.1      21 Sep 2022 17:20  Phos  3.0     09-21  Mg     2.20     09-21        Culture - Blood (collected 09-18-22 @ 17:50)  Source: .Blood Blood-Peripheral    Culture - Blood (collected 09-18-22 @ 17:50)  Source: .Blood Blood-Peripheral    Culture - Urine (collected 09-17-22 @ 00:22)  Source: Clean Catch Clean Catch (Midstream)  Final Report (09-18-22 @ 05:32):    <10,000 CFU/mL Normal Urogenital [place]      Creatinine, Serum: 1.34 mg/dL (09-21-22 @ 17:20)  Creatinine, Serum: 1.20 mg/dL (09-21-22 @ 11:53)  Creatinine, Serum: 1.22 mg/dL (09-21-22 @ 00:43)  Creatinine, Serum: 1.33 mg/dL (09-20-22 @ 02:20)  Creatinine, Serum: 1.64 mg/dL (09-19-22 @ 16:00)  Creatinine, Serum: 1.75 mg/dL (09-19-22 @ 03:24)  Creatinine, Serum: 2.26 mg/dL (09-18-22 @ 17:50)    Procalcitonin, Serum: 0.16 ng/mL (09-18-22 @ 17:50)    WBC Count: 10.79 K/uL (09-21-22 @ 00:43)  WBC Count: 11.47 K/uL (09-20-22 @ 02:20)  WBC Count: 11.98 K/uL (09-19-22 @ 03:24)  WBC Count: 13.24 K/uL (09-18-22 @ 17:50)    SARS-CoV-2: NotDetec (09-18-22 @ 18:36)  SARS-CoV-2: NotDetec (09-16-22 @ 23:59)      Alkaline Phosphatase, Serum: 93 U/L (09-18-22 @ 17:50)  Alanine Aminotransferase (ALT/SGPT): 41 U/L (09-18-22 @ 17:50)  Aspartate Aminotransferase (AST/SGOT): 25 U/L (09-18-22 @ 17:50)  Bilirubin Total, Serum: 1.2 mg/dL (09-18-22 @ 17:50)    --------------------------------------------------------------------------------------    IMAGING STUDIES:       ACC: [id number] EXAM:  CT BRAIN                          PROCEDURE DATE:  09/21/2022          INTERPRETATION:  Clinical indication: Altered mental status. Follow-up   hemorrhage.    Multiple axial sections were performed from the base of vertex without   contrast enhancement coronal and sagittal destructions were performed.    This exam is compared prior head CT performed on September 20, 2022.    Acute parenchymal hemorrhage involving the left basal ganglia region is   again seen with surrounding edema. This finding measures approximately   1.9 x 2.8 cm and previously measured approximately 2.1 x 2.6 cm.    Intraventricular hemorrhage is again seen.    The size and configuration of ventricles appear unchanged when compared   with the prior exam    Intraventricular hemorrhage is again seen    The size and configuration of ventricles appear unchanged.    Evaluation of the osseous structures with the appropriate window appears   unremarkable    The visualized paranasal sinuses mastoid and middle ear regions appear   clear.    Impression: Acute parenchymal hemorrhage is again seen with   intraventricular hemorrhage is again identified.    --- End of Report --      MEENAKSHI SERNA MD; Attending Radiologist  This document has been electronically signed. Sep 21 2022  8:13AM   SICU Daily Progress Note  =====================================================  Interval/Overnight Events:      - F/u MRI head with/without contrast read   - renal duplex r/o renal artery stenosis f/u read   - Labetalol increased to 300mg TID, added hydralazine 50 tid  - Repeat CT head unchanged from 9/20  - NS 1.5% increased to 100ml/hr and salt tabs increased to 3g TID for goal Na 140-150, continue monitor BMP q6hr  - q4hr neuro checks tomorrow?    HD #5          	SICU Day # 5    HPI:    36y Male y/o male with PMHx of HTN, HLD who has not taken his anti-hypertensive medications for approximately 3 months, presents to [facility] ED after a syncopal episode. Patient noted to have an episode of urinary incontinence at the time. Lost consciousness for few minutes and woke up in the ambulance, no head trauma. BP on arrival was 194/143, and was started on a cardene gtt. Patient has been c/o neck pain, headache, and b/l blurry vision for 4 days as per sister. Vision has now normalized. 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 SICU was consulted for bp control and q1hr neuro checks.     PMH:       PAST MEDICAL & SURGICAL HISTORY:  High cholesterol      Hypertension      Allergies: No Known Allergies      MEDICATIONS:   --------------------------------------------------------------------------------------  Neurologic Medications  acetaminophen     Tablet .. 1000 milliGRAM(s) Oral every 6 hours  HYDROmorphone  Injectable 0.25 milliGRAM(s) IV Push every 4 hours PRN Moderate Pain (4 - 6)  levETIRAcetam 500 milliGRAM(s) Oral two times a day    Respiratory Medications    Cardiovascular Medications  amLODIPine   Tablet 10 milliGRAM(s) Oral daily  labetalol 200 milliGRAM(s) Oral every 8 hours  labetalol Injectable 10 milliGRAM(s) IV Push every 2 hours PRN Systolic blood pressure > 160  niCARdipine Infusion 5 mG/Hr IV Continuous <Continuous>  spironolactone 12.5 milliGRAM(s) Oral two times a day  valsartan 320 milliGRAM(s) Oral daily    Gastrointestinal Medications  sodium chloride 2 Gram(s) Oral three times a day  sodium chloride 1.5%. 500 milliLiter(s) IV Continuous <Continuous>    Genitourinary Medications    Hematologic/Oncologic Medications  influenza   Vaccine 0.5 milliLiter(s) IntraMuscular once    Antimicrobial/Immunologic Medications    Endocrine/Metabolic Medications    Topical/Other Medications    --------------------------------------------------------------------------------------    VITAL SIGNS, INS/OUTS (last 24 hours):  --------------------------------------------------------------------------------------  ICU Vital Signs Last 24 Hrs  T(C): 37.7 (21 Sep 2022 20:00), Max: 37.9 (21 Sep 2022 16:00)  T(F): 99.8 (21 Sep 2022 20:00), Max: 100.2 (21 Sep 2022 16:00)  HR: 96 (21 Sep 2022 23:00) (87 - 102)  BP: 154/81 (21 Sep 2022 23:00) (122/65 - 174/101)  BP(mean): 103 (21 Sep 2022 23:00) (80 - 120)  ABP: --  ABP(mean): --  RR: 14 (21 Sep 2022 23:00) (11 - 25)  SpO2: 97% (21 Sep 2022 23:00) (94% - 99%)    I&O's Summary    20 Sep 2022 07:01  -  21 Sep 2022 07:00  --------------------------------------------------------  IN: 3197.5 mL / OUT: 2600 mL / NET: 597.5 mL    21 Sep 2022 07:01  -  22 Sep 2022 00:26  --------------------------------------------------------  IN: 2670 mL / OUT: 2850 mL / NET: -180 mL      --------------------------------------------------------------------------------------    EXAM    NEUROLOGY   Exam: Normal, NAD, alert, oriented x3, no focal deficits. CN II-XII intact. RODRIGUEZ, SILT, Strength 5/5 all extremities.    HEENT  Exam: Normocephalic, atraumatic, EOMI.     RESPIRATORY  Exam: Lungs clear to auscultation, Normal expansion/effort.     CARDIOVASCULAR  Exam: S1, S2.  Regular rate and rhythm.       GI/NUTRITION  Exam: Abdomen soft, Non-tender, Non-distended.    Current Diet:  NPO    VASCULAR  Exam: Extremities warm, pink, well-perfused.     MUSCULOSKELETAL  Exam: All extremities moving spontaneously without limitations.     SKIN  Exam: Good skin turgor, no skin breakdown.    METABOLIC/FLUIDS/ELECTROLYTES  sodium chloride 1 Gram(s) Oral three times a day  sodium chloride 0.9%. 1000 milliLiter(s) IV Continuous <Continuous>      HEMATOLOGIC  [x] VTE Prophylaxis:   Transfusions:	[] PRBC	[] Platelets		[] FFP	[] Cryoprecipitate    INFECTIOUS DISEASE  Antimicrobials/Immunologic Medications:  influenza   Vaccine 0.5 milliLiter(s) IntraMuscular once    Tubes/Lines/Drains   [x] Peripheral IV  [] Central Venous Line     	[] R	[] L	[] IJ	[] Fem	[] SC	Date Placed:   [] Arterial Line		[] R	[] L	[] Fem	[] Rad	[] Ax	Date Placed:   [] PICC		[] Midline		[] Mediport  [] Urinary Catheter		Date Placed:   [x] Necessity of urinary, arterial, and venous catheters discussed    LABS  --------------------------------------------------------------------------------------  Vitals:  ============  T(F): 99.8 (21 Sep 2022 20:00), Max: 100.2 (21 Sep 2022 16:00)  HR: 96 (21 Sep 2022 23:00)  BP: 154/81 (21 Sep 2022 23:00)  RR: 14 (21 Sep 2022 23:00)  SpO2: 97% (21 Sep 2022 23:00) (94% - 99%)  temp max in last 48H T(F): , Max: 100.2 (09-21-22 @ 16:00)    =======================================================  Current Antibiotics:    Other medications:  acetaminophen     Tablet .. 1000 milliGRAM(s) Oral every 6 hours  amLODIPine   Tablet 10 milliGRAM(s) Oral daily  influenza   Vaccine 0.5 milliLiter(s) IntraMuscular once  labetalol 200 milliGRAM(s) Oral every 8 hours  levETIRAcetam 500 milliGRAM(s) Oral two times a day  niCARdipine Infusion 5 mG/Hr IV Continuous <Continuous>  sodium chloride 2 Gram(s) Oral three times a day  sodium chloride 1.5%. 500 milliLiter(s) IV Continuous <Continuous>  spironolactone 12.5 milliGRAM(s) Oral two times a day  valsartan 320 milliGRAM(s) Oral daily      =======================================================  Labs:                        13.4   10.79 )-----------( 266      ( 21 Sep 2022 00:43 )             41.6     09-21    135  |  102  |  15  ----------------------------<  99  5.1   |  23  |  1.34<H>    Ca    9.1      21 Sep 2022 17:20  Phos  3.0     09-21  Mg     2.20     09-21        Culture - Blood (collected 09-18-22 @ 17:50)  Source: .Blood Blood-Peripheral    Culture - Blood (collected 09-18-22 @ 17:50)  Source: .Blood Blood-Peripheral    Culture - Urine (collected 09-17-22 @ 00:22)  Source: Clean Catch Clean Catch (Midstream)  Final Report (09-18-22 @ 05:32):    <10,000 CFU/mL Normal Urogenital [place]      Creatinine, Serum: 1.34 mg/dL (09-21-22 @ 17:20)  Creatinine, Serum: 1.20 mg/dL (09-21-22 @ 11:53)  Creatinine, Serum: 1.22 mg/dL (09-21-22 @ 00:43)  Creatinine, Serum: 1.33 mg/dL (09-20-22 @ 02:20)  Creatinine, Serum: 1.64 mg/dL (09-19-22 @ 16:00)  Creatinine, Serum: 1.75 mg/dL (09-19-22 @ 03:24)  Creatinine, Serum: 2.26 mg/dL (09-18-22 @ 17:50)    Procalcitonin, Serum: 0.16 ng/mL (09-18-22 @ 17:50)    WBC Count: 10.79 K/uL (09-21-22 @ 00:43)  WBC Count: 11.47 K/uL (09-20-22 @ 02:20)  WBC Count: 11.98 K/uL (09-19-22 @ 03:24)  WBC Count: 13.24 K/uL (09-18-22 @ 17:50)    SARS-CoV-2: NotDetec (09-18-22 @ 18:36)  SARS-CoV-2: NotDetec (09-16-22 @ 23:59)      Alkaline Phosphatase, Serum: 93 U/L (09-18-22 @ 17:50)  Alanine Aminotransferase (ALT/SGPT): 41 U/L (09-18-22 @ 17:50)  Aspartate Aminotransferase (AST/SGOT): 25 U/L (09-18-22 @ 17:50)  Bilirubin Total, Serum: 1.2 mg/dL (09-18-22 @ 17:50)    --------------------------------------------------------------------------------------    IMAGING STUDIES:       ACC: [id number] EXAM:  CT BRAIN                          PROCEDURE DATE:  09/21/2022          INTERPRETATION:  Clinical indication: Altered mental status. Follow-up   hemorrhage.    Multiple axial sections were performed from the base of vertex without   contrast enhancement coronal and sagittal destructions were performed.    This exam is compared prior head CT performed on September 20, 2022.    Acute parenchymal hemorrhage involving the left basal ganglia region is   again seen with surrounding edema. This finding measures approximately   1.9 x 2.8 cm and previously measured approximately 2.1 x 2.6 cm.    Intraventricular hemorrhage is again seen.    The size and configuration of ventricles appear unchanged when compared   with the prior exam    Intraventricular hemorrhage is again seen    The size and configuration of ventricles appear unchanged.    Evaluation of the osseous structures with the appropriate window appears   unremarkable    The visualized paranasal sinuses mastoid and middle ear regions appear   clear.    Impression: Acute parenchymal hemorrhage is again seen with   intraventricular hemorrhage is again identified.    --- End of Report --      MEENAKSHI SERNA MD; Attending Radiologist  This document has been electronically signed. Sep 21 2022  8:13AM   SICU Daily Progress Note  =====================================================  Interval/Overnight Events:      - F/u MRI head with/without contrast read   - renal duplex r/o renal artery stenosis, f/u read   - Labetalol increased to 300mg TID, added hydralazine 50 tid  - Repeat CT head unchanged from 9/20  - NS 1.5% increased to 100ml/hr and salt tabs increased to 3g TID for goal Na 140-150, continue monitor BMP q6hr      HD #5          	SICU Day # 5    HPI:    36y Male y/o male with PMHx of HTN, HLD who has not taken his anti-hypertensive medications for approximately 3 months, presents to [facility] ED after a syncopal episode. Patient noted to have an episode of urinary incontinence at the time. Lost consciousness for few minutes and woke up in the ambulance, no head trauma. BP on arrival was 194/143, and was started on a cardene gtt. Patient has been c/o neck pain, headache, and b/l blurry vision for 4 days as per sister. Vision has now normalized. 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 SICU was consulted for bp control and q1hr neuro checks.     PMH:       PAST MEDICAL & SURGICAL HISTORY:  High cholesterol      Hypertension      Allergies: No Known Allergies      MEDICATIONS:   --------------------------------------------------------------------------------------  Neurologic Medications  acetaminophen     Tablet .. 1000 milliGRAM(s) Oral every 6 hours  HYDROmorphone  Injectable 0.25 milliGRAM(s) IV Push every 4 hours PRN Moderate Pain (4 - 6)  levETIRAcetam 500 milliGRAM(s) Oral two times a day    Respiratory Medications    Cardiovascular Medications  amLODIPine   Tablet 10 milliGRAM(s) Oral daily  labetalol 200 milliGRAM(s) Oral every 8 hours  labetalol Injectable 10 milliGRAM(s) IV Push every 2 hours PRN Systolic blood pressure > 160  niCARdipine Infusion 5 mG/Hr IV Continuous <Continuous>  spironolactone 12.5 milliGRAM(s) Oral two times a day  valsartan 320 milliGRAM(s) Oral daily    Gastrointestinal Medications  sodium chloride 2 Gram(s) Oral three times a day  sodium chloride 1.5%. 500 milliLiter(s) IV Continuous <Continuous>    Genitourinary Medications    Hematologic/Oncologic Medications  influenza   Vaccine 0.5 milliLiter(s) IntraMuscular once    Antimicrobial/Immunologic Medications    Endocrine/Metabolic Medications    Topical/Other Medications    --------------------------------------------------------------------------------------    VITAL SIGNS, INS/OUTS (last 24 hours):  --------------------------------------------------------------------------------------  ICU Vital Signs Last 24 Hrs  T(C): 37.7 (21 Sep 2022 20:00), Max: 37.9 (21 Sep 2022 16:00)  T(F): 99.8 (21 Sep 2022 20:00), Max: 100.2 (21 Sep 2022 16:00)  HR: 96 (21 Sep 2022 23:00) (87 - 102)  BP: 154/81 (21 Sep 2022 23:00) (122/65 - 174/101)  BP(mean): 103 (21 Sep 2022 23:00) (80 - 120)  ABP: --  ABP(mean): --  RR: 14 (21 Sep 2022 23:00) (11 - 25)  SpO2: 97% (21 Sep 2022 23:00) (94% - 99%)    I&O's Summary    20 Sep 2022 07:01  -  21 Sep 2022 07:00  --------------------------------------------------------  IN: 3197.5 mL / OUT: 2600 mL / NET: 597.5 mL    21 Sep 2022 07:01  -  22 Sep 2022 00:26  --------------------------------------------------------  IN: 2670 mL / OUT: 2850 mL / NET: -180 mL      --------------------------------------------------------------------------------------    EXAM    NEUROLOGY   Exam: Normal, NAD, alert, oriented x3, no focal deficits. CN II-XII intact. RODRIGUEZ, SILT, Strength 5/5 all extremities.    HEENT  Exam: Normocephalic, atraumatic, EOMI.     RESPIRATORY  Exam: Lungs clear to auscultation, Normal expansion/effort.     CARDIOVASCULAR  Exam: S1, S2.  Regular rate and rhythm.       GI/NUTRITION  Exam: Abdomen soft, Non-tender, Non-distended.    Current Diet:  NPO    VASCULAR  Exam: Extremities warm, pink, well-perfused.     MUSCULOSKELETAL  Exam: All extremities moving spontaneously without limitations.     SKIN  Exam: Good skin turgor, no skin breakdown.    METABOLIC/FLUIDS/ELECTROLYTES  sodium chloride 1 Gram(s) Oral three times a day  sodium chloride 0.9%. 1000 milliLiter(s) IV Continuous <Continuous>      HEMATOLOGIC  [x] VTE Prophylaxis:   Transfusions:	[] PRBC	[] Platelets		[] FFP	[] Cryoprecipitate    INFECTIOUS DISEASE  Antimicrobials/Immunologic Medications:  influenza   Vaccine 0.5 milliLiter(s) IntraMuscular once    Tubes/Lines/Drains   [x] Peripheral IV  [] Central Venous Line     	[] R	[] L	[] IJ	[] Fem	[] SC	Date Placed:   [] Arterial Line		[] R	[] L	[] Fem	[] Rad	[] Ax	Date Placed:   [] PICC		[] Midline		[] Mediport  [] Urinary Catheter		Date Placed:   [x] Necessity of urinary, arterial, and venous catheters discussed    LABS  --------------------------------------------------------------------------------------  Vitals:  ============  T(F): 99.8 (21 Sep 2022 20:00), Max: 100.2 (21 Sep 2022 16:00)  HR: 96 (21 Sep 2022 23:00)  BP: 154/81 (21 Sep 2022 23:00)  RR: 14 (21 Sep 2022 23:00)  SpO2: 97% (21 Sep 2022 23:00) (94% - 99%)  temp max in last 48H T(F): , Max: 100.2 (09-21-22 @ 16:00)    =======================================================  Current Antibiotics:    Other medications:  acetaminophen     Tablet .. 1000 milliGRAM(s) Oral every 6 hours  amLODIPine   Tablet 10 milliGRAM(s) Oral daily  influenza   Vaccine 0.5 milliLiter(s) IntraMuscular once  labetalol 200 milliGRAM(s) Oral every 8 hours  levETIRAcetam 500 milliGRAM(s) Oral two times a day  niCARdipine Infusion 5 mG/Hr IV Continuous <Continuous>  sodium chloride 2 Gram(s) Oral three times a day  sodium chloride 1.5%. 500 milliLiter(s) IV Continuous <Continuous>  spironolactone 12.5 milliGRAM(s) Oral two times a day  valsartan 320 milliGRAM(s) Oral daily      =======================================================  Labs:                        13.4   10.79 )-----------( 266      ( 21 Sep 2022 00:43 )             41.6     09-21    135  |  102  |  15  ----------------------------<  99  5.1   |  23  |  1.34<H>    Ca    9.1      21 Sep 2022 17:20  Phos  3.0     09-21  Mg     2.20     09-21        Culture - Blood (collected 09-18-22 @ 17:50)  Source: .Blood Blood-Peripheral    Culture - Blood (collected 09-18-22 @ 17:50)  Source: .Blood Blood-Peripheral    Culture - Urine (collected 09-17-22 @ 00:22)  Source: Clean Catch Clean Catch (Midstream)  Final Report (09-18-22 @ 05:32):    <10,000 CFU/mL Normal Urogenital [place]      Creatinine, Serum: 1.34 mg/dL (09-21-22 @ 17:20)  Creatinine, Serum: 1.20 mg/dL (09-21-22 @ 11:53)  Creatinine, Serum: 1.22 mg/dL (09-21-22 @ 00:43)  Creatinine, Serum: 1.33 mg/dL (09-20-22 @ 02:20)  Creatinine, Serum: 1.64 mg/dL (09-19-22 @ 16:00)  Creatinine, Serum: 1.75 mg/dL (09-19-22 @ 03:24)  Creatinine, Serum: 2.26 mg/dL (09-18-22 @ 17:50)    Procalcitonin, Serum: 0.16 ng/mL (09-18-22 @ 17:50)    WBC Count: 10.79 K/uL (09-21-22 @ 00:43)  WBC Count: 11.47 K/uL (09-20-22 @ 02:20)  WBC Count: 11.98 K/uL (09-19-22 @ 03:24)  WBC Count: 13.24 K/uL (09-18-22 @ 17:50)    SARS-CoV-2: NotDetec (09-18-22 @ 18:36)  SARS-CoV-2: NotDetec (09-16-22 @ 23:59)      Alkaline Phosphatase, Serum: 93 U/L (09-18-22 @ 17:50)  Alanine Aminotransferase (ALT/SGPT): 41 U/L (09-18-22 @ 17:50)  Aspartate Aminotransferase (AST/SGOT): 25 U/L (09-18-22 @ 17:50)  Bilirubin Total, Serum: 1.2 mg/dL (09-18-22 @ 17:50)    --------------------------------------------------------------------------------------    IMAGING STUDIES:       ACC: [id number] EXAM:  CT BRAIN                          PROCEDURE DATE:  09/21/2022          INTERPRETATION:  Clinical indication: Altered mental status. Follow-up   hemorrhage.    Multiple axial sections were performed from the base of vertex without   contrast enhancement coronal and sagittal destructions were performed.    This exam is compared prior head CT performed on September 20, 2022.    Acute parenchymal hemorrhage involving the left basal ganglia region is   again seen with surrounding edema. This finding measures approximately   1.9 x 2.8 cm and previously measured approximately 2.1 x 2.6 cm.    Intraventricular hemorrhage is again seen.    The size and configuration of ventricles appear unchanged when compared   with the prior exam    Intraventricular hemorrhage is again seen    The size and configuration of ventricles appear unchanged.    Evaluation of the osseous structures with the appropriate window appears   unremarkable    The visualized paranasal sinuses mastoid and middle ear regions appear   clear.    Impression: Acute parenchymal hemorrhage is again seen with   intraventricular hemorrhage is again identified.    --- End of Report --      MEENAKSHI SERNA MD; Attending Radiologist  This document has been electronically signed. Sep 21 2022  8:13AM

## 2022-09-23 DIAGNOSIS — E87.1 HYPO-OSMOLALITY AND HYPONATREMIA: ICD-10-CM

## 2022-09-23 LAB
% ALBUMIN: 39.5 % — SIGNIFICANT CHANGE UP
% ALPHA 1: 3.7 % — SIGNIFICANT CHANGE UP
% ALPHA 2: 19.5 % — SIGNIFICANT CHANGE UP
% BETA: 12.9 % — SIGNIFICANT CHANGE UP
% GAMMA: 24.5 % — SIGNIFICANT CHANGE UP
% M SPIKE: 12.9 % — SIGNIFICANT CHANGE UP
ALBUMIN SERPL ELPH-MCNC: 3.59 G/DL — SIGNIFICANT CHANGE UP (ref 3.3–4.4)
ALBUMIN/GLOB SERPL ELPH: 0.7 RATIO — SIGNIFICANT CHANGE UP
ALPHA1 GLOB SERPL ELPH-MCNC: 0.34 G/DL — HIGH (ref 0.1–0.3)
ALPHA2 GLOB SERPL ELPH-MCNC: 1.8 G/DL — HIGH (ref 0.6–1)
ANION GAP SERPL CALC-SCNC: 11 MMOL/L — SIGNIFICANT CHANGE UP (ref 7–14)
ANION GAP SERPL CALC-SCNC: 13 MMOL/L — SIGNIFICANT CHANGE UP (ref 7–14)
ANION GAP SERPL CALC-SCNC: 8 MMOL/L — SIGNIFICANT CHANGE UP (ref 7–14)
APPEARANCE UR: CLEAR — SIGNIFICANT CHANGE UP
B-GLOBULIN SERPL ELPH-MCNC: 1.17 G/DL — HIGH (ref 0.6–1.1)
BILIRUB UR-MCNC: NEGATIVE — SIGNIFICANT CHANGE UP
BUN SERPL-MCNC: 17 MG/DL — SIGNIFICANT CHANGE UP (ref 7–23)
BUN SERPL-MCNC: 17 MG/DL — SIGNIFICANT CHANGE UP (ref 7–23)
BUN SERPL-MCNC: 18 MG/DL — SIGNIFICANT CHANGE UP (ref 7–23)
CALCIUM SERPL-MCNC: 8.4 MG/DL — SIGNIFICANT CHANGE UP (ref 8.4–10.5)
CALCIUM SERPL-MCNC: 8.5 MG/DL — SIGNIFICANT CHANGE UP (ref 8.4–10.5)
CALCIUM SERPL-MCNC: 8.6 MG/DL — SIGNIFICANT CHANGE UP (ref 8.4–10.5)
CHLORIDE SERPL-SCNC: 103 MMOL/L — SIGNIFICANT CHANGE UP (ref 98–107)
CHLORIDE SERPL-SCNC: 106 MMOL/L — SIGNIFICANT CHANGE UP (ref 98–107)
CHLORIDE SERPL-SCNC: 110 MMOL/L — HIGH (ref 98–107)
CHLORIDE UR-SCNC: 199 MMOL/L — SIGNIFICANT CHANGE UP
CO2 SERPL-SCNC: 17 MMOL/L — LOW (ref 22–31)
CO2 SERPL-SCNC: 21 MMOL/L — LOW (ref 22–31)
CO2 SERPL-SCNC: 21 MMOL/L — LOW (ref 22–31)
COLOR SPEC: YELLOW — SIGNIFICANT CHANGE UP
CREAT ?TM UR-MCNC: 61 MG/DL — SIGNIFICANT CHANGE UP
CREAT SERPL-MCNC: 1.11 MG/DL — SIGNIFICANT CHANGE UP (ref 0.5–1.3)
CREAT SERPL-MCNC: 1.16 MG/DL — SIGNIFICANT CHANGE UP (ref 0.5–1.3)
CREAT SERPL-MCNC: 1.29 MG/DL — SIGNIFICANT CHANGE UP (ref 0.5–1.3)
CULTURE RESULTS: SIGNIFICANT CHANGE UP
CULTURE RESULTS: SIGNIFICANT CHANGE UP
DIFF PNL FLD: NEGATIVE — SIGNIFICANT CHANGE UP
EGFR: 74 ML/MIN/1.73M2 — SIGNIFICANT CHANGE UP
EGFR: 84 ML/MIN/1.73M2 — SIGNIFICANT CHANGE UP
EGFR: 88 ML/MIN/1.73M2 — SIGNIFICANT CHANGE UP
GAMMA GLOBULIN: 2.23 G/DL — HIGH (ref 0.7–1.7)
GLUCOSE SERPL-MCNC: 106 MG/DL — HIGH (ref 70–99)
GLUCOSE SERPL-MCNC: 121 MG/DL — HIGH (ref 70–99)
GLUCOSE SERPL-MCNC: 151 MG/DL — HIGH (ref 70–99)
GLUCOSE UR QL: NEGATIVE — SIGNIFICANT CHANGE UP
HCT VFR BLD CALC: 36.7 % — LOW (ref 39–50)
HGB BLD-MCNC: 11.7 G/DL — LOW (ref 13–17)
KETONES UR-MCNC: NEGATIVE — SIGNIFICANT CHANGE UP
LEUKOCYTE ESTERASE UR-ACNC: NEGATIVE — SIGNIFICANT CHANGE UP
M-SPIKE: 1.2 G/DL — SIGNIFICANT CHANGE UP
MAGNESIUM SERPL-MCNC: 2.2 MG/DL — SIGNIFICANT CHANGE UP (ref 1.6–2.6)
MCHC RBC-ENTMCNC: 26.8 PG — LOW (ref 27–34)
MCHC RBC-ENTMCNC: 31.9 GM/DL — LOW (ref 32–36)
MCV RBC AUTO: 84 FL — SIGNIFICANT CHANGE UP (ref 80–100)
NITRITE UR-MCNC: NEGATIVE — SIGNIFICANT CHANGE UP
NRBC # BLD: 0 /100 WBCS — SIGNIFICANT CHANGE UP (ref 0–0)
NRBC # FLD: 0 K/UL — SIGNIFICANT CHANGE UP (ref 0–0)
OSMOLALITY UR: 627 MOSM/KG — SIGNIFICANT CHANGE UP (ref 50–1200)
PH UR: 6.5 — SIGNIFICANT CHANGE UP (ref 5–8)
PHOSPHATE SERPL-MCNC: 2.7 MG/DL — SIGNIFICANT CHANGE UP (ref 2.5–4.5)
PHOSPHATE SERPL-MCNC: 3.1 MG/DL — SIGNIFICANT CHANGE UP (ref 2.5–4.5)
PHOSPHATE SERPL-MCNC: 3.1 MG/DL — SIGNIFICANT CHANGE UP (ref 2.5–4.5)
PLATELET # BLD AUTO: 286 K/UL — SIGNIFICANT CHANGE UP (ref 150–400)
POTASSIUM SERPL-MCNC: 4.1 MMOL/L — SIGNIFICANT CHANGE UP (ref 3.5–5.3)
POTASSIUM SERPL-MCNC: 4.5 MMOL/L — SIGNIFICANT CHANGE UP (ref 3.5–5.3)
POTASSIUM SERPL-MCNC: 4.8 MMOL/L — SIGNIFICANT CHANGE UP (ref 3.5–5.3)
POTASSIUM SERPL-SCNC: 4.1 MMOL/L — SIGNIFICANT CHANGE UP (ref 3.5–5.3)
POTASSIUM SERPL-SCNC: 4.5 MMOL/L — SIGNIFICANT CHANGE UP (ref 3.5–5.3)
POTASSIUM SERPL-SCNC: 4.8 MMOL/L — SIGNIFICANT CHANGE UP (ref 3.5–5.3)
POTASSIUM UR-SCNC: 28.9 MMOL/L — SIGNIFICANT CHANGE UP
PROT PATTERN SERPL ELPH-IMP: SIGNIFICANT CHANGE UP
PROT SERPL-MCNC: 9.1 G/DL — SIGNIFICANT CHANGE UP
PROT UR-MCNC: ABNORMAL
RBC # BLD: 4.37 M/UL — SIGNIFICANT CHANGE UP (ref 4.2–5.8)
RBC # FLD: 14 % — SIGNIFICANT CHANGE UP (ref 10.3–14.5)
SODIUM SERPL-SCNC: 133 MMOL/L — LOW (ref 135–145)
SODIUM SERPL-SCNC: 138 MMOL/L — SIGNIFICANT CHANGE UP (ref 135–145)
SODIUM SERPL-SCNC: 139 MMOL/L — SIGNIFICANT CHANGE UP (ref 135–145)
SODIUM UR-SCNC: 212 MMOL/L — SIGNIFICANT CHANGE UP
SP GR SPEC: 1.02 — SIGNIFICANT CHANGE UP (ref 1.01–1.05)
SPECIMEN SOURCE: SIGNIFICANT CHANGE UP
SPECIMEN SOURCE: SIGNIFICANT CHANGE UP
UROBILINOGEN FLD QL: ABNORMAL
WBC # BLD: 10.38 K/UL — SIGNIFICANT CHANGE UP (ref 3.8–10.5)
WBC # FLD AUTO: 10.38 K/UL — SIGNIFICANT CHANGE UP (ref 3.8–10.5)

## 2022-09-23 PROCEDURE — 99291 CRITICAL CARE FIRST HOUR: CPT | Mod: 25

## 2022-09-23 PROCEDURE — 71045 X-RAY EXAM CHEST 1 VIEW: CPT | Mod: 26

## 2022-09-23 PROCEDURE — 99232 SBSQ HOSP IP/OBS MODERATE 35: CPT

## 2022-09-23 RX ORDER — SODIUM,POTASSIUM PHOSPHATES 278-250MG
1 POWDER IN PACKET (EA) ORAL ONCE
Refills: 0 | Status: COMPLETED | OUTPATIENT
Start: 2022-09-23 | End: 2022-09-23

## 2022-09-23 RX ADMIN — SODIUM CHLORIDE 30 MILLILITER(S): 5 INJECTION, SOLUTION INTRAVENOUS at 19:57

## 2022-09-23 RX ADMIN — Medication 1000 MILLIGRAM(S): at 13:44

## 2022-09-23 RX ADMIN — Medication 1000 MILLIGRAM(S): at 14:50

## 2022-09-23 RX ADMIN — AMLODIPINE BESYLATE 10 MILLIGRAM(S): 2.5 TABLET ORAL at 06:26

## 2022-09-23 RX ADMIN — HEPARIN SODIUM 5000 UNIT(S): 5000 INJECTION INTRAVENOUS; SUBCUTANEOUS at 22:01

## 2022-09-23 RX ADMIN — Medication 50 MILLIGRAM(S): at 06:26

## 2022-09-23 RX ADMIN — Medication 1000 MILLIGRAM(S): at 06:25

## 2022-09-23 RX ADMIN — Medication 1000 MILLIGRAM(S): at 01:17

## 2022-09-23 RX ADMIN — SODIUM CHLORIDE 3 GRAM(S): 9 INJECTION INTRAMUSCULAR; INTRAVENOUS; SUBCUTANEOUS at 22:00

## 2022-09-23 RX ADMIN — Medication 1000 MILLIGRAM(S): at 06:55

## 2022-09-23 RX ADMIN — LEVETIRACETAM 500 MILLIGRAM(S): 250 TABLET, FILM COATED ORAL at 17:43

## 2022-09-23 RX ADMIN — Medication 1 PACKET(S): at 08:44

## 2022-09-23 RX ADMIN — Medication 1000 MILLIGRAM(S): at 18:33

## 2022-09-23 RX ADMIN — OXYCODONE HYDROCHLORIDE 5 MILLIGRAM(S): 5 TABLET ORAL at 09:30

## 2022-09-23 RX ADMIN — SODIUM CHLORIDE 3 GRAM(S): 9 INJECTION INTRAMUSCULAR; INTRAVENOUS; SUBCUTANEOUS at 06:25

## 2022-09-23 RX ADMIN — OXYCODONE HYDROCHLORIDE 5 MILLIGRAM(S): 5 TABLET ORAL at 08:45

## 2022-09-23 RX ADMIN — Medication 50 MILLIGRAM(S): at 13:44

## 2022-09-23 RX ADMIN — HEPARIN SODIUM 5000 UNIT(S): 5000 INJECTION INTRAVENOUS; SUBCUTANEOUS at 06:26

## 2022-09-23 RX ADMIN — Medication 300 MILLIGRAM(S): at 06:25

## 2022-09-23 RX ADMIN — OXYCODONE HYDROCHLORIDE 5 MILLIGRAM(S): 5 TABLET ORAL at 01:30

## 2022-09-23 RX ADMIN — Medication 300 MILLIGRAM(S): at 22:00

## 2022-09-23 RX ADMIN — SODIUM CHLORIDE 3 GRAM(S): 9 INJECTION INTRAMUSCULAR; INTRAVENOUS; SUBCUTANEOUS at 13:44

## 2022-09-23 RX ADMIN — HEPARIN SODIUM 5000 UNIT(S): 5000 INJECTION INTRAVENOUS; SUBCUTANEOUS at 13:44

## 2022-09-23 RX ADMIN — Medication 50 MILLIGRAM(S): at 22:00

## 2022-09-23 RX ADMIN — Medication 1000 MILLIGRAM(S): at 00:57

## 2022-09-23 RX ADMIN — Medication 1000 MILLIGRAM(S): at 17:43

## 2022-09-23 RX ADMIN — VALSARTAN 320 MILLIGRAM(S): 80 TABLET ORAL at 06:24

## 2022-09-23 RX ADMIN — Medication 63.75 MILLIMOLE(S): at 06:00

## 2022-09-23 RX ADMIN — LEVETIRACETAM 500 MILLIGRAM(S): 250 TABLET, FILM COATED ORAL at 06:24

## 2022-09-23 RX ADMIN — OXYCODONE HYDROCHLORIDE 5 MILLIGRAM(S): 5 TABLET ORAL at 00:57

## 2022-09-23 RX ADMIN — Medication 300 MILLIGRAM(S): at 13:49

## 2022-09-23 NOTE — PROGRESS NOTE ADULT - SUBJECTIVE AND OBJECTIVE BOX
HPI:  Patient is a 35 YO right handed male with PMH of HTN, HLD, who has not taken his prescribed medications for approximately 3 months. On 9/16, patient presented to [facility] ED with 2 day history of generalized body aches, nausea, fevers, and chills. He was treated symptomatically and advised to resume his antihypertensive agents. Patient was at the pharmacy earlier today filling his prescriptions when he suffered a syncopal episode, patient noted to have had an episode of urinary incontinence. Patient brought to ED by EMS, BP on arrival was 194/143. Patient C/O neck pain and blurry vision. Per patient's sister he has been complaining of fatigue.   (18 Sep 2022 21:07)    Patient still C/O headahces  No acute issues overnight    ICU Vital Signs Last 24 Hrs  T(C): 38 (23 Sep 2022 01:00), Max: 38 (23 Sep 2022 01:00)  T(F): 100.4 (23 Sep 2022 01:00), Max: 100.4 (23 Sep 2022 01:00)  HR: 102 (23 Sep 2022 01:00) (88 - 110)  BP: 151/102 (23 Sep 2022 01:00) (117/88 - 186/103)  BP(mean): 116 (23 Sep 2022 01:00) (82 - 123)  ABP: --  ABP(mean): --  RR: 39 (23 Sep 2022 01:00) (14 - 39)  SpO2: 99% (23 Sep 2022 01:00) (92% - 100%)    AAO X 3  PERRLA, EOMI  CN 2-12 grossly intact  RODRIGUEZ strength 5/5  No dysmetria or drift    MEDICATIONS  (STANDING):  acetaminophen     Tablet .. 1000 milliGRAM(s) Oral every 6 hours  amLODIPine   Tablet 10 milliGRAM(s) Oral daily  heparin   Injectable 5000 Unit(s) SubCutaneous every 8 hours  hydrALAZINE 50 milliGRAM(s) Oral every 8 hours  influenza   Vaccine 0.5 milliLiter(s) IntraMuscular once  labetalol 300 milliGRAM(s) Oral every 8 hours  levETIRAcetam 500 milliGRAM(s) Oral two times a day  sodium chloride 3 Gram(s) Oral three times a day  sodium chloride 3%. 500 milliLiter(s) (30 mL/Hr) IV Continuous <Continuous>  sodium chloride 3%. 500 milliLiter(s) (30 mL/Hr) IV Continuous <Continuous>  valsartan 320 milliGRAM(s) Oral daily    MEDICATIONS  (PRN):  labetalol Injectable 10 milliGRAM(s) IV Push every 2 hours PRN Systolic blood pressure > 160  oxyCODONE    IR 5 milliGRAM(s) Oral every 4 hours PRN Moderate Pain (4 - 6)  oxyCODONE    IR 10 milliGRAM(s) Oral every 6 hours PRN Severe Pain (7 - 10)                          11.7   10.38 )-----------( 286      ( 23 Sep 2022 01:30 )             36.7     09-23    133<L>  |  103  |  18  ----------------------------<  151<H>  4.1   |  17<L>  |  1.11    Ca    8.6      23 Sep 2022 01:30  Phos  2.7     09-23  Mg     2.20     09-23    < from: MR Head w/wo IV Cont (09.21.22 @ 22:53) >  FINDINGS:    Late subacute intraparenchymal hemorrhage centered in the left basal   ganglia with intraventricular extension. The largest component of the   hemorrhage currently measuring 2.8 x 2.0 cm and is largely unchanged when   compared to prior day's study on 9/20/2022 where it measured 2.6 x 2.1 cm.    There is local mass effect with effacement of the left lateral ventricle   and slight rightward midline shift of approximately 5 mm, unchanged from   prior day CT head.There is T2/FLAIR hyperintense signal surrounding the   region of the hemorrhage, likely representing brain edema.    Focal areas of peripheral diffusion restriction in the left basal   ganglia, likely secondary to hemorrhagic change versus artifact.  Susceptibility in the known regions of hemorrhage including left basal   ganglia and left lateral ventricle. No additional areas of diffusion   restriction or susceptibility are seen.    No abnormal extra-axial fluid collections are present. Major flow-voids   at the base of the brain follow expected course and contour.    The calvarium is intact. The visualized intraorbital compartments,   paranasal sinuses and tympanomastoid cavities appear free of acute   disease.    IMPRESSION:    Late subacute intraparenchymal hemorrhage in the left basal ganglia with   intraventricular extension which is largely unchanged and size and   distribution when compared to same and prior day CT scans of the brain.   Unchanged degree of brain edema, mass effect and mild rightward midline   shift.    A short interval follow-up MRI of the brain with and without IV contrast   is recommended to ensure resolution of hemorrhage and exclude underlying   mass which may be obscured.    < end of copied text >

## 2022-09-23 NOTE — PROGRESS NOTE ADULT - ASSESSMENT
36y M with uncontrolled HTN (non-compliant w/ meds), who presented for syncope and found to have L BG hemorrhage w/ associated IVH and hydrocephalus. SICU consulted for blood pressure control and q2hr neuro checks.    PLAN:  Neuro:   - AOx4   - q4hr neuro checks  - CTH 09/20 and 09/21 unchanged  - MRI/MRA demonstrates no acute changes  - Keppra 500mg bid  - Pain control    Respiratory:  - Saturating >92% on RA    Cardiovascular:  - Amlodipine 10 mg, Valsartan 320 mg, Labetalol 300 mg TID, Hydralazine 50 mg q8  - Labetalol 10 mg IV PRN for SBP > 160  - Maintain MAP > 65, SBP goal 120-150    Gastrointestinal:  - Regular diet    /Renal:   - NS 1.5% @ 100ml/hr  - salt tabs 3 grams TID  - monitor Na. Na goal 140-150. [Na 139--->136 --> 135-->134)]  - Strict I&O's   - Replete electrolytes prn  - f/u renal duplex to r/o renal artery stenosis    Hematologic:  - H/H stable  - VTE ppx: SCDs, heparin subQ    Infectious Disease:  - Afebrile  - No ABX    Endo:  - AMRITA, A1C 5.4% 09/18    Dispo: SICU     36y M with uncontrolled HTN (non-compliant w/ meds), who presented for syncope and found to have L BG hemorrhage w/ associated IVH and hydrocephalus. SICU consulted for blood pressure control and q2hr neuro checks.    PLAN:  Neuro:   - AOx4   - q4hr neuro checks  - CTH 09/20 and 09/21 unchanged  - MRI/MRA demonstrates no acute changes  - Keppra 500mg bid  - Pain control    Respiratory:  - Saturating >92% on RA    Cardiovascular:  - Amlodipine 10 mg, Valsartan 320 mg, Labetalol 300 mg TID, Hydralazine 50 mg q8  - Labetalol 10 mg IV PRN for SBP > 160  - Maintain MAP > 65, SBP goal 120-150    Gastrointestinal:  - Regular diet    /Renal:   - NS 3% @ 30 cc/hr via 2 PIV  - salt tabs 3 grams TID  - monitor Na. Na goal 140-150  - Strict I&O's   - Replete electrolytes prn  - Renal duplex negative    Hematologic:  - H/H stable  - VTE ppx: SCDs, heparin subQ    Infectious Disease:  - Afebrile  - No ABX    Endo:  - AMRITA, A1C 5.4% 09/18    Dispo: SICU     36y M with uncontrolled HTN (non-compliant w/ meds), who presented for syncope and found to have L BG hemorrhage w/ associated IVH and hydrocephalus. SICU consulted for blood pressure control and q2hr neuro checks.     PLAN:  Neuro:   - AOx4   - q4hr neuro checks  - CTH 09/20 and 09/21 unchanged  - MRI/MRA demonstrates no acute changes  - Keppra 500mg bid  - Pain control w/ IV Tylenol and Oxy PRN    Respiratory:  - Saturating >92% on RA    Cardiovascular:  - Amlodipine 10 mg, Valsartan 320 mg, Labetalol 300 mg TID, Hydralazine 50 mg q8  - Labetalol 10 mg IV PRN for SBP > 160  - Maintain MAP > 65, SBP goal 120-150    Gastrointestinal:  - Regular diet    /Renal:   - NS 3% @ 30 cc/hr via 2 PIV  - salt tabs 3 grams TID  - monitor Na. Na goal 140-150  - Strict I&O's   - Replete electrolytes prn  - Renal duplex negative  - BMP's q8h     Hematologic:  - H/H stable  - VTE ppx: SCDs, heparin subQ    Infectious Disease:  - Spiked fever 100.4F 09/22, f/u BCx's   - Monitor WBC  - No ABX    Endo:  - AMRITA, A1C 5.4% 09/18    Dispo: SICU

## 2022-09-23 NOTE — PROGRESS NOTE ADULT - PROBLEM SELECTOR PLAN 2
Neurologic checks and vital signs Q4H  Maintain SBP<150  Maintain serum Na 140-150  Follow up with stroke neurology

## 2022-09-23 NOTE — PROGRESS NOTE ADULT - SUBJECTIVE AND OBJECTIVE BOX
SICU Daily Progress Note  =====================================================  Interval/Overnight Events:    - Q4 neurochecks  - Hyponatremic --> NS 3% @30cc/hr x 2 + salt tabs 3g  - DC spironolactone    --------------------------------------------------------------------------------------    MEDICATIONS:    Neurologic Medications  acetaminophen     Tablet .. 1000 milliGRAM(s) Oral every 6 hours  levETIRAcetam 500 milliGRAM(s) Oral two times a day  oxyCODONE    IR 5 milliGRAM(s) Oral every 4 hours PRN Moderate Pain (4 - 6)  oxyCODONE    IR 10 milliGRAM(s) Oral every 6 hours PRN Severe Pain (7 - 10)    Respiratory Medications    Cardiovascular Medications  amLODIPine   Tablet 10 milliGRAM(s) Oral daily  hydrALAZINE 50 milliGRAM(s) Oral every 8 hours  labetalol 300 milliGRAM(s) Oral every 8 hours  labetalol Injectable 10 milliGRAM(s) IV Push every 2 hours PRN Systolic blood pressure > 160  valsartan 320 milliGRAM(s) Oral daily    Gastrointestinal Medications  sodium chloride 3 Gram(s) Oral three times a day  sodium chloride 3%. 500 milliLiter(s) IV Continuous <Continuous>  sodium chloride 3%. 500 milliLiter(s) IV Continuous <Continuous>    Genitourinary Medications    Hematologic/Oncologic Medications  heparin   Injectable 5000 Unit(s) SubCutaneous every 8 hours  influenza   Vaccine 0.5 milliLiter(s) IntraMuscular once    Antimicrobial/Immunologic Medications    Endocrine/Metabolic Medications    Topical/Other Medications    --------------------------------------------------------------------------------------    VITAL SIGNS:  ICU Vital Signs Last 24 Hrs  T(C): 38 (23 Sep 2022 01:00), Max: 38 (23 Sep 2022 01:00)  T(F): 100.4 (23 Sep 2022 01:00), Max: 100.4 (23 Sep 2022 01:00)  HR: 102 (23 Sep 2022 01:00) (88 - 110)  BP: 151/102 (23 Sep 2022 01:00) (117/88 - 186/103)  BP(mean): 116 (23 Sep 2022 01:00) (82 - 123)  ABP: --  ABP(mean): --  RR: 39 (23 Sep 2022 01:00) (14 - 39)  SpO2: 99% (23 Sep 2022 01:00) (92% - 100%)  --------------------------------------------------------------------------------------    INS AND OUTS:  I&O's Detail    21 Sep 2022 07:01  -  22 Sep 2022 07:00  --------------------------------------------------------  IN:    NiCARdipine: 1337.5 mL    Oral Fluid: 1360 mL    sodium chloride 0.9%: 50 mL    sodium chloride 1.5%: 1625 mL  Total IN: 4372.5 mL    OUT:    Voided (mL): 4250 mL  Total OUT: 4250 mL    Total NET: 122.5 mL      22 Sep 2022 07:01  -  23 Sep 2022 01:42  --------------------------------------------------------  IN:    NiCARdipine: 312.5 mL    Oral Fluid: 1880 mL    sodium chloride 1.5%: 850 mL    sodium chloride 3%: 240 mL    sodium chloride 3%: 240 mL  Total IN: 3522.5 mL    OUT:    Voided (mL): 2650 mL  Total OUT: 2650 mL    Total NET: 872.5 mL  --------------------------------------------------------------------------------------    EXAM    NEURO: NAD, A/O x4, CNs intact, strength 5/5, no focal deficits  HEENT: NC/AT  RESPIRATORY: nonlabored respirations, normal CW expansion  CARDIO: RRR, S1S2  ABDOMEN: soft, nontender, nondistended  EXTREMITIES: normal strength, no deformities    --------------------------------------------------------------------------------------    LABS                        12.8   12.53 )-----------( 277      ( 22 Sep 2022 01:00 )             40.3   09-22    134<L>  |  104  |  19  ----------------------------<  137<H>  4.2   |  19<L>  |  1.25    Ca    8.6      22 Sep 2022 21:40  Phos  2.8     09-22  Mg     2.30     09-22    -------------------------------------------------------------------------------------- SICU Daily Progress Note  =====================================================  Interval/Overnight Events:    - Q4 neurochecks  - Hyponatremic --> NS 3% @30cc/hr x 2 + salt tabs 3g  - DC spironolactone  - BMP's q8h now  - To place midline      --------------------------------------------------------------------------------------    MEDICATIONS:    Neurologic Medications  acetaminophen     Tablet .. 1000 milliGRAM(s) Oral every 6 hours  levETIRAcetam 500 milliGRAM(s) Oral two times a day  oxyCODONE    IR 5 milliGRAM(s) Oral every 4 hours PRN Moderate Pain (4 - 6)  oxyCODONE    IR 10 milliGRAM(s) Oral every 6 hours PRN Severe Pain (7 - 10)    Respiratory Medications    Cardiovascular Medications  amLODIPine   Tablet 10 milliGRAM(s) Oral daily  hydrALAZINE 50 milliGRAM(s) Oral every 8 hours  labetalol 300 milliGRAM(s) Oral every 8 hours  labetalol Injectable 10 milliGRAM(s) IV Push every 2 hours PRN Systolic blood pressure > 160  valsartan 320 milliGRAM(s) Oral daily    Gastrointestinal Medications  sodium chloride 3 Gram(s) Oral three times a day  sodium chloride 3%. 500 milliLiter(s) IV Continuous <Continuous>  sodium chloride 3%. 500 milliLiter(s) IV Continuous <Continuous>    Genitourinary Medications    Hematologic/Oncologic Medications  heparin   Injectable 5000 Unit(s) SubCutaneous every 8 hours  influenza   Vaccine 0.5 milliLiter(s) IntraMuscular once    Antimicrobial/Immunologic Medications    Endocrine/Metabolic Medications    Topical/Other Medications    --------------------------------------------------------------------------------------    VITAL SIGNS:  ICU Vital Signs Last 24 Hrs  T(C): 38 (23 Sep 2022 01:00), Max: 38 (23 Sep 2022 01:00)  T(F): 100.4 (23 Sep 2022 01:00), Max: 100.4 (23 Sep 2022 01:00)  HR: 102 (23 Sep 2022 01:00) (88 - 110)  BP: 151/102 (23 Sep 2022 01:00) (117/88 - 186/103)  BP(mean): 116 (23 Sep 2022 01:00) (82 - 123)  ABP: --  ABP(mean): --  RR: 39 (23 Sep 2022 01:00) (14 - 39)  SpO2: 99% (23 Sep 2022 01:00) (92% - 100%)  --------------------------------------------------------------------------------------    INS AND OUTS:  I&O's Detail    21 Sep 2022 07:01  -  22 Sep 2022 07:00  --------------------------------------------------------  IN:    NiCARdipine: 1337.5 mL    Oral Fluid: 1360 mL    sodium chloride 0.9%: 50 mL    sodium chloride 1.5%: 1625 mL  Total IN: 4372.5 mL    OUT:    Voided (mL): 4250 mL  Total OUT: 4250 mL    Total NET: 122.5 mL      22 Sep 2022 07:01  -  23 Sep 2022 01:42  --------------------------------------------------------  IN:    NiCARdipine: 312.5 mL    Oral Fluid: 1880 mL    sodium chloride 1.5%: 850 mL    sodium chloride 3%: 240 mL    sodium chloride 3%: 240 mL  Total IN: 3522.5 mL    OUT:    Voided (mL): 2650 mL  Total OUT: 2650 mL    Total NET: 872.5 mL  --------------------------------------------------------------------------------------    EXAM    NEURO: NAD, A/O x4, CNs intact, strength 5/5, no focal deficits  HEENT: NC/AT  RESPIRATORY: nonlabored respirations, normal CW expansion  CARDIO: RRR, S1S2  ABDOMEN: soft, nontender, nondistended  EXTREMITIES: normal strength, no deformities    --------------------------------------------------------------------------------------    LABS                        12.8   12.53 )-----------( 277      ( 22 Sep 2022 01:00 )             40.3   09-22    134<L>  |  104  |  19  ----------------------------<  137<H>  4.2   |  19<L>  |  1.25    Ca    8.6      22 Sep 2022 21:40  Phos  2.8     09-22  Mg     2.30     09-22    --------------------------------------------------------------------------------------

## 2022-09-23 NOTE — PROCEDURE NOTE - ADDITIONAL PROCEDURE DETAILS
Patient prepped and draped in sterile manner. Ultrasound the same. Midline catheter cut to 15cm using sterile scissors. Basilic vein identified with U/S imaging, compressible. Cannulated using needle, with good return. Guidewire inserted. Vein introducer and dilator inserted. Middle cannula removed with large volume blood as expected. Midline catheter introduced into dilator. Catheter flushed without resistance and with good blood return. Sheath peeled back, catheter reintroduced periodically, sheath removed completely. Sutured into place using sterile kit and 2-0 silk. Sterile central line dressing placed. Secured in place.

## 2022-09-23 NOTE — PROGRESS NOTE ADULT - ASSESSMENT
35 YO male with Hx of HTN, noncompliant with prescribed medications, HLD brought to ED following a syncopal episode, markedly hypertensive on arrival to ED, with a left basal ganglia hemorrhage, intraventricular extension, and early hydrocephalus. Patient does not require neurosurgical intervention at this time, however he is at high risk of worsening hydrocephalus and may require placement of an external ventricular drain.    9/19: Alert and nonfocal exam but not as well oriented as on initial exam. BP controlled on nicardipine infusion. Repeat head CT this AM  9/20: Neurologically intact. SBP maintained<150, on nicardipine gtt. Increased headache yesterday with stable Head CT.   9/21: neuro intact, on nicardipine for SBP< 150, na 136 goal 145-150, CTH stable, awaiting mri brain.  9/22: neuro intact, CTH stable yesterday, mri brain done, Na 134 goal 145-150  9/23: Neurologically intact. SBP maintained<150, on oral medications, nicardipine discontinued. Na 133 on NaCl tabs and hypertonic saline infusion

## 2022-09-24 LAB
ANION GAP SERPL CALC-SCNC: 11 MMOL/L — SIGNIFICANT CHANGE UP (ref 7–14)
ANION GAP SERPL CALC-SCNC: 12 MMOL/L — SIGNIFICANT CHANGE UP (ref 7–14)
ANION GAP SERPL CALC-SCNC: 9 MMOL/L — SIGNIFICANT CHANGE UP (ref 7–14)
BUN SERPL-MCNC: 15 MG/DL — SIGNIFICANT CHANGE UP (ref 7–23)
BUN SERPL-MCNC: 16 MG/DL — SIGNIFICANT CHANGE UP (ref 7–23)
BUN SERPL-MCNC: 16 MG/DL — SIGNIFICANT CHANGE UP (ref 7–23)
CALCIUM SERPL-MCNC: 8.5 MG/DL — SIGNIFICANT CHANGE UP (ref 8.4–10.5)
CALCIUM SERPL-MCNC: 8.7 MG/DL — SIGNIFICANT CHANGE UP (ref 8.4–10.5)
CALCIUM SERPL-MCNC: 8.9 MG/DL — SIGNIFICANT CHANGE UP (ref 8.4–10.5)
CHLORIDE SERPL-SCNC: 105 MMOL/L — SIGNIFICANT CHANGE UP (ref 98–107)
CHLORIDE SERPL-SCNC: 107 MMOL/L — SIGNIFICANT CHANGE UP (ref 98–107)
CHLORIDE SERPL-SCNC: 110 MMOL/L — HIGH (ref 98–107)
CO2 SERPL-SCNC: 18 MMOL/L — LOW (ref 22–31)
CO2 SERPL-SCNC: 20 MMOL/L — LOW (ref 22–31)
CO2 SERPL-SCNC: 20 MMOL/L — LOW (ref 22–31)
CREAT SERPL-MCNC: 1.06 MG/DL — SIGNIFICANT CHANGE UP (ref 0.5–1.3)
CREAT SERPL-MCNC: 1.08 MG/DL — SIGNIFICANT CHANGE UP (ref 0.5–1.3)
CREAT SERPL-MCNC: 1.13 MG/DL — SIGNIFICANT CHANGE UP (ref 0.5–1.3)
EGFR: 86 ML/MIN/1.73M2 — SIGNIFICANT CHANGE UP
EGFR: 91 ML/MIN/1.73M2 — SIGNIFICANT CHANGE UP
EGFR: 93 ML/MIN/1.73M2 — SIGNIFICANT CHANGE UP
GLUCOSE SERPL-MCNC: 109 MG/DL — HIGH (ref 70–99)
GLUCOSE SERPL-MCNC: 109 MG/DL — HIGH (ref 70–99)
GLUCOSE SERPL-MCNC: 112 MG/DL — HIGH (ref 70–99)
HCT VFR BLD CALC: 34.9 % — LOW (ref 39–50)
HGB BLD-MCNC: 11.1 G/DL — LOW (ref 13–17)
MAGNESIUM SERPL-MCNC: 2.1 MG/DL — SIGNIFICANT CHANGE UP (ref 1.6–2.6)
MAGNESIUM SERPL-MCNC: 2.1 MG/DL — SIGNIFICANT CHANGE UP (ref 1.6–2.6)
MAGNESIUM SERPL-MCNC: 2.2 MG/DL — SIGNIFICANT CHANGE UP (ref 1.6–2.6)
MCHC RBC-ENTMCNC: 26.7 PG — LOW (ref 27–34)
MCHC RBC-ENTMCNC: 31.8 GM/DL — LOW (ref 32–36)
MCV RBC AUTO: 83.9 FL — SIGNIFICANT CHANGE UP (ref 80–100)
NRBC # BLD: 0 /100 WBCS — SIGNIFICANT CHANGE UP (ref 0–0)
NRBC # FLD: 0 K/UL — SIGNIFICANT CHANGE UP (ref 0–0)
PHOSPHATE SERPL-MCNC: 3.1 MG/DL — SIGNIFICANT CHANGE UP (ref 2.5–4.5)
PLATELET # BLD AUTO: 325 K/UL — SIGNIFICANT CHANGE UP (ref 150–400)
POTASSIUM SERPL-MCNC: 4.1 MMOL/L — SIGNIFICANT CHANGE UP (ref 3.5–5.3)
POTASSIUM SERPL-MCNC: 4.3 MMOL/L — SIGNIFICANT CHANGE UP (ref 3.5–5.3)
POTASSIUM SERPL-MCNC: 4.5 MMOL/L — SIGNIFICANT CHANGE UP (ref 3.5–5.3)
POTASSIUM SERPL-SCNC: 4.1 MMOL/L — SIGNIFICANT CHANGE UP (ref 3.5–5.3)
POTASSIUM SERPL-SCNC: 4.3 MMOL/L — SIGNIFICANT CHANGE UP (ref 3.5–5.3)
POTASSIUM SERPL-SCNC: 4.5 MMOL/L — SIGNIFICANT CHANGE UP (ref 3.5–5.3)
RBC # BLD: 4.16 M/UL — LOW (ref 4.2–5.8)
RBC # FLD: 14.1 % — SIGNIFICANT CHANGE UP (ref 10.3–14.5)
SODIUM SERPL-SCNC: 136 MMOL/L — SIGNIFICANT CHANGE UP (ref 135–145)
SODIUM SERPL-SCNC: 137 MMOL/L — SIGNIFICANT CHANGE UP (ref 135–145)
SODIUM SERPL-SCNC: 139 MMOL/L — SIGNIFICANT CHANGE UP (ref 135–145)
WBC # BLD: 11.29 K/UL — HIGH (ref 3.8–10.5)
WBC # FLD AUTO: 11.29 K/UL — HIGH (ref 3.8–10.5)

## 2022-09-24 PROCEDURE — 93970 EXTREMITY STUDY: CPT | Mod: 26

## 2022-09-24 PROCEDURE — 99232 SBSQ HOSP IP/OBS MODERATE 35: CPT

## 2022-09-24 PROCEDURE — 71045 X-RAY EXAM CHEST 1 VIEW: CPT | Mod: 26

## 2022-09-24 RX ORDER — SODIUM CHLORIDE 5 G/100ML
500 INJECTION, SOLUTION INTRAVENOUS
Refills: 0 | Status: DISCONTINUED | OUTPATIENT
Start: 2022-09-24 | End: 2022-09-24

## 2022-09-24 RX ORDER — HYDRALAZINE HCL 50 MG
75 TABLET ORAL EVERY 8 HOURS
Refills: 0 | Status: DISCONTINUED | OUTPATIENT
Start: 2022-09-24 | End: 2022-09-26

## 2022-09-24 RX ORDER — HYDRALAZINE HCL 50 MG
100 TABLET ORAL EVERY 8 HOURS
Refills: 0 | Status: DISCONTINUED | OUTPATIENT
Start: 2022-09-24 | End: 2022-09-24

## 2022-09-24 RX ORDER — SODIUM CHLORIDE 5 G/100ML
500 INJECTION, SOLUTION INTRAVENOUS
Refills: 0 | Status: DISCONTINUED | OUTPATIENT
Start: 2022-09-24 | End: 2022-09-25

## 2022-09-24 RX ADMIN — OXYCODONE HYDROCHLORIDE 5 MILLIGRAM(S): 5 TABLET ORAL at 04:30

## 2022-09-24 RX ADMIN — Medication 1000 MILLIGRAM(S): at 01:30

## 2022-09-24 RX ADMIN — Medication 300 MILLIGRAM(S): at 05:12

## 2022-09-24 RX ADMIN — Medication 1000 MILLIGRAM(S): at 06:13

## 2022-09-24 RX ADMIN — OXYCODONE HYDROCHLORIDE 5 MILLIGRAM(S): 5 TABLET ORAL at 21:37

## 2022-09-24 RX ADMIN — SODIUM CHLORIDE 75 MILLILITER(S): 5 INJECTION, SOLUTION INTRAVENOUS at 20:52

## 2022-09-24 RX ADMIN — OXYCODONE HYDROCHLORIDE 5 MILLIGRAM(S): 5 TABLET ORAL at 03:36

## 2022-09-24 RX ADMIN — OXYCODONE HYDROCHLORIDE 5 MILLIGRAM(S): 5 TABLET ORAL at 22:25

## 2022-09-24 RX ADMIN — Medication 75 MILLIGRAM(S): at 17:00

## 2022-09-24 RX ADMIN — SODIUM CHLORIDE 3 GRAM(S): 9 INJECTION INTRAMUSCULAR; INTRAVENOUS; SUBCUTANEOUS at 13:02

## 2022-09-24 RX ADMIN — Medication 300 MILLIGRAM(S): at 13:02

## 2022-09-24 RX ADMIN — Medication 1000 MILLIGRAM(S): at 00:38

## 2022-09-24 RX ADMIN — HEPARIN SODIUM 5000 UNIT(S): 5000 INJECTION INTRAVENOUS; SUBCUTANEOUS at 13:02

## 2022-09-24 RX ADMIN — HEPARIN SODIUM 5000 UNIT(S): 5000 INJECTION INTRAVENOUS; SUBCUTANEOUS at 21:38

## 2022-09-24 RX ADMIN — HEPARIN SODIUM 5000 UNIT(S): 5000 INJECTION INTRAVENOUS; SUBCUTANEOUS at 05:12

## 2022-09-24 RX ADMIN — LEVETIRACETAM 500 MILLIGRAM(S): 250 TABLET, FILM COATED ORAL at 05:11

## 2022-09-24 RX ADMIN — SODIUM CHLORIDE 3 GRAM(S): 9 INJECTION INTRAMUSCULAR; INTRAVENOUS; SUBCUTANEOUS at 05:11

## 2022-09-24 RX ADMIN — SODIUM CHLORIDE 3 GRAM(S): 9 INJECTION INTRAMUSCULAR; INTRAVENOUS; SUBCUTANEOUS at 21:37

## 2022-09-24 RX ADMIN — LEVETIRACETAM 500 MILLIGRAM(S): 250 TABLET, FILM COATED ORAL at 17:00

## 2022-09-24 RX ADMIN — AMLODIPINE BESYLATE 10 MILLIGRAM(S): 2.5 TABLET ORAL at 05:12

## 2022-09-24 RX ADMIN — Medication 1000 MILLIGRAM(S): at 05:11

## 2022-09-24 RX ADMIN — VALSARTAN 320 MILLIGRAM(S): 80 TABLET ORAL at 05:11

## 2022-09-24 RX ADMIN — Medication 1000 MILLIGRAM(S): at 13:02

## 2022-09-24 RX ADMIN — Medication 50 MILLIGRAM(S): at 05:12

## 2022-09-24 RX ADMIN — Medication 1000 MILLIGRAM(S): at 17:00

## 2022-09-24 RX ADMIN — Medication 300 MILLIGRAM(S): at 21:37

## 2022-09-24 RX ADMIN — Medication 1000 MILLIGRAM(S): at 23:49

## 2022-09-24 NOTE — PROGRESS NOTE ADULT - PROBLEM SELECTOR PLAN 3
Neurologic checks and vital signs Q4H  Maintain SBP<150  Maintain serum Na 140-150  Follow up with stroke neurology Neurologic checks and vital signs Q4H  Maintain SBP<150  Liberalize sodium goals - 135-145  Follow up with stroke neurology

## 2022-09-24 NOTE — PROGRESS NOTE ADULT - SUBJECTIVE AND OBJECTIVE BOX
SICU Daily Progress Note  =====================================================  Interval/Overnight Events:    - BC negative  - No acute overnight events    ALLERGIES:  No Known Allergies      --------------------------------------------------------------------------------------    MEDICATIONS:    Neurologic Medications  acetaminophen     Tablet .. 1000 milliGRAM(s) Oral every 6 hours  levETIRAcetam 500 milliGRAM(s) Oral two times a day  oxyCODONE    IR 5 milliGRAM(s) Oral every 4 hours PRN Moderate Pain (4 - 6)    Respiratory Medications    Cardiovascular Medications  amLODIPine   Tablet 10 milliGRAM(s) Oral daily  hydrALAZINE 50 milliGRAM(s) Oral every 8 hours  labetalol 300 milliGRAM(s) Oral every 8 hours  labetalol Injectable 10 milliGRAM(s) IV Push every 2 hours PRN Systolic blood pressure > 160  valsartan 320 milliGRAM(s) Oral daily    Gastrointestinal Medications  sodium chloride 3 Gram(s) Oral three times a day  sodium chloride 3%. 500 milliLiter(s) IV Continuous <Continuous>  sodium chloride 3%. 500 milliLiter(s) IV Continuous <Continuous>    Genitourinary Medications    Hematologic/Oncologic Medications  heparin   Injectable 5000 Unit(s) SubCutaneous every 8 hours  influenza   Vaccine 0.5 milliLiter(s) IntraMuscular once    Antimicrobial/Immunologic Medications    Endocrine/Metabolic Medications    Topical/Other Medications    --------------------------------------------------------------------------------------    VITAL SIGNS:  ICU Vital Signs Last 24 Hrs  T(C): 36.8 (23 Sep 2022 20:00), Max: 38 (23 Sep 2022 01:00)  T(F): 98.3 (23 Sep 2022 20:00), Max: 100.4 (23 Sep 2022 01:00)  HR: 102 (23 Sep 2022 20:00) (92 - 106)  BP: 147/94 (23 Sep 2022 20:00) (122/64 - 166/105)  BP(mean): 107 (23 Sep 2022 20:00) (80 - 121)  ABP: --  ABP(mean): --  RR: 24 (23 Sep 2022 20:00) (17 - 39)  SpO2: 99% (23 Sep 2022 20:00) (95% - 100%)    O2 Parameters below as of 23 Sep 2022 20:00  Patient On (Oxygen Delivery Method): room air  --------------------------------------------------------------------------------------    INS AND OUTS:  I&O's Detail    22 Sep 2022 07:01  -  23 Sep 2022 07:00  --------------------------------------------------------  IN:    IV PiggyBack: 250 mL    NiCARdipine: 312.5 mL    Oral Fluid: 1880 mL    sodium chloride 1.5%: 850 mL    sodium chloride 3%: 390 mL    sodium chloride 3%: 390 mL  Total IN: 4072.5 mL    OUT:    Voided (mL): 2650 mL  Total OUT: 2650 mL    Total NET: 1422.5 mL      23 Sep 2022 07:01  -  24 Sep 2022 00:31  --------------------------------------------------------  IN:    sodium chloride 3%: 300 mL    sodium chloride 3%: 300 mL  Total IN: 600 mL    OUT:    Voided (mL): 1900 mL  Total OUT: 1900 mL    Total NET: -1300 mL  --------------------------------------------------------------------------------------    EXAM    NEURO: NAD, A/O x4, CNs intact, strength 5/5, no focal deficits  HEENT: NC/AT  RESPIRATORY: nonlabored respirations, normal CW expansion  CARDIO: RRR, S1S2  ABDOMEN: soft, nontender, nondistended  EXTREMITIES: normal strength, no deformities    --------------------------------------------------------------------------------------    LABS                        11.7   10.38 )-----------( 286      ( 23 Sep 2022 01:30 )             36.7   09-23    139  |  110<H>  |  17  ----------------------------<  106<H>  4.8   |  21<L>  |  1.29    Ca    8.4      23 Sep 2022 16:53  Phos  3.1     09-23  Mg     2.20     09-23      --------------------------------------------------------------------------------------   SICU Daily Progress Note  =====================================================  Interval/Overnight Events:    - BC negative  - No acute overnight events    ALLERGIES:  No Known Allergies    --------------------------------------------------------------------------------------    MEDICATIONS:    Neurologic Medications  acetaminophen     Tablet .. 1000 milliGRAM(s) Oral every 6 hours  levETIRAcetam 500 milliGRAM(s) Oral two times a day  oxyCODONE    IR 5 milliGRAM(s) Oral every 4 hours PRN Moderate Pain (4 - 6)    Respiratory Medications    Cardiovascular Medications  amLODIPine   Tablet 10 milliGRAM(s) Oral daily  hydrALAZINE 50 milliGRAM(s) Oral every 8 hours  labetalol 300 milliGRAM(s) Oral every 8 hours  labetalol Injectable 10 milliGRAM(s) IV Push every 2 hours PRN Systolic blood pressure > 160  valsartan 320 milliGRAM(s) Oral daily    Gastrointestinal Medications  sodium chloride 3 Gram(s) Oral three times a day  sodium chloride 3%. 500 milliLiter(s) IV Continuous <Continuous>  sodium chloride 3%. 500 milliLiter(s) IV Continuous <Continuous>    Genitourinary Medications    Hematologic/Oncologic Medications  heparin   Injectable 5000 Unit(s) SubCutaneous every 8 hours  influenza   Vaccine 0.5 milliLiter(s) IntraMuscular once    Antimicrobial/Immunologic Medications    Endocrine/Metabolic Medications    Topical/Other Medications    --------------------------------------------------------------------------------------    VITAL SIGNS:  ICU Vital Signs Last 24 Hrs  T(C): 36.8 (23 Sep 2022 20:00), Max: 38 (23 Sep 2022 01:00)  T(F): 98.3 (23 Sep 2022 20:00), Max: 100.4 (23 Sep 2022 01:00)  HR: 102 (23 Sep 2022 20:00) (92 - 106)  BP: 147/94 (23 Sep 2022 20:00) (122/64 - 166/105)  BP(mean): 107 (23 Sep 2022 20:00) (80 - 121)  ABP: --  ABP(mean): --  RR: 24 (23 Sep 2022 20:00) (17 - 39)  SpO2: 99% (23 Sep 2022 20:00) (95% - 100%)    O2 Parameters below as of 23 Sep 2022 20:00  Patient On (Oxygen Delivery Method): room air  --------------------------------------------------------------------------------------    INS AND OUTS:  I&O's Detail    22 Sep 2022 07:01  -  23 Sep 2022 07:00  --------------------------------------------------------  IN:    IV PiggyBack: 250 mL    NiCARdipine: 312.5 mL    Oral Fluid: 1880 mL    sodium chloride 1.5%: 850 mL    sodium chloride 3%: 390 mL    sodium chloride 3%: 390 mL  Total IN: 4072.5 mL    OUT:    Voided (mL): 2650 mL  Total OUT: 2650 mL    Total NET: 1422.5 mL      23 Sep 2022 07:01  -  24 Sep 2022 00:31  --------------------------------------------------------  IN:    sodium chloride 3%: 300 mL    sodium chloride 3%: 300 mL  Total IN: 600 mL    OUT:    Voided (mL): 1900 mL  Total OUT: 1900 mL    Total NET: -1300 mL  --------------------------------------------------------------------------------------    EXAM    NEURO: NAD, A/O x4, CNs intact, strength 5/5, no focal deficits  HEENT: NC/AT  RESPIRATORY: nonlabored respirations, normal CW expansion  CARDIO: RRR, S1S2  ABDOMEN: soft, nontender, nondistended  EXTREMITIES: normal strength, no deformities    --------------------------------------------------------------------------------------    LABS                        11.7   10.38 )-----------( 286      ( 23 Sep 2022 01:30 )             36.7   09-23    139  |  110<H>  |  17  ----------------------------<  106<H>  4.8   |  21<L>  |  1.29    Ca    8.4      23 Sep 2022 16:53  Phos  3.1     09-23  Mg     2.20     09-23      --------------------------------------------------------------------------------------   SICU Daily Progress Note  =====================================================  Interval/Overnight Events:    - Blood cultures negative  - No acute overnight events  - increased hydralazine to 75 tid for SBP in 170s  - sodium goal to be normal range (135-145) per neurosx, d/c 3%sodium chloride and transition to 1.5% sodium chloride  - listed for floors     ALLERGIES:  No Known Allergies    --------------------------------------------------------------------------------------    MEDICATIONS:    Neurologic Medications  acetaminophen     Tablet .. 1000 milliGRAM(s) Oral every 6 hours  levETIRAcetam 500 milliGRAM(s) Oral two times a day  oxyCODONE    IR 5 milliGRAM(s) Oral every 4 hours PRN Moderate Pain (4 - 6)    Respiratory Medications    Cardiovascular Medications  amLODIPine   Tablet 10 milliGRAM(s) Oral daily  hydrALAZINE 50 milliGRAM(s) Oral every 8 hours  labetalol 300 milliGRAM(s) Oral every 8 hours  labetalol Injectable 10 milliGRAM(s) IV Push every 2 hours PRN Systolic blood pressure > 160  valsartan 320 milliGRAM(s) Oral daily    Gastrointestinal Medications  sodium chloride 3 Gram(s) Oral three times a day  sodium chloride 3%. 500 milliLiter(s) IV Continuous <Continuous>  sodium chloride 3%. 500 milliLiter(s) IV Continuous <Continuous>      Hematologic/Oncologic Medications  heparin   Injectable 5000 Unit(s) SubCutaneous every 8 hours  influenza   Vaccine 0.5 milliLiter(s) IntraMuscular once        --------------------------------------------------------------------------------------    VITAL SIGNS:  ICU Vital Signs Last 24 Hrs  T(C): 36.7 (24 Sep 2022 08:00), Max: 36.8 (23 Sep 2022 20:00)  T(F): 98 (24 Sep 2022 08:00), Max: 98.3 (23 Sep 2022 20:00)  HR: 92 (24 Sep 2022 08:00) (92 - 106)  BP: 133/81 (24 Sep 2022 08:00) (122/64 - 173/106)  BP(mean): 96 (24 Sep 2022 08:00) (80 - 123)  RR: 15 (24 Sep 2022 08:00) (15 - 30)  SpO2: 97% (24 Sep 2022 08:00) (96% - 100%)    O2 Parameters below as of 24 Sep 2022 08:00  Patient On (Oxygen Delivery Method): room air      --------------------------------------------------------------------------------------    INS AND OUTS:  I&O's Detail    I&O's Detail    23 Sep 2022 07:01  -  24 Sep 2022 07:00  --------------------------------------------------------  IN:    sodium chloride 3%: 660 mL    sodium chloride 3%: 660 mL  Total IN: 1320 mL    OUT:    Voided (mL): 3200 mL  Total OUT: 3200 mL    Total NET: -1880 mL      24 Sep 2022 07:01  -  24 Sep 2022 10:00  --------------------------------------------------------  IN:    sodium chloride 3%: 30 mL    sodium chloride 3%: 30 mL  Total IN: 60 mL    OUT:  Total OUT: 0 mL    Total NET: 60 mL    --------------------------------------------------------------------------------------    EXAM    NEURO: NAD, A/O x4, CNs intact, strength 5/5, no focal deficits  HEENT: NC/AT  RESPIRATORY: nonlabored respirations, normal CW expansion, no signs of respiratory distress.   CARDIO: RRR, S1S2  ABDOMEN: soft, nontender, nondistended  EXTREMITIES: normal strength, no deformities    --------------------------------------------------------------------------------------    LABS                         11.1   11.29 )-----------( 325      ( 24 Sep 2022 00:40 )             34.9     09-24    139  |  110<H>  |  16  ----------------------------<  109<H>  4.3   |  18<L>  |  1.08    Ca    8.5      24 Sep 2022 00:40  Phos  3.1     09-24  Mg     2.10     09-24      --------------------------------------------------------------------------------------

## 2022-09-24 NOTE — PROGRESS NOTE ADULT - PROBLEM SELECTOR PLAN 6
Monitor Na  Continue NaCl tabs and hypertonic saline Monitor Na  Liberalize sodium goals - 135-145  Continue NaCl tabs

## 2022-09-24 NOTE — PROGRESS NOTE ADULT - ATTENDING COMMENTS
Patient overnight tolerating diet. MRI and ct head x3 are stable. Weaned off nicardipene gtt however hyponatremic started on 3% saline.  N mentating well, neurologically intact, q4 hr neurochecks, on 3% saline, will monitor q6-8 Na  resp on room air  cv hemodynamically stable  gi on reg diet  gu/renal adequate uop, monitor na, salt tab, and 3% saline  heme vte ppx  id no abx  endo no changes  The patient is a critical care patient with life threatening hemodynamic and metabolic instability in SICU.  I have personally interviewed when possible and examined the patient, reviewed data and laboratory tests/x-rays and all pertinent electronic images.  I was physically present for the key portions of the evaluation and management (E/M) service provided.   The SICU team has a constant risk benefit analyzes discussion with the primary team, all consultants, House Staff and PA's on all decisions.  These diagnoses are unrelated to the surgical procedure noted above.  I meet with family if needed to get further history, discuss the case and make care decisions for this patient who might not be able to participate.  Time involved in performance of separately billable procedures was not counted toward my critical care time. There is no overlap.  I spent 55-75 minutes ( 0800Hrs-0915Hrs in AM/ 1600Hrs-1715Hrs in PM, or other time indicated) of critical care time for the diagnoses, assessment, plan and interventions.  This time excludes time spent on separate procedures and teaching.
Patient with basal ganglia hemorrhage from hypertension. Patient overnight lethargic repeat ct head this am stable.   N mentating at baseline, keppra for seizure ppx, q1 hr neurochecks, evd if hydrocephalus worsens, mri due today  resp on room air  cv on nicardipene gtt  gi regular diet  gu/renal ckd, renal u/s pending  heme vte ppx  id no abx  endo no changes    The patient is a critical care patient with life threatening hemodynamic and metabolic instability in SICU.  I have personally interviewed when possible and examined the patient, reviewed data and laboratory tests/x-rays and all pertinent electronic images.  I was physically present for the key portions of the evaluation and management (E/M) service provided.   The SICU team has a constant risk benefit analyzes discussion with the primary team, all consultants, House Staff and PA's on all decisions.  These diagnoses are unrelated to the surgical procedure noted above.  I meet with family if needed to get further history, discuss the case and make care decisions for this patient who might not be able to participate.  Time involved in performance of separately billable procedures was not counted toward my critical care time. There is no overlap.  I spent 55-75 minutes ( 0800Hrs-0915Hrs in AM/ 1600Hrs-1715Hrs in PM, or other time indicated) of critical care time for the diagnoses, assessment, plan and interventions.  This time excludes time spent on separate procedures and teaching.
Patient with intracranial bleed from uncontrolled hypertension. Patient requiring sicu for q1 hr neurochecks, and clevidipine gtt.  N mentating at baseline, some blurry vision, neurologically intact, will repeat ct head tonight, on keppra, maintain q1 hr neurochecks, monitor HTN,   resp on room air  cv on clevidipine, will resume home medications for htn, hold HCTz for hyponatremia  gi npo, ivf  gu/renal monitor na, repat lytes in afternoon  heme hold vte ppx  id no changes  endo no changes    The patient is a critical care patient with life threatening hemodynamic and metabolic instability in SICU.  I have personally interviewed when possible and examined the patient, reviewed data and laboratory tests/x-rays and all pertinent electronic images.  I was physically present for the key portions of the evaluation and management (E/M) service provided.   The SICU team has a constant risk benefit analyzes discussion with the primary team, all consultants, House Staff and PA's on all decisions.  These diagnoses are unrelated to the surgical procedure noted above.  I meet with family if needed to get further history, discuss the case and make care decisions for this patient who might not be able to participate.  Time involved in performance of separately billable procedures was not counted toward my critical care time. There is no overlap.  I spent 55-75 minutes ( 0800Hrs-0915Hrs in AM/ 1600Hrs-1715Hrs in PM, or other time indicated) of critical care time for the diagnoses, assessment, plan and interventions.  This time excludes time spent on separate procedures and teaching.
Patient overnight repeat ct head appears stable. Maintained on nicardipene gtt.  N mentating at baseline, complaining of head and neck pain. Neurosurgery consult appreciated, repeat ct head in am, continue on keppra, mri tomorrow  resp on room air  cv on nicardipene gtt, maitain sbp below 140, increase home po antihypertensives  gi cld, advance as tolerated  gu/renal ckd, f/u duplex, monitor na  heme vte ppx  id no abx  endo no changes    The patient is a critical care patient with life threatening hemodynamic and metabolic instability in SICU.  I have personally interviewed when possible and examined the patient, reviewed data and laboratory tests/x-rays and all pertinent electronic images.  I was physically present for the key portions of the evaluation and management (E/M) service provided.   The SICU team has a constant risk benefit analyzes discussion with the primary team, all consultants, House Staff and PA's on all decisions.  These diagnoses are unrelated to the surgical procedure noted above.  I meet with family if needed to get further history, discuss the case and make care decisions for this patient who might not be able to participate.  Time involved in performance of separately billable procedures was not counted toward my critical care time. There is no overlap.  I spent 55-75 minutes ( 0800Hrs-0915Hrs in AM/ 1600Hrs-1715Hrs in PM, or other time indicated) of critical care time for the diagnoses, assessment, plan and interventions.  This time excludes time spent on separate procedures and teaching.
Uncontrolled HTN  MEHDI    Cr better, now on aldactone. Will cont to monitor.
Patient with intracranial hemorrhage. Patient doing well, neuro checks intact. Na levels normal transition to 1.5 saline. BMP improved.    I have personally interviewed when possible and examined the patient, reviewed data and laboratory tests/x-rays and all pertinent electronic images.  I was physically present for the key portions of the evaluation and management (E/M) service provided.   The SICU team has a constant risk benefit analyzes discussion with the primary team, all consultants, House Staff and PA's on all decisions.  These diagnoses are unrelated to the surgical procedure noted above.  I meet with family if needed to get further history, discuss the case and make care decisions for this patient who might not be able to participate.  Time involved in performance of separately billable procedures was not counted toward my critical care time. There is no overlap.  I spent 30 minutes ( 0800Hrs-0915Hrs in AM/ 1600Hrs-1715Hrs in PM, or other time indicated) of critical care time for the diagnoses, assessment, plan and interventions.  This time excludes time spent on separate procedures and teaching.
Patient with stroke from intracranial bleed s/p ct head and mri with stable bleed, no worsening hydrocephalus.  N mentating at baseline, neurologically intact  resp on room air  cv on nicardipene gtt, started on home medications and po hydralazine, wean off nicardipene  gi reg diet  gu/renal monitor uop  heme vte ppx  id no abx  endo no changes    The patient is a critical care patient with life threatening hemodynamic and metabolic instability in SICU.  I have personally interviewed when possible and examined the patient, reviewed data and laboratory tests/x-rays and all pertinent electronic images.  I was physically present for the key portions of the evaluation and management (E/M) service provided.   The SICU team has a constant risk benefit analyzes discussion with the primary team, all consultants, House Staff and PA's on all decisions.  These diagnoses are unrelated to the surgical procedure noted above.  I meet with family if needed to get further history, discuss the case and make care decisions for this patient who might not be able to participate.  Time involved in performance of separately billable procedures was not counted toward my critical care time. There is no overlap.  I spent 55-75 minutes ( 0800Hrs-0915Hrs in AM/ 1600Hrs-1715Hrs in PM, or other time indicated) of critical care time for the diagnoses, assessment, plan and interventions.  This time excludes time spent on separate procedures and teaching.

## 2022-09-24 NOTE — PROGRESS NOTE ADULT - SUBJECTIVE AND OBJECTIVE BOX
HPI:  Patient is a 35 YO right handed male with PMH of HTN, HLD, who has not taken his prescribed medications for approximately 3 months. On 9/16, patient presented to [facility] ED with 2 day history of generalized body aches, nausea, fevers, and chills. He was treated symptomatically and advised to resume his antihypertensive agents. Patient was at the pharmacy earlier today filling his prescriptions when he suffered a syncopal episode, patient noted to have had an episode of urinary incontinence. Patient brought to ED by EMS, BP on arrival was 194/143. Patient C/O neck pain and blurry vision. Per patient's sister he has been complaining of fatigue.   (18 Sep 2022 21:07)    PAST MEDICAL & SURGICAL HISTORY:  High cholesterol  Hypertension    Headache improved  No issues overnight  ICU Vital Signs Last 24 Hrs  T(C): 36.8 (24 Sep 2022 00:00), Max: 37.3 (23 Sep 2022 08:00)  T(F): 98.3 (24 Sep 2022 00:00), Max: 99.2 (23 Sep 2022 08:00)  HR: 94 (24 Sep 2022 00:00) (92 - 106)  BP: 151/93 (24 Sep 2022 00:00) (122/64 - 166/105)  BP(mean): 111 (24 Sep 2022 00:00) (80 - 121)  ABP: --  ABP(mean): --  RR: 23 (24 Sep 2022 00:00) (17 - 31)  SpO2: 99% (24 Sep 2022 00:00) (95% - 100%)    O2 Parameters below as of 24 Sep 2022 00:00  Patient On (Oxygen Delivery Method): room air    AAO X 3  PERRLA, EOMI  CN 2-12 grossly intact  RODRIGUEZ strength 5/5  No dysmetria or drift    MEDICATIONS  (STANDING):  acetaminophen     Tablet .. 1000 milliGRAM(s) Oral every 6 hours  amLODIPine   Tablet 10 milliGRAM(s) Oral daily  heparin   Injectable 5000 Unit(s) SubCutaneous every 8 hours  hydrALAZINE 50 milliGRAM(s) Oral every 8 hours  influenza   Vaccine 0.5 milliLiter(s) IntraMuscular once  labetalol 300 milliGRAM(s) Oral every 8 hours  levETIRAcetam 500 milliGRAM(s) Oral two times a day  sodium chloride 3 Gram(s) Oral three times a day  sodium chloride 3%. 500 milliLiter(s) (30 mL/Hr) IV Continuous <Continuous>  sodium chloride 3%. 500 milliLiter(s) (30 mL/Hr) IV Continuous <Continuous>  valsartan 320 milliGRAM(s) Oral daily    MEDICATIONS  (PRN):  labetalol Injectable 10 milliGRAM(s) IV Push every 2 hours PRN Systolic blood pressure > 160  oxyCODONE    IR 5 milliGRAM(s) Oral every 4 hours PRN Moderate Pain (4 - 6)                          11.1   11.29 )-----------( 325      ( 24 Sep 2022 00:40 )             34.9     09-24    139  |  110<H>  |  16  ----------------------------<  109<H>  4.3   |  18<L>  |  1.08    Ca    8.5      24 Sep 2022 00:40  Phos  3.1     09-24  Mg     2.10     09-24

## 2022-09-24 NOTE — CHART NOTE - NSCHARTNOTEFT_GEN_A_CORE
Patient medically stable for surgical floor and does not require critical care needs at this time per SICU team with discussion with attending. Patient going to 4T [id number]. Patient signed out to neurosurgery resident.

## 2022-09-24 NOTE — PROGRESS NOTE ADULT - PROBLEM SELECTOR PROBLEM 6
Chronic kidney disease, unspecified CKD stage
Hyponatremia
Chronic kidney disease, unspecified CKD stage

## 2022-09-24 NOTE — PROGRESS NOTE ADULT - ASSESSMENT
37 YO male with Hx of HTN, noncompliant with prescribed medications, HLD brought to ED following a syncopal episode, markedly hypertensive on arrival to ED, with a left basal ganglia hemorrhage, intraventricular extension, and early hydrocephalus. Patient does not require neurosurgical intervention at this time, however he is at high risk of worsening hydrocephalus and may require placement of an external ventricular drain.    9/19: Alert and nonfocal exam but not as well oriented as on initial exam. BP controlled on nicardipine infusion. Repeat head CT this AM  9/20: Neurologically intact. SBP maintained<150, on nicardipine gtt. Increased headache yesterday with stable Head CT.   9/21: neuro intact, on nicardipine for SBP< 150, na 136 goal 145-150, CTH stable, awaiting mri brain.  9/22: neuro intact, CTH stable yesterday, mri brain done, Na 134 goal 145-150  9/23: Neurologically intact. SBP maintained<150, on oral medications, nicardipine discontinued. Na 133 on NaCl tabs and hypertonic saline infusion  9/24: Neurologically intact. SBP maintained<150, on oral medications, remains off nicardipine. Hyponatremia imroved, Na 139 on NaCl tabs and hypertonic saline infusion

## 2022-09-24 NOTE — PROGRESS NOTE ADULT - ASSESSMENT
ASSESSMENT: 36y M with uncontrolled HTN (non-compliant w/ meds), who presented for syncope and found to have L BG hemorrhage w/ associated IVH and hydrocephalus. SICU consulted for blood pressure control and q2hr neuro checks.     PLAN:  Neuro:   - AOx4   - q4hr neuro checks  - CTH 09/20 and 09/21 unchanged  - MRI/MRA demonstrates no acute changes  - Keppra 500mg bid  - Pain control w/ IV Tylenol and Oxy PRN    Respiratory:  - Saturating >92% on RA    Cardiovascular:  - Amlodipine 10 mg, Valsartan 320 mg, Labetalol 300 mg TID, Hydralazine 50 mg q8  - Labetalol 10 mg IV PRN for SBP > 160  - Maintain MAP > 65, SBP goal 120-150    Gastrointestinal:  - Regular diet    /Renal:   - NS 3% @ 30 cc/hr via 2 PIV  - salt tabs 3 grams TID  - Na goal 140-150  - Strict I&O's   - Replete electrolytes prn  - Renal duplex negative  - BMP's q8h     Hematologic:  - H/H stable  - VTE ppx: SCDs, heparin subQ    Infectious Disease:  - Negative BC from 9/23  - Monitor WBC  - No ABX    Endo:  - AMRITA, A1C 5.4% 09/18    Dispo: SICU     ASSESSMENT: 36y M with uncontrolled HTN (non-compliant w/ meds), who presented for syncope and found to have L BG hemorrhage w/ associated IVH and hydrocephalus. SICU consulted for blood pressure control and q2hr neuro checks.     PLAN:  Neuro:   - AOx4   - q4hr neuro checks  - CTH 09/20 and 09/21 unchanged  - MRI/MRA demonstrates no acute changes  - Keppra 500mg bid  - Pain control w/ IV Tylenol and Oxy PRN    Respiratory:  - Saturating >92% on RA    Cardiovascular:  - Amlodipine 10 mg, Valsartan 320 mg, Labetalol 300 mg TID, Hydralazine 50 mg q8  - Labetalol 10 mg IV PRN for SBP > 160  - Maintain MAP > 65, SBP goal 120-150    Gastrointestinal:  - Regular diet    /Renal:   - NS 3% @ 30 cc/hr via 2 PIV  - salt tabs 3 grams TID  - Na goal 140-150  - Strict I&O's   - Replete electrolytes prn  - Renal duplex negative  - BMP's q8h     Hematologic:  - H/H stable  - VTE ppx: SCDs, heparin subQ  - Upper extremity duplex R/O DVT    Infectious Disease:  - Negative BC from 9/23  - Monitor WBC  - No ABX    Endo:  - AMRITA, A1C 5.4% 09/18    Lines:  - PIV  - Midline    Dispo: SICU     ASSESSMENT: 36y M with uncontrolled HTN (non-compliant w/ meds), who presented for syncope and found to have L BG hemorrhage w/ associated IVH and hydrocephalus. SICU consulted for blood pressure control and q2hr neuro checks.     PLAN:  Neuro:   - AOx4   - q4hr neuro checks  - CTH 09/20 and 09/21 unchanged  - MRI/MRA demonstrates no acute changes  - Keppra 500mg bid  - Pain control w/ IV Tylenol and Oxy PRN    Respiratory:  - Saturating >92% on RA    Cardiovascular:  - Amlodipine 10 mg, Valsartan 320 mg, Labetalol 300 mg TID, Hydralazine 75mg q8  - Labetalol 10 mg IV PRN for SBP > 160  - Maintain MAP > 65, SBP goal 120-150    Gastrointestinal:  - Regular diet    /Renal:   - 1.5% sodium chloride @ 75cc/hr  - salt tabs 3 grams TID  - Na goal 135-145 per neurosx  - Strict I&O's   - Replete electrolytes prn  - Renal duplex negative  - BMP's q8h to monitor sodium    Hematologic:  - H/H stable  - VTE ppx: SCDs, heparin subQ  - Upper extremity duplex R/O DVT    Infectious Disease:  - Negative BC from 9/23  - Monitor WBC on trend fever curve     Endo:  - AMRITA, A1C 5.4% 09/18    Lines:  - PIV  - Midline    Dispo: listed for floor

## 2022-09-25 LAB
ANION GAP SERPL CALC-SCNC: 11 MMOL/L — SIGNIFICANT CHANGE UP (ref 7–14)
ANION GAP SERPL CALC-SCNC: 11 MMOL/L — SIGNIFICANT CHANGE UP (ref 7–14)
ANION GAP SERPL CALC-SCNC: 9 MMOL/L — SIGNIFICANT CHANGE UP (ref 7–14)
BUN SERPL-MCNC: 16 MG/DL — SIGNIFICANT CHANGE UP (ref 7–23)
BUN SERPL-MCNC: 17 MG/DL — SIGNIFICANT CHANGE UP (ref 7–23)
BUN SERPL-MCNC: 21 MG/DL — SIGNIFICANT CHANGE UP (ref 7–23)
CALCIUM SERPL-MCNC: 9 MG/DL — SIGNIFICANT CHANGE UP (ref 8.4–10.5)
CALCIUM SERPL-MCNC: 9.1 MG/DL — SIGNIFICANT CHANGE UP (ref 8.4–10.5)
CALCIUM SERPL-MCNC: 9.1 MG/DL — SIGNIFICANT CHANGE UP (ref 8.4–10.5)
CHLORIDE SERPL-SCNC: 104 MMOL/L — SIGNIFICANT CHANGE UP (ref 98–107)
CHLORIDE SERPL-SCNC: 104 MMOL/L — SIGNIFICANT CHANGE UP (ref 98–107)
CHLORIDE SERPL-SCNC: 105 MMOL/L — SIGNIFICANT CHANGE UP (ref 98–107)
CO2 SERPL-SCNC: 20 MMOL/L — LOW (ref 22–31)
CO2 SERPL-SCNC: 20 MMOL/L — LOW (ref 22–31)
CO2 SERPL-SCNC: 21 MMOL/L — LOW (ref 22–31)
CREAT SERPL-MCNC: 1.06 MG/DL — SIGNIFICANT CHANGE UP (ref 0.5–1.3)
CREAT SERPL-MCNC: 1.11 MG/DL — SIGNIFICANT CHANGE UP (ref 0.5–1.3)
CREAT SERPL-MCNC: 1.25 MG/DL — SIGNIFICANT CHANGE UP (ref 0.5–1.3)
EGFR: 77 ML/MIN/1.73M2 — SIGNIFICANT CHANGE UP
EGFR: 88 ML/MIN/1.73M2 — SIGNIFICANT CHANGE UP
EGFR: 93 ML/MIN/1.73M2 — SIGNIFICANT CHANGE UP
GLUCOSE SERPL-MCNC: 106 MG/DL — HIGH (ref 70–99)
GLUCOSE SERPL-MCNC: 106 MG/DL — HIGH (ref 70–99)
GLUCOSE SERPL-MCNC: 115 MG/DL — HIGH (ref 70–99)
HCT VFR BLD CALC: 35.1 % — LOW (ref 39–50)
HGB BLD-MCNC: 11.4 G/DL — LOW (ref 13–17)
MAGNESIUM SERPL-MCNC: 2 MG/DL — SIGNIFICANT CHANGE UP (ref 1.6–2.6)
MAGNESIUM SERPL-MCNC: 2.2 MG/DL — SIGNIFICANT CHANGE UP (ref 1.6–2.6)
MCHC RBC-ENTMCNC: 27.3 PG — SIGNIFICANT CHANGE UP (ref 27–34)
MCHC RBC-ENTMCNC: 32.5 GM/DL — SIGNIFICANT CHANGE UP (ref 32–36)
MCV RBC AUTO: 84 FL — SIGNIFICANT CHANGE UP (ref 80–100)
NRBC # BLD: 0 /100 WBCS — SIGNIFICANT CHANGE UP (ref 0–0)
NRBC # FLD: 0 K/UL — SIGNIFICANT CHANGE UP (ref 0–0)
PHOSPHATE SERPL-MCNC: 3.3 MG/DL — SIGNIFICANT CHANGE UP (ref 2.5–4.5)
PHOSPHATE SERPL-MCNC: 4.1 MG/DL — SIGNIFICANT CHANGE UP (ref 2.5–4.5)
PLATELET # BLD AUTO: 383 K/UL — SIGNIFICANT CHANGE UP (ref 150–400)
POTASSIUM SERPL-MCNC: 4.1 MMOL/L — SIGNIFICANT CHANGE UP (ref 3.5–5.3)
POTASSIUM SERPL-MCNC: 4.3 MMOL/L — SIGNIFICANT CHANGE UP (ref 3.5–5.3)
POTASSIUM SERPL-MCNC: 4.3 MMOL/L — SIGNIFICANT CHANGE UP (ref 3.5–5.3)
POTASSIUM SERPL-SCNC: 4.1 MMOL/L — SIGNIFICANT CHANGE UP (ref 3.5–5.3)
POTASSIUM SERPL-SCNC: 4.3 MMOL/L — SIGNIFICANT CHANGE UP (ref 3.5–5.3)
POTASSIUM SERPL-SCNC: 4.3 MMOL/L — SIGNIFICANT CHANGE UP (ref 3.5–5.3)
RBC # BLD: 4.18 M/UL — LOW (ref 4.2–5.8)
RBC # FLD: 14.2 % — SIGNIFICANT CHANGE UP (ref 10.3–14.5)
RENIN PLAS-CCNC: 11.95 NG/ML/HR — HIGH (ref 0.17–5.38)
SARS-COV-2 RNA SPEC QL NAA+PROBE: SIGNIFICANT CHANGE UP
SODIUM SERPL-SCNC: 135 MMOL/L — SIGNIFICANT CHANGE UP (ref 135–145)
WBC # BLD: 9.4 K/UL — SIGNIFICANT CHANGE UP (ref 3.8–10.5)
WBC # FLD AUTO: 9.4 K/UL — SIGNIFICANT CHANGE UP (ref 3.8–10.5)

## 2022-09-25 PROCEDURE — 99232 SBSQ HOSP IP/OBS MODERATE 35: CPT

## 2022-09-25 PROCEDURE — 99223 1ST HOSP IP/OBS HIGH 75: CPT

## 2022-09-25 RX ADMIN — Medication 75 MILLIGRAM(S): at 09:56

## 2022-09-25 RX ADMIN — Medication 1000 MILLIGRAM(S): at 20:12

## 2022-09-25 RX ADMIN — LEVETIRACETAM 500 MILLIGRAM(S): 250 TABLET, FILM COATED ORAL at 05:54

## 2022-09-25 RX ADMIN — VALSARTAN 320 MILLIGRAM(S): 80 TABLET ORAL at 05:54

## 2022-09-25 RX ADMIN — LEVETIRACETAM 500 MILLIGRAM(S): 250 TABLET, FILM COATED ORAL at 18:07

## 2022-09-25 RX ADMIN — HEPARIN SODIUM 5000 UNIT(S): 5000 INJECTION INTRAVENOUS; SUBCUTANEOUS at 05:53

## 2022-09-25 RX ADMIN — Medication 300 MILLIGRAM(S): at 05:54

## 2022-09-25 RX ADMIN — Medication 75 MILLIGRAM(S): at 01:35

## 2022-09-25 RX ADMIN — OXYCODONE HYDROCHLORIDE 5 MILLIGRAM(S): 5 TABLET ORAL at 20:12

## 2022-09-25 RX ADMIN — SODIUM CHLORIDE 3 GRAM(S): 9 INJECTION INTRAMUSCULAR; INTRAVENOUS; SUBCUTANEOUS at 14:49

## 2022-09-25 RX ADMIN — OXYCODONE HYDROCHLORIDE 5 MILLIGRAM(S): 5 TABLET ORAL at 10:14

## 2022-09-25 RX ADMIN — SODIUM CHLORIDE 75 MILLILITER(S): 5 INJECTION, SOLUTION INTRAVENOUS at 05:52

## 2022-09-25 RX ADMIN — OXYCODONE HYDROCHLORIDE 5 MILLIGRAM(S): 5 TABLET ORAL at 06:50

## 2022-09-25 RX ADMIN — Medication 1000 MILLIGRAM(S): at 06:50

## 2022-09-25 RX ADMIN — HEPARIN SODIUM 5000 UNIT(S): 5000 INJECTION INTRAVENOUS; SUBCUTANEOUS at 22:12

## 2022-09-25 RX ADMIN — AMLODIPINE BESYLATE 10 MILLIGRAM(S): 2.5 TABLET ORAL at 05:53

## 2022-09-25 RX ADMIN — Medication 1000 MILLIGRAM(S): at 20:57

## 2022-09-25 RX ADMIN — Medication 300 MILLIGRAM(S): at 22:12

## 2022-09-25 RX ADMIN — Medication 1000 MILLIGRAM(S): at 13:38

## 2022-09-25 RX ADMIN — SODIUM CHLORIDE 3 GRAM(S): 9 INJECTION INTRAMUSCULAR; INTRAVENOUS; SUBCUTANEOUS at 05:52

## 2022-09-25 RX ADMIN — Medication 75 MILLIGRAM(S): at 18:07

## 2022-09-25 RX ADMIN — HEPARIN SODIUM 5000 UNIT(S): 5000 INJECTION INTRAVENOUS; SUBCUTANEOUS at 13:38

## 2022-09-25 RX ADMIN — SODIUM CHLORIDE 3 GRAM(S): 9 INJECTION INTRAMUSCULAR; INTRAVENOUS; SUBCUTANEOUS at 22:12

## 2022-09-25 RX ADMIN — OXYCODONE HYDROCHLORIDE 5 MILLIGRAM(S): 5 TABLET ORAL at 10:44

## 2022-09-25 RX ADMIN — OXYCODONE HYDROCHLORIDE 5 MILLIGRAM(S): 5 TABLET ORAL at 20:57

## 2022-09-25 RX ADMIN — Medication 300 MILLIGRAM(S): at 13:38

## 2022-09-25 RX ADMIN — OXYCODONE HYDROCHLORIDE 5 MILLIGRAM(S): 5 TABLET ORAL at 05:52

## 2022-09-25 RX ADMIN — Medication 1000 MILLIGRAM(S): at 05:52

## 2022-09-25 NOTE — CONSULT NOTE ADULT - SUBJECTIVE AND OBJECTIVE BOX
HPI:   36yMale PMH  PAST MEDICAL & SURGICAL HISTORY:  High cholesterol      Hypertension          Review of Systems:   CONSTITUTIONAL: No fever, weight loss, or fatigue  EYES: No eye pain, visual disturbances, or discharge  ENMT:  No difficulty hearing, tinnitus, vertigo; No sinus or throat pain  NECK: No pain or stiffness  RESPIRATORY: No cough, wheezing, chills or hemoptysis; No shortness of breath  CARDIOVASCULAR: No chest pain, palpitations, dizziness, or leg swelling  GASTROINTESTINAL: No abdominal or epigastric pain. No nausea, vomiting, or hematemesis; No diarrhea or constipation. No melena or hematochezia.  GENITOURINARY: No dysuria, frequency, hematuria, or incontinence  NEUROLOGICAL: No headaches, memory loss, loss of strength, numbness, or tremors  SKIN: No itching, burning, rashes, or lesions   LYMPH NODES: No enlarged glands  ENDOCRINE: No heat or cold intolerance; No hair loss  MUSCULOSKELETAL: No joint pain or swelling; No muscle, back, or extremity pain  HEME/LYMPH: No easy bruising, or bleeding gums  ALLERGY AND IMMUNOLOGIC: No hives or eczema    Allergies    No Known Allergies    Intolerances        Social History:     FAMILY HISTORY:  FH: diabetes mellitus        MEDICATIONS  (STANDING):  acetaminophen     Tablet .. 1000 milliGRAM(s) Oral every 6 hours  amLODIPine   Tablet 10 milliGRAM(s) Oral daily  heparin   Injectable 5000 Unit(s) SubCutaneous every 8 hours  hydrALAZINE 75 milliGRAM(s) Oral every 8 hours  influenza   Vaccine 0.5 milliLiter(s) IntraMuscular once  labetalol 300 milliGRAM(s) Oral every 8 hours  levETIRAcetam 500 milliGRAM(s) Oral two times a day  sodium chloride 3 Gram(s) Oral three times a day  valsartan 320 milliGRAM(s) Oral daily    MEDICATIONS  (PRN):  labetalol Injectable 10 milliGRAM(s) IV Push every 2 hours PRN Systolic blood pressure > 160  oxyCODONE    IR 5 milliGRAM(s) Oral every 4 hours PRN Moderate Pain (4 - 6)      Vital Signs Last 24 Hrs  T(C): 38 (25 Sep 2022 14:00), Max: 38 (25 Sep 2022 14:00)  T(F): 100.4 (25 Sep 2022 14:00), Max: 100.4 (25 Sep 2022 14:00)  HR: 98 (25 Sep 2022 14:00) (93 - 99)  BP: 158/88 (25 Sep 2022 14:00) (124/70 - 158/88)  BP(mean): 115 (24 Sep 2022 16:00) (115 - 115)  RR: 18 (25 Sep 2022 14:00) (18 - 19)  SpO2: 100% (25 Sep 2022 14:00) (98% - 100%)    Parameters below as of 25 Sep 2022 14:00  Patient On (Oxygen Delivery Method): room air      CAPILLARY BLOOD GLUCOSE            PHYSICAL EXAM:  GENERAL: NAD, well-developed  HEAD:  Atraumatic, Normocephalic  EYES: EOMI, PERRLA, conjunctiva and sclera clear  NECK: Supple, No JVD  CHEST/LUNG: Clear to auscultation bilaterally; No wheeze  HEART: Regular rate and rhythm; No murmurs, rubs, or gallops  ABDOMEN: Soft, Nontender, Nondistended; Bowel sounds present  EXTREMITIES:  2+ Peripheral Pulses, No clubbing, cyanosis, or edema  PSYCH: AAOx3  NEUROLOGY: non-focal  SKIN: No rashes or lesions    LABS:                        11.4   9.40  )-----------( 383      ( 25 Sep 2022 06:00 )             35.1     09-25    135  |  105  |  16  ----------------------------<  106<H>  4.1   |  21<L>  |  1.06    Ca    9.0      25 Sep 2022 06:10  Phos  3.3     09-25  Mg     2.00     09-25                EKG(personally reviewed):    RADIOLOGY & ADDITIONAL TESTS:    Imaging Personally Reviewed:    Consultant(s) Notes Reviewed:      Care Discussed with Consultants/Other Providers:   HPI:   36M h/o essential HTN p/w L basal ganglia intraparenchymal hemorrhage in context of hypertensive emergency. Patient had not taken his anti-hypertensive medications for several months and had not seen his primary care physician in about a year. Patient states he had no "personal reasons" for not taking his medications or for not seeing his PMD. He was admitted to SICU for nicardipine infusion, q1h neuro checks, and hypertonic saline.     Patient states he was diagnosed with hypertension about 10 years ago. He reports no adverse effects to antihypertensive agents, or issues with affording medications.      PAST MEDICAL & SURGICAL HISTORY:  High cholesterol      Hypertension    Review of Systems:   CONSTITUTIONAL: No fever, weight loss, or fatigue  EYES: No eye pain, visual disturbances, or discharge  ENMT:  No difficulty hearing, tinnitus, vertigo; No sinus or throat pain  NECK: No pain or stiffness  RESPIRATORY: No cough, wheezing, chills or hemoptysis; No shortness of breath  CARDIOVASCULAR: No chest pain, palpitations, dizziness, or leg swelling  GASTROINTESTINAL: No abdominal or epigastric pain. No nausea, vomiting, or hematemesis; No diarrhea or constipation. No melena or hematochezia.  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 Lives w/his sister. Reports rare tobacco and rare alcohol use. Reports no illicit substance use.    FAMILY HISTORY:  Reports no FMH of disease      MEDICATIONS  (STANDING):  acetaminophen     Tablet .. 1000 milliGRAM(s) Oral every 6 hours  amLODIPine   Tablet 10 milliGRAM(s) Oral daily  heparin   Injectable 5000 Unit(s) SubCutaneous every 8 hours  hydrALAZINE 75 milliGRAM(s) Oral every 8 hours  influenza   Vaccine 0.5 milliLiter(s) IntraMuscular once  labetalol 300 milliGRAM(s) Oral every 8 hours  levETIRAcetam 500 milliGRAM(s) Oral two times a day  sodium chloride 3 Gram(s) Oral three times a day  valsartan 320 milliGRAM(s) Oral daily    MEDICATIONS  (PRN):  labetalol Injectable 10 milliGRAM(s) IV Push every 2 hours PRN Systolic blood pressure > 160  oxyCODONE    IR 5 milliGRAM(s) Oral every 4 hours PRN Moderate Pain (4 - 6)      Vital Signs Last 24 Hrs  T(C): 38 (25 Sep 2022 14:00), Max: 38 (25 Sep 2022 14:00)  T(F): 100.4 (25 Sep 2022 14:00), Max: 100.4 (25 Sep 2022 14:00)  HR: 98 (25 Sep 2022 14:00) (93 - 99)  BP: 158/88 (25 Sep 2022 14:00) (124/70 - 158/88)  BP(mean): 115 (24 Sep 2022 16:00) (115 - 115)  RR: 18 (25 Sep 2022 14:00) (18 - 19)  SpO2: 100% (25 Sep 2022 14:00) (98% - 100%)    Parameters below as of 25 Sep 2022 14:00  Patient On (Oxygen Delivery Method): room air      CAPILLARY BLOOD GLUCOSE            PHYSICAL EXAM:  GENERAL: NAD, well-developed, lying in bed  HEAD:  Atraumatic, Normocephalic  EYES: EOMI, PERRLA, conjunctiva and sclera clear  NECK: Supple, No JVD  CHEST/LUNG: Clear to auscultation bilaterally; No wheeze  HEART: Regular rate and rhythm; No murmurs, rubs, or gallops  ABDOMEN: Soft, Nontender, Nondistended; Bowel sounds present  EXTREMITIES:  2+ Peripheral Pulses, No clubbing, cyanosis, or edema  PSYCH: AAOx3  NEUROLOGY: non-focal  SKIN: No rashes or lesions    LABS:                        11.4   9.40  )-----------( 383      ( 25 Sep 2022 06:00 )             35.1     09-25    135  |  105  |  16  ----------------------------<  106<H>  4.1   |  21<L>  |  1.06    Ca    9.0      25 Sep 2022 06:10  Phos  3.3     09-25  Mg     2.00     09-25                EKG(personally reviewed):    RADIOLOGY & ADDITIONAL TESTS:    Imaging Personally Reviewed:    Consultant(s) Notes Reviewed:      Care Discussed with Consultants/Other Providers:

## 2022-09-25 NOTE — CONSULT NOTE ADULT - PROBLEM SELECTOR RECOMMENDATION 2
Patient presented w/hypertensive emergency and intracranial hemorrhage. Required nicardipine infusion in SICU. BP improved on current regimen.    Would advise addition of diuretic, such as HCTZ 25mg daily (patient's home dose), however, this agent can cause hyponatremia. Can start this once patient is ready for discharge.    Advise continuing current regimen as patient's blood pressure is improved but not yet optimal (regimen can be titrated further as outpatient): amlodipine 10mg daily, valsartan 320mg daily, hydralazine 75mg q8h, labetalol 300mg q8h.    Patient will require close outpatient follow-up on discharge.    Counseled patient on importance of adherence to antihypertensive regimen.

## 2022-09-25 NOTE — PROGRESS NOTE ADULT - PROBLEM SELECTOR PLAN 2
Neurologic checks Q4H  Maintain SBP<150  Liberalize sodium goals - 135-145  Follow up with stroke neurology

## 2022-09-25 NOTE — CONSULT NOTE ADULT - ASSESSMENT
36M h/o essential HTN p/w L basal ganglia intraparenchymal hemorrhage in context of hypertensive emergency. Medicine consulted for co-management.

## 2022-09-25 NOTE — PROGRESS NOTE ADULT - SUBJECTIVE AND OBJECTIVE BOX
HPI:  Patient is a 35 YO right handed male with PMH of HTN, HLD, who has not taken his prescribed medications for approximately 3 months. On 9/16, patient presented to [facility] ED with 2 day history of generalized body aches, nausea, fevers, and chills. He was treated symptomatically and advised to resume his antihypertensive agents. Patient was at the pharmacy earlier today filling his prescriptions when he suffered a syncopal episode, patient noted to have had an episode of urinary incontinence. Patient brought to ED by EMS, BP on arrival was 194/143. Patient C/O neck pain and blurry vision. Per patient's sister he has been complaining of fatigue.   (18 Sep 2022 21:07)    PAST MEDICAL & SURGICAL HISTORY:  High cholesterol  Hypertension    Patient transferred to floor  No issues overnight  Vital Signs Last 24 Hrs  T(C): 37.4 (24 Sep 2022 21:34), Max: 38 (24 Sep 2022 12:00)  T(F): 99.4 (24 Sep 2022 21:34), Max: 100.4 (24 Sep 2022 12:00)  HR: 98 (24 Sep 2022 21:34) (92 - 104)  BP: 141/89 (24 Sep 2022 21:34) (133/81 - 173/106)  BP(mean): 115 (24 Sep 2022 16:00) (96 - 123)  RR: 18 (24 Sep 2022 21:34) (15 - 23)  SpO2: 100% (24 Sep 2022 21:34) (97% - 100%)    Parameters below as of 24 Sep 2022 21:34  Patient On (Oxygen Delivery Method): room air    AAO X 3  PERRLA, EOMI  CN 2-12 grossly intact  RODRIGUEZ strength 5/5  No dysmetria or drift    MEDICATIONS  (STANDING):  acetaminophen     Tablet .. 1000 milliGRAM(s) Oral every 6 hours  amLODIPine   Tablet 10 milliGRAM(s) Oral daily  heparin   Injectable 5000 Unit(s) SubCutaneous every 8 hours  hydrALAZINE 75 milliGRAM(s) Oral every 8 hours  influenza   Vaccine 0.5 milliLiter(s) IntraMuscular once  labetalol 300 milliGRAM(s) Oral every 8 hours  levETIRAcetam 500 milliGRAM(s) Oral two times a day  sodium chloride 3 Gram(s) Oral three times a day  sodium chloride 1.5%. 500 milliLiter(s) (75 mL/Hr) IV Continuous <Continuous>  valsartan 320 milliGRAM(s) Oral daily    MEDICATIONS  (PRN):  labetalol Injectable 10 milliGRAM(s) IV Push every 2 hours PRN Systolic blood pressure > 160  oxyCODONE    IR 5 milliGRAM(s) Oral every 4 hours PRN Moderate Pain (4 - 6)                          11.1   11.29 )-----------( 325      ( 24 Sep 2022 00:40 )             34.9     09-24    137  |  105  |  15  ----------------------------<  112<H>  4.1   |  20<L>  |  1.13    Ca    8.7      24 Sep 2022 18:30  Phos  3.1     09-24  Mg     2.10     09-24

## 2022-09-25 NOTE — CONSULT NOTE ADULT - REASON FOR ADMISSION
Left basal ganglia IPH with intraventricular extension
IVH
Left basal ganglia IPH with intraventricular extension
Left basal ganglia IPH with intraventricular extension

## 2022-09-25 NOTE — CONSULT NOTE ADULT - PROBLEM SELECTOR RECOMMENDATION 9
Patient presented with IPH due to hypertensive emergency.  -s/p hypertonic saline  -BP control  -further care per primary team

## 2022-09-25 NOTE — PROGRESS NOTE ADULT - ASSESSMENT
37 YO male with Hx of HTN, noncompliant with prescribed medications, HLD brought to ED following a syncopal episode, markedly hypertensive on arrival to ED, with a left basal ganglia hemorrhage, intraventricular extension, and early hydrocephalus. Patient does not require neurosurgical intervention at this time, however he is at high risk of worsening hydrocephalus and may require placement of an external ventricular drain.    9/19: Alert and nonfocal exam but not as well oriented as on initial exam. BP controlled on nicardipine infusion. Repeat head CT this AM  9/20: Neurologically intact. SBP maintained<150, on nicardipine gtt. Increased headache yesterday with stable Head CT.   9/21: neuro intact, on nicardipine for SBP< 150, na 136 goal 145-150, CTH stable, awaiting mri brain.  9/22: neuro intact, CTH stable yesterday, mri brain done, Na 134 goal 145-150  9/23: Neurologically intact. SBP maintained<150, on oral medications, nicardipine discontinued. Na 133 on NaCl tabs and hypertonic saline infusion  9/24: Neurologically intact. SBP maintained<150, on oral medications, remains off nicardipine. Hyponatremia improved, Na 139 on NaCl tabs and hypertonic saline infusion  9/25: Neurologically intact. SBP maintained<150, on oral medications. Hyponatremia improved, Na 13 on NaCl tabs and 1.5% saline infusion. Transferred to floor

## 2022-09-26 ENCOUNTER — TRANSCRIPTION ENCOUNTER (OUTPATIENT)
Age: 37
End: 2022-09-26

## 2022-09-26 VITALS
DIASTOLIC BLOOD PRESSURE: 62 MMHG | HEART RATE: 92 BPM | TEMPERATURE: 99 F | RESPIRATION RATE: 16 BRPM | SYSTOLIC BLOOD PRESSURE: 132 MMHG | OXYGEN SATURATION: 100 %

## 2022-09-26 LAB
ANION GAP SERPL CALC-SCNC: 12 MMOL/L — SIGNIFICANT CHANGE UP (ref 7–14)
BUN SERPL-MCNC: 24 MG/DL — HIGH (ref 7–23)
CALCIUM SERPL-MCNC: 9.3 MG/DL — SIGNIFICANT CHANGE UP (ref 8.4–10.5)
CHLORIDE SERPL-SCNC: 105 MMOL/L — SIGNIFICANT CHANGE UP (ref 98–107)
CO2 SERPL-SCNC: 19 MMOL/L — LOW (ref 22–31)
CREAT SERPL-MCNC: 1.26 MG/DL — SIGNIFICANT CHANGE UP (ref 0.5–1.3)
EGFR: 76 ML/MIN/1.73M2 — SIGNIFICANT CHANGE UP
GLUCOSE SERPL-MCNC: 100 MG/DL — HIGH (ref 70–99)
POTASSIUM SERPL-MCNC: 4.1 MMOL/L — SIGNIFICANT CHANGE UP (ref 3.5–5.3)
POTASSIUM SERPL-SCNC: 4.1 MMOL/L — SIGNIFICANT CHANGE UP (ref 3.5–5.3)
SODIUM SERPL-SCNC: 136 MMOL/L — SIGNIFICANT CHANGE UP (ref 135–145)

## 2022-09-26 PROCEDURE — 99232 SBSQ HOSP IP/OBS MODERATE 35: CPT

## 2022-09-26 RX ORDER — AMLODIPINE BESYLATE 2.5 MG/1
1 TABLET ORAL
Qty: 14 | Refills: 0
Start: 2022-09-26 | End: 2022-10-09

## 2022-09-26 RX ORDER — VALSARTAN 80 MG/1
1 TABLET ORAL
Qty: 14 | Refills: 0
Start: 2022-09-26 | End: 2022-10-09

## 2022-09-26 RX ORDER — LEVETIRACETAM 250 MG/1
1 TABLET, FILM COATED ORAL
Qty: 28 | Refills: 0
Start: 2022-09-26 | End: 2022-10-09

## 2022-09-26 RX ORDER — OXYCODONE HYDROCHLORIDE 5 MG/1
1 TABLET ORAL
Qty: 12 | Refills: 0
Start: 2022-09-26 | End: 2022-09-28

## 2022-09-26 RX ORDER — HYDRALAZINE HCL 50 MG
3 TABLET ORAL
Qty: 126 | Refills: 0
Start: 2022-09-26 | End: 2022-10-09

## 2022-09-26 RX ORDER — LABETALOL HCL 100 MG
1 TABLET ORAL
Qty: 42 | Refills: 0
Start: 2022-09-26 | End: 2022-10-09

## 2022-09-26 RX ORDER — HYDROCHLOROTHIAZIDE 25 MG
25 TABLET ORAL DAILY
Refills: 0 | Status: DISCONTINUED | OUTPATIENT
Start: 2022-09-26 | End: 2022-09-26

## 2022-09-26 RX ADMIN — HEPARIN SODIUM 5000 UNIT(S): 5000 INJECTION INTRAVENOUS; SUBCUTANEOUS at 14:18

## 2022-09-26 RX ADMIN — Medication 1000 MILLIGRAM(S): at 14:18

## 2022-09-26 RX ADMIN — Medication 75 MILLIGRAM(S): at 02:05

## 2022-09-26 RX ADMIN — Medication 25 MILLIGRAM(S): at 12:37

## 2022-09-26 RX ADMIN — VALSARTAN 320 MILLIGRAM(S): 80 TABLET ORAL at 05:38

## 2022-09-26 RX ADMIN — Medication 1000 MILLIGRAM(S): at 08:28

## 2022-09-26 RX ADMIN — Medication 75 MILLIGRAM(S): at 10:03

## 2022-09-26 RX ADMIN — Medication 300 MILLIGRAM(S): at 14:18

## 2022-09-26 RX ADMIN — Medication 1000 MILLIGRAM(S): at 02:40

## 2022-09-26 RX ADMIN — AMLODIPINE BESYLATE 10 MILLIGRAM(S): 2.5 TABLET ORAL at 05:38

## 2022-09-26 RX ADMIN — SODIUM CHLORIDE 3 GRAM(S): 9 INJECTION INTRAMUSCULAR; INTRAVENOUS; SUBCUTANEOUS at 05:38

## 2022-09-26 RX ADMIN — Medication 300 MILLIGRAM(S): at 05:38

## 2022-09-26 RX ADMIN — LEVETIRACETAM 500 MILLIGRAM(S): 250 TABLET, FILM COATED ORAL at 05:39

## 2022-09-26 RX ADMIN — HEPARIN SODIUM 5000 UNIT(S): 5000 INJECTION INTRAVENOUS; SUBCUTANEOUS at 05:36

## 2022-09-26 RX ADMIN — SODIUM CHLORIDE 3 GRAM(S): 9 INJECTION INTRAMUSCULAR; INTRAVENOUS; SUBCUTANEOUS at 14:18

## 2022-09-26 RX ADMIN — Medication 1000 MILLIGRAM(S): at 02:05

## 2022-09-26 NOTE — PROGRESS NOTE ADULT - PROBLEM SELECTOR PLAN 1
Patient presented with IPH due to hypertensive emergency.  -s/p hypertonic saline  -BP control  -further care per primary team.

## 2022-09-26 NOTE — PROGRESS NOTE ADULT - SUBJECTIVE AND OBJECTIVE BOX
HPI:  Patient is a 35 YO right handed male with PMH of HTN, HLD, who has not taken his prescribed medications for approximately 3 months. On 9/16, patient presented to [facility] ED with 2 day history of generalized body aches, nausea, fevers, and chills. He was treated symptomatically and advised to resume his antihypertensive agents. Patient was at the pharmacy earlier today filling his prescriptions when he suffered a syncopal episode, patient noted to have had an episode of urinary incontinence. Patient brought to ED by EMS, BP on arrival was 194/143. Patient C/O neck pain and blurry vision. Per patient's sister he has been complaining of fatigue.   (18 Sep 2022 21:07)    PAST MEDICAL & SURGICAL HISTORY:  High cholesterol  Hypertension    No issues overnight  Hospitalist consulted for optimization of antihypertensives  Vital Signs Last 24 Hrs  T(C): 37.2 (26 Sep 2022 02:00), Max: 38 (25 Sep 2022 14:00)  T(F): 98.9 (26 Sep 2022 02:00), Max: 100.4 (25 Sep 2022 14:00)  HR: 91 (26 Sep 2022 02:00) (88 - 99)  BP: 107/61 (26 Sep 2022 02:00) (107/61 - 158/88)  BP(mean): --  RR: 18 (26 Sep 2022 02:00) (18 - 18)  SpO2: 98% (26 Sep 2022 02:00) (98% - 100%)    Parameters below as of 26 Sep 2022 02:00  Patient On (Oxygen Delivery Method): room air    AAO X 3  PERRLA, EOMI  CN 2-12 grossly intact  RODRIGUEZ strength 5/5  No dysmetria or drift    MEDICATIONS  (STANDING):  acetaminophen     Tablet .. 1000 milliGRAM(s) Oral every 6 hours  amLODIPine   Tablet 10 milliGRAM(s) Oral daily  heparin   Injectable 5000 Unit(s) SubCutaneous every 8 hours  hydrALAZINE 75 milliGRAM(s) Oral every 8 hours  influenza   Vaccine 0.5 milliLiter(s) IntraMuscular once  labetalol 300 milliGRAM(s) Oral every 8 hours  levETIRAcetam 500 milliGRAM(s) Oral two times a day  sodium chloride 3 Gram(s) Oral three times a day  valsartan 320 milliGRAM(s) Oral daily    MEDICATIONS  (PRN):  labetalol Injectable 10 milliGRAM(s) IV Push every 2 hours PRN Systolic blood pressure > 160  oxyCODONE    IR 5 milliGRAM(s) Oral every 4 hours PRN Moderate Pain (4 - 6)                          11.4   9.40  )-----------( 383      ( 25 Sep 2022 06:00 )             35.1     09-25    135  |  104  |  21  ----------------------------<  106<H>  4.3   |  20<L>  |  1.25    Ca    9.1      25 Sep 2022 21:33  Phos  4.1     09-25  Mg     2.20     09-25

## 2022-09-26 NOTE — PROGRESS NOTE ADULT - REASON FOR ADMISSION
Left basal ganglia IPH with intraventricular extension

## 2022-09-26 NOTE — PROGRESS NOTE ADULT - PROBLEM SELECTOR PROBLEM 1
Chronic kidney disease, unspecified CKD stage
Basal ganglia hemorrhage

## 2022-09-26 NOTE — DISCHARGE NOTE PROVIDER - CARE PROVIDER_API CALL
Isidro Esteban (MD; MS)  Unallocated  [address] [facility], [address] [place] [id number]  Phone: [phone]  Fax: [phone]  Follow Up Time: 1 week

## 2022-09-26 NOTE — PROGRESS NOTE ADULT - PROBLEM SELECTOR PROBLEM 5
HLD (hyperlipidemia)
Hyponatremia
Hyponatremia
Chronic kidney disease, unspecified CKD stage
HLD (hyperlipidemia)

## 2022-09-26 NOTE — PROGRESS NOTE ADULT - PROBLEM SELECTOR PLAN 4
Monitor BP  Goal SBP<150  Continue nicardipine
Monitor BP  Continue amlodipine, hydralazine, labetalol and valsartan
Chronic kidney disease, unspecified CKD stage.   Plan: Monitor BUN/Creatinine
Monitor BUN/Creatinine
Monitor BP  Continue amlodipine, hydralazine, labetalol and valsartan

## 2022-09-26 NOTE — PROGRESS NOTE ADULT - PROBLEM SELECTOR PROBLEM 3
Hydrocephalus
HTN (hypertension)
HTN (hypertension)
Hydrocephalus

## 2022-09-26 NOTE — DISCHARGE NOTE PROVIDER - HOSPITAL COURSE
37 YO male with Hx of HTN, noncompliant with prescribed medications, HLD brought to ED following a syncopal episode, markedly hypertensive on arrival to ED, with a left basal ganglia hemorrhage, intraventricular extension, and early hydrocephalus. Patient does not require neurosurgical intervention at this time, however he is at high risk of worsening hydrocephalus and may require placement of an external ventricular drain.    9/19: Alert and nonfocal exam but not as well oriented as on initial exam. BP controlled on nicardipine infusion. Repeat head CT this AM  9/20: Neurologically intact. SBP maintained<150, on nicardipine gtt. Increased headache yesterday with stable Head CT.   9/21: neuro intact, on nicardipine for SBP< 150, na 136 goal 145-150, CTH stable, awaiting mri brain.  9/22: neuro intact, CTH stable yesterday, mri brain done, Na 134 goal 145-150  9/23: Neurologically intact. SBP maintained<150, on oral medications, nicardipine discontinued. Na 133 on NaCl tabs and hypertonic saline infusion  9/24: Neurologically intact. SBP maintained<150, on oral medications, remains off nicardipine. Hyponatremia improved, Na 139 on NaCl tabs and hypertonic saline infusion  9/25: Neurologically intact. SBP maintained<150, on oral medications. Hyponatremia improved, Na 133 on NaCl tabs and 1.5% saline infusion. Transferred to floor  9/26: Neurologically intact. SBP maintained<150, on oral medications. Hyponatremia improved, Na 135 on NaCl tabs, off hypertonic saline

## 2022-09-26 NOTE — PROGRESS NOTE ADULT - PROBLEM SELECTOR PLAN 2
Patient presented w/hypertensive emergency and intracranial hemorrhage. Required nicardipine infusion in SICU. BP improved on current regimen.    Would advise addition of diuretic, such as HCTZ 25mg daily (patient's home dose), however, this agent can cause hyponatremia. Can start this once patient is ready for discharge.    Advise continuing current regimen as patient's blood pressure is improved but not yet optimal (regimen can be titrated further as outpatient): amlodipine 10mg daily, valsartan 320mg daily, hydralazine 75mg q8h, labetalol 300mg q8h.    Patient will require close outpatient follow-up on discharge, preferably within the next week for repeat BMP to monitor Na and for BP check

## 2022-09-26 NOTE — PROGRESS NOTE ADULT - PROBLEM SELECTOR PROBLEM 2
HTN (hypertension)
Intraventricular hemorrhage
Attending with

## 2022-09-26 NOTE — DISCHARGE NOTE NURSING/CASE MANAGEMENT/SOCIAL WORK - NSDCPEFALRISK_GEN_ALL_CORE
For information on Fall & Injury Prevention, visit: [email or web address] OR  [email or web address] OR  https://www.cdc.gov/steadi/patient.html

## 2022-09-26 NOTE — DISCHARGE NOTE NURSING/CASE MANAGEMENT/SOCIAL WORK - NSDCPNINST_GEN_ALL_CORE
Continue to take medications at home as prescribe. Call for follow up appointments. Return to emergency room for vision changes, numbness or tingling, facial droop, difficulty ambulating, increased headache, nausea or vomiting.

## 2022-09-26 NOTE — DISCHARGE NOTE PROVIDER - NSDCCPCAREPLAN_GEN_ALL_CORE_FT
PRINCIPAL DISCHARGE DIAGNOSIS  Diagnosis: Intraventricular hemorrhage  Assessment and Plan of Treatment:

## 2022-09-26 NOTE — PROGRESS NOTE ADULT - PROBLEM SELECTOR PROBLEM 4
Chronic kidney disease, unspecified CKD stage
HTN (hypertension)
Chronic kidney disease, unspecified CKD stage
HTN (hypertension)

## 2022-09-26 NOTE — PROGRESS NOTE ADULT - ASSESSMENT
35 YO male with Hx of HTN, noncompliant with prescribed medications, HLD brought to ED following a syncopal episode, markedly hypertensive on arrival to ED, with a left basal ganglia hemorrhage, intraventricular extension, and early hydrocephalus. Patient does not require neurosurgical intervention at this time, however he is at high risk of worsening hydrocephalus and may require placement of an external ventricular drain.    9/19: Alert and nonfocal exam but not as well oriented as on initial exam. BP controlled on nicardipine infusion. Repeat head CT this AM  9/20: Neurologically intact. SBP maintained<150, on nicardipine gtt. Increased headache yesterday with stable Head CT.   9/21: neuro intact, on nicardipine for SBP< 150, na 136 goal 145-150, CTH stable, awaiting mri brain.  9/22: neuro intact, CTH stable yesterday, mri brain done, Na 134 goal 145-150  9/23: Neurologically intact. SBP maintained<150, on oral medications, nicardipine discontinued. Na 133 on NaCl tabs and hypertonic saline infusion  9/24: Neurologically intact. SBP maintained<150, on oral medications, remains off nicardipine. Hyponatremia improved, Na 139 on NaCl tabs and hypertonic saline infusion  9/25: Neurologically intact. SBP maintained<150, on oral medications. Hyponatremia improved, Na 133 on NaCl tabs and 1.5% saline infusion. Transferred to floor  9/26: Neurologically intact. SBP maintained<150, on oral medications. Hyponatremia improved, Na 135 on NaCl tabs, off hypertonic saline

## 2022-09-26 NOTE — PROGRESS NOTE ADULT - PROVIDER SPECIALTY LIST ADULT
SICU
Nephrology
SICU
Neurology
Neurosurgery
SICU
SICU
Hospitalist
Neurosurgery

## 2022-09-26 NOTE — PROGRESS NOTE ADULT - SUBJECTIVE AND OBJECTIVE BOX
Patient is a 36y old  Male who presents with a chief complaint of Left basal ganglia IPH with intraventricular extension (26 Sep 2022 07:54)      SUBJECTIVE / OVERNIGHT EVENTS: No acute events overnight. Pt has no complaints     ADDITIONAL REVIEW OF SYSTEMS:    MEDICATIONS  (STANDING):  acetaminophen     Tablet .. 1000 milliGRAM(s) Oral every 6 hours  amLODIPine   Tablet 10 milliGRAM(s) Oral daily  heparin   Injectable 5000 Unit(s) SubCutaneous every 8 hours  hydrALAZINE 75 milliGRAM(s) Oral every 8 hours  hydrochlorothiazide 25 milliGRAM(s) Oral daily  labetalol 300 milliGRAM(s) Oral every 8 hours  levETIRAcetam 500 milliGRAM(s) Oral two times a day  sodium chloride 3 Gram(s) Oral three times a day  valsartan 320 milliGRAM(s) Oral daily    MEDICATIONS  (PRN):  labetalol Injectable 10 milliGRAM(s) IV Push every 2 hours PRN Systolic blood pressure > 160  oxyCODONE    IR 5 milliGRAM(s) Oral every 4 hours PRN Moderate Pain (4 - 6)      CAPILLARY BLOOD GLUCOSE        I&O's Summary    25 Sep 2022 07:01  -  26 Sep 2022 07:00  --------------------------------------------------------  IN: 1320 mL / OUT: 1600 mL / NET: -280 mL        PHYSICAL EXAM:  Vital Signs Last 24 Hrs  T(C): 36.6 (26 Sep 2022 12:40), Max: 38 (25 Sep 2022 14:00)  T(F): 97.9 (26 Sep 2022 12:40), Max: 100.4 (25 Sep 2022 14:00)  HR: 91 (26 Sep 2022 12:40) (88 - 98)  BP: 136/65 (26 Sep 2022 12:40) (107/61 - 158/88)  BP(mean): --  RR: 17 (26 Sep 2022 12:40) (17 - 18)  SpO2: 100% (26 Sep 2022 12:40) (98% - 100%)    Parameters below as of 26 Sep 2022 12:40  Patient On (Oxygen Delivery Method): room air      GENERAL: NAD, well-developed, lying in bed  HEAD:  Atraumatic, Normocephalic  EYES: EOMI, PERRLA, conjunctiva and sclera clear  NECK: Supple, No JVD  CHEST/LUNG: Clear to auscultation bilaterally; No wheeze  HEART: Regular rate and rhythm; No murmurs, rubs, or gallops  ABDOMEN: Soft, Nontender, Nondistended; Bowel sounds present  EXTREMITIES:  2+ Peripheral Pulses, No clubbing, cyanosis, or edema  PSYCH: AAOx3  NEUROLOGY: non-focal  SKIN: No rashes or lesions    LABS:                        11.4   9.40  )-----------( 383      ( 25 Sep 2022 06:00 )             35.1     09-26    136  |  105  |  24<H>  ----------------------------<  100<H>  4.1   |  19<L>  |  1.26    Ca    9.3      26 Sep 2022 05:48  Phos  4.1     09-25  Mg     2.20     09-25                  RADIOLOGY & ADDITIONAL TESTS:  Results Reviewed:   Imaging Personally Reviewed:  Electrocardiogram Personally Reviewed:    COORDINATION OF CARE:  Care Discussed with Consultants/Other Providers [Y/N]:  Prior or Outpatient Records Reviewed [Y/N]:

## 2022-09-26 NOTE — DISCHARGE NOTE PROVIDER - NSDCFUADDINST_GEN_ALL_CORE_FT
- Call to schedule a follow up appointment with neurosurgery to be seen within 1 week of hospital discharge, you will likely need a cerebral angiogram at some point, to be scheduled as an outpatient   - You may shower / bathe as you normally would without restrictions   - Continue with "activities of daily living" Ex: walking, eating, dressing  - Return to ED if worsening symptoms of severe headache not relieved by medication, nausea, vomiting, new weakness, or irritability   - No contact sports until cleared by neurosurgeon   - No NSAIDs such as motrin, ibuprofen, or aspirin/other blood thinners until cleared by neurosurgeon   - Please note it is normal to experience some dizziness or mild headache   - Be sure to get a good nights sleep and take naps during the day as needed, get maximal nighttime sleep, avoid screen time and loud music before bed    -  May return to work when cleared by neurosurgery at f/u visit  - you have been diagnosed with an intraparenchymal hemorrhage with intraventricular hemorrhage from uncontrolled blood pressure. It is extremely important to take your blood pressure medication as prescribed.     - Call to schedule a follow up appointment with neurosurgery to be seen within 1 week of hospital discharge, you will likely need a cerebral angiogram at some point, to be scheduled as an outpatient   - You may shower / bathe as you normally would without restrictions   - Continue with "activities of daily living" Ex: walking, eating, dressing  - Return to ED if worsening symptoms of severe headache not relieved by medication, nausea, vomiting, new weakness, or irritability   - No contact sports until cleared by neurosurgeon   - No NSAIDs such as motrin, ibuprofen, or aspirin/other blood thinners until cleared by neurosurgeon   - Please note it is normal to experience some dizziness or mild headache   - Be sure to get a good nights sleep and take naps during the day as needed, get maximal nighttime sleep, avoid screen time and loud music before bed    -  May return to work when cleared by neurosurgery at f/u visit  - you have been diagnosed with an intraparenchymal hemorrhage with intraventricular hemorrhage from uncontrolled blood pressure. It is extremely important to take your blood pressure medication as prescribed.    - you need to follow up with your primary care provider in 1 week for BP medication management

## 2022-09-26 NOTE — DISCHARGE NOTE NURSING/CASE MANAGEMENT/SOCIAL WORK - PATIENT PORTAL LINK FT
You can access the FollowMyHealth Patient Portal offered by [facility] by registering at the following website: [email or web address]. By joining [facility]’s FollowMyHealth portal, you will also be able to view your health information using other applications (apps) compatible with our system.

## 2022-09-26 NOTE — PROGRESS NOTE ADULT - PROBLEM SELECTOR PLAN 5
DASH diet
DASH diet when PO intake resumes  Start statin
BUN/Creatinine normalized  Follow up with nephrology as needed
Monitor serum Na  Continue NaCl tabs
DASH diet
DASH diet
Liberalize sodium goals  D/C 1.5% saline in AM
DASH diet

## 2022-09-26 NOTE — DISCHARGE NOTE PROVIDER - NSDCMRMEDTOKEN_GEN_ALL_CORE_FT
acetaminophen 325 mg oral tablet: 2 tab(s) orally every 4 hours, As needed, Temp greater or equal to 38C (100.4F)  amlodipine-valsartan 10 mg-320 mg oral tablet: 1 tab(s) orally once a day  hydroCHLOROthiazide 25 mg oral tablet: 1 tab(s) orally once a day  levETIRAcetam 500 mg oral tablet: 1 tab(s) orally 2 times a day  oxyCODONE 5 mg oral tablet: 1 tab(s) orally every 6 hours, As Needed -Moderate Pain (4 - 6) MDD:4   acetaminophen 325 mg oral tablet: 2 tab(s) orally every 4 hours, As needed, Temp greater or equal to 38C (100.4F)  amLODIPine 10 mg oral tablet: 1 tab(s) orally once a day  amlodipine-valsartan 10 mg-320 mg oral tablet: 1 tab(s) orally once a day  hydrALAZINE 25 mg oral tablet: 3 tab(s) orally every 8 hours  hydroCHLOROthiazide 25 mg oral tablet: 1 tab(s) orally once a day  labetalol 300 mg oral tablet: 1 tab(s) orally every 8 hours  levETIRAcetam 500 mg oral tablet: 1 tab(s) orally 2 times a day  oxyCODONE 5 mg oral tablet: 1 tab(s) orally every 6 hours, As Needed -Moderate Pain (4 - 6) MDD:4  valsartan 320 mg oral tablet: 1 tab(s) orally once a day

## 2022-09-29 PROBLEM — Z00.00 ENCOUNTER FOR PREVENTIVE HEALTH EXAMINATION: Status: ACTIVE | Noted: 2022-09-29

## 2022-09-30 ENCOUNTER — INPATIENT (INPATIENT)
Facility: HOSPITAL | Age: 37
LOS: 9 days | Discharge: HOME | End: 2022-10-10
Attending: STUDENT IN AN ORGANIZED HEALTH CARE EDUCATION/TRAINING PROGRAM | Admitting: STUDENT IN AN ORGANIZED HEALTH CARE EDUCATION/TRAINING PROGRAM

## 2022-09-30 VITALS
HEIGHT: 72.1 IN | RESPIRATION RATE: 16 BRPM | HEART RATE: 82 BPM | TEMPERATURE: 99 F | OXYGEN SATURATION: 100 % | DIASTOLIC BLOOD PRESSURE: 75 MMHG | SYSTOLIC BLOOD PRESSURE: 114 MMHG

## 2022-09-30 LAB
HCT VFR BLD CALC: 36.8 % — LOW (ref 39–50)
HGB BLD-MCNC: 11.6 G/DL — LOW (ref 13–17)
MCHC RBC-ENTMCNC: 26.9 PG — LOW (ref 27–34)
MCHC RBC-ENTMCNC: 31.5 GM/DL — LOW (ref 32–36)
MCV RBC AUTO: 85.4 FL — SIGNIFICANT CHANGE UP (ref 80–100)
NRBC # BLD: 0 /100 WBCS — SIGNIFICANT CHANGE UP (ref 0–0)
NRBC # FLD: 0 K/UL — SIGNIFICANT CHANGE UP (ref 0–0)
PLATELET # BLD AUTO: 606 K/UL — HIGH (ref 150–400)
RBC # BLD: 4.31 M/UL — SIGNIFICANT CHANGE UP (ref 4.2–5.8)
RBC # FLD: 13.4 % — SIGNIFICANT CHANGE UP (ref 10.3–14.5)
WBC # BLD: 8.62 K/UL — SIGNIFICANT CHANGE UP (ref 3.8–10.5)
WBC # FLD AUTO: 8.62 K/UL — SIGNIFICANT CHANGE UP (ref 3.8–10.5)

## 2022-09-30 PROCEDURE — 93010 ELECTROCARDIOGRAM REPORT: CPT

## 2022-09-30 PROCEDURE — 99285 EMERGENCY DEPT VISIT HI MDM: CPT | Mod: 25

## 2022-09-30 PROCEDURE — 73562 X-RAY EXAM OF KNEE 3: CPT | Mod: 26,LT

## 2022-09-30 RX ORDER — ACETAMINOPHEN 500 MG
975 TABLET ORAL ONCE
Refills: 0 | Status: COMPLETED | OUTPATIENT
Start: 2022-09-30 | End: 2022-09-30

## 2022-09-30 RX ADMIN — Medication 975 MILLIGRAM(S): at 23:40

## 2022-09-30 NOTE — ED PROVIDER NOTE - ATTENDING CONTRIBUTION TO CARE
I performed a face-to-face evaluation of the patient and performed a history and physical examination. I agree with the history and physical examination. If this was a PA visit, I personally saw the patient with the PA and performed a substantive portion of the visit including all aspects of the medical decision making.    Recently had a left intracerebral hemorrhage. This was due to elevated blood pressure, which, in turn, was due to not taking his blood pressure medicines. Since then, he has been taking his blood pressure medicines. Patient’s sister states that he has been feeling very weak; he nearly fell to the ground. She was able to catch him, although his left knee touched the ground. He complains of pain in the left knee. Will check CT brain to see if there’s been any extension of the recent intracerebral hemorrhage. Check x-ray of the left knee. Give Tylenol. Check EKG and troponin.

## 2022-09-30 NOTE — ED PROVIDER NOTE - CLINICAL SUMMARY MEDICAL DECISION MAKING FREE TEXT BOX
Nadia: Recently had a left intracerebral hemorrhage. This was due to elevated blood pressure, which, in turn, was due to not taking his blood pressure medicines. Since then, he has been taking his blood pressure medicines. Patient’s sister states that he has been feeling very weak; he nearly fell to the ground. She was able to catch him, although his left knee touched the ground. He complains of pain in the left knee. Will check CT brain to see if there’s been any extension of the recent intracerebral hemorrhage. Check x-ray of the left knee. Give Tylenol. Check EKG and troponin.

## 2022-09-30 NOTE — ED PROVIDER NOTE - PROGRESS NOTE DETAILS
Rosa Maria Pradhan, PGY-2, EM: s/w neurosurgery once CT performed. Per neurosurgery resident, ICH resolving. Appears improved from most recent imaging. States sx are likely unrelated. will admit for MEHDI.

## 2022-09-30 NOTE — ED ADULT TRIAGE NOTE - CHIEF COMPLAINT QUOTE
d/c 2 weeks ago from ICU for "brain bleed 2/2 HTN," c/o progressive neck pain since, states blurry vision "only when inside," hx HTN

## 2022-09-30 NOTE — ED PROVIDER NOTE - PHYSICAL EXAMINATION
Well appearing, well nourished, awake, alert, oriented to person, place, time/situation and in no apparent distress.    Airway patent    Eyes without scleral injection. No jaundice.    Strong pulse.    Respirations unlabored.    Abdomen soft, non-tender, no guarding.    Spine appears normal, range of motion is not limited, no muscle or joint tenderness. L knee: not red/warm/tender/swollen.    Alert and oriented, no gross motor or sensory deficits.    Skin normal color for race, warm, dry and intact. No evidence of rash.    No SI/HI.

## 2022-09-30 NOTE — ED ADULT NURSE NOTE - OBJECTIVE STATEMENT
36 yo male presents to ED from home c/o weakness, L knee pain and neck pain. Patient's sister at bedside reports patient was recently hospitalized for L ICH due to  increased BP. Patient reports he did not have any procedure for his ICH and it "just dissolved". Patient's sister reports he almost fell today due to weakness, hitting his L knee on the ground. Patient c/o L knee and neck pain at this time. Patient denies SOB, CP, nvd, fever/chills, sick contacts, falls/loc. Patient A&Ox3, breathing spontaneously, airway patent, bl clear lungs, abdomen nontender, +pulses, cap refill <2 seconds. Patient is neuro intact. MD at bedside, family updated on plan of care.

## 2022-09-30 NOTE — ED PROVIDER NOTE - OBJECTIVE STATEMENT
Nadia: Recently had a left intracerebral hemorrhage. This was due to elevated blood pressure, which, in turn, was due to not taking his blood pressure medicines. Since then, he has been taking his blood pressure medicines. Patient’s sister states that he has been feeling very weak; he nearly fell to the ground. She was able to catch him, although his left knee touched the ground. He complains of pain in the left knee.

## 2022-10-01 DIAGNOSIS — G93.6 CEREBRAL EDEMA: ICD-10-CM

## 2022-10-01 DIAGNOSIS — Z91.89 OTHER SPECIFIED PERSONAL RISK FACTORS, NOT ELSEWHERE CLASSIFIED: ICD-10-CM

## 2022-10-01 DIAGNOSIS — G93.5 COMPRESSION OF BRAIN: ICD-10-CM

## 2022-10-01 DIAGNOSIS — D75.839 THROMBOCYTOSIS, UNSPECIFIED: ICD-10-CM

## 2022-10-01 DIAGNOSIS — I10 ESSENTIAL (PRIMARY) HYPERTENSION: ICD-10-CM

## 2022-10-01 DIAGNOSIS — Z86.79 PERSONAL HISTORY OF OTHER DISEASES OF THE CIRCULATORY SYSTEM: ICD-10-CM

## 2022-10-01 DIAGNOSIS — R63.8 OTHER SYMPTOMS AND SIGNS CONCERNING FOOD AND FLUID INTAKE: ICD-10-CM

## 2022-10-01 DIAGNOSIS — E87.1 HYPO-OSMOLALITY AND HYPONATREMIA: ICD-10-CM

## 2022-10-01 DIAGNOSIS — N17.9 ACUTE KIDNEY FAILURE, UNSPECIFIED: ICD-10-CM

## 2022-10-01 DIAGNOSIS — R53.1 WEAKNESS: ICD-10-CM

## 2022-10-01 LAB
ALBUMIN SERPL ELPH-MCNC: 3.9 G/DL — SIGNIFICANT CHANGE UP (ref 3.3–5)
ALBUMIN SERPL ELPH-MCNC: 4.3 G/DL — SIGNIFICANT CHANGE UP (ref 3.3–5)
ALP SERPL-CCNC: 72 U/L — SIGNIFICANT CHANGE UP (ref 40–120)
ALP SERPL-CCNC: 78 U/L — SIGNIFICANT CHANGE UP (ref 40–120)
ALT FLD-CCNC: 47 U/L — HIGH (ref 4–41)
ALT FLD-CCNC: 47 U/L — HIGH (ref 4–41)
ANION GAP SERPL CALC-SCNC: 14 MMOL/L — SIGNIFICANT CHANGE UP (ref 7–14)
ANION GAP SERPL CALC-SCNC: 14 MMOL/L — SIGNIFICANT CHANGE UP (ref 7–14)
ANION GAP SERPL CALC-SCNC: 15 MMOL/L — HIGH (ref 7–14)
APPEARANCE UR: CLEAR — SIGNIFICANT CHANGE UP
APPEARANCE UR: CLEAR — SIGNIFICANT CHANGE UP
AST SERPL-CCNC: 25 U/L — SIGNIFICANT CHANGE UP (ref 4–40)
AST SERPL-CCNC: 25 U/L — SIGNIFICANT CHANGE UP (ref 4–40)
BACTERIA # UR AUTO: NEGATIVE — SIGNIFICANT CHANGE UP
BACTERIA # UR AUTO: NEGATIVE — SIGNIFICANT CHANGE UP
BASE EXCESS BLDV CALC-SCNC: -4.7 MMOL/L — LOW (ref -2–3)
BILIRUB SERPL-MCNC: 0.3 MG/DL — SIGNIFICANT CHANGE UP (ref 0.2–1.2)
BILIRUB SERPL-MCNC: 0.4 MG/DL — SIGNIFICANT CHANGE UP (ref 0.2–1.2)
BILIRUB UR-MCNC: NEGATIVE — SIGNIFICANT CHANGE UP
BILIRUB UR-MCNC: NEGATIVE — SIGNIFICANT CHANGE UP
BUN SERPL-MCNC: 50 MG/DL — HIGH (ref 7–23)
BUN SERPL-MCNC: 52 MG/DL — HIGH (ref 7–23)
BUN SERPL-MCNC: 54 MG/DL — HIGH (ref 7–23)
CA-I SERPL-SCNC: 1.28 MMOL/L — SIGNIFICANT CHANGE UP (ref 1.15–1.33)
CALCIUM SERPL-MCNC: 10 MG/DL — SIGNIFICANT CHANGE UP (ref 8.4–10.5)
CALCIUM SERPL-MCNC: 10.3 MG/DL — SIGNIFICANT CHANGE UP (ref 8.4–10.5)
CALCIUM SERPL-MCNC: 9.5 MG/DL — SIGNIFICANT CHANGE UP (ref 8.4–10.5)
CHLORIDE BLDV-SCNC: 100 MMOL/L — SIGNIFICANT CHANGE UP (ref 96–108)
CHLORIDE SERPL-SCNC: 100 MMOL/L — SIGNIFICANT CHANGE UP (ref 98–107)
CHLORIDE SERPL-SCNC: 95 MMOL/L — LOW (ref 98–107)
CHLORIDE SERPL-SCNC: 97 MMOL/L — LOW (ref 98–107)
CO2 BLDV-SCNC: 21.5 MMOL/L — LOW (ref 22–26)
CO2 SERPL-SCNC: 19 MMOL/L — LOW (ref 22–31)
CO2 SERPL-SCNC: 19 MMOL/L — LOW (ref 22–31)
CO2 SERPL-SCNC: 21 MMOL/L — LOW (ref 22–31)
COLOR SPEC: YELLOW — SIGNIFICANT CHANGE UP
COLOR SPEC: YELLOW — SIGNIFICANT CHANGE UP
CREAT ?TM UR-MCNC: 190 MG/DL — SIGNIFICANT CHANGE UP
CREAT ?TM UR-MCNC: 209 MG/DL — SIGNIFICANT CHANGE UP
CREAT SERPL-MCNC: 2.35 MG/DL — HIGH (ref 0.5–1.3)
CREAT SERPL-MCNC: 2.39 MG/DL — HIGH (ref 0.5–1.3)
CREAT SERPL-MCNC: 2.75 MG/DL — HIGH (ref 0.5–1.3)
DIFF PNL FLD: NEGATIVE — SIGNIFICANT CHANGE UP
DIFF PNL FLD: NEGATIVE — SIGNIFICANT CHANGE UP
EGFR: 30 ML/MIN/1.73M2 — LOW
EGFR: 35 ML/MIN/1.73M2 — LOW
EGFR: 36 ML/MIN/1.73M2 — LOW
EPI CELLS # UR: 1 /HPF — SIGNIFICANT CHANGE UP (ref 0–5)
EPI CELLS # UR: 1 /HPF — SIGNIFICANT CHANGE UP (ref 0–5)
FLUAV AG NPH QL: SIGNIFICANT CHANGE UP
FLUBV AG NPH QL: SIGNIFICANT CHANGE UP
GAS PNL BLDV: 131 MMOL/L — LOW (ref 136–145)
GAS PNL BLDV: SIGNIFICANT CHANGE UP
GLUCOSE BLDV-MCNC: 105 MG/DL — HIGH (ref 70–99)
GLUCOSE SERPL-MCNC: 100 MG/DL — HIGH (ref 70–99)
GLUCOSE SERPL-MCNC: 101 MG/DL — HIGH (ref 70–99)
GLUCOSE SERPL-MCNC: 98 MG/DL — SIGNIFICANT CHANGE UP (ref 70–99)
GLUCOSE UR QL: NEGATIVE — SIGNIFICANT CHANGE UP
GLUCOSE UR QL: NEGATIVE — SIGNIFICANT CHANGE UP
HCO3 BLDV-SCNC: 20 MMOL/L — LOW (ref 22–29)
HCT VFR BLD CALC: 37.2 % — LOW (ref 39–50)
HCT VFR BLDA CALC: 35 % — LOW (ref 39–51)
HGB BLD CALC-MCNC: 11.8 G/DL — LOW (ref 13–17)
HGB BLD-MCNC: 11.4 G/DL — LOW (ref 13–17)
HYALINE CASTS # UR AUTO: 2 /LPF — SIGNIFICANT CHANGE UP (ref 0–7)
HYALINE CASTS # UR AUTO: 9 /LPF — HIGH (ref 0–7)
KETONES UR-MCNC: NEGATIVE — SIGNIFICANT CHANGE UP
KETONES UR-MCNC: NEGATIVE — SIGNIFICANT CHANGE UP
LACTATE BLDV-MCNC: 0.9 MMOL/L — SIGNIFICANT CHANGE UP (ref 0.5–2)
LEUKOCYTE ESTERASE UR-ACNC: NEGATIVE — SIGNIFICANT CHANGE UP
LEUKOCYTE ESTERASE UR-ACNC: NEGATIVE — SIGNIFICANT CHANGE UP
MAGNESIUM SERPL-MCNC: 2.5 MG/DL — SIGNIFICANT CHANGE UP (ref 1.6–2.6)
MCHC RBC-ENTMCNC: 27.1 PG — SIGNIFICANT CHANGE UP (ref 27–34)
MCHC RBC-ENTMCNC: 30.6 GM/DL — LOW (ref 32–36)
MCV RBC AUTO: 88.6 FL — SIGNIFICANT CHANGE UP (ref 80–100)
NITRITE UR-MCNC: NEGATIVE — SIGNIFICANT CHANGE UP
NITRITE UR-MCNC: NEGATIVE — SIGNIFICANT CHANGE UP
NRBC # BLD: 0 /100 WBCS — SIGNIFICANT CHANGE UP (ref 0–0)
NRBC # FLD: 0 K/UL — SIGNIFICANT CHANGE UP (ref 0–0)
OSMOLALITY SERPL: 288 MOSM/KG — SIGNIFICANT CHANGE UP (ref 275–295)
OSMOLALITY UR: 656 MOSM/KG — SIGNIFICANT CHANGE UP (ref 50–1200)
PCO2 BLDV: 37 MMHG — LOW (ref 42–55)
PH BLDV: 7.35 — SIGNIFICANT CHANGE UP (ref 7.32–7.43)
PH UR: 5.5 — SIGNIFICANT CHANGE UP (ref 5–8)
PH UR: 5.5 — SIGNIFICANT CHANGE UP (ref 5–8)
PHOSPHATE SERPL-MCNC: 5.7 MG/DL — HIGH (ref 2.5–4.5)
PLATELET # BLD AUTO: 595 K/UL — HIGH (ref 150–400)
PO2 BLDV: 81 MMHG — SIGNIFICANT CHANGE UP
POTASSIUM BLDV-SCNC: 4.3 MMOL/L — SIGNIFICANT CHANGE UP (ref 3.5–5.1)
POTASSIUM SERPL-MCNC: 4.3 MMOL/L — SIGNIFICANT CHANGE UP (ref 3.5–5.3)
POTASSIUM SERPL-MCNC: 4.6 MMOL/L — SIGNIFICANT CHANGE UP (ref 3.5–5.3)
POTASSIUM SERPL-MCNC: 4.9 MMOL/L — SIGNIFICANT CHANGE UP (ref 3.5–5.3)
POTASSIUM SERPL-SCNC: 4.3 MMOL/L — SIGNIFICANT CHANGE UP (ref 3.5–5.3)
POTASSIUM SERPL-SCNC: 4.6 MMOL/L — SIGNIFICANT CHANGE UP (ref 3.5–5.3)
POTASSIUM SERPL-SCNC: 4.9 MMOL/L — SIGNIFICANT CHANGE UP (ref 3.5–5.3)
PROT SERPL-MCNC: 8.2 G/DL — SIGNIFICANT CHANGE UP (ref 6–8.3)
PROT SERPL-MCNC: 8.5 G/DL — HIGH (ref 6–8.3)
PROT UR-MCNC: ABNORMAL
PROT UR-MCNC: ABNORMAL
RBC # BLD: 4.2 M/UL — SIGNIFICANT CHANGE UP (ref 4.2–5.8)
RBC # FLD: 13.6 % — SIGNIFICANT CHANGE UP (ref 10.3–14.5)
RBC CASTS # UR COMP ASSIST: 1 /HPF — SIGNIFICANT CHANGE UP (ref 0–4)
RBC CASTS # UR COMP ASSIST: 2 /HPF — SIGNIFICANT CHANGE UP (ref 0–4)
RSV RNA NPH QL NAA+NON-PROBE: SIGNIFICANT CHANGE UP
SAO2 % BLDV: 96.4 % — SIGNIFICANT CHANGE UP
SARS-COV-2 RNA SPEC QL NAA+PROBE: SIGNIFICANT CHANGE UP
SODIUM SERPL-SCNC: 130 MMOL/L — LOW (ref 135–145)
SODIUM SERPL-SCNC: 131 MMOL/L — LOW (ref 135–145)
SODIUM SERPL-SCNC: 133 MMOL/L — LOW (ref 135–145)
SODIUM UR-SCNC: 47 MMOL/L — SIGNIFICANT CHANGE UP
SODIUM UR-SCNC: 50 MMOL/L — SIGNIFICANT CHANGE UP
SP GR SPEC: 1.02 — SIGNIFICANT CHANGE UP (ref 1.01–1.05)
SP GR SPEC: 1.02 — SIGNIFICANT CHANGE UP (ref 1.01–1.05)
TROPONIN T, HIGH SENSITIVITY RESULT: 22 NG/L — SIGNIFICANT CHANGE UP
UROBILINOGEN FLD QL: SIGNIFICANT CHANGE UP
UROBILINOGEN FLD QL: SIGNIFICANT CHANGE UP
UUN UR-MCNC: 909.9 MG/DL — SIGNIFICANT CHANGE UP
WBC # BLD: 6.35 K/UL — SIGNIFICANT CHANGE UP (ref 3.8–10.5)
WBC # FLD AUTO: 6.35 K/UL — SIGNIFICANT CHANGE UP (ref 3.8–10.5)
WBC UR QL: 1 /HPF — SIGNIFICANT CHANGE UP (ref 0–5)
WBC UR QL: 2 /HPF — SIGNIFICANT CHANGE UP (ref 0–5)

## 2022-10-01 PROCEDURE — 12345: CPT | Mod: NC

## 2022-10-01 PROCEDURE — 70450 CT HEAD/BRAIN W/O DYE: CPT | Mod: 26,MA

## 2022-10-01 PROCEDURE — 76770 US EXAM ABDO BACK WALL COMP: CPT | Mod: 26

## 2022-10-01 PROCEDURE — 99223 1ST HOSP IP/OBS HIGH 75: CPT

## 2022-10-01 PROCEDURE — 99221 1ST HOSP IP/OBS SF/LOW 40: CPT

## 2022-10-01 PROCEDURE — 99222 1ST HOSP IP/OBS MODERATE 55: CPT

## 2022-10-01 RX ORDER — LABETALOL HCL 100 MG
300 TABLET ORAL EVERY 8 HOURS
Refills: 0 | Status: DISCONTINUED | OUTPATIENT
Start: 2022-10-01 | End: 2022-10-10

## 2022-10-01 RX ORDER — SODIUM CHLORIDE 9 MG/ML
1000 INJECTION INTRAMUSCULAR; INTRAVENOUS; SUBCUTANEOUS ONCE
Refills: 0 | Status: COMPLETED | OUTPATIENT
Start: 2022-10-01 | End: 2022-10-01

## 2022-10-01 RX ORDER — AMLODIPINE BESYLATE 2.5 MG/1
10 TABLET ORAL DAILY
Refills: 0 | Status: DISCONTINUED | OUTPATIENT
Start: 2022-10-01 | End: 2022-10-10

## 2022-10-01 RX ORDER — HYDRALAZINE HCL 50 MG
75 TABLET ORAL EVERY 8 HOURS
Refills: 0 | Status: DISCONTINUED | OUTPATIENT
Start: 2022-10-01 | End: 2022-10-10

## 2022-10-01 RX ORDER — LEVETIRACETAM 250 MG/1
500 TABLET, FILM COATED ORAL
Refills: 0 | Status: DISCONTINUED | OUTPATIENT
Start: 2022-10-01 | End: 2022-10-10

## 2022-10-01 RX ORDER — SODIUM CHLORIDE 5 G/100ML
500 INJECTION, SOLUTION INTRAVENOUS
Refills: 0 | Status: DISCONTINUED | OUTPATIENT
Start: 2022-10-01 | End: 2022-10-05

## 2022-10-01 RX ORDER — ACETAMINOPHEN 500 MG
650 TABLET ORAL EVERY 6 HOURS
Refills: 0 | Status: DISCONTINUED | OUTPATIENT
Start: 2022-10-01 | End: 2022-10-10

## 2022-10-01 RX ADMIN — Medication 650 MILLIGRAM(S): at 18:06

## 2022-10-01 RX ADMIN — LEVETIRACETAM 500 MILLIGRAM(S): 250 TABLET, FILM COATED ORAL at 07:09

## 2022-10-01 RX ADMIN — SODIUM CHLORIDE 1000 MILLILITER(S): 9 INJECTION INTRAMUSCULAR; INTRAVENOUS; SUBCUTANEOUS at 03:49

## 2022-10-01 RX ADMIN — LEVETIRACETAM 500 MILLIGRAM(S): 250 TABLET, FILM COATED ORAL at 17:01

## 2022-10-01 RX ADMIN — AMLODIPINE BESYLATE 10 MILLIGRAM(S): 2.5 TABLET ORAL at 09:31

## 2022-10-01 RX ADMIN — SODIUM CHLORIDE 30 MILLILITER(S): 5 INJECTION, SOLUTION INTRAVENOUS at 14:30

## 2022-10-01 RX ADMIN — Medication 650 MILLIGRAM(S): at 17:00

## 2022-10-01 RX ADMIN — Medication 650 MILLIGRAM(S): at 11:20

## 2022-10-01 RX ADMIN — Medication 650 MILLIGRAM(S): at 10:13

## 2022-10-01 RX ADMIN — Medication 75 MILLIGRAM(S): at 17:01

## 2022-10-01 RX ADMIN — Medication 300 MILLIGRAM(S): at 17:01

## 2022-10-01 RX ADMIN — Medication 75 MILLIGRAM(S): at 07:12

## 2022-10-01 RX ADMIN — Medication 300 MILLIGRAM(S): at 09:31

## 2022-10-01 NOTE — CONSULT NOTE ADULT - SUBJECTIVE AND OBJECTIVE BOX
NEUROSURGERY CONSULT    Consulted for: headache,  h/o L basal ganglia hemorrhage    HPI: 37 year old M PMH HTN with recent admission for left basal ganglia hemorrhage, intraventricular extension, and early hydrocephalus. Patient presenting with headaches. Reports generalized weakness and was presyncopal during this time. He has been taking his medications as prescribed. Denies fevers, chills, nausea, dysuria, cough, LE edema, or sob.     ED course: /71 and creatinine of 2.39  EKG no acute ST elevation or pathologic Q waves. MO and Qtc wnl.   CT head noncon w/ decreased ICH, but increased l. basal ganglia edema.   Stable 6 mm left-to-right subfalcine herniation. (01 Oct 2022 04:58)    RADIOLOGY:   < from: CT Head No Cont (10.01.22 @ 02:57) >  IMPRESSION:    1. Worsening edema in the left basal ganglia extending into the internal   capsule, in spite of significant decrease in size of acute hematoma.   Evolving infarct is possible. Consider repeat MRI.  2. Small acute hemorrhage in the head of the left caudate, significantly   decreased in size compared to the prior. Resolution of previously seen   intraventricular hemorrhage.  3. Stable 6 mm left-to-right subfalcine herniation.    MEDS:  acetaminophen     Tablet .. 650 milliGRAM(s) Oral every 6 hours PRN  amLODIPine   Tablet 10 milliGRAM(s) Oral daily  hydrALAZINE 75 milliGRAM(s) Oral every 8 hours  labetalol 300 milliGRAM(s) Oral every 8 hours  levETIRAcetam 500 milliGRAM(s) Oral two times a day      PHYSICAL EXAM:  Vital Signs Last 24 Hrs  T(C): 36.5 (01 Oct 2022 06:53), Max: 37.1 (30 Sep 2022 20:43)  T(F): 97.7 (01 Oct 2022 06:53), Max: 98.8 (30 Sep 2022 20:43)  HR: 82 (01 Oct 2022 06:53) (79 - 90)  BP: 137/86 (01 Oct 2022 06:53) (114/75 - 137/86)  BP(mean): 99 (01 Oct 2022 06:53) (99 - 99)  RR: 16 (01 Oct 2022 06:53) (16 - 16)  SpO2: 100% (01 Oct 2022 06:53) (97% - 100%)    Parameters below as of 01 Oct 2022 06:53  Patient On (Oxygen Delivery Method): room air        LABS:                        11.6   8.62  )-----------( 606      ( 30 Sep 2022 23:20 )             36.8     10-01    130<L>  |  95<L>  |  54<H>  ----------------------------<  98  4.3   |  21<L>  |  2.75<H>    Ca    10.3      01 Oct 2022 06:21  Phos  5.7     10-01  Mg     2.50     10-01    TPro  8.5<H>  /  Alb  4.3  /  TBili  0.3  /  DBili  x   /  AST  25  /  ALT  47<H>  /  AlkPhos  78  10-01

## 2022-10-01 NOTE — PROGRESS NOTE ADULT - SUBJECTIVE AND OBJECTIVE BOX
Patient is a 37y old  Male who presents with a chief complaint of Headache (01 Oct 2022 11:42)      SUBJECTIVE / OVERNIGHT EVENTS: Pt seen and examined at 11:50am, no overnight events, pt denies any headache, vision changes, weakness, nausea, vomiting, or any other complaints. Refused ruvalcaba cath, says that he is voiding fine and does not want it despite explaining the importance of it. No other new issues reported.    MEDICATIONS  (STANDING):  amLODIPine   Tablet 10 milliGRAM(s) Oral daily  hydrALAZINE 75 milliGRAM(s) Oral every 8 hours  labetalol 300 milliGRAM(s) Oral every 8 hours  levETIRAcetam 500 milliGRAM(s) Oral two times a day  sodium chloride 3%. 500 milliLiter(s) (30 mL/Hr) IV Continuous <Continuous>    MEDICATIONS  (PRN):  acetaminophen     Tablet .. 650 milliGRAM(s) Oral every 6 hours PRN Temp greater or equal to 38C (100.4F), Mild Pain (1 - 3)      Vital Signs Last 24 Hrs  T(C): 36.4 (01 Oct 2022 14:28), Max: 37.1 (30 Sep 2022 20:43)  T(F): 97.6 (01 Oct 2022 14:28), Max: 98.8 (30 Sep 2022 20:43)  HR: 80 (01 Oct 2022 14:28) (79 - 90)  BP: 121/76 (01 Oct 2022 14:28) (114/75 - 137/86)  BP(mean): 99 (01 Oct 2022 06:53) (99 - 99)  RR: 17 (01 Oct 2022 14:28) (16 - 17)  SpO2: 100% (01 Oct 2022 14:28) (97% - 100%)    Parameters below as of 01 Oct 2022 14:28  Patient On (Oxygen Delivery Method): room air      CAPILLARY BLOOD GLUCOSE        I&O's Summary    01 Oct 2022 07:01  -  01 Oct 2022 15:26  --------------------------------------------------------  IN: 400 mL / OUT: 595 mL / NET: -195 mL        PHYSICAL EXAM:  GENERAL: NAD, well-developed  CHEST/LUNG: Clear to auscultation bilaterally; No wheeze  HEART: Regular rate and rhythm  ABDOMEN: Soft, Nontender, Nondistended  EXTREMITIES: no LE edema  PSYCH: Calm  NEUROLOGY: AAOx3  SKIN: No rashes or lesions    LABS:                        11.4   6.35  )-----------( 595      ( 01 Oct 2022 10:59 )             37.2     10-    130<L>  |  95<L>  |  54<H>  ----------------------------<  98  4.3   |  21<L>  |  2.75<H>    Ca    10.3      01 Oct 2022 06:21  Phos  5.7     10-  Mg     2.50     10-01    TPro  8.5<H>  /  Alb  4.3  /  TBili  0.3  /  DBili  x   /  AST  25  /  ALT  47<H>  /  AlkPhos  78  10          Urinalysis Basic - ( 01 Oct 2022 13:04 )    Color: Yellow / Appearance: Clear / S.024 / pH: x  Gluc: x / Ketone: Negative  / Bili: Negative / Urobili: <2 mg/dL   Blood: x / Protein: 30 mg/dL / Nitrite: Negative   Leuk Esterase: Negative / RBC: 2 /HPF / WBC 2 /HPF   Sq Epi: x / Non Sq Epi: 1 /HPF / Bacteria: Negative        RADIOLOGY & ADDITIONAL TESTS:  < from: CT Head No Cont (10.01.22 @ 02:57) >  CT BRAIN       < end of copied text >  < from: CT Head No Cont (10.01.22 @ 02:57) >  Worsening edema in the left basal ganglia extending into the internal   capsule, in spite of significant decrease in size of acute hematoma.   Evolving infarct is possible. Consider repeatMRI.  2. Small acute hemorrhage in the head of the left caudate, significantly   decreased in size compared to the prior. Resolution of previously seen   intraventricular hemorrhage.  3. Stable 6 mm left-to-right subfalcine herniation.    < end of copied text >  < from: US Kidney and Bladder (10.01.22 @ 13:52) >  US KIDNEYS AND BLADDER                            < end of copied text >  < from: US Kidney and Bladder (10.01.22 @ 13:52) >  Sonographically normal kidneys. No hydronephrosis.        < end of copied text >    Imaging Personally Reviewed:    Consultant(s) Notes Reviewed:      Care Discussed with Consultants/Other Providers:

## 2022-10-01 NOTE — H&P ADULT - HISTORY OF PRESENT ILLNESS
Patient is a 37 year old M hx of l. ICH, HTN who is presenting due to headaches. He had generalized weakness and was presyncopal during this time. He has been taking his medications as prescribed. Denies fevers, chills, nausea, dysuria, cough, LE edema, or sob.     ED course: /71 and creatinine of 2.39  EKG no acute ST elevation or pathologic Q waves. MA and Qtc wnl.  Patient is a 37 year old M hx of l. ICH, HTN who is presenting due to headaches. He had generalized weakness and was presyncopal during this time. He has been taking his medications as prescribed. Denies fevers, chills, nausea, dysuria, cough, LE edema, or sob.     ED course: /71 and creatinine of 2.39  EKG no acute ST elevation or pathologic Q waves. NC and Qtc wnl.   CT head noncon w/ decreased ICH, but increased l. basal ganglia edema.   Stable 6 mm left-to-right subfalcine herniation.

## 2022-10-01 NOTE — CONSULT NOTE ADULT - PROBLEM SELECTOR RECOMMENDATION 9
Pt with MEHDI in the setting of volume depletion from HCTZ use & baseline ARB use    Per [facility] review pt's baseline SCr had ranged from 1-1.2 since 2021. Had a prior episode of MEHDI in September 2021 & Sepember 2022 when his SCr peaked to ~2 both times with resolution of MEHDI. During the last MEHDI episode pt was seen by our team. Pt's  SCr. was 1.5 which peaked to 2.2 and then trended to 1.2 on discharge 9/26/22 with IVF. UA with 30 mg/dl proteins. Urine electrolytes suggesting pre-renal etiology. Check spot urine TP/CR. Check bladder scan & Renal US. Recommend Frankel catheter placement if retaining. Would give IVF as below. Hold HCTZ & valsartan. Monitor labs and urine output. Avoid NSAIDs, ACEI/ARBS, RCA and nephrotoxins. Dose medications as per eGFR. Pt with MEHDI in the setting of volume depletion from HCTZ use & high dose ARB use    Per [facility] review pt's baseline SCr had ranged from 1-1.2 since 2021. Had a prior episode of MEHDI in September 2021 & Sepember 2022 when his SCr peaked to ~2 both times with resolution of MEHDI. During the last MEHDI episode pt was seen by our team. Pt's  SCr. was 1.5 which peaked to 2.2 and then trended to 1.2 on discharge 9/26/22 with IVF. SCr this admission was 2.3 on 9/30 & trends up to 2.7 today.     UA with 30 mg/dl proteins. Urine electrolytes suggesting pre-renal etiology. Check spot urine TP/CR. Check bladder scan & Renal US. Recommend Frankel catheter placement if retaining. Would give IVF as below. Hold HCTZ & valsartan. Monitor labs and urine output. Avoid NSAIDs, ACEI/ARBS, RCA and nephrotoxins. Dose medications as per eGFR.

## 2022-10-01 NOTE — CHART NOTE - NSCHARTNOTEFT_GEN_A_CORE
Neurology and nephrology consults called, will follow up recommendations.   Case discussed with Dr. Mcclain.

## 2022-10-01 NOTE — H&P ADULT - PROBLEM SELECTOR PLAN 3
-pt w/ HTN c/w amlodipine 10 mg, HLZ 75 mg TID, and labetalol 300 mg Q8  -hold HCT and valsartan given MEHDI, if BP is elevated increase HLZ to 100 mg TID -platelet count of 606, recheck in am -stable 6 mm subfalcine herniation  -continue to monitor

## 2022-10-01 NOTE — PATIENT PROFILE ADULT - FALL HARM RISK - RISK INTERVENTIONS
Assistance with ambulation/Communicate Fall Risk and Risk Factors to all staff, patient, and family/Reinforce activity limits and safety measures with patient and family/Sit up slowly, dangle for a short time, stand at bedside before walking/Use of alarms - bed, chair and/or voice tab/Visual Cue: Yellow wristband/Bed in lowest position, wheels locked, appropriate side rails in place/Call bell, personal items and telephone in reach/Instruct patient to call for assistance before getting out of bed or chair/Non-slip footwear when patient is out of bed/[place] to call system/Physically safe environment - no spills, clutter or unnecessary equipment/Purposeful Proactive Rounding/Room/bathroom lighting operational, light cord in reach Assistance OOB with selected safe patient handling equipment/Assistance with ambulation/Communicate Fall Risk and Risk Factors to all staff, patient, and family/Reinforce activity limits and safety measures with patient and family/Visual Cue: Yellow wristband/Bed in lowest position, wheels locked, appropriate side rails in place/Call bell, personal items and telephone in reach/Instruct patient to call for assistance before getting out of bed or chair/Non-slip footwear when patient is out of bed/[place] to call system/Physically safe environment - no spills, clutter or unnecessary equipment/Purposeful Proactive Rounding/Room/bathroom lighting operational, light cord in reach

## 2022-10-01 NOTE — CHART NOTE - NSCHARTNOTEFT_GEN_A_CORE
Case discussed with nephrology, neuro surgery, neurology, and MICU today, as well as primary attending.  Patient remains hemodynamically stable, neuro exam non focal. AAO x 3.   Per my conversation with MICU who discussed with nursing, ok to give hypertonic saline at a rate of 30cc/hr on floors.   3% saline ordered per nephro recs at a rate of 30cc/hr x 4 hours.   Plan to repeat a BMP immediately upon completion of fluid to re-assess, and recheck BMP q4h thereafter.   Will contact nephrology with the results for help with further management.   Also discussed with neurology, who is requesting MRI brain w/wo contrast for further evaluation of possible infarct on CT.  MRI w ruby ordered, and discussed with nephrology who is ok with gadolinium administration.   Case and plan discussed with Dr. Mcclain in detail.

## 2022-10-01 NOTE — H&P ADULT - PROBLEM SELECTOR PLAN 6
F-none  E-replete prn  N-DASH TLC diet  improve score 0 SCD for DVT prophylaxis (not AC given ICH) -pt w/ HTN c/w amlodipine 10 mg, HLZ 75 mg TID, and labetalol 300 mg Q8  -hold HCT and valsartan given MEHDI, if BP is elevated increase HLZ to 100 mg TID

## 2022-10-01 NOTE — CONSULT NOTE ADULT - ASSESSMENT
37 year old M PMH HTN with recent admission for left basal ganglia hemorrhage, intraventricular extension, and early hydrocephalus presenting with headaches. CTH showing worsening L basal ganglia edema, decreased L caudate hemorrhage, resolution of IVH    PLAN:  - No acute neurosurgical intervention  - No indication for steroids at this time  - Headache management  - Control BP    Case discussed with attending neurosurgeon Dr. Esteban

## 2022-10-01 NOTE — CONSULT NOTE ADULT - SUBJECTIVE AND OBJECTIVE BOX
Neurology Consult    ABHISHEK RIDDLE  37y Male  MRN-[id number]    INCOMPLETE    HPI:  Patient is a 37 year old M with  Left basal ganglia hemorrhage (likely due to chronic HTN) who is presenting due to headaches, generalized weakness and presyncope. Found to have MEHDI.     Of note, patient was recently admitted at [facility] for for syncope and found to have L BG hemorrhage w/ associated IVH and hydrocephalus. Serial CTH performed for persisting, sometimes worsening HA with stable serial CTs. MRI brain showedacute L BG hemorrhage, no underlying lesions. At htat time endored Patient also reports lightheadedness, blurry vision, and generalized weakness. Denies one sided weakness, numbness, N/V, difficulty with speech    REVIEW OF SYSTEMS:  Per HPI.      PAST MEDICAL & SURGICAL HISTORY:  High cholesterol      Hypertension        As stated in HPI.    FAMILY HISTORY:  FH: diabetes mellitus      As stated in HPI, unless otherwise noted above.     SOCIAL HISTORY:   As stated in HPI, unless otherwise noted above.     MEDICATIONS    Home Medications:  acetaminophen 325 mg oral tablet: 2 tab(s) orally every 4 hours, As needed, Temp greater or equal to 38C (100.4F) (01 Oct 2022 04:51)    MEDICATIONS  (STANDING):  amLODIPine   Tablet 10 milliGRAM(s) Oral daily  hydrALAZINE 75 milliGRAM(s) Oral every 8 hours  labetalol 300 milliGRAM(s) Oral every 8 hours  levETIRAcetam 500 milliGRAM(s) Oral two times a day    MEDICATIONS  (PRN):  acetaminophen     Tablet .. 650 milliGRAM(s) Oral every 6 hours PRN Temp greater or equal to 38C (100.4F), Mild Pain (1 - 3)      Allergies    No Known Allergies    Intolerances        VITALS & EXAMINATION    Height (cm): 183.1 ( @ 20:43)    Vital Signs Last 24 Hrs  T(C): 36.6 (01 Oct 2022 09:29), Max: 37.1 (30 Sep 2022 20:43)  T(F): 97.8 (01 Oct 2022 09:29), Max: 98.8 (30 Sep 2022 20:43)  HR: 86 (01 Oct 2022 09:29) (79 - 90)  BP: 124/72 (01 Oct 2022 09:29) (114/75 - 137/86)  BP(mean): 99 (01 Oct 2022 06:53) (99 - 99)  RR: 17 (01 Oct 2022 09:29) (16 - 17)  SpO2: 100% (01 Oct 2022 09:29) (97% - 100%)    Parameters below as of 01 Oct 2022 09:29  Patient On (Oxygen Delivery Method): room air          LABS:    CBC                       11.6   8.62  )-----------( 606      ( 30 Sep 2022 23:20 )             36.8     Chem 10-    130<L>  |  95<L>  |  54<H>  ----------------------------<  98  4.3   |  21<L>  |  2.75<H>    Ca    10.3      01 Oct 2022 06:21  Phos  5.7     10  Mg     2.50     10    TPro  8.5<H>  /  Alb  4.3  /  TBili  0.3  /  DBili  x   /  AST  25  /  ALT  47<H>  /  AlkPhos  78  10    Coags   LFTs LIVER FUNCTIONS - ( 01 Oct 2022 06:21 )  Alb: 4.3 g/dL / Pro: 8.5 g/dL / ALK PHOS: 78 U/L / ALT: 47 U/L / AST: 25 U/L / GGT: x           Lipid Panel   A1c   Cardiac enzymes     U/A Urinalysis Basic - ( 01 Oct 2022 04:36 )    Color: Yellow / Appearance: Clear / S.021 / pH: x  Gluc: x / Ketone: Negative  / Bili: Negative / Urobili: <2 mg/dL   Blood: x / Protein: 30 mg/dL / Nitrite: Negative   Leuk Esterase: Negative / RBC: 1 /HPF / WBC 1 /HPF   Sq Epi: x / Non Sq Epi: 1 /HPF / Bacteria: Negative          STUDIES & IMAGING    `< from: CT Head No Cont (10.01.22 @ 02:57) >    IMPRESSION:    1. Worsening edema in the left basal ganglia extending into the internal   capsule, in spite of significant decrease in size of acute hematoma.   Evolving infarct is possible. Consider repeatMRI.  2. Small acute hemorrhage in the head of the left caudate, significantly   decreased in size compared to the prior. Resolution of previously seen   intraventricular hemorrhage.  3. Stable 6 mm left-to-right subfalcine herniation.    < end of copied text >                < from: MR Head w/wo IV Cont (22 @ 22:53) >  IMPRESSION:    Late subacute intraparenchymal hemorrhage in the left basal ganglia with   intraventricular extension which is largely unchanged and size and   distribution when compared to same and prior day CT scans of the brain.   Unchanged degree of brain edema, mass effect and mild rightward midline   shift.    A short interval follow-up MRI of the brain with and without IV contrast   is recommended to ensure resolution of hemorrhage and exclude underlying   mass which may be obscured.    < end of copied text >         Neurology Consult    ABHISHEK RIDDLE  37y Male  MRN-[id number]    HPI:  Patient is a 37 year old M with Left basal ganglia hemorrhage (likely due to chronic HTN), HLD, HTN who is presenting due to headaches, generalized weakness and presyncope. Found to have MEHDI. Patient reports blacking out for couple of seconds after going to the bathroom. Sister witnessed event, denied LOC, shaking. Patient also reports mild intermittent headaches for last couple months. Patient denied urinary incontinence tongue biting, weakness, numbness, dizziness changes to speech, changes to vision. Neurology consulted for CT finding of possible evolving infarct left BG.     Of note, patient was recently admitted at [facility] for for syncope and found to have L BG hemorrhage w/ associated IVH and hydrocephalus. Serial CTH performed for persisting, sometimes worsening HA with stable serial CTs. MRI brain showed acute L BG hemorrhage, no underlying lesions. At that time endorsed lightheadedness, blurry vision, and generalized weakness. Patient denied following up with neurology or PCP after being discharged.     REVIEW OF SYSTEMS:  Per HPI.      PAST MEDICAL & SURGICAL HISTORY:  High cholesterol      Hypertension        As stated in HPI.    FAMILY HISTORY:  FH: diabetes mellitus      As stated in HPI, unless otherwise noted above.     SOCIAL HISTORY:   As stated in HPI, unless otherwise noted above.     MEDICATIONS    Home Medications:  acetaminophen 325 mg oral tablet: 2 tab(s) orally every 4 hours, As needed, Temp greater or equal to 38C (100.4F) (01 Oct 2022 04:51)    MEDICATIONS  (STANDING):  amLODIPine   Tablet 10 milliGRAM(s) Oral daily  hydrALAZINE 75 milliGRAM(s) Oral every 8 hours  labetalol 300 milliGRAM(s) Oral every 8 hours  levETIRAcetam 500 milliGRAM(s) Oral two times a day    MEDICATIONS  (PRN):  acetaminophen     Tablet .. 650 milliGRAM(s) Oral every 6 hours PRN Temp greater or equal to 38C (100.4F), Mild Pain (1 - 3)      Allergies    No Known Allergies    Intolerances        VITALS & EXAMINATION    Height (cm): 183.1 ( @ 20:43)    Vital Signs Last 24 Hrs  T(C): 36.6 (01 Oct 2022 09:29), Max: 37.1 (30 Sep 2022 20:43)  T(F): 97.8 (01 Oct 2022 09:29), Max: 98.8 (30 Sep 2022 20:43)  HR: 86 (01 Oct 2022 09:29) (79 - 90)  BP: 124/72 (01 Oct 2022 09:29) (114/75 - 137/86)  BP(mean): 99 (01 Oct 2022 06:53) (99 - 99)  RR: 17 (01 Oct 2022 09:29) (16 - 17)  SpO2: 100% (01 Oct 2022 09:29) (97% - 100%)    Parameters below as of 01 Oct 2022 09:29  Patient On (Oxygen Delivery Method): room air    General:  Constitutional: appears stated age, in no apparent distress including pain.      Neurological (>12):  MS: Eyes open, alert, oriented to person, place, situation, time.  Able to name the current president. Normal affect. Follows all commands.    Speech/Language: Clear, fluent with good repetition. Naming intact.    CNs: PERRL (3mm OU). CVF intact. EOMI, no nystagmus, no diplopia. V1-3 intact to LT,  No facial asymmetry b/l, full eye closure strength b/l. Hearing grossly normal (rubbing fingers) b/l. Symmetric palate elevation in midline, no uvula deviation.  Head turning & shoulder shrug intact b/l. Tongue midline, normal movements.    Motor: Muscle bulk and tone are normal. Strength is 5/5 in all four extremities both proximally and distally. There is no pronator drift or satelliting on arm roll.    Sensation: Intact to LT    Cortical: No hemineglect, no extinction to double sided stimulation (visual & tactile).    Reflexes:              Biceps(C5)       BR(C6)     Triceps(C7)               Patellar(L4)    Achilles(S1)    Plantar Resp  R	2	          2	             2		        2		    2		Flexor  L	2	          2	             2		        2		    2		Flexor    Coordination: Intact rapid-alt movements. Fast finger tapping with normal amplitude and speed. No dysmetria to FTN/HTS.    Gait: No postural instability. Normal base and natural gait. Able to perform heel, toe, and tandem gait.       LABS:    CBC                       11.6   8.62  )-----------( 606      ( 30 Sep 2022 23:20 )             36.8     Chem 10-01    130<L>  |  95<L>  |  54<H>  ----------------------------<  98  4.3   |  21<L>  |  2.75<H>    Ca    10.3      01 Oct 2022 06:21  Phos  5.7     10  Mg     2.50     10    TPro  8.5<H>  /  Alb  4.3  /  TBili  0.3  /  DBili  x   /  AST  25  /  ALT  47<H>  /  AlkPhos  78  10-01    Coags   LFTs LIVER FUNCTIONS - ( 01 Oct 2022 06:21 )  Alb: 4.3 g/dL / Pro: 8.5 g/dL / ALK PHOS: 78 U/L / ALT: 47 U/L / AST: 25 U/L / GGT: x           Lipid Panel   A1c   Cardiac enzymes     U/A Urinalysis Basic - ( 01 Oct 2022 04:36 )    Color: Yellow / Appearance: Clear / S.021 / pH: x  Gluc: x / Ketone: Negative  / Bili: Negative / Urobili: <2 mg/dL   Blood: x / Protein: 30 mg/dL / Nitrite: Negative   Leuk Esterase: Negative / RBC: 1 /HPF / WBC 1 /HPF   Sq Epi: x / Non Sq Epi: 1 /HPF / Bacteria: Negative          STUDIES & IMAGING    `< from: CT Head No Cont (10.01.22 @ 02:57) >    IMPRESSION:    1. Worsening edema in the left basal ganglia extending into the internal   capsule, in spite of significant decrease in size of acute hematoma.   Evolving infarct is possible. Consider repeatMRI.  2. Small acute hemorrhage in the head of the left caudate, significantly   decreased in size compared to the prior. Resolution of previously seen   intraventricular hemorrhage.  3. Stable 6 mm left-to-right subfalcine herniation.    < end of copied text >                < from: MR Head w/wo IV Cont (22 @ 22:53) >  IMPRESSION:    Late subacute intraparenchymal hemorrhage in the left basal ganglia with   intraventricular extension which is largely unchanged and size and   distribution when compared to same and prior day CT scans of the brain.   Unchanged degree of brain edema, mass effect and mild rightward midline   shift.    A short interval follow-up MRI of the brain with and without IV contrast   is recommended to ensure resolution of hemorrhage and exclude underlying   mass which may be obscured.    < end of copied text >         Neurology Consult    ABHISHEK RIDDLE  37y Male  MRN-[id number]    HPI:  Patient is a 37 year old M with Left basal ganglia hemorrhage (likely due to chronic HTN), HLD, HTN who is presenting due to headaches, generalized weakness and presyncope. Found to have MEHDI. Patient reports blacking out for couple of seconds after going to the bathroom. Sister witnessed event, denied LOC, shaking. Patient also reports mild intermittent headaches for last couple months. Patient denied urinary incontinence tongue biting, weakness, numbness, dizziness changes to speech, changes to vision. Neurology consulted for CT finding of possible evolving infarct left BG.     Of note, patient was recently admitted at [facility] for for syncope and found to have L BG hemorrhage w/ associated IVH and hydrocephalus. Serial CTH performed for persisting, sometimes worsening HA with stable serial CTs. MRI brain showed acute L BG hemorrhage, no underlying lesions. At that time endorsed lightheadedness, blurry vision, and generalized weakness. Patient denied following up with neurology or PCP after being discharged.     NIHSS 0  mRS 0    REVIEW OF SYSTEMS:  Per HPI.      PAST MEDICAL & SURGICAL HISTORY:  High cholesterol      Hypertension        As stated in HPI.    FAMILY HISTORY:  FH: diabetes mellitus      As stated in HPI, unless otherwise noted above.     SOCIAL HISTORY:   As stated in HPI, unless otherwise noted above.     MEDICATIONS    Home Medications:  acetaminophen 325 mg oral tablet: 2 tab(s) orally every 4 hours, As needed, Temp greater or equal to 38C (100.4F) (01 Oct 2022 04:51)    MEDICATIONS  (STANDING):  amLODIPine   Tablet 10 milliGRAM(s) Oral daily  hydrALAZINE 75 milliGRAM(s) Oral every 8 hours  labetalol 300 milliGRAM(s) Oral every 8 hours  levETIRAcetam 500 milliGRAM(s) Oral two times a day    MEDICATIONS  (PRN):  acetaminophen     Tablet .. 650 milliGRAM(s) Oral every 6 hours PRN Temp greater or equal to 38C (100.4F), Mild Pain (1 - 3)      Allergies    No Known Allergies    Intolerances        VITALS & EXAMINATION    Height (cm): 183.1 ( @ 20:43)    Vital Signs Last 24 Hrs  T(C): 36.6 (01 Oct 2022 09:29), Max: 37.1 (30 Sep 2022 20:43)  T(F): 97.8 (01 Oct 2022 09:29), Max: 98.8 (30 Sep 2022 20:43)  HR: 86 (01 Oct 2022 09:29) (79 - 90)  BP: 124/72 (01 Oct 2022 09:29) (114/75 - 137/86)  BP(mean): 99 (01 Oct 2022 06:53) (99 - 99)  RR: 17 (01 Oct 2022 09:29) (16 - 17)  SpO2: 100% (01 Oct 2022 09:29) (97% - 100%)    Parameters below as of 01 Oct 2022 09:29  Patient On (Oxygen Delivery Method): room air    General:  Constitutional: appears stated age, in no apparent distress including pain.      Neurological (>12):  MS: Eyes open, alert, oriented to person, place, situation, time.  Able to name the current president. Normal affect. Follows all commands.    Speech/Language: Clear, fluent with good repetition. Naming intact.    CNs: PERRL (3mm OU). CVF intact. EOMI, no nystagmus, no diplopia. V1-3 intact to LT,  No facial asymmetry b/l, full eye closure strength b/l. Hearing grossly normal (rubbing fingers) b/l. Symmetric palate elevation in midline, no uvula deviation.  Head turning & shoulder shrug intact b/l. Tongue midline, normal movements.    Motor: Muscle bulk and tone are normal. Strength is 5/5 in all four extremities both proximally and distally. There is no pronator drift or satelliting on arm roll.    Sensation: Intact to LT    Cortical: No hemineglect, no extinction to double sided stimulation (visual & tactile).    Reflexes:              Biceps(C5)       BR(C6)     Triceps(C7)               Patellar(L4)    Achilles(S1)    Plantar Resp  R	2	          2	             2		        2		    2		Flexor  L	2	          2	             2		        2		    2		Flexor    Coordination: Intact rapid-alt movements. Fast finger tapping with normal amplitude and speed. No dysmetria to FTN/HTS.    Gait: No postural instability. Normal base and natural gait. Able to perform heel, toe, and tandem gait.       LABS:    CBC                       11.6   8.62  )-----------( 606      ( 30 Sep 2022 23:20 )             36.8     Chem 10-01    130<L>  |  95<L>  |  54<H>  ----------------------------<  98  4.3   |  21<L>  |  2.75<H>    Ca    10.3      01 Oct 2022 06:21  Phos  5.7     10  Mg     2.50     10-01    TPro  8.5<H>  /  Alb  4.3  /  TBili  0.3  /  DBili  x   /  AST  25  /  ALT  47<H>  /  AlkPhos  78  10-01    Coags   LFTs LIVER FUNCTIONS - ( 01 Oct 2022 06:21 )  Alb: 4.3 g/dL / Pro: 8.5 g/dL / ALK PHOS: 78 U/L / ALT: 47 U/L / AST: 25 U/L / GGT: x           Lipid Panel   A1c   Cardiac enzymes     U/A Urinalysis Basic - ( 01 Oct 2022 04:36 )    Color: Yellow / Appearance: Clear / S.021 / pH: x  Gluc: x / Ketone: Negative  / Bili: Negative / Urobili: <2 mg/dL   Blood: x / Protein: 30 mg/dL / Nitrite: Negative   Leuk Esterase: Negative / RBC: 1 /HPF / WBC 1 /HPF   Sq Epi: x / Non Sq Epi: 1 /HPF / Bacteria: Negative          STUDIES & IMAGING    `< from: CT Head No Cont (10.01.22 @ 02:57) >    IMPRESSION:    1. Worsening edema in the left basal ganglia extending into the internal   capsule, in spite of significant decrease in size of acute hematoma.   Evolving infarct is possible. Consider repeatMRI.  2. Small acute hemorrhage in the head of the left caudate, significantly   decreased in size compared to the prior. Resolution of previously seen   intraventricular hemorrhage.  3. Stable 6 mm left-to-right subfalcine herniation.    < end of copied text >                < from: MR Head w/wo IV Cont (22 @ 22:53) >  IMPRESSION:    Late subacute intraparenchymal hemorrhage in the left basal ganglia with   intraventricular extension which is largely unchanged and size and   distribution when compared to same and prior day CT scans of the brain.   Unchanged degree of brain edema, mass effect and mild rightward midline   shift.    A short interval follow-up MRI of the brain with and without IV contrast   is recommended to ensure resolution of hemorrhage and exclude underlying   mass which may be obscured.    < end of copied text >

## 2022-10-01 NOTE — H&P ADULT - NSHPLABSRESULTS_GEN_ALL_CORE
.  LABS:                         11.6   8.62  )-----------( 606      ( 30 Sep 2022 23:20 )             36.8         131<L>  |  97<L>  |  52<H>  ----------------------------<  100<H>  4.9   |  19<L>  |  2.39<H>    Ca    10.0      30 Sep 2022 23:20    TPro  8.2  /  Alb  3.9  /  TBili  0.4  /  DBili  x   /  AST  25  /  ALT  47<H>  /  AlkPhos  72        Urinalysis Basic - ( 01 Oct 2022 04:36 )    Color: Yellow / Appearance: Clear / S.021 / pH: x  Gluc: x / Ketone: Negative  / Bili: Negative / Urobili: <2 mg/dL   Blood: x / Protein: 30 mg/dL / Nitrite: Negative   Leuk Esterase: Negative / RBC: 1 /HPF / WBC 1 /HPF   Sq Epi: x / Non Sq Epi: 1 /HPF / Bacteria: Negative                RADIOLOGY, EKG & ADDITIONAL TESTS: Reviewed.

## 2022-10-01 NOTE — CONSULT NOTE ADULT - ATTENDING COMMENTS
< from: CT Head No Cont (10.01.22 @ 02:57) >    1. Worsening edema in the left basal ganglia extending into the internal   capsule, in spite of significant decrease in size of acute hematoma.   Evolving infarct is possible. Consider repeatMRI.    < end of copied text >    A/P  Mr. Honeycutt is a 38 yo man with recent primary intracerebral hemorrhage with intraventricular extension now admitted with MEHDI and hyponatremia.   He has no headache or lightheadedness now.  CT head imaging findings may be expected radiographic evolution of hemorrhage but report notes the possibility of evolving infarct.   MRI brain w/wo gado to exclude an underlying lesion that bled and new infarct.   Etiology of ICH is likely hypertensive hemorrhage.  Usually, I wait until blood is resorbed to do repeat MRI brain w/wo gado to exclude an underlying lesion that bled so I would recommend repeating this around 11/18/22.  We counselled the patient of the importance of treating HTN and following up with stroke NP as outpatient.   D/W patient.  Thank you
Hyponatremia    Plan as per fellows note above, Cont 3% HTS monitor serum sodium, avoid overcorrection

## 2022-10-01 NOTE — H&P ADULT - PROBLEM SELECTOR PLAN 1
-pt w/ ICH last admission, and still present  -likely headache from this  -CT head noncon performed shows that hemorrhage has not worsened from prior, discussed with neurosurgery team overnight  -can give tylenol for headaches manage rest as below

## 2022-10-01 NOTE — PROGRESS NOTE ADULT - ASSESSMENT
Patient is a 37 year old M hx of l. ICH, HTN who is presenting due to headaches.       l. basal ganglia edema.   Stable 6 mm left-to-right subfalcine herniation.

## 2022-10-01 NOTE — CONSULT NOTE ADULT - SUBJECTIVE AND OBJECTIVE BOX
[facility] DIVISION OF KIDNEY DISEASES AND HYPERTENSION -- [phone]  -- INITIAL CONSULT NOTE  --------------------------------------------------------------------------------  HPI: 37 year old M with PMH of HTN, HLD (non compliant with BP meds) with recent admission for left basal ganglia hemorrhage, intraventricular extension, and early hydrocephalus who was discharged on 9/26/22 after no neurosurgical intervention was planned.  Patient presenting with headaches, generalized weakness and presyncopal episode. CT head done on 10/1 showed worsening edema in the left basal ganglia extending into the internal capsule, despite of significant decrease in size of acute hematoma. Nephrology was called for MEHDI & hyponatremia.  Per [facility] review pt's baseline SCr had ranged from 1-1.2 since 2021. Had a prior episode of MEHDI in September 2021 & Sepember 2022 when his SCr peaked to ~2 both times with resolution of MEHDI. During the last MEHDI episode pt was seen by our team. Pt's  SCr. was 1.5 which peaked to 2.2 and then trended to 1.2 on discharge 9/26/22 with IVF. Pt was also found to be hyponatremic at that time with SNa 133 and was given 3% HTS & then salt tabs to maintain SNa goal of 145-150 per neurosx. SNa did not reach the sodium goal during last admission & now pt comes in with worsening cerebral edema. Last SNa was 136 on 9/26. SNa on this admission is 131 9/30. Pt received NS 1L bolus in the ED after which SNa is 130 today. Pt was sent home on HCTZ & valsartan.               PAST HISTORY  --------------------------------------------------------------------------------  PAST MEDICAL & SURGICAL HISTORY:  High cholesterol      Hypertension        FAMILY HISTORY:  FH: diabetes mellitus      PAST SOCIAL HISTORY:    ALLERGIES & MEDICATIONS  --------------------------------------------------------------------------------  Allergies    No Known Allergies    Intolerances      Standing Inpatient Medications  amLODIPine   Tablet 10 milliGRAM(s) Oral daily  hydrALAZINE 75 milliGRAM(s) Oral every 8 hours  labetalol 300 milliGRAM(s) Oral every 8 hours  levETIRAcetam 500 milliGRAM(s) Oral two times a day    PRN Inpatient Medications  acetaminophen     Tablet .. 650 milliGRAM(s) Oral every 6 hours PRN      REVIEW OF SYSTEMS  --------------------------------------------------------------------------------  Gen: No chills  Respiratory: No dyspnea, cough  CV: No chest pain  GI: No abdominal pain, diarrhea,  nausea, vomiting  : No increased frequency, dysuria, hematuria  MSK:  no edema  Neuro: No dizziness/lightheadedness    All other systems were reviewed and are negative, except as noted.    VITALS/PHYSICAL EXAM  --------------------------------------------------------------------------------  T(C): 36.6 (10-01-22 @ 09:29), Max: 37.1 (09-30-22 @ 20:43)  HR: 86 (10-01-22 @ 09:29) (79 - 90)  BP: 124/72 (10-01-22 @ 09:29) (114/75 - 137/86)  RR: 17 (10-01-22 @ 09:29) (16 - 17)  SpO2: 100% (10-01-22 @ 09:29) (97% - 100%)  Wt(kg): --  Height (cm): 183.1 (09-30-22 @ 20:43)      10-01-22 @ 07:01  -  10-01-22 @ 11:41  --------------------------------------------------------  IN: 300 mL / OUT: 425 mL / NET: -125 mL      Physical Exam:  	Gen: NAD  	HEENT: Anicteric, MMM, no JVD  	Pulm: CTA B/L  	CV: S1S2, no rub  	Abd: Soft, +BS, NT, ND           No presacral edema  	Ext: No LE edema B/L  	Neuro: Awake, alert  	Skin: Warm and dry  	Vascular access:    LABS/STUDIES  --------------------------------------------------------------------------------              11.6   8.62  >-----------<  606      [09-30-22 @ 23:20]              36.8     130  |  95  |  54  ----------------------------<  98      [10-01-22 @ 06:21]  4.3   |  21  |  2.75        Ca     10.3     [10-01-22 @ 06:21]      Mg     2.50     [10-01-22 @ 06:21]      Phos  5.7     [10-01-22 @ 06:21]    TPro  8.5  /  Alb  4.3  /  TBili  0.3  /  DBili  x   /  AST  25  /  ALT  47  /  AlkPhos  78  [10-01-22 @ 06:21]          Creatinine Trend:  SCr 2.75 [10-01 @ 06:21]  SCr 2.39 [09-30 @ 23:20]  SCr 1.26 [09-26 @ 05:48]  SCr 1.25 [09-25 @ 21:33]  SCr 1.11 [09-25 @ 14:49]    Urinalysis - [10-01-22 @ 04:36]      Color Yellow / Appearance Clear / SG 1.021 / pH 5.5      Gluc Negative / Ketone Negative  / Bili Negative / Urobili <2 mg/dL       Blood Negative / Protein 30 mg/dL / Leuk Est Negative / Nitrite Negative      RBC 1 / WBC 1 / Hyaline 9 / Gran  / Sq Epi  / Non Sq Epi 1 / Bacteria Negative    Urine Creatinine 209      [10-01-22 @ 04:36]  Urine Sodium 50      [10-01-22 @ 04:36]  Urine Urea Nitrogen 909.9      [10-01-22 @ 04:36]    TSH 0.82      [09-18-22 @ 17:50]      SPEP Interpretation: Distinct band in Beta region. Serum and Urine Immunofixation  Electrophoresis are recommended if not previously performed. EMILY Mendoza MD      [09-21-22 @ 00:43]   [facility] DIVISION OF KIDNEY DISEASES AND HYPERTENSION -- [phone]  -- INITIAL CONSULT NOTE  --------------------------------------------------------------------------------  HPI: 37 year old M with PMH of HTN, HLD with recent admission for left basal ganglia hemorrhage, intraventricular extension, and early hydrocephalus who was discharged on 9/26/22 after no neurosurgical intervention was planned.  Patient presenting with headaches, generalized weakness and presyncopal episode. CT head done on 10/1 showed worsening edema in the left basal ganglia extending into the internal capsule, despite of significant decrease in size of acute hematoma. Nephrology was called for MEHDI & hyponatremia.  Per [facility] review pt's baseline SCr had ranged from 1-1.2 since 2021. Had a prior episode of MEHDI in September 2021 & Sepember 2022 when his SCr peaked to ~2 both times with resolution of MEHDI. During the last MEHDI episode pt was seen by our team. Pt's  SCr. was 1.5 which peaked to 2.2 and then trended to 1.2 on discharge 9/26/22 with IVF. SCr this admission was 2.3 on 9/30 & trends up to 2.7 today. Pt was also found to be hyponatremic at that admission with SNa 133 and was given 3% HTS & then salt tabs to maintain SNa goal of 145-150 per neurosx. SNa did not reach the sodium goal during last admission & now pt comes in with worsening cerebral edema. Last SNa was 136 on 9/26. SNa on this admission is 131 9/30. Pt received NS 1L bolus in the ED after which SNa is 130 today. Pt was sent home on HCTZ & valsartan (valsartan by itself & in combination with amlodipine). Not sent home on salt tabs.                PAST HISTORY  --------------------------------------------------------------------------------  PAST MEDICAL & SURGICAL HISTORY:  High cholesterol      Hypertension        FAMILY HISTORY:  FH: diabetes mellitus      PAST SOCIAL HISTORY:    ALLERGIES & MEDICATIONS  --------------------------------------------------------------------------------  Allergies    No Known Allergies    Intolerances      Standing Inpatient Medications  amLODIPine   Tablet 10 milliGRAM(s) Oral daily  hydrALAZINE 75 milliGRAM(s) Oral every 8 hours  labetalol 300 milliGRAM(s) Oral every 8 hours  levETIRAcetam 500 milliGRAM(s) Oral two times a day    PRN Inpatient Medications  acetaminophen     Tablet .. 650 milliGRAM(s) Oral every 6 hours PRN      REVIEW OF SYSTEMS  --------------------------------------------------------------------------------  Gen: No chills  Respiratory: No dyspnea, cough  CV: No chest pain  GI: No abdominal pain, diarrhea,  nausea, vomiting  : No increased frequency, dysuria, hematuria  MSK:  no edema  Neuro: No dizziness/lightheadedness    All other systems were reviewed and are negative, except as noted.    VITALS/PHYSICAL EXAM  --------------------------------------------------------------------------------  T(C): 36.6 (10-01-22 @ 09:29), Max: 37.1 (09-30-22 @ 20:43)  HR: 86 (10-01-22 @ 09:29) (79 - 90)  BP: 124/72 (10-01-22 @ 09:29) (114/75 - 137/86)  RR: 17 (10-01-22 @ 09:29) (16 - 17)  SpO2: 100% (10-01-22 @ 09:29) (97% - 100%)  Wt(kg): --  Height (cm): 183.1 (09-30-22 @ 20:43)      10-01-22 @ 07:01  -  10-01-22 @ 11:41  --------------------------------------------------------  IN: 300 mL / OUT: 425 mL / NET: -125 mL      Physical Exam:  	Gen: NAD  	HEENT: Anicteric, MMM, no JVD  	Pulm: CTA B/L  	CV: S1S2, no rub  	Abd: Soft, +BS, NT, ND           No presacral edema  	Ext: No LE edema B/L  	Neuro: AAO x 3  	Skin: Warm and dry  	Vascular access:    LABS/STUDIES  --------------------------------------------------------------------------------              11.6   8.62  >-----------<  606      [09-30-22 @ 23:20]              36.8     130  |  95  |  54  ----------------------------<  98      [10-01-22 @ 06:21]  4.3   |  21  |  2.75        Ca     10.3     [10-01-22 @ 06:21]      Mg     2.50     [10-01-22 @ 06:21]      Phos  5.7     [10-01-22 @ 06:21]    TPro  8.5  /  Alb  4.3  /  TBili  0.3  /  DBili  x   /  AST  25  /  ALT  47  /  AlkPhos  78  [10-01-22 @ 06:21]          Creatinine Trend:  SCr 2.75 [10-01 @ 06:21]  SCr 2.39 [09-30 @ 23:20]  SCr 1.26 [09-26 @ 05:48]  SCr 1.25 [09-25 @ 21:33]  SCr 1.11 [09-25 @ 14:49]    Urinalysis - [10-01-22 @ 04:36]      Color Yellow / Appearance Clear / SG 1.021 / pH 5.5      Gluc Negative / Ketone Negative  / Bili Negative / Urobili <2 mg/dL       Blood Negative / Protein 30 mg/dL / Leuk Est Negative / Nitrite Negative      RBC 1 / WBC 1 / Hyaline 9 / Gran  / Sq Epi  / Non Sq Epi 1 / Bacteria Negative    Urine Creatinine 209      [10-01-22 @ 04:36]  Urine Sodium 50      [10-01-22 @ 04:36]  Urine Urea Nitrogen 909.9      [10-01-22 @ 04:36]    TSH 0.82      [09-18-22 @ 17:50]      SPEP Interpretation: Distinct band in Beta region. Serum and Urine Immunofixation  Electrophoresis are recommended if not previously performed. EMILY Mendoza MD      [09-21-22 @ 00:43]   [facility] DIVISION OF KIDNEY DISEASES AND HYPERTENSION -- [phone]  -- INITIAL CONSULT NOTE  --------------------------------------------------------------------------------  HPI: 37 year old M with PMH of HTN, HLD with recent admission for left basal ganglia hemorrhage, intraventricular extension, and early hydrocephalus who was discharged on 9/26/22 after no neurosurgical intervention was planned.  Patient presenting with headaches, generalized weakness and presyncopal episode. CT head done on 10/1 showed worsening edema in the left basal ganglia extending into the internal capsule, despite of significant decrease in size of acute hematoma. Nephrology was called for MEHDI & hyponatremia.  Per [facility] review pt's baseline SCr had ranged from 1-1.2 since 2021. Had a prior episode of MEHDI in September 2021 & Sepember 2022 when his SCr peaked to ~2 both times with resolution of MEHDI. During the last MEHDI episode pt was seen by our team. Pt's  SCr. was 1.5 which peaked to 2.2 and then trended to 1.2 on discharge 9/26/22 with IVF. SCr this admission was 2.3 on 9/30 & trends up to 2.7 today. Pt was also found to be hyponatremic at that admission with SNa 133 and was given 3% HTS & then salt tabs to maintain SNa goal of 145-150 per neurosx. SNa did not reach the sodium goal during last admission & now pt comes in with worsening cerebral edema. Last SNa was 136 on 9/26. SNa on this admission is 131 9/30. Pt received NS 1L bolus in the ED after which SNa is 130 today. Pt was sent home on HCTZ & valsartan (valsartan by itself & in combination with amlodipine). Not sent home on salt tabs.          PAST HISTORY  --------------------------------------------------------------------------------  PAST MEDICAL & SURGICAL HISTORY:  High cholesterol      Hypertension        FAMILY HISTORY:  FH: diabetes mellitus      PAST SOCIAL HISTORY: No smoking, drugs or alcohol use.    ALLERGIES & MEDICATIONS  --------------------------------------------------------------------------------  Allergies    No Known Allergies    Intolerances      Standing Inpatient Medications  amLODIPine   Tablet 10 milliGRAM(s) Oral daily  hydrALAZINE 75 milliGRAM(s) Oral every 8 hours  labetalol 300 milliGRAM(s) Oral every 8 hours  levETIRAcetam 500 milliGRAM(s) Oral two times a day    PRN Inpatient Medications  acetaminophen     Tablet .. 650 milliGRAM(s) Oral every 6 hours PRN      REVIEW OF SYSTEMS  --------------------------------------------------------------------------------  Gen: No chills  Respiratory: No dyspnea, cough  CV: No chest pain  GI: No abdominal pain, diarrhea,  nausea, vomiting  : No increased frequency, dysuria, hematuria  MSK:  no edema  Neuro: No dizziness/lightheadedness    All other systems were reviewed and are negative, except as noted.    VITALS/PHYSICAL EXAM  --------------------------------------------------------------------------------  T(C): 36.6 (10-01-22 @ 09:29), Max: 37.1 (09-30-22 @ 20:43)  HR: 86 (10-01-22 @ 09:29) (79 - 90)  BP: 124/72 (10-01-22 @ 09:29) (114/75 - 137/86)  RR: 17 (10-01-22 @ 09:29) (16 - 17)  SpO2: 100% (10-01-22 @ 09:29) (97% - 100%)  Wt(kg): --  Height (cm): 183.1 (09-30-22 @ 20:43)      10-01-22 @ 07:01  -  10-01-22 @ 11:41  --------------------------------------------------------  IN: 300 mL / OUT: 425 mL / NET: -125 mL      Physical Exam:  	Gen: NAD  	HEENT: Anicteric, MMM, no JVD  	Pulm: CTA B/L  	CV: S1S2, no rub  	Abd: Soft, +BS, NT, ND           No presacral edema  	Ext: No LE edema B/L  	Neuro: AAO x 3  	Skin: Warm and dry  	Vascular access:    LABS/STUDIES  --------------------------------------------------------------------------------              11.6   8.62  >-----------<  606      [09-30-22 @ 23:20]              36.8     130  |  95  |  54  ----------------------------<  98      [10-01-22 @ 06:21]  4.3   |  21  |  2.75        Ca     10.3     [10-01-22 @ 06:21]      Mg     2.50     [10-01-22 @ 06:21]      Phos  5.7     [10-01-22 @ 06:21]    TPro  8.5  /  Alb  4.3  /  TBili  0.3  /  DBili  x   /  AST  25  /  ALT  47  /  AlkPhos  78  [10-01-22 @ 06:21]          Creatinine Trend:  SCr 2.75 [10-01 @ 06:21]  SCr 2.39 [09-30 @ 23:20]  SCr 1.26 [09-26 @ 05:48]  SCr 1.25 [09-25 @ 21:33]  SCr 1.11 [09-25 @ 14:49]    Urinalysis - [10-01-22 @ 04:36]      Color Yellow / Appearance Clear / SG 1.021 / pH 5.5      Gluc Negative / Ketone Negative  / Bili Negative / Urobili <2 mg/dL       Blood Negative / Protein 30 mg/dL / Leuk Est Negative / Nitrite Negative      RBC 1 / WBC 1 / Hyaline 9 / Gran  / Sq Epi  / Non Sq Epi 1 / Bacteria Negative    Urine Creatinine 209      [10-01-22 @ 04:36]  Urine Sodium 50      [10-01-22 @ 04:36]  Urine Urea Nitrogen 909.9      [10-01-22 @ 04:36]    TSH 0.82      [09-18-22 @ 17:50]      SPEP Interpretation: Distinct band in Beta region. Serum and Urine Immunofixation  Electrophoresis are recommended if not previously performed. EMILY Mendoza MD      [09-21-22 @ 00:43]

## 2022-10-01 NOTE — PATIENT PROFILE ADULT - FUNCTIONAL ASSESSMENT - BASIC MOBILITY 6.
3-calculated by average/Not able to assess (calculate score using [facility] averaging method)

## 2022-10-01 NOTE — H&P ADULT - ASSESSMENT
Patient is a 37 year old M hx of l. ICH, HTN who is presenting due to headaches.  Patient is a 37 year old M hx of l. ICH, HTN who is presenting due to headaches.       l. basal ganglia edema.   Stable 6 mm left-to-right subfalcine herniation.

## 2022-10-01 NOTE — H&P ADULT - PROBLEM SELECTOR PLAN 4
-likely from longstanding HTN and valsartan use  -check urine lytes  -renal US (renal arterial US negative for stenosis last admission) -pt w/ HTN c/w amlodipine 10 mg, HLZ 75 mg TID, and labetalol 300 mg Q8  -hold HCT and valsartan given MEHDI, if BP is elevated increase HLZ to 100 mg TID -left basal ganglia edema  -f/u neurosurgery recs were consulted overnight in regards to steroids or not

## 2022-10-01 NOTE — PROGRESS NOTE ADULT - PROBLEM SELECTOR PLAN 2
Na at 130, ct head with worsening edema, renal consulted, per renal Pt. now with symptomatic hyponatremia with worsening brain edema.   Check [place], UOsm, SOsm, serum uric acid. Would ideally raise SNa by 3 meq in the next hour due to worsening brain edema by giving 100cc bolus of 3% HTS.   -If cannot give this on the floor then, start 3% HTS @ 30cc/hr x 4 hours. Avoid over-correction by >4-6mEQ in 24 hours. Ultimate SNa goal is 145-150 to reduce cerebral edema. Fluid restriction to <1L/day for now.  Liberalize salt intake. Increase PO solute (protein) intake. Avoid isotonic or hypotonic fluids. Monitor SNa Q4 hours.  Would re-call Neurosurgery.    Discussed with micu per micu pt can be on the floor with 3%HTS at 30cc/hr, started on it, monitor bmp closely, f/u with renal  -nsx follg

## 2022-10-01 NOTE — ED ADULT NURSE REASSESSMENT NOTE - NS ED NURSE REASSESS COMMENT FT1
Report received from RAAD Brown @03:00. Patient A&Ox4, resting on stretcher, respirations even and unlabored. Patient denies any complaints at this time. Vitals stable. IV line intact and patent. Patient awaiting dispo. Stretcher in lowest position, wheels locked, appropriate side rails in place, call bell in reach.

## 2022-10-01 NOTE — H&P ADULT - PROBLEM SELECTOR PLAN 7
-likely from longstanding HTN and valsartan use  -check urine lytes  -renal US (renal arterial US negative for stenosis last admission)

## 2022-10-01 NOTE — CONSULT NOTE ADULT - PROBLEM SELECTOR RECOMMENDATION 2
Hypo-osmolar Hyponatremia likely ADH dependant from volume depletion in the setting of HCTZ use    Pt was also found to be hyponatremic on last admission with SNa 133 and was given 3% HTS & then salt tabs to maintain SNa goal of 145-150 per neurosx. SNa did not reach the sodium goal during last admission & now pt comes in with worsening cerebral edema. Last SNa was 136 on 9/26. SNa on this admission is 131 on 9/30. Pt received NS 1L bolus in the ED after which SNa is 130 today. Pt. now with symptomatic hyponatremia with worsening brain edema. Check [place], UOsm, SOsm, serum uric acid. Would start 3% HTS @ 30cc/hr x 6 hours. Would raise SNa by 3 meq in the next hour due to worsening brain edema.  SNa goal is 145-150 in the setting of cerebral edema. Fluid restriction to <1L/day for now.  Avoid over-correction by >4-6mEQ in 24 hours. Liberalize salt intake. Increase PO solute (protein) intake. Avoid isotonic or hypotonic fluids. Monitor SNa Q6 hours.  Would re-monica neurosurgery.  If at any point, urine output becomes > 100cc/hr, please call renal fellow      ###INCOMPLETE###    If you have any questions, please feel free to contact me  Deysi Gallegos  Nephrology Fellow  Pager NS: [phone]/ [facility]: [phone]    (After 5 pm or on weekends please page the on-call fellow, can check [email or web address] for schedule. Login is lance olea, schedule under [facility] medicine, psych, derm) Hypo-osmolar Hyponatremia likely ADH dependant from volume depletion in the setting of HCTZ use    Pt was also found to be hyponatremic on last admission with SNa 133 and was given 3% HTS & then salt tabs to maintain SNa goal of 145-150 per neurosx. SNa did not reach the sodium goal during last admission & now pt comes in with worsening cerebral edema. Last SNa was 136 on 9/26. SNa on this admission is 131 on 9/30. Pt received NS 1L bolus in the ED after which SNa is 130 today. Pt. now with symptomatic hyponatremia with worsening brain edema. Check [place], UOsm, SOsm, serum uric acid. Would ideally raise SNa by 3 meq in the next hour due to worsening brain edema by giving 100cc bolus of 3% HTS. If cannot give this on the floor then, start 3% HTS @ 30cc/hr x 4 hours. Avoid over-correction by >4-6mEQ in 24 hours. Ultimate SNa goal is 145-150 to reduce cerebral edema. Fluid restriction to <1L/day for now.  Liberalize salt intake. Increase PO solute (protein) intake. Avoid isotonic or hypotonic fluids. Monitor SNa Q6 hours.  Would re-call Neurosurgery.            If you have any questions, please feel free to contact me  Deysi Gallegos  Nephrology Fellow  Pager NS: [phone]/ [facility]: [phone]    (After 5 pm or on weekends please page the on-call fellow, can check [email or web address] for schedule. Login is lance olea, schedule under [facility] medicine, psych, derm) Hypo-osmolar Hyponatremia likely ADH dependant from volume depletion in the setting of HCTZ use    Pt was also found to be hyponatremic on last admission with SNa 133 and was given 3% HTS & then salt tabs to maintain SNa goal of 145-150 per neurosx. SNa did not reach the sodium goal during last admission & now pt comes in with worsening cerebral edema. Last SNa was 136 on 9/26. SNa on this admission is 131 on 9/30. Pt received NS 1L bolus in the ED after which SNa is 130 today. Pt. now with symptomatic hyponatremia with worsening brain edema. Check [place], UOsm, SOsm, serum uric acid. Would ideally raise SNa by 3 meq in the next hour due to worsening brain edema by giving 100cc bolus of 3% HTS. If cannot give this on the floor then, start 3% HTS @ 30cc/hr x 4 hours. Avoid over-correction by >4-6mEQ in 24 hours. Ultimate SNa goal is 145-150 to reduce cerebral edema. Fluid restriction to <1L/day for now.  Liberalize salt intake. Increase PO solute (protein) intake. Avoid isotonic or hypotonic fluids. Monitor SNa Q6 hours.  Would re-call Neurosurgery.          Disccussed with primary team    If you have any questions, please feel free to contact me  Deysi Gallegos  Nephrology Fellow  Pager NS: [phone]/ [facility]: [phone]    (After 5 pm or on weekends please page the on-call fellow, can check [email or web address] for schedule. Login is lance olea, schedule under [facility] medicine, psych, derm) Hypo-osmolar Hyponatremia likely ADH dependant from volume depletion in the setting of HCTZ use    Pt was also found to be hyponatremic on last admission with SNa 133 and was given 3% HTS & then salt tabs to maintain SNa goal of 145-150 per neurosx. SNa did not reach the sodium goal during last admission & now pt comes in with worsening cerebral edema. Last SNa was 136 on 9/26. SNa on this admission is 131 on 9/30. Pt received NS 1L bolus in the ED after which SNa is 130 today. Pt. now with symptomatic hyponatremia with worsening brain edema. Check [place], UOsm, SOsm, serum uric acid. Would ideally raise SNa by 3 meq in the next hour due to worsening brain edema by giving 100cc bolus of 3% HTS. If cannot give this on the floor then, start 3% HTS @ 30cc/hr x 4 hours. Avoid over-correction by >4-6mEQ in 24 hours. Ultimate SNa goal is 145-150 to reduce cerebral edema. Fluid restriction to <1L/day for now.  Liberalize salt intake. Increase PO solute (protein) intake. Avoid isotonic or hypotonic fluids. Monitor SNa Q4 hours.  Would re-call Neurosurgery.          Disccussed with primary team    If you have any questions, please feel free to contact me  Deysi Gallegos  Nephrology Fellow  Pager NS: [phone]/ [facility]: [phone]    (After 5 pm or on weekends please page the on-call fellow, can check [email or web address] for schedule. Login is lance olea, schedule under [facility] medicine, psych, derm)

## 2022-10-01 NOTE — PATIENT PROFILE ADULT - BILL PAYMENT
NST Performed due to FGR.   reviewed efm tracing. See NST/BPP Doc Flowsheet tab.  Brittany Merino RN    
no

## 2022-10-01 NOTE — CONSULT NOTE ADULT - ASSESSMENT
Patient is a 37 year old M with  Left basal ganglia hemorrhage (likely due to chronic HTN) who is presenting due to headaches, generalized weakness and presyncope.     Impression:    Recommendation:         Case to be seen and discussed with Dr. Story. Patient is a 37 year old M with  Left basal ganglia hemorrhage (likely due to chronic HTN) who is presenting due to headaches, generalized weakness and presyncope. Patient reports blacking out for couple of seconds after going to the bathroom. Sister witnessed event, denied LOC, shaking. Patient also reports mild intermittent headaches for last couple months. CT showed Worsening edema in the left basal ganglia extending into the IC, possible evolving infarct, decreased small acute hemorrhage left caudate.   Evolving infarct is possible.    Impression: Headache and pre-syncope due known resolving Left BG and caudate hemorrhage. Finding of possible evolving infarct.    Recommendation:   [ ] MRI brain with and without contrast   [ ] BP control goal <140/90  [x] Neurosurg consultation, appreciate recs  [ ] Q4 neurochecks  [ ] Fall and seizure precaution   [] Patient given information to follow up with stroke NP at [address] [place] [id number] [phone]       Case seen and discussed with Dr. Story.

## 2022-10-01 NOTE — H&P ADULT - PROBLEM SELECTOR PLAN 5
F-none  E-replete prn  N-DASH TLC diet  improve score 0 SCD for DVT prophylaxis (not AC given ICH) -likely from longstanding HTN and valsartan use  -check urine lytes  -renal US (renal arterial US negative for stenosis last admission) -platelet count of 606, recheck in am

## 2022-10-01 NOTE — PROGRESS NOTE ADULT - PROBLEM SELECTOR PLAN 1
-pt w/ ICH last admission, and still present  -likely headache from this  -CT head showed Worsening edema in the left basal ganglia extending into the internal capsule, in spite of significant decrease in size of acute hematoma. Evolving infarct is possible. Consider repeatMRI. Small acute hemorrhage in the head of the left caudate, significantly decreased in size compared to the prior. Resolution of previously seen intraventricular hemorrhage. Stable 6 mm left-to-right subfalcine herniation.  Neuro consult appreciated per neuro to wait until blood is resorbed to do repeat MRI brain w/wo gado to exclude an underlying lesion that bled so neuro recommend repeating this around 11/18/22.  -Neuro sx follg per nsx no acute neurosurgical intervention  - No indication for steroids at this time  - Headache management  - Control BP  -can give tylenol for headaches manage rest as below

## 2022-10-02 LAB
ANION GAP SERPL CALC-SCNC: 12 MMOL/L — SIGNIFICANT CHANGE UP (ref 7–14)
ANION GAP SERPL CALC-SCNC: 12 MMOL/L — SIGNIFICANT CHANGE UP (ref 7–14)
ANION GAP SERPL CALC-SCNC: 13 MMOL/L — SIGNIFICANT CHANGE UP (ref 7–14)
ANION GAP SERPL CALC-SCNC: 14 MMOL/L — SIGNIFICANT CHANGE UP (ref 7–14)
BUN SERPL-MCNC: 44 MG/DL — HIGH (ref 7–23)
BUN SERPL-MCNC: 45 MG/DL — HIGH (ref 7–23)
BUN SERPL-MCNC: 50 MG/DL — HIGH (ref 7–23)
BUN SERPL-MCNC: 52 MG/DL — HIGH (ref 7–23)
CALCIUM SERPL-MCNC: 10 MG/DL — SIGNIFICANT CHANGE UP (ref 8.4–10.5)
CALCIUM SERPL-MCNC: 10.1 MG/DL — SIGNIFICANT CHANGE UP (ref 8.4–10.5)
CALCIUM SERPL-MCNC: 10.2 MG/DL — SIGNIFICANT CHANGE UP (ref 8.4–10.5)
CALCIUM SERPL-MCNC: 9.8 MG/DL — SIGNIFICANT CHANGE UP (ref 8.4–10.5)
CHLORIDE SERPL-SCNC: 100 MMOL/L — SIGNIFICANT CHANGE UP (ref 98–107)
CHLORIDE SERPL-SCNC: 101 MMOL/L — SIGNIFICANT CHANGE UP (ref 98–107)
CHLORIDE SERPL-SCNC: 98 MMOL/L — SIGNIFICANT CHANGE UP (ref 98–107)
CHLORIDE SERPL-SCNC: 99 MMOL/L — SIGNIFICANT CHANGE UP (ref 98–107)
CO2 SERPL-SCNC: 20 MMOL/L — LOW (ref 22–31)
CO2 SERPL-SCNC: 22 MMOL/L — SIGNIFICANT CHANGE UP (ref 22–31)
CREAT SERPL-MCNC: 2.02 MG/DL — HIGH (ref 0.5–1.3)
CREAT SERPL-MCNC: 2.08 MG/DL — HIGH (ref 0.5–1.3)
CREAT SERPL-MCNC: 2.22 MG/DL — HIGH (ref 0.5–1.3)
CREAT SERPL-MCNC: 2.32 MG/DL — HIGH (ref 0.5–1.3)
EGFR: 36 ML/MIN/1.73M2 — LOW
EGFR: 38 ML/MIN/1.73M2 — LOW
EGFR: 41 ML/MIN/1.73M2 — LOW
EGFR: 43 ML/MIN/1.73M2 — LOW
GLUCOSE SERPL-MCNC: 133 MG/DL — HIGH (ref 70–99)
GLUCOSE SERPL-MCNC: 85 MG/DL — SIGNIFICANT CHANGE UP (ref 70–99)
GLUCOSE SERPL-MCNC: 97 MG/DL — SIGNIFICANT CHANGE UP (ref 70–99)
GLUCOSE SERPL-MCNC: 99 MG/DL — SIGNIFICANT CHANGE UP (ref 70–99)
HCT VFR BLD CALC: 35.9 % — LOW (ref 39–50)
HGB BLD-MCNC: 11.6 G/DL — LOW (ref 13–17)
MAGNESIUM SERPL-MCNC: 2.3 MG/DL — SIGNIFICANT CHANGE UP (ref 1.6–2.6)
MAGNESIUM SERPL-MCNC: 2.3 MG/DL — SIGNIFICANT CHANGE UP (ref 1.6–2.6)
MAGNESIUM SERPL-MCNC: 2.4 MG/DL — SIGNIFICANT CHANGE UP (ref 1.6–2.6)
MCHC RBC-ENTMCNC: 26.7 PG — LOW (ref 27–34)
MCHC RBC-ENTMCNC: 32.3 GM/DL — SIGNIFICANT CHANGE UP (ref 32–36)
MCV RBC AUTO: 82.7 FL — SIGNIFICANT CHANGE UP (ref 80–100)
NRBC # BLD: 0 /100 WBCS — SIGNIFICANT CHANGE UP (ref 0–0)
NRBC # FLD: 0 K/UL — SIGNIFICANT CHANGE UP (ref 0–0)
OSMOLALITY UR: 613 MOSM/KG — SIGNIFICANT CHANGE UP (ref 50–1200)
PHOSPHATE SERPL-MCNC: 3.4 MG/DL — SIGNIFICANT CHANGE UP (ref 2.5–4.5)
PHOSPHATE SERPL-MCNC: 4.9 MG/DL — HIGH (ref 2.5–4.5)
PHOSPHATE SERPL-MCNC: 5.2 MG/DL — HIGH (ref 2.5–4.5)
PLATELET # BLD AUTO: 624 K/UL — HIGH (ref 150–400)
POTASSIUM SERPL-MCNC: 4.3 MMOL/L — SIGNIFICANT CHANGE UP (ref 3.5–5.3)
POTASSIUM SERPL-MCNC: 4.6 MMOL/L — SIGNIFICANT CHANGE UP (ref 3.5–5.3)
POTASSIUM SERPL-SCNC: 4.3 MMOL/L — SIGNIFICANT CHANGE UP (ref 3.5–5.3)
POTASSIUM SERPL-SCNC: 4.6 MMOL/L — SIGNIFICANT CHANGE UP (ref 3.5–5.3)
RBC # BLD: 4.34 M/UL — SIGNIFICANT CHANGE UP (ref 4.2–5.8)
RBC # FLD: 13.5 % — SIGNIFICANT CHANGE UP (ref 10.3–14.5)
SODIUM SERPL-SCNC: 131 MMOL/L — LOW (ref 135–145)
SODIUM SERPL-SCNC: 131 MMOL/L — LOW (ref 135–145)
SODIUM SERPL-SCNC: 134 MMOL/L — LOW (ref 135–145)
SODIUM SERPL-SCNC: 135 MMOL/L — SIGNIFICANT CHANGE UP (ref 135–145)
WBC # BLD: 5.33 K/UL — SIGNIFICANT CHANGE UP (ref 3.8–10.5)
WBC # FLD AUTO: 5.33 K/UL — SIGNIFICANT CHANGE UP (ref 3.8–10.5)

## 2022-10-02 PROCEDURE — 99222 1ST HOSP IP/OBS MODERATE 55: CPT

## 2022-10-02 PROCEDURE — 99233 SBSQ HOSP IP/OBS HIGH 50: CPT

## 2022-10-02 RX ORDER — SODIUM CHLORIDE 5 G/100ML
500 INJECTION, SOLUTION INTRAVENOUS
Refills: 0 | Status: DISCONTINUED | OUTPATIENT
Start: 2022-10-02 | End: 2022-10-02

## 2022-10-02 RX ORDER — SODIUM CHLORIDE 5 G/100ML
500 INJECTION, SOLUTION INTRAVENOUS
Refills: 0 | Status: DISCONTINUED | OUTPATIENT
Start: 2022-10-02 | End: 2022-10-03

## 2022-10-02 RX ADMIN — Medication 300 MILLIGRAM(S): at 03:29

## 2022-10-02 RX ADMIN — AMLODIPINE BESYLATE 10 MILLIGRAM(S): 2.5 TABLET ORAL at 06:03

## 2022-10-02 RX ADMIN — Medication 75 MILLIGRAM(S): at 08:58

## 2022-10-02 RX ADMIN — Medication 300 MILLIGRAM(S): at 18:31

## 2022-10-02 RX ADMIN — SODIUM CHLORIDE 30 MILLILITER(S): 5 INJECTION, SOLUTION INTRAVENOUS at 17:16

## 2022-10-02 RX ADMIN — Medication 75 MILLIGRAM(S): at 18:32

## 2022-10-02 RX ADMIN — SODIUM CHLORIDE 30 MILLILITER(S): 5 INJECTION, SOLUTION INTRAVENOUS at 11:03

## 2022-10-02 RX ADMIN — Medication 300 MILLIGRAM(S): at 11:18

## 2022-10-02 RX ADMIN — LEVETIRACETAM 500 MILLIGRAM(S): 250 TABLET, FILM COATED ORAL at 06:03

## 2022-10-02 RX ADMIN — LEVETIRACETAM 500 MILLIGRAM(S): 250 TABLET, FILM COATED ORAL at 18:32

## 2022-10-02 NOTE — PROGRESS NOTE ADULT - PROBLEM SELECTOR PLAN 2
Na at 131 today, ct head with worsening edema, renal consulted, per renal Pt. now with symptomatic hyponatremia with worsening brain edema.    Per renal Would ideally raise SNa by 3 meq in the next hour due to worsening brain edema by giving 100cc bolus of 3% HTS.   -If cannot give this on the floor then, start 3% HTS @ 30cc/hr x 4 hours. Avoid over-correction by >4-6mEQ in 24 hours. Ultimate SNa goal is 145-150 to reduce cerebral edema. Fluid restriction to <1L/day for now.  Liberalize salt intake. Increase PO solute (protein) intake. Avoid isotonic or hypotonic fluids. Monitor SNa Q4 hours.  Would re-call Neurosurgery.    Discussed with micu on 10/1 per micu pt can be on the floor with 3%HTS at 30cc/hr, started on it, monitor bmp closely, f/u with renal  -on 3% HTS at 30cc/hr, monitor Na closely  -nsx follg

## 2022-10-02 NOTE — CHART NOTE - NSCHARTNOTEFT_GEN_A_CORE
Contacted Nephrologist,  to discuss sodium levels s/p 6 hours of hypertonic saline over the course of the day. Within the last 24 hours sodium changed from 131--->135. Per Nephrology recommendations ordered 3% hypertonic saline to be started at midnight at a rate of 30 cc/hr X 4 hours. Next set of labs to be repeated at 4 AM after fluids completed. RN advised of plan and agreed. Called to expedite MRI head. Contacted Nephrologist,  to discuss sodium levels s/p 6 hours of hypertonic saline over the course of the day. Within the last 24 hours sodium changed from 131--->135. Per Nephrology recommendations ordered 3% hypertonic saline to be started at midnight at a rate of 30 cc/hr X 4 hours. Next set of labs to be repeated at 4 AM after fluids completed. RN advised of plan and agreed. Called to expedite MRI head.    Patient refused continuation of IVF as causing pain, fluids stopped at 2:15 AM; received approximately 1/2 of fluids. Patient agreed to reattempt fluids later in the day. Will have RN draw labs and reassess Na level. Contacted Nephrologist,  to discuss sodium levels s/p 6 hours of hypertonic saline over the course of the day. Within the last 24 hours sodium changed from 131--->135. Per Nephrology recommendations ordered 3% hypertonic saline to be started at midnight at a rate of 30 cc/hr X 4 hours. Next set of labs to be repeated at 4 AM after fluids completed. RN advised of plan and agreed. Called to expedite MRI head.    Patient refused continuation of IVF as causing pain, fluids stopped at 2:15 AM; received approximately 1/2 of fluids. Patient agreed to reattempt fluids later in the day. Will have RN draw labs and reassess Na level at 131(downtrending). Patient refusing fluids at this time. Will resume once patient permits. Nephrologist made aware.

## 2022-10-02 NOTE — PROGRESS NOTE ADULT - PROBLEM SELECTOR PLAN 1
-pt w/ ICH last admission, and still present  -likely headache from this  -CT head showed Worsening edema in the left basal ganglia extending into the internal capsule, in spite of significant decrease in size of acute hematoma. Evolving infarct is possible. Consider repeatMRI. Small acute hemorrhage in the head of the left caudate, significantly decreased in size compared to the prior. Resolution of previously seen intraventricular hemorrhage. Stable 6 mm left-to-right subfalcine herniation.  Neuro consult appreciated, await MRI of the head, to be expedited discussed with ACP  -Neuro sx follg per nsx no acute neurosurgical intervention  - No indication for steroids at this time  - Headache management  - Control BP  -can give tylenol for headaches manage rest as below

## 2022-10-02 NOTE — PROGRESS NOTE ADULT - SUBJECTIVE AND OBJECTIVE BOX
Patient is a 37y old  Male who presents with a chief complaint of Headache (01 Oct 2022 15:26)      SUBJECTIVE / OVERNIGHT EVENTS: Pt seen and examined at 10:40am, no overnight events, pt denies any headache, vision changes, weakness, nausea, vomiting, or any other complaints. No other new issues reported.        MEDICATIONS  (STANDING):  amLODIPine   Tablet 10 milliGRAM(s) Oral daily  hydrALAZINE 75 milliGRAM(s) Oral every 8 hours  labetalol 300 milliGRAM(s) Oral every 8 hours  levETIRAcetam 500 milliGRAM(s) Oral two times a day  sodium chloride 3%. 500 milliLiter(s) (30 mL/Hr) IV Continuous <Continuous>  sodium chloride 3%. 500 milliLiter(s) (30 mL/Hr) IV Continuous <Continuous>    MEDICATIONS  (PRN):  acetaminophen     Tablet .. 650 milliGRAM(s) Oral every 6 hours PRN Temp greater or equal to 38C (100.4F), Mild Pain (1 - 3)      Vital Signs Last 24 Hrs  T(C): 36.5 (02 Oct 2022 10:18), Max: 37.1 (02 Oct 2022 03:25)  T(F): 97.7 (02 Oct 2022 10:18), Max: 98.7 (02 Oct 2022 03:25)  HR: 85 (02 Oct 2022 10:18) (80 - 86)  BP: 119/71 (02 Oct 2022 10:18) (114/67 - 127/64)  BP(mean): --  RR: 17 (02 Oct 2022 10:18) (16 - 18)  SpO2: 100% (02 Oct 2022 10:18) (89% - 100%)    Parameters below as of 02 Oct 2022 10:18  Patient On (Oxygen Delivery Method): room air      CAPILLARY BLOOD GLUCOSE        I&O's Summary    01 Oct 2022 07:01  -  02 Oct 2022 07:00  --------------------------------------------------------  IN: 620 mL / OUT: 1195 mL / NET: -575 mL    02 Oct 2022 07:01  -  02 Oct 2022 14:25  --------------------------------------------------------  IN: 570 mL / OUT: 700 mL / NET: -130 mL        PHYSICAL EXAM:  GENERAL: NAD, well-developed  CHEST/LUNG: Clear to auscultation bilaterally; No wheeze  HEART: Regular rate and rhythm  ABDOMEN: Soft, Nontender, Nondistended  EXTREMITIES: no LE edema  PSYCH: Calm  NEUROLOGY: AAOx3,moves all extremities  SKIN: No rashes or lesions      LABS:                        11.6   5.33  )-----------( 624      ( 02 Oct 2022 06:20 )             35.9     10-02    131<L>  |  98  |  50<H>  ----------------------------<  97  4.6   |  20<L>  |  2.22<H>    Ca    10.2      02 Oct 2022 06:20  Phos  4.9     10-02  Mg     2.40     10-02    TPro  8.5<H>  /  Alb  4.3  /  TBili  0.3  /  DBili  x   /  AST  25  /  ALT  47<H>  /  AlkPhos  78  10-01          Urinalysis Basic - ( 01 Oct 2022 13:04 )    Color: Yellow / Appearance: Clear / S.024 / pH: x  Gluc: x / Ketone: Negative  / Bili: Negative / Urobili: <2 mg/dL   Blood: x / Protein: 30 mg/dL / Nitrite: Negative   Leuk Esterase: Negative / RBC: 2 /HPF / WBC 2 /HPF   Sq Epi: x / Non Sq Epi: 1 /HPF / Bacteria: Negative        RADIOLOGY & ADDITIONAL TESTS:    Imaging Personally Reviewed:    Consultant(s) Notes Reviewed:      Care Discussed with Consultants/Other Providers:

## 2022-10-03 LAB
ANION GAP SERPL CALC-SCNC: 13 MMOL/L — SIGNIFICANT CHANGE UP (ref 7–14)
ANION GAP SERPL CALC-SCNC: 13 MMOL/L — SIGNIFICANT CHANGE UP (ref 7–14)
ANION GAP SERPL CALC-SCNC: 15 MMOL/L — HIGH (ref 7–14)
ANION GAP SERPL CALC-SCNC: 15 MMOL/L — HIGH (ref 7–14)
BUN SERPL-MCNC: 39 MG/DL — HIGH (ref 7–23)
BUN SERPL-MCNC: 40 MG/DL — HIGH (ref 7–23)
BUN SERPL-MCNC: 42 MG/DL — HIGH (ref 7–23)
BUN SERPL-MCNC: 44 MG/DL — HIGH (ref 7–23)
CALCIUM SERPL-MCNC: 10 MG/DL — SIGNIFICANT CHANGE UP (ref 8.4–10.5)
CALCIUM SERPL-MCNC: 10 MG/DL — SIGNIFICANT CHANGE UP (ref 8.4–10.5)
CALCIUM SERPL-MCNC: 10.1 MG/DL — SIGNIFICANT CHANGE UP (ref 8.4–10.5)
CALCIUM SERPL-MCNC: 10.4 MG/DL — SIGNIFICANT CHANGE UP (ref 8.4–10.5)
CHLORIDE SERPL-SCNC: 101 MMOL/L — SIGNIFICANT CHANGE UP (ref 98–107)
CHLORIDE SERPL-SCNC: 101 MMOL/L — SIGNIFICANT CHANGE UP (ref 98–107)
CHLORIDE SERPL-SCNC: 102 MMOL/L — SIGNIFICANT CHANGE UP (ref 98–107)
CHLORIDE SERPL-SCNC: 98 MMOL/L — SIGNIFICANT CHANGE UP (ref 98–107)
CO2 SERPL-SCNC: 18 MMOL/L — LOW (ref 22–31)
CO2 SERPL-SCNC: 20 MMOL/L — LOW (ref 22–31)
CO2 SERPL-SCNC: 21 MMOL/L — LOW (ref 22–31)
CO2 SERPL-SCNC: 21 MMOL/L — LOW (ref 22–31)
CREAT SERPL-MCNC: 1.99 MG/DL — HIGH (ref 0.5–1.3)
CREAT SERPL-MCNC: 2 MG/DL — HIGH (ref 0.5–1.3)
CREAT SERPL-MCNC: 2.02 MG/DL — HIGH (ref 0.5–1.3)
CREAT SERPL-MCNC: 2.09 MG/DL — HIGH (ref 0.5–1.3)
EGFR: 41 ML/MIN/1.73M2 — LOW
EGFR: 43 ML/MIN/1.73M2 — LOW
EGFR: 43 ML/MIN/1.73M2 — LOW
EGFR: 44 ML/MIN/1.73M2 — LOW
GLUCOSE SERPL-MCNC: 115 MG/DL — HIGH (ref 70–99)
GLUCOSE SERPL-MCNC: 118 MG/DL — HIGH (ref 70–99)
GLUCOSE SERPL-MCNC: 93 MG/DL — SIGNIFICANT CHANGE UP (ref 70–99)
GLUCOSE SERPL-MCNC: 99 MG/DL — SIGNIFICANT CHANGE UP (ref 70–99)
HCT VFR BLD CALC: 34.3 % — LOW (ref 39–50)
HCT VFR BLD CALC: 37.1 % — LOW (ref 39–50)
HGB BLD-MCNC: 11.1 G/DL — LOW (ref 13–17)
HGB BLD-MCNC: 11.4 G/DL — LOW (ref 13–17)
MAGNESIUM SERPL-MCNC: 2.2 MG/DL — SIGNIFICANT CHANGE UP (ref 1.6–2.6)
MAGNESIUM SERPL-MCNC: 2.3 MG/DL — SIGNIFICANT CHANGE UP (ref 1.6–2.6)
MCHC RBC-ENTMCNC: 26.8 PG — LOW (ref 27–34)
MCHC RBC-ENTMCNC: 27.2 PG — SIGNIFICANT CHANGE UP (ref 27–34)
MCHC RBC-ENTMCNC: 30.7 GM/DL — LOW (ref 32–36)
MCHC RBC-ENTMCNC: 32.4 GM/DL — SIGNIFICANT CHANGE UP (ref 32–36)
MCV RBC AUTO: 84.1 FL — SIGNIFICANT CHANGE UP (ref 80–100)
MCV RBC AUTO: 87.3 FL — SIGNIFICANT CHANGE UP (ref 80–100)
NRBC # BLD: 0 /100 WBCS — SIGNIFICANT CHANGE UP (ref 0–0)
NRBC # BLD: 0 /100 WBCS — SIGNIFICANT CHANGE UP (ref 0–0)
NRBC # FLD: 0 K/UL — SIGNIFICANT CHANGE UP (ref 0–0)
NRBC # FLD: 0 K/UL — SIGNIFICANT CHANGE UP (ref 0–0)
PHOSPHATE SERPL-MCNC: 3.7 MG/DL — SIGNIFICANT CHANGE UP (ref 2.5–4.5)
PHOSPHATE SERPL-MCNC: 4.4 MG/DL — SIGNIFICANT CHANGE UP (ref 2.5–4.5)
PLATELET # BLD AUTO: 442 K/UL — HIGH (ref 150–400)
PLATELET # BLD AUTO: 574 K/UL — HIGH (ref 150–400)
POTASSIUM SERPL-MCNC: 4.2 MMOL/L — SIGNIFICANT CHANGE UP (ref 3.5–5.3)
POTASSIUM SERPL-MCNC: 4.4 MMOL/L — SIGNIFICANT CHANGE UP (ref 3.5–5.3)
POTASSIUM SERPL-MCNC: 4.7 MMOL/L — SIGNIFICANT CHANGE UP (ref 3.5–5.3)
POTASSIUM SERPL-MCNC: 4.7 MMOL/L — SIGNIFICANT CHANGE UP (ref 3.5–5.3)
POTASSIUM SERPL-SCNC: 4.2 MMOL/L — SIGNIFICANT CHANGE UP (ref 3.5–5.3)
POTASSIUM SERPL-SCNC: 4.4 MMOL/L — SIGNIFICANT CHANGE UP (ref 3.5–5.3)
POTASSIUM SERPL-SCNC: 4.7 MMOL/L — SIGNIFICANT CHANGE UP (ref 3.5–5.3)
POTASSIUM SERPL-SCNC: 4.7 MMOL/L — SIGNIFICANT CHANGE UP (ref 3.5–5.3)
RBC # BLD: 4.08 M/UL — LOW (ref 4.2–5.8)
RBC # BLD: 4.25 M/UL — SIGNIFICANT CHANGE UP (ref 4.2–5.8)
RBC # FLD: 13.7 % — SIGNIFICANT CHANGE UP (ref 10.3–14.5)
RBC # FLD: 13.8 % — SIGNIFICANT CHANGE UP (ref 10.3–14.5)
SODIUM SERPL-SCNC: 131 MMOL/L — LOW (ref 135–145)
SODIUM SERPL-SCNC: 135 MMOL/L — SIGNIFICANT CHANGE UP (ref 135–145)
SODIUM SERPL-SCNC: 135 MMOL/L — SIGNIFICANT CHANGE UP (ref 135–145)
SODIUM SERPL-SCNC: 137 MMOL/L — SIGNIFICANT CHANGE UP (ref 135–145)
WBC # BLD: 5.83 K/UL — SIGNIFICANT CHANGE UP (ref 3.8–10.5)
WBC # BLD: 8.32 K/UL — SIGNIFICANT CHANGE UP (ref 3.8–10.5)
WBC # FLD AUTO: 5.83 K/UL — SIGNIFICANT CHANGE UP (ref 3.8–10.5)
WBC # FLD AUTO: 8.32 K/UL — SIGNIFICANT CHANGE UP (ref 3.8–10.5)

## 2022-10-03 PROCEDURE — 99233 SBSQ HOSP IP/OBS HIGH 50: CPT

## 2022-10-03 PROCEDURE — 70553 MRI BRAIN STEM W/O & W/DYE: CPT | Mod: 26

## 2022-10-03 RX ORDER — SODIUM CHLORIDE 5 G/100ML
500 INJECTION, SOLUTION INTRAVENOUS
Refills: 0 | Status: DISCONTINUED | OUTPATIENT
Start: 2022-10-03 | End: 2022-10-03

## 2022-10-03 RX ADMIN — Medication 75 MILLIGRAM(S): at 18:07

## 2022-10-03 RX ADMIN — Medication 300 MILLIGRAM(S): at 10:58

## 2022-10-03 RX ADMIN — Medication 650 MILLIGRAM(S): at 04:30

## 2022-10-03 RX ADMIN — Medication 300 MILLIGRAM(S): at 01:50

## 2022-10-03 RX ADMIN — Medication 300 MILLIGRAM(S): at 18:07

## 2022-10-03 RX ADMIN — SODIUM CHLORIDE 30 MILLILITER(S): 5 INJECTION, SOLUTION INTRAVENOUS at 00:09

## 2022-10-03 RX ADMIN — LEVETIRACETAM 500 MILLIGRAM(S): 250 TABLET, FILM COATED ORAL at 06:33

## 2022-10-03 RX ADMIN — Medication 650 MILLIGRAM(S): at 03:40

## 2022-10-03 RX ADMIN — Medication 75 MILLIGRAM(S): at 00:07

## 2022-10-03 RX ADMIN — LEVETIRACETAM 500 MILLIGRAM(S): 250 TABLET, FILM COATED ORAL at 18:08

## 2022-10-03 NOTE — CHART NOTE - NSCHARTNOTEFT_GEN_A_CORE
Case and imaging reviewed.     < from: MR Head w/wo IV Cont (10.03.22 @ 02:08) >    IMPRESSION:  Left thalamic parenchymal hematoma with intraventricular   rupture. Associated vasogenic edema and mild local mass effect result in   mild left-to-right subfalcine herniation. These features have not   substantially changed since 9/21/2022    < end of copied text >    No neurosurgical intervention at this time. Neurosurgery signing off. Reconsult PRN. Pt can follow up outpatient with Dr. Esteban. Case d/w attending.

## 2022-10-03 NOTE — HISTORY OF PRESENT ILLNESS
[FreeTextEntry1] : ABHISHEK RIDDLE is a 37 year old right handed male with PMH of HTN, noncompliant with prescribed medications, HLD. He presented to [facility] ED on 9/16 with 2 day history of generalized body aches, nausea, fevers, and chills. He was treated symptomatically and advised to resume his antihypertensive agents. He presented to [facility] ED on 9/18/22 following a syncopal episode, and episode of urinary incontinence, was markedly hypertensive on arrival to ED. Found to have a left basal ganglia hemorrhage with left intraventricular extension, and early hydrocephalus. Of note, prior to this second admission, he had been having transient bilateral blurry vision, and severe stabbing posterior neck pain. Denies personal or family history of seizure or stroke. He had not taken his prescribed anti-hypertensive medications for approximately 3 months. No acute neurosurgical intervention required. IPH with IVH deemed to be result from uncontrolled high blood pressure. Discharged on Keppra 500mg BID for seizure prophylaxis.

## 2022-10-03 NOTE — PROGRESS NOTE ADULT - SUBJECTIVE AND OBJECTIVE BOX
Patient is a 37y old  Male who presents with a chief complaint of Headache (02 Oct 2022 14:25)      SUBJECTIVE / OVERNIGHT EVENTS:Pt seen and examined, chart reviewed. No complaints. Denies any HA, N/V. Pt refusing hypertonic IVF hydration      MEDICATIONS  (STANDING):  amLODIPine   Tablet 10 milliGRAM(s) Oral daily  hydrALAZINE 75 milliGRAM(s) Oral every 8 hours  labetalol 300 milliGRAM(s) Oral every 8 hours  levETIRAcetam 500 milliGRAM(s) Oral two times a day  sodium chloride 3%. 500 milliLiter(s) (30 mL/Hr) IV Continuous <Continuous>    MEDICATIONS  (PRN):  acetaminophen     Tablet .. 650 milliGRAM(s) Oral every 6 hours PRN Temp greater or equal to 38C (100.4F), Mild Pain (1 - 3)      Vital Signs Last 24 Hrs  T(C): 36.5 (03 Oct 2022 09:39), Max: 37.2 (02 Oct 2022 17:31)  T(F): 97.7 (03 Oct 2022 09:39), Max: 99 (02 Oct 2022 17:31)  HR: 76 (03 Oct 2022 09:39) (76 - 85)  BP: 130/79 (03 Oct 2022 09:39) (103/65 - 139/82)  BP(mean): --  RR: 17 (03 Oct 2022 09:39) (17 - 18)  SpO2: 100% (03 Oct 2022 09:39) (100% - 100%)    Parameters below as of 03 Oct 2022 09:39  Patient On (Oxygen Delivery Method): room air      CAPILLARY BLOOD GLUCOSE        I&O's Summary    02 Oct 2022 07:01  -  03 Oct 2022 07:00  --------------------------------------------------------  IN: 900 mL / OUT: 700 mL / NET: 200 mL        PHYSICAL EXAM:  GENERAL: NAD, well-developed  HEAD:  Atraumatic, Normocephalic  EYES: EOMI, PERRLA, conjunctiva and sclera clear  NECK: Supple, No JVD  CHEST/LUNG: Clear to auscultation bilaterally; No wheeze  HEART: Regular rate and rhythm; No murmurs, rubs, or gallops  ABDOMEN: Soft, Nontender, Nondistended; Bowel sounds present  EXTREMITIES:  2+ Peripheral Pulses, No clubbing, cyanosis, or edema  PSYCH: AAOx3  NEUROLOGY: non-focal  SKIN: No rashes or lesions    LABS:                        11.1   8.32  )-----------( 574      ( 03 Oct 2022 03:00 )             34.3     10-03    135  |  101  |  39<H>  ----------------------------<  93  4.7   |  21<L>  |  1.99<H>    Ca    10.4      03 Oct 2022 11:45  Phos  4.4     10-03  Mg     2.30     10-03                RADIOLOGY & ADDITIONAL TESTS:    Imaging Personally Reviewed:    Consultant(s) Notes Reviewed:      Care Discussed with Consultants/Other Providers:

## 2022-10-03 NOTE — CHART NOTE - NSCHARTNOTEFT_GEN_A_CORE
IMAGING     < from: MR Head w/wo IV Cont (10.03.22 @ 02:08) >    IMPRESSION:  Left thalamic parenchymal hematoma with intraventricular   rupture. Associated vasogenic edema and mild local mass effect result in   mild left-to-right subfalcine herniation. These features have not   substantially changed since 9/21/2022    < end of copied text >    IMPRESSION   Head and pre-syncope with L thalamic hematoma w/ intraventricular rupture, stable non-evolving infarct      RECOMMENDATION  [X ] MRI brain with and without contrast   [ ] BP control goal <140/90  [x] Neurosurg consultation, appreciate recs  [ ] Q4 neurochecks  [ ] Fall and seizure precaution   [] Patient given information to follow up with stroke NP at [address] [place] [address] [phone] IMAGING     < from: MR Head w/wo IV Cont (10.03.22 @ 02:08) >    IMPRESSION:  Left thalamic parenchymal hematoma with intraventricular   rupture. Associated vasogenic edema and mild local mass effect result in   mild left-to-right subfalcine herniation. These features have not   substantially changed since 9/21/2022    < end of copied text >    IMPRESSION   Headache and pre-syncope with subacute L basal ganglia hematoma w/ intraventricular rupture, no evidence of acute infarct      RECOMMENDATION  [X ] MRI brain with and without contrast   [ ] BP control goal <140/90  [x] Neurosurg consultation, appreciate recs  [ ] Q4 neurochecks  [ ] Fall and seizure precaution   [] Patient given information to follow up with stroke NP at [address] [place] [id number] [phone]    Stroke attending. Agree with above

## 2022-10-03 NOTE — PROGRESS NOTE ADULT - PROBLEM SELECTOR PLAN 2
Na at 131, ct head with worsening edema, renal consulted, per renal Pt. now with symptomatic hyponatremia with worsening brain edema.    Per renal Would ideally raise SNa by 3 meq in the next hour due to worsening brain edema by giving 100cc bolus of 3% HTS.   -If cannot give this on the floor then, start 3% HTS @ 30cc/hr x 4 hours. Avoid over-correction by >4-6mEQ in 24 hours. Ultimate SNa goal is 145-150 to reduce cerebral edema. Fluid restriction to <1L/day for now.  Liberalize salt intake. Increase PO solute (protein) intake. Avoid isotonic or hypotonic fluids. Monitor SNa Q4 hours.  Would re-call Neurosurgery.    Discussed with micu on 10/1 per micu pt can be on the floor with 3%HTS at 30cc/hr, started on it, monitor bmp closely, f/u with renal  -on 3% HTS at 30cc/hr, monitor Na closely, pt refusing HTS. Will d/w Renal re: Free water restriction with Salt tab 1 gram 3X/day   -Monitor serial BMP    -nsx /Neuro follg

## 2022-10-03 NOTE — ASSESSMENT
[FreeTextEntry1] : IMPRESSION: \par 37M with PMH of HTN, HLD, noncompliant with meds, p/w transient blurry vision, neck pain, and eventual syncopal episode with urinary incontinence episode, found to have L basal ganglia IPH with intraventricular extension, presumed to be due to hypertensive emergency, likely uncontrolled HTN.\par \par \par Recommend diagnostic cerebral angiogram as the initial step for further evaluation. The risks, benefits, alternatives, complications and personnel associated with the procedure were discussed with the patient and family in great detail. They request that we proceed. \par \par \par PLAN:\par Advised to follow up with PCP for BP medication management\par Advised to follow up with stroke neurology??

## 2022-10-03 NOTE — PROGRESS NOTE ADULT - PROBLEM SELECTOR PLAN 1
-pt w/ ICH last admission, and still present  -likely headache from this  -CT head showed Worsening edema in the left basal ganglia extending into the internal capsule, in spite of significant decrease in size of acute hematoma. Evolving infarct is possible. Consider repeat MRI. Small acute hemorrhage in the head of the left caudate, significantly decreased in size compared to the prior. Resolution of previously seen intraventricular hemorrhage. Stable 6 mm left-to-right subfalcine herniation.  Neuro consult appreciated, await MRI of the head, to be expedited discussed with ACP  -Neuro sx follg per nsx no acute neurosurgical intervention  - No indication for steroids at this time  - Headache management, resolved.   - Control BP  -can give tylenol for headaches manage rest as below

## 2022-10-04 LAB
ANION GAP SERPL CALC-SCNC: 11 MMOL/L — SIGNIFICANT CHANGE UP (ref 7–14)
ANION GAP SERPL CALC-SCNC: 13 MMOL/L — SIGNIFICANT CHANGE UP (ref 7–14)
ANION GAP SERPL CALC-SCNC: 13 MMOL/L — SIGNIFICANT CHANGE UP (ref 7–14)
BUN SERPL-MCNC: 36 MG/DL — HIGH (ref 7–23)
BUN SERPL-MCNC: 36 MG/DL — HIGH (ref 7–23)
BUN SERPL-MCNC: 39 MG/DL — HIGH (ref 7–23)
CALCIUM SERPL-MCNC: 10 MG/DL — SIGNIFICANT CHANGE UP (ref 8.4–10.5)
CALCIUM SERPL-MCNC: 10.1 MG/DL — SIGNIFICANT CHANGE UP (ref 8.4–10.5)
CALCIUM SERPL-MCNC: 10.3 MG/DL — SIGNIFICANT CHANGE UP (ref 8.4–10.5)
CHLORIDE SERPL-SCNC: 102 MMOL/L — SIGNIFICANT CHANGE UP (ref 98–107)
CHLORIDE SERPL-SCNC: 103 MMOL/L — SIGNIFICANT CHANGE UP (ref 98–107)
CHLORIDE SERPL-SCNC: 103 MMOL/L — SIGNIFICANT CHANGE UP (ref 98–107)
CO2 SERPL-SCNC: 19 MMOL/L — LOW (ref 22–31)
CO2 SERPL-SCNC: 21 MMOL/L — LOW (ref 22–31)
CO2 SERPL-SCNC: 22 MMOL/L — SIGNIFICANT CHANGE UP (ref 22–31)
CREAT SERPL-MCNC: 1.86 MG/DL — HIGH (ref 0.5–1.3)
CREAT SERPL-MCNC: 1.9 MG/DL — HIGH (ref 0.5–1.3)
CREAT SERPL-MCNC: 1.95 MG/DL — HIGH (ref 0.5–1.3)
EGFR: 45 ML/MIN/1.73M2 — LOW
EGFR: 46 ML/MIN/1.73M2 — LOW
EGFR: 47 ML/MIN/1.73M2 — LOW
GLUCOSE BLDC GLUCOMTR-MCNC: 97 MG/DL — SIGNIFICANT CHANGE UP (ref 70–99)
GLUCOSE SERPL-MCNC: 100 MG/DL — HIGH (ref 70–99)
GLUCOSE SERPL-MCNC: 103 MG/DL — HIGH (ref 70–99)
GLUCOSE SERPL-MCNC: 123 MG/DL — HIGH (ref 70–99)
HCT VFR BLD CALC: 36.3 % — LOW (ref 39–50)
HGB BLD-MCNC: 11.3 G/DL — LOW (ref 13–17)
MAGNESIUM SERPL-MCNC: 2 MG/DL — SIGNIFICANT CHANGE UP (ref 1.6–2.6)
MAGNESIUM SERPL-MCNC: 2.1 MG/DL — SIGNIFICANT CHANGE UP (ref 1.6–2.6)
MAGNESIUM SERPL-MCNC: 2.2 MG/DL — SIGNIFICANT CHANGE UP (ref 1.6–2.6)
MCHC RBC-ENTMCNC: 27 PG — SIGNIFICANT CHANGE UP (ref 27–34)
MCHC RBC-ENTMCNC: 31.1 GM/DL — LOW (ref 32–36)
MCV RBC AUTO: 86.6 FL — SIGNIFICANT CHANGE UP (ref 80–100)
NRBC # BLD: 0 /100 WBCS — SIGNIFICANT CHANGE UP (ref 0–0)
NRBC # FLD: 0 K/UL — SIGNIFICANT CHANGE UP (ref 0–0)
PHOSPHATE SERPL-MCNC: 3.4 MG/DL — SIGNIFICANT CHANGE UP (ref 2.5–4.5)
PHOSPHATE SERPL-MCNC: 4.1 MG/DL — SIGNIFICANT CHANGE UP (ref 2.5–4.5)
PHOSPHATE SERPL-MCNC: 4.4 MG/DL — SIGNIFICANT CHANGE UP (ref 2.5–4.5)
PLATELET # BLD AUTO: 578 K/UL — HIGH (ref 150–400)
POTASSIUM SERPL-MCNC: 4.1 MMOL/L — SIGNIFICANT CHANGE UP (ref 3.5–5.3)
POTASSIUM SERPL-MCNC: 4.3 MMOL/L — SIGNIFICANT CHANGE UP (ref 3.5–5.3)
POTASSIUM SERPL-MCNC: 4.7 MMOL/L — SIGNIFICANT CHANGE UP (ref 3.5–5.3)
POTASSIUM SERPL-SCNC: 4.1 MMOL/L — SIGNIFICANT CHANGE UP (ref 3.5–5.3)
POTASSIUM SERPL-SCNC: 4.3 MMOL/L — SIGNIFICANT CHANGE UP (ref 3.5–5.3)
POTASSIUM SERPL-SCNC: 4.7 MMOL/L — SIGNIFICANT CHANGE UP (ref 3.5–5.3)
RBC # BLD: 4.19 M/UL — LOW (ref 4.2–5.8)
RBC # FLD: 13.4 % — SIGNIFICANT CHANGE UP (ref 10.3–14.5)
SODIUM SERPL-SCNC: 134 MMOL/L — LOW (ref 135–145)
SODIUM SERPL-SCNC: 136 MMOL/L — SIGNIFICANT CHANGE UP (ref 135–145)
SODIUM SERPL-SCNC: 137 MMOL/L — SIGNIFICANT CHANGE UP (ref 135–145)
WBC # BLD: 6.13 K/UL — SIGNIFICANT CHANGE UP (ref 3.8–10.5)
WBC # FLD AUTO: 6.13 K/UL — SIGNIFICANT CHANGE UP (ref 3.8–10.5)

## 2022-10-04 PROCEDURE — 99233 SBSQ HOSP IP/OBS HIGH 50: CPT

## 2022-10-04 PROCEDURE — 70450 CT HEAD/BRAIN W/O DYE: CPT | Mod: 26

## 2022-10-04 RX ORDER — SODIUM CHLORIDE 9 MG/ML
1000 INJECTION INTRAMUSCULAR; INTRAVENOUS; SUBCUTANEOUS
Refills: 0 | Status: DISCONTINUED | OUTPATIENT
Start: 2022-10-04 | End: 2022-10-05

## 2022-10-04 RX ADMIN — Medication 650 MILLIGRAM(S): at 04:26

## 2022-10-04 RX ADMIN — Medication 75 MILLIGRAM(S): at 00:24

## 2022-10-04 RX ADMIN — LEVETIRACETAM 500 MILLIGRAM(S): 250 TABLET, FILM COATED ORAL at 05:52

## 2022-10-04 RX ADMIN — Medication 75 MILLIGRAM(S): at 23:44

## 2022-10-04 RX ADMIN — Medication 300 MILLIGRAM(S): at 17:55

## 2022-10-04 RX ADMIN — SODIUM CHLORIDE 75 MILLILITER(S): 9 INJECTION INTRAMUSCULAR; INTRAVENOUS; SUBCUTANEOUS at 14:00

## 2022-10-04 RX ADMIN — Medication 650 MILLIGRAM(S): at 10:58

## 2022-10-04 RX ADMIN — Medication 75 MILLIGRAM(S): at 07:38

## 2022-10-04 RX ADMIN — Medication 650 MILLIGRAM(S): at 11:58

## 2022-10-04 RX ADMIN — Medication 300 MILLIGRAM(S): at 10:58

## 2022-10-04 RX ADMIN — Medication 75 MILLIGRAM(S): at 17:55

## 2022-10-04 RX ADMIN — LEVETIRACETAM 500 MILLIGRAM(S): 250 TABLET, FILM COATED ORAL at 17:54

## 2022-10-04 RX ADMIN — AMLODIPINE BESYLATE 10 MILLIGRAM(S): 2.5 TABLET ORAL at 05:44

## 2022-10-04 NOTE — CHART NOTE - NSCHARTNOTEFT_GEN_A_CORE
Followed up with on call Nephrologist to discuss patient case. Repeat BMP @ 7 . As per Nephrologist,  is not opposed to continuing fluids however noted that NS will likely decrease sodium. Per Nephrology sodium at goal. Will reassess BMP at midnight after fluids end. If no drastic shift in sodium and patient remains volume down will order additional fluids. Per specialist recommends daily labs.     At start of shift CT called to expedite scan. Per RN patient's headache has improved. Will continue to monitor. Followed up with on call Nephrologist to discuss patient case. Repeat BMP @ 7 . As per Nephrologist,  is not opposed to continuing fluids however noted that NS will likely decrease sodium. Per Nephrology sodium at goal. Will reassess BMP at midnight after fluids end. If no drastic shift in sodium and patient remains volume down will order additional fluids to assist with hydration in light of patient's recent orthostatic episode. Per specialist recommends daily labs.     At start of shift CT called to expedite scan. Per RN patient's headache has improved. Will continue to monitor. Followed up with on call Nephrologist to discuss patient case. Repeat BMP @ 7 . As per Nephrologist,  is not opposed to continuing fluids however noted that NS will likely decrease sodium. Per Nephrology sodium at goal. Will reassess BMP at midnight after fluids end. If no drastic shift in sodium and patient remains volume down will order additional fluids to assist with hydration in light of patient's recent orthostatic episode. Per specialist recommends daily labs.     At start of shift CT called to expedite scan: preliminary results stable; ie no expansion of bleed or acute changes. Per RN patient's headache has improved. Writer went to assess patient. Admits that has no headache, dizziness, blurred vison or any other acute complaints at this time. Will continue to monitor.

## 2022-10-04 NOTE — CHART NOTE - NSCHARTNOTEFT_GEN_A_CORE
Patient with severe headache this AM (15/10 per patient), now improved. No neuro deficits on exam. Discussed with neurology team and Dr. Wells. STAT CT head ordered.    Na 134 at 2:30AM today, will repeat STAT BMP now and will follow up results with nephrology team. Per RN patient complaining of severe headache. Went to see patient at bedside, patient states headache started overnight. Patient states when headache first began pain level was 15/10, now improved to 6-7/10 on pain scale. No neuro deficits on exam (A&O x 4, PERRL, 5/5 strength in all four extremities, sensation intact). Discussed with neurology team and Dr. Wells. STAT CT head ordered. Continue with neurological checks q 4hrs.     Na 134 at 2:30AM today, will repeat STAT BMP now and will follow up results with nephrology team.    UPDATE:  Discussed repeat BMP with nephrology team. Patient clinically appears dehydrated (orthostatic overnight, MEHDI on labs.). Ordered IV normal saline 75 cc/hr x 10hrs per renal recommendations. Will recheck bmp at 7pm. Dr. Wells in agreement with plan. Team to continue to monitor.

## 2022-10-04 NOTE — PROGRESS NOTE ADULT - PROBLEM SELECTOR PLAN 1
-pt w/ ICH last admission, and still present  -likely headache from this  -CT head showed Worsening edema in the left basal ganglia extending into the internal capsule, in spite of significant decrease in size of acute hematoma. Evolving infarct is possible. Consider repeat MRI. Small acute hemorrhage in the head of the left caudate, significantly decreased in size compared to the prior. Resolution of previously seen intraventricular hemorrhage. Stable 6 mm left-to-right subfalcine herniation.  Neuro consult appreciated, await MRI of the head, to be expedited discussed with ACP  -Neuro sx follg per nsx no acute neurosurgical intervention  - No indication for steroids at this time  - Headache management, pt c/o HA this morning, now resolved.  -repeat CT of head as per Neuro   - Control BP  -can give tylenol for headaches manage rest as below

## 2022-10-04 NOTE — PHYSICAL THERAPY INITIAL EVALUATION ADULT - PATIENT PROFILE REVIEW, REHAB EVAL
PT initial evaluation received and chart review completed. Pt agreeable to participate in PT evaluation. Pt cleared by RAAD Christopher./yes

## 2022-10-04 NOTE — PHYSICAL THERAPY INITIAL EVALUATION ADULT - PERTINENT HX OF CURRENT PROBLEM, REHAB EVAL
Pt is a 37 year old male presenting with headaches associated with generalized weakness and presyncope. Pt has hx of spontaneous intraparenchymal intracranial hemorrhage. CT head significant for worsening edema in the left basal ganglia extending into the internal capsule, stable subfalcine herniation, and small acute hemorrhage in the head of the left caudate significantly decreased in size compared to the prior. MRI significant for left thalamic parenchymal hematoma with intraventricular rupture associated vasogenic edema and mild local mass effect; these features have not substantially changed compared to the prior.

## 2022-10-04 NOTE — CHART NOTE - NSCHARTNOTEFT_GEN_A_CORE
Patient is a 37 year old M hx of ICH, HTN who is presenting due to headaches. Patient is a 37 year old M hx of ICH, HTN who is presenting due to headaches. Neurology and Neurosurgery evaluated the patient. MRI unchanged from previous(9/21/2022). Contacted by RN as PCA witnessed patient have a presyncopal episode where he slumped over in bed. Patient maintained consciousness during the episode and was talking to PCA and RN who stated he was fine. Writer went to bedside to evaluate the patient. Patient noted headache which has since resolved. Denies dizziness, headache, blurred vision or palpitations. Admits that he leaned forward in bed after going from laying down to sitting position rapidly. He denies loss of consciousness, any pre-syncopal or post syncopal symptoms. Patient's neurologic exam grossly normal. Full strength, normal EOMs,coordination CN 2-12 intact. POCT glucose done at bedside: 97. At time of episode patient's blood pressure SBP 89 however quickly returned to SBP ~120 upon standing up.    Plan:  -VSS  -Patient does not want fluids now and as VSS will defer at the moment  -Advised avoiding rapid change in position  -Orthostatics ordered for AM if positive would suggest small bolus  -Maintain neuro checks q 4 hours   -AM labs drawn early; last Na 137 ~8 PM will evaluate for any changes  -Will continue to monitor  -Day team to be informed of night events to ensure continuity of care

## 2022-10-04 NOTE — PROGRESS NOTE ADULT - SUBJECTIVE AND OBJECTIVE BOX
Patient is a 37y old  Male who presents with a chief complaint of Headache (03 Oct 2022 14:33)      SUBJECTIVE / OVERNIGHT EVENTS: Pt had a near syncope episode last night. Orthostatic by symptoms. No other complaints.     MEDICATIONS  (STANDING):  amLODIPine   Tablet 10 milliGRAM(s) Oral daily  hydrALAZINE 75 milliGRAM(s) Oral every 8 hours  labetalol 300 milliGRAM(s) Oral every 8 hours  levETIRAcetam 500 milliGRAM(s) Oral two times a day  sodium chloride 0.9%. 1000 milliLiter(s) (75 mL/Hr) IV Continuous <Continuous>  sodium chloride 3%. 500 milliLiter(s) (30 mL/Hr) IV Continuous <Continuous>    MEDICATIONS  (PRN):  acetaminophen     Tablet .. 650 milliGRAM(s) Oral every 6 hours PRN Temp greater or equal to 38C (100.4F), Mild Pain (1 - 3)      Vital Signs Last 24 Hrs  T(C): 36.6 (04 Oct 2022 10:05), Max: 37.1 (03 Oct 2022 17:43)  T(F): 97.8 (04 Oct 2022 10:05), Max: 98.8 (03 Oct 2022 17:43)  HR: 83 (04 Oct 2022 10:05) (70 - 86)  BP: 114/80 (04 Oct 2022 10:05) (89/56 - 142/92)  BP(mean): 82 (04 Oct 2022 04:35) (82 - 82)  RR: 19 (04 Oct 2022 10:05) (18 - 20)  SpO2: 100% (04 Oct 2022 10:05) (100% - 100%)    Parameters below as of 04 Oct 2022 10:05  Patient On (Oxygen Delivery Method): room air      CAPILLARY BLOOD GLUCOSE      POCT Blood Glucose.: 97 mg/dL (04 Oct 2022 02:57)    I&O's Summary    03 Oct 2022 07:01  -  04 Oct 2022 07:00  --------------------------------------------------------  IN: 720 mL / OUT: 1500 mL / NET: -780 mL    04 Oct 2022 07:01  -  04 Oct 2022 15:22  --------------------------------------------------------  IN: 238 mL / OUT: 0 mL / NET: 238 mL        PHYSICAL EXAM:  GENERAL: NAD, well-developed  HEAD:  Atraumatic, Normocephalic  EYES: EOMI, PERRLA, conjunctiva and sclera clear  NECK: Supple, No JVD  CHEST/LUNG: Clear to auscultation bilaterally; No wheeze  HEART: Regular rate and rhythm; No murmurs, rubs, or gallops  ABDOMEN: Soft, Nontender, Nondistended; Bowel sounds present  EXTREMITIES:  2+ Peripheral Pulses, No clubbing, cyanosis, or edema  PSYCH: AAOx3  NEUROLOGY: non-focal  SKIN: No rashes or lesions    LABS:                        11.3   6.13  )-----------( 578      ( 04 Oct 2022 02:30 )             36.3     10-04    136  |  103  |  36<H>  ----------------------------<  100<H>  4.7   |  22  |  1.90<H>    Ca    10.3      04 Oct 2022 09:47  Phos  4.1     10-04  Mg     2.20     10-04                RADIOLOGY & ADDITIONAL TESTS:    Imaging Personally Reviewed:    Consultant(s) Notes Reviewed:      Care Discussed with Consultants/Other Providers: Dr Naik renal attending, pt is clinically dehydrated with orthostasis and pre-renal Azotemia. Plan to cautiously hydrate pt with NS at 75c?H X 10 Hours, check BMP in 6 hours. If Na is stable will give 10more Hours NS at 75cc?h as per discussion.

## 2022-10-04 NOTE — PROGRESS NOTE ADULT - PROBLEM SELECTOR PLAN 2
Na at 131, ct head with worsening edema, renal consulted, per renal Pt. now with symptomatic hyponatremia with worsening brain edema.    Per renal Would ideally raise SNa by 3 meq in the next hour due to worsening brain edema by giving 100cc bolus of 3% HTS.   -If cannot give this on the floor then, start 3% HTS @ 30cc/hr x 4 hours. Avoid over-correction by >4-6mEQ in 24 hours. Ultimate SNa goal is 145-150 to reduce cerebral edema. Fluid restriction to <1L/day for now.  Liberalize salt intake. Increase PO solute (protein) intake. Avoid isotonic or hypotonic fluids. Monitor SNa Q4 hours.  Would re-call Neurosurgery.    Discussed with micu on 10/1 per micu pt can be on the floor with 3%HTS at 30cc/hr, started on it, monitor bmp closely, f/u with renal  -on 3% HTS at 30cc/hr, monitor Na closely, pt refusing HTS.  No indication for HTS as per Renal   -Monitor serial BMP    -nsx /Neuro follg

## 2022-10-04 NOTE — PHYSICAL THERAPY INITIAL EVALUATION ADULT - LEVEL OF INDEPENDENCE: STAIR NEGOTIATION, REHAB EVAL
Deferred due to + orthostatic hypotension per RN Ashwin. Pt independent with all other functional mobility and ambulation.

## 2022-10-05 ENCOUNTER — APPOINTMENT (OUTPATIENT)
Dept: NEUROSURGERY | Facility: CLINIC | Age: 37
End: 2022-10-05

## 2022-10-05 LAB
ANION GAP SERPL CALC-SCNC: 12 MMOL/L — SIGNIFICANT CHANGE UP (ref 7–14)
ANION GAP SERPL CALC-SCNC: 12 MMOL/L — SIGNIFICANT CHANGE UP (ref 7–14)
ANION GAP SERPL CALC-SCNC: 15 MMOL/L — HIGH (ref 7–14)
BUN SERPL-MCNC: 30 MG/DL — HIGH (ref 7–23)
BUN SERPL-MCNC: 34 MG/DL — HIGH (ref 7–23)
BUN SERPL-MCNC: 36 MG/DL — HIGH (ref 7–23)
CALCIUM SERPL-MCNC: 10.2 MG/DL — SIGNIFICANT CHANGE UP (ref 8.4–10.5)
CALCIUM SERPL-MCNC: 9.5 MG/DL — SIGNIFICANT CHANGE UP (ref 8.4–10.5)
CALCIUM SERPL-MCNC: 9.8 MG/DL — SIGNIFICANT CHANGE UP (ref 8.4–10.5)
CHLORIDE SERPL-SCNC: 102 MMOL/L — SIGNIFICANT CHANGE UP (ref 98–107)
CHLORIDE SERPL-SCNC: 102 MMOL/L — SIGNIFICANT CHANGE UP (ref 98–107)
CHLORIDE SERPL-SCNC: 104 MMOL/L — SIGNIFICANT CHANGE UP (ref 98–107)
CO2 SERPL-SCNC: 19 MMOL/L — LOW (ref 22–31)
CO2 SERPL-SCNC: 20 MMOL/L — LOW (ref 22–31)
CO2 SERPL-SCNC: 21 MMOL/L — LOW (ref 22–31)
CREAT SERPL-MCNC: 1.74 MG/DL — HIGH (ref 0.5–1.3)
CREAT SERPL-MCNC: 1.89 MG/DL — HIGH (ref 0.5–1.3)
CREAT SERPL-MCNC: 1.9 MG/DL — HIGH (ref 0.5–1.3)
EGFR: 46 ML/MIN/1.73M2 — LOW
EGFR: 46 ML/MIN/1.73M2 — LOW
EGFR: 51 ML/MIN/1.73M2 — LOW
GLUCOSE SERPL-MCNC: 101 MG/DL — HIGH (ref 70–99)
GLUCOSE SERPL-MCNC: 102 MG/DL — HIGH (ref 70–99)
GLUCOSE SERPL-MCNC: 107 MG/DL — HIGH (ref 70–99)
HCT VFR BLD CALC: 37 % — LOW (ref 39–50)
HGB BLD-MCNC: 11.6 G/DL — LOW (ref 13–17)
MAGNESIUM SERPL-MCNC: 1.9 MG/DL — SIGNIFICANT CHANGE UP (ref 1.6–2.6)
MAGNESIUM SERPL-MCNC: 2 MG/DL — SIGNIFICANT CHANGE UP (ref 1.6–2.6)
MAGNESIUM SERPL-MCNC: 2 MG/DL — SIGNIFICANT CHANGE UP (ref 1.6–2.6)
MCHC RBC-ENTMCNC: 26.8 PG — LOW (ref 27–34)
MCHC RBC-ENTMCNC: 31.4 GM/DL — LOW (ref 32–36)
MCV RBC AUTO: 85.5 FL — SIGNIFICANT CHANGE UP (ref 80–100)
NRBC # BLD: 0 /100 WBCS — SIGNIFICANT CHANGE UP (ref 0–0)
NRBC # FLD: 0 K/UL — SIGNIFICANT CHANGE UP (ref 0–0)
PHOSPHATE SERPL-MCNC: 3.5 MG/DL — SIGNIFICANT CHANGE UP (ref 2.5–4.5)
PHOSPHATE SERPL-MCNC: 3.8 MG/DL — SIGNIFICANT CHANGE UP (ref 2.5–4.5)
PHOSPHATE SERPL-MCNC: 4.2 MG/DL — SIGNIFICANT CHANGE UP (ref 2.5–4.5)
PLATELET # BLD AUTO: 539 K/UL — HIGH (ref 150–400)
POTASSIUM SERPL-MCNC: 4.3 MMOL/L — SIGNIFICANT CHANGE UP (ref 3.5–5.3)
POTASSIUM SERPL-MCNC: 4.3 MMOL/L — SIGNIFICANT CHANGE UP (ref 3.5–5.3)
POTASSIUM SERPL-MCNC: 4.9 MMOL/L — SIGNIFICANT CHANGE UP (ref 3.5–5.3)
POTASSIUM SERPL-SCNC: 4.3 MMOL/L — SIGNIFICANT CHANGE UP (ref 3.5–5.3)
POTASSIUM SERPL-SCNC: 4.3 MMOL/L — SIGNIFICANT CHANGE UP (ref 3.5–5.3)
POTASSIUM SERPL-SCNC: 4.9 MMOL/L — SIGNIFICANT CHANGE UP (ref 3.5–5.3)
RBC # BLD: 4.33 M/UL — SIGNIFICANT CHANGE UP (ref 4.2–5.8)
RBC # FLD: 13.7 % — SIGNIFICANT CHANGE UP (ref 10.3–14.5)
SODIUM SERPL-SCNC: 134 MMOL/L — LOW (ref 135–145)
SODIUM SERPL-SCNC: 136 MMOL/L — SIGNIFICANT CHANGE UP (ref 135–145)
SODIUM SERPL-SCNC: 137 MMOL/L — SIGNIFICANT CHANGE UP (ref 135–145)
WBC # BLD: 6.79 K/UL — SIGNIFICANT CHANGE UP (ref 3.8–10.5)
WBC # FLD AUTO: 6.79 K/UL — SIGNIFICANT CHANGE UP (ref 3.8–10.5)

## 2022-10-05 PROCEDURE — 99233 SBSQ HOSP IP/OBS HIGH 50: CPT

## 2022-10-05 PROCEDURE — 70450 CT HEAD/BRAIN W/O DYE: CPT | Mod: 26

## 2022-10-05 PROCEDURE — 95720 EEG PHY/QHP EA INCR W/VEEG: CPT

## 2022-10-05 RX ORDER — SODIUM CHLORIDE 9 MG/ML
1000 INJECTION INTRAMUSCULAR; INTRAVENOUS; SUBCUTANEOUS
Refills: 0 | Status: DISCONTINUED | OUTPATIENT
Start: 2022-10-05 | End: 2022-10-06

## 2022-10-05 RX ADMIN — AMLODIPINE BESYLATE 10 MILLIGRAM(S): 2.5 TABLET ORAL at 06:02

## 2022-10-05 RX ADMIN — Medication 75 MILLIGRAM(S): at 07:32

## 2022-10-05 RX ADMIN — SODIUM CHLORIDE 75 MILLILITER(S): 9 INJECTION INTRAMUSCULAR; INTRAVENOUS; SUBCUTANEOUS at 12:24

## 2022-10-05 RX ADMIN — LEVETIRACETAM 500 MILLIGRAM(S): 250 TABLET, FILM COATED ORAL at 17:24

## 2022-10-05 RX ADMIN — LEVETIRACETAM 500 MILLIGRAM(S): 250 TABLET, FILM COATED ORAL at 06:07

## 2022-10-05 RX ADMIN — Medication 300 MILLIGRAM(S): at 17:23

## 2022-10-05 RX ADMIN — Medication 300 MILLIGRAM(S): at 11:17

## 2022-10-05 RX ADMIN — Medication 300 MILLIGRAM(S): at 02:35

## 2022-10-05 RX ADMIN — Medication 75 MILLIGRAM(S): at 17:25

## 2022-10-05 NOTE — CHART NOTE - NSCHARTNOTEFT_GEN_A_CORE
Patient is a 37 year old M hx of l. ICH, HTN who is presenting due to headaches.   Contacted by RN regarding patient falling. Writer went to the bedside to evaluate the patient. Per RN she administered morning medications and took patient's vitals shortly before the incident. Upon leaving his room she noted he closed the curtain. Subsequently after she noted patient on the ground. He landed on his gluteus and slumped forward. His head lightly came in contact with surface of the floor. Per patient recollection he was using the urinal and then landed on the ground. He doesn't recall how the fall occurred stating "I blacked out again". Denied any presyncopal events: including but not limited to headache, dizziness, blurred vision or diplopia. Patient denies loss of consciousness but does not recall slipping or events during the fall. Per RN patient was arousable during the encounter. Of note CTH repeated last night and on preliminary report no acute changes. Patient remains neurologically intact. Of note patient orthostatic + yesterday however appears to have improved. Patient does not appear volume down this morning.     PHYSICAL EXAM:  GENERAL: NAD, well-developed  HEAD:  Atraumatic, Normocephalic, no ecchymosis or excoriations  EYES: EOMI, PERRLA, conjunctiva and sclera clear  NECK: Supple, No JVD  CHEST/LUNG: Clear to auscultation bilaterally; No wheeze/rhonchi/rale  HEART: Regular rate and rhythm; No murmurs, rubs, or gallops  ABDOMEN: Soft, Nontender, Nondistended; Bowel sounds present  EXTREMITIES:  2+ Peripheral Pulses, No clubbing, cyanosis, or edema  PSYCH: AAOx3  NEUROLOGY: non-focal, CN 2-12 normal, full strength, normal EOMs  SKIN: No rashes or lesions    Plan  -Urgent CTH ordered due to patient's history of ICH   -STAT labs drawn   -Fall precautions ordered  -Patient placed on bedrest  -Constant observation ordered   -Patient educated on importance of asking RNs for assistance to prevent future falls which could lead to deleterious health outcomes due to history of ICH. Patient expressed understanding.  -EEG ordered to r/o seizure activity for questionable syncopal event   -Neurology to be made aware of early AM fall and CTH results to be conveyed when available   -Day team ACP made aware of fall and advised to follow up with Neurology and CTH results   -Consideration for cardiac workup: echo to rule out cardiac causes of syncope should be considered if EEG normal and CTH stable Patient is a 37 year old M hx of l. ICH, HTN who is presenting due to headaches.   Contacted by RN regarding patient falling. Writer went to the bedside to evaluate the patient. Per RN she administered morning medications and took patient's vitals shortly before the incident. Upon leaving his room she noted he closed the curtain. Subsequently after she noted patient on the ground. He landed on his gluteus and slumped forward. His head lightly came in contact with surface of the floor. Per patient recollection he was using the urinal and then landed on the ground. He doesn't recall how the fall occurred stating "I blacked out again". Denied any presyncopal events: including but not limited to headache, dizziness, blurred vision or diplopia. Patient denies loss of consciousness but does not recall slipping or events during the fall. Per RN patient was arousable during the encounter. Of note CTH repeated last night and on preliminary report no acute changes. Patient remains neurologically intact. Of note patient orthostatic + yesterday however appears to have improved. Patient does not appear volume down this morning.     PHYSICAL EXAM:  GENERAL: NAD, well-developed  HEAD:  Atraumatic, Normocephalic, no ecchymosis or excoriations  EYES: EOMI, PERRLA, conjunctiva and sclera clear  NECK: Supple, No JVD  CHEST/LUNG: Clear to auscultation bilaterally; No wheeze/rhonchi/rale  HEART: Regular rate and rhythm; No murmurs, rubs, or gallops  ABDOMEN: Soft, Nontender, Nondistended; Bowel sounds present  EXTREMITIES:  2+ Peripheral Pulses, No clubbing, cyanosis, or edema  PSYCH: AAOx3  NEUROLOGY: non-focal, CN 2-12 normal, full strength, normal EOMs  SKIN: No rashes or lesions    Plan  -Urgent CTH ordered due to patient's history of ICH   -STAT labs drawn   -Fall precautions ordered  -Patient placed on bedrest  -Constant observation ordered   -Patient educated on importance of asking RNs for assistance to prevent future falls which could lead to deleterious health outcomes due to history of ICH. Patient expressed understanding.  -EEG ordered to r/o seizure activity for questionable syncopal event   -Neurology to be made aware of early AM fall and CTH results to be conveyed when available   -Day team ACP made aware of fall and advised to follow up with Neurology and CTH results   -Consideration for cardiac workup: echo to rule out cardiac causes of syncope should be considered if EEG normal and CTH stable  -Hospitalist,  notified and agreed with plan listed above

## 2022-10-05 NOTE — CHART NOTE - NSCHARTNOTEFT_GEN_A_CORE
Discussed case with medical attending, will give IV normal saline 75 cc/hr x 24 hrs for dehydration. Will repeat BMP later today. Team to continue to monitor. Neurology made aware of overnight events and in agreement with EEG.     Discussed case with medical attending, will give IV normal saline 75 cc/hr x 24 hrs for dehydration. Will repeat BMP later today. Team to continue to monitor.

## 2022-10-05 NOTE — PROGRESS NOTE ADULT - SUBJECTIVE AND OBJECTIVE BOX
Patient is a 37y old  Male who presents with a chief complaint of Headache (04 Oct 2022 15:21)      SUBJECTIVE / OVERNIGHT EVENTS: Pt seen and examined, had a witnessed fall this morning when he tried to get out of bed. Now pt denies any HA, dizziness.     MEDICATIONS  (STANDING):  amLODIPine   Tablet 10 milliGRAM(s) Oral daily  hydrALAZINE 75 milliGRAM(s) Oral every 8 hours  labetalol 300 milliGRAM(s) Oral every 8 hours  levETIRAcetam 500 milliGRAM(s) Oral two times a day  sodium chloride 0.9%. 1000 milliLiter(s) (75 mL/Hr) IV Continuous <Continuous>  sodium chloride 0.9%. 1000 milliLiter(s) (75 mL/Hr) IV Continuous <Continuous>  sodium chloride 3%. 500 milliLiter(s) (30 mL/Hr) IV Continuous <Continuous>    MEDICATIONS  (PRN):  acetaminophen     Tablet .. 650 milliGRAM(s) Oral every 6 hours PRN Temp greater or equal to 38C (100.4F), Mild Pain (1 - 3)      Vital Signs Last 24 Hrs  T(C): 36.9 (05 Oct 2022 09:36), Max: 36.9 (04 Oct 2022 17:53)  T(F): 98.4 (05 Oct 2022 09:36), Max: 98.4 (04 Oct 2022 17:53)  HR: 84 (05 Oct 2022 09:36) (80 - 89)  BP: 127/76 (05 Oct 2022 09:36) (115/71 - 138/83)  BP(mean): --  RR: 17 (05 Oct 2022 09:36) (16 - 19)  SpO2: 100% (05 Oct 2022 09:36) (100% - 100%)    Parameters below as of 05 Oct 2022 09:36  Patient On (Oxygen Delivery Method): room air      CAPILLARY BLOOD GLUCOSE        I&O's Summary    04 Oct 2022 07:01  -  05 Oct 2022 07:00  --------------------------------------------------------  IN: 356 mL / OUT: 500 mL / NET: -144 mL    05 Oct 2022 07:01  -  05 Oct 2022 11:44  --------------------------------------------------------  IN: 240 mL / OUT: 0 mL / NET: 240 mL        PHYSICAL EXAM:  GENERAL: NAD, well-developed  HEAD:  Atraumatic, Normocephalic  EYES: EOMI, PERRLA, conjunctiva and sclera clear  NECK: Supple, No JVD  CHEST/LUNG: Clear to auscultation bilaterally; No wheeze  HEART: Regular rate and rhythm; No murmurs, rubs, or gallops  ABDOMEN: Soft, Nontender, Nondistended; Bowel sounds present  EXTREMITIES:  2+ Peripheral Pulses, No clubbing, cyanosis, or edema  PSYCH: AAOx3  NEUROLOGY: non-focal  SKIN: No rashes or lesions    LABS:                        11.6   6.79  )-----------( 539      ( 05 Oct 2022 07:15 )             37.0     10-05    136  |  102  |  34<H>  ----------------------------<  107<H>  4.9   |  19<L>  |  1.90<H>    Ca    10.2      05 Oct 2022 07:15  Phos  3.8     10-05  Mg     2.00     10-05      < from: CT Head No Cont (10.05.22 @ 09:03) >    IMPRESSION:    Similar-appearing resolving hemorrhage with similar mass effect in the   left anterior basal ganglia region.    No new acute intracranial hemorrhage    --- End of Report ---            ARLYN JOHNSTON MD; Attending Radiologist  This document has been electronically signed. Oct  5 2022  9:44AM    < end of copied text >            RADIOLOGY & ADDITIONAL TESTS:    Imaging Personally Reviewed:    Consultant(s) Notes Reviewed:      Care Discussed with Consultants/Other Providers:

## 2022-10-06 DIAGNOSIS — R55 SYNCOPE AND COLLAPSE: ICD-10-CM

## 2022-10-06 LAB
ANION GAP SERPL CALC-SCNC: 11 MMOL/L — SIGNIFICANT CHANGE UP (ref 7–14)
ANION GAP SERPL CALC-SCNC: 13 MMOL/L — SIGNIFICANT CHANGE UP (ref 7–14)
BUN SERPL-MCNC: 23 MG/DL — SIGNIFICANT CHANGE UP (ref 7–23)
BUN SERPL-MCNC: 24 MG/DL — HIGH (ref 7–23)
CALCIUM SERPL-MCNC: 7.8 MG/DL — LOW (ref 8.4–10.5)
CALCIUM SERPL-MCNC: 9.6 MG/DL — SIGNIFICANT CHANGE UP (ref 8.4–10.5)
CHLORIDE SERPL-SCNC: 102 MMOL/L — SIGNIFICANT CHANGE UP (ref 98–107)
CHLORIDE SERPL-SCNC: 112 MMOL/L — HIGH (ref 98–107)
CO2 SERPL-SCNC: 16 MMOL/L — LOW (ref 22–31)
CO2 SERPL-SCNC: 19 MMOL/L — LOW (ref 22–31)
CREAT SERPL-MCNC: 1.46 MG/DL — HIGH (ref 0.5–1.3)
CREAT SERPL-MCNC: 1.62 MG/DL — HIGH (ref 0.5–1.3)
EGFR: 56 ML/MIN/1.73M2 — LOW
EGFR: 63 ML/MIN/1.73M2 — SIGNIFICANT CHANGE UP
GLUCOSE SERPL-MCNC: 134 MG/DL — HIGH (ref 70–99)
GLUCOSE SERPL-MCNC: 79 MG/DL — SIGNIFICANT CHANGE UP (ref 70–99)
HCT VFR BLD CALC: 29.1 % — LOW (ref 39–50)
HGB BLD-MCNC: 9.3 G/DL — LOW (ref 13–17)
MAGNESIUM SERPL-MCNC: 1.4 MG/DL — LOW (ref 1.6–2.6)
MAGNESIUM SERPL-MCNC: 1.7 MG/DL — SIGNIFICANT CHANGE UP (ref 1.6–2.6)
MCHC RBC-ENTMCNC: 27 PG — SIGNIFICANT CHANGE UP (ref 27–34)
MCHC RBC-ENTMCNC: 32 GM/DL — SIGNIFICANT CHANGE UP (ref 32–36)
MCV RBC AUTO: 84.3 FL — SIGNIFICANT CHANGE UP (ref 80–100)
NRBC # BLD: 0 /100 WBCS — SIGNIFICANT CHANGE UP (ref 0–0)
NRBC # FLD: 0 K/UL — SIGNIFICANT CHANGE UP (ref 0–0)
PHOSPHATE SERPL-MCNC: 2.9 MG/DL — SIGNIFICANT CHANGE UP (ref 2.5–4.5)
PHOSPHATE SERPL-MCNC: 3.2 MG/DL — SIGNIFICANT CHANGE UP (ref 2.5–4.5)
PLATELET # BLD AUTO: 388 K/UL — SIGNIFICANT CHANGE UP (ref 150–400)
POTASSIUM SERPL-MCNC: 3.6 MMOL/L — SIGNIFICANT CHANGE UP (ref 3.5–5.3)
POTASSIUM SERPL-MCNC: 4.1 MMOL/L — SIGNIFICANT CHANGE UP (ref 3.5–5.3)
POTASSIUM SERPL-SCNC: 3.6 MMOL/L — SIGNIFICANT CHANGE UP (ref 3.5–5.3)
POTASSIUM SERPL-SCNC: 4.1 MMOL/L — SIGNIFICANT CHANGE UP (ref 3.5–5.3)
RBC # BLD: 3.45 M/UL — LOW (ref 4.2–5.8)
RBC # FLD: 13.7 % — SIGNIFICANT CHANGE UP (ref 10.3–14.5)
SODIUM SERPL-SCNC: 134 MMOL/L — LOW (ref 135–145)
SODIUM SERPL-SCNC: 139 MMOL/L — SIGNIFICANT CHANGE UP (ref 135–145)
WBC # BLD: 5.47 K/UL — SIGNIFICANT CHANGE UP (ref 3.8–10.5)
WBC # FLD AUTO: 5.47 K/UL — SIGNIFICANT CHANGE UP (ref 3.8–10.5)

## 2022-10-06 PROCEDURE — 99233 SBSQ HOSP IP/OBS HIGH 50: CPT

## 2022-10-06 PROCEDURE — 93306 TTE W/DOPPLER COMPLETE: CPT | Mod: 26

## 2022-10-06 PROCEDURE — 95718 EEG PHYS/QHP 2-12 HR W/VEEG: CPT

## 2022-10-06 RX ORDER — SODIUM CHLORIDE 9 MG/ML
1000 INJECTION INTRAMUSCULAR; INTRAVENOUS; SUBCUTANEOUS
Refills: 0 | Status: DISCONTINUED | OUTPATIENT
Start: 2022-10-06 | End: 2022-10-10

## 2022-10-06 RX ADMIN — Medication 75 MILLIGRAM(S): at 00:10

## 2022-10-06 RX ADMIN — SODIUM CHLORIDE 75 MILLILITER(S): 9 INJECTION INTRAMUSCULAR; INTRAVENOUS; SUBCUTANEOUS at 00:11

## 2022-10-06 RX ADMIN — Medication 75 MILLIGRAM(S): at 10:00

## 2022-10-06 RX ADMIN — LEVETIRACETAM 500 MILLIGRAM(S): 250 TABLET, FILM COATED ORAL at 06:49

## 2022-10-06 RX ADMIN — Medication 650 MILLIGRAM(S): at 21:49

## 2022-10-06 RX ADMIN — LEVETIRACETAM 500 MILLIGRAM(S): 250 TABLET, FILM COATED ORAL at 17:20

## 2022-10-06 RX ADMIN — Medication 300 MILLIGRAM(S): at 02:25

## 2022-10-06 RX ADMIN — AMLODIPINE BESYLATE 10 MILLIGRAM(S): 2.5 TABLET ORAL at 06:49

## 2022-10-06 RX ADMIN — SODIUM CHLORIDE 75 MILLILITER(S): 9 INJECTION INTRAMUSCULAR; INTRAVENOUS; SUBCUTANEOUS at 09:59

## 2022-10-06 RX ADMIN — Medication 300 MILLIGRAM(S): at 17:20

## 2022-10-06 RX ADMIN — Medication 650 MILLIGRAM(S): at 22:19

## 2022-10-06 RX ADMIN — Medication 75 MILLIGRAM(S): at 17:20

## 2022-10-06 RX ADMIN — Medication 300 MILLIGRAM(S): at 09:59

## 2022-10-06 NOTE — DIETITIAN INITIAL EVALUATION ADULT - ETIOLOGY
Have You Had Botox Before?: has had botox When Was Your Last Botox Treatment?: May 2022 related to physiological causes

## 2022-10-06 NOTE — CONSULT NOTE ADULT - CONSULT REASON
evolving infarct seen on CTH
MEHDI, hyponatremia
headache, h/o L basal ganglia hemorrhage
presyncopal

## 2022-10-06 NOTE — EEG REPORT - NS EEG TEXT BOX
ABHISHEK RIDDLE [id number]     Study Date: 		10-05-22 14:46 to 10-6-22 08:00  Duration x Hours: 16:29hrs  --------------------------------------------------------------------------------------------------  History:  CC/ HPI Patient is a 37y old  Male who presents with a chief complaint of Headache (05 Oct 2022 11:42)    MEDICATIONS  (STANDING):  amLODIPine   Tablet 10 milliGRAM(s) Oral daily  hydrALAZINE 75 milliGRAM(s) Oral every 8 hours  labetalol 300 milliGRAM(s) Oral every 8 hours  levETIRAcetam 500 milliGRAM(s) Oral two times a day  sodium chloride 0.9%. 1000 milliLiter(s) (75 mL/Hr) IV Continuous <Continuous>    --------------------------------------------------------------------------------------------------  Study Interpretation:    [[[Abbreviation Key:  PDR=alpha rhythm/posterior dominant rhythm. A-P=anterior posterior.  Amplitude: ‘very low’:<20; ‘low’:20-49; ‘medium’:; ‘high’:>150uV.  Persistence for periodic/rhythmic patterns (% of epoch) ‘rare’:<1%; ‘occasional’:1-10%; ‘frequent’:10-50%; ‘abundant’:50-90%; ‘continuous’:>90%.  Persistence for sporadic discharges: ‘rare’:<1/hr; ‘occasional’:1/min-1/hr; ‘frequent’:>1/min; ‘abundant’:>1/10 sec.  RPP=rhythmic and periodic patterns; GRDA=generalized rhythmic delta activity; FIRDA=frontal intermittent GRDA; LRDA=lateralized rhythmic delta activity; TIRDA=temporal intermittent rhythmic delta activity;  LPD=PLED=lateralized periodic discharges; GPD=generalized periodic discharges; BIPDs =bilateral independent periodic discharges; Mf=multifocal; SIRPDs=stimulus induced rhythmic, periodic, or ictal appearing discharges; BIRDs=brief potentially ictal rhythmic discharges >4 Hz, lasting .5-10s; PFA (paroxysmal bursts >13 Hz or =8 Hz <10s).  Modifiers: +F=with fast component; +S=with spike component; +R=with rhythmic component.  S-B=burst suppression pattern.  Max=maximal. N1-drowsy; N2-stage II sleep; N3-slow wave sleep. SSS/BETS=small sharp spikes/benign epileptiform transients of sleep. HV=hyperventilation; PS=photic stimulation]]]    Daily EEG Visual Analysis    FINDINGS:      Background:  Continuous: continuous  Symmetry: symmetric  PDR:  7.5-8 Hz activity, not as well formed on the right, with amplitude to 40 uV, that attenuated to eye opening.  Low amplitude frontal beta noted in wakefulness.  Reactivity: present  Voltage: normal, mostly 20-150uV  Anterior Posterior Gradient: present  Other background findings: none  Breach: absent    Background Slowing:  Generalized slowing: Persistent theta and delta even during wakefulness  Focal slowing: Intermittent polymorphic theta and delta in right hemisphere, max right Central and posterior region    State Changes:   -Drowsiness noted with increased slowing, attenuation of fast activity, vertex transients.  -Present with N2 sleep transients with symmetric spindles and K-complexes.    Sporadic Epileptiform Discharges:    None    Rhythmic and Periodic Patterns (RPPs):  None     Electrographic and Electroclinical seizures:  None    Other Clinical Events:  None    Activation Procedures:   -Hyperventilation was not performed.    -Photic stimulation was performed and did not elicit any abnormalities.      Artifacts:  Intermittent myogenic and movement artifacts were noted.    ECG:  The heart rate on single channel ECG was predominantly between 80-90 BPM.    EEG Classification / Summary:  Abnormal  EEG in the awake / drowsy / asleep state(s).    1. Intermittent polymorphic slowing, focal, maximal right central and posterior region  2. Background slowing, generalized, mild     Clinical Impression:    Evidence for right hemisphere cerebral dysfunction  Mild diffuse/multifocal cerebral dysfunction, not specific as to etiology  There were no epileptiform abnormalities recorded.      This is a prelim report only, pending review with attending prior to finalization.  -------------------------------------------------------------------------------------------------------  [facility] EEG Reading Room Ph#: [phone]  Epilepsy Answering Service after 5PM and before 8:30AM: Ph#: [phone]    Sander Sethi M.D.   Epilepsy fellow ABHISHEK RIDDLE [id number]     Study Date: 		10-05-22 14:46 to 10-6-22 08:00  Duration x Hours: 16:29hrs  --------------------------------------------------------------------------------------------------  History:  CC/ HPI Patient is a 37y old  Male who presents with a chief complaint of Headache (05 Oct 2022 11:42)    MEDICATIONS  (STANDING):  amLODIPine   Tablet 10 milliGRAM(s) Oral daily  hydrALAZINE 75 milliGRAM(s) Oral every 8 hours  labetalol 300 milliGRAM(s) Oral every 8 hours  levETIRAcetam 500 milliGRAM(s) Oral two times a day  sodium chloride 0.9%. 1000 milliLiter(s) (75 mL/Hr) IV Continuous <Continuous>    --------------------------------------------------------------------------------------------------  Study Interpretation:    [[[Abbreviation Key:  PDR=alpha rhythm/posterior dominant rhythm. A-P=anterior posterior.  Amplitude: ‘very low’:<20; ‘low’:20-49; ‘medium’:; ‘high’:>150uV.  Persistence for periodic/rhythmic patterns (% of epoch) ‘rare’:<1%; ‘occasional’:1-10%; ‘frequent’:10-50%; ‘abundant’:50-90%; ‘continuous’:>90%.  Persistence for sporadic discharges: ‘rare’:<1/hr; ‘occasional’:1/min-1/hr; ‘frequent’:>1/min; ‘abundant’:>1/10 sec.  RPP=rhythmic and periodic patterns; GRDA=generalized rhythmic delta activity; FIRDA=frontal intermittent GRDA; LRDA=lateralized rhythmic delta activity; TIRDA=temporal intermittent rhythmic delta activity;  LPD=PLED=lateralized periodic discharges; GPD=generalized periodic discharges; BIPDs =bilateral independent periodic discharges; Mf=multifocal; SIRPDs=stimulus induced rhythmic, periodic, or ictal appearing discharges; BIRDs=brief potentially ictal rhythmic discharges >4 Hz, lasting .5-10s; PFA (paroxysmal bursts >13 Hz or =8 Hz <10s).  Modifiers: +F=with fast component; +S=with spike component; +R=with rhythmic component.  S-B=burst suppression pattern.  Max=maximal. N1-drowsy; N2-stage II sleep; N3-slow wave sleep. SSS/BETS=small sharp spikes/benign epileptiform transients of sleep. HV=hyperventilation; PS=photic stimulation]]]    Daily EEG Visual Analysis    FINDINGS:      Background:  Continuous: continuous  Symmetry: symmetric  PDR:  7.5-8 Hz activity, not as well formed on the right, with amplitude to 40 uV, that attenuated to eye opening.  Low amplitude frontal beta noted in wakefulness.  Reactivity: present  Voltage: normal, mostly 20-150uV  Anterior Posterior Gradient: present  Other background findings: none  Breach: absent    Background Slowing:  Generalized slowing: Persistent theta and delta even during wakefulness    State Changes:   -Drowsiness noted with increased slowing, attenuation of fast activity, vertex transients.  -Present with N2 sleep transients with symmetric spindles and K-complexes.    Sporadic Epileptiform Discharges:    None    Rhythmic and Periodic Patterns (RPPs):  None     Electrographic and Electroclinical seizures:  None    Other Clinical Events:  None    Activation Procedures:   -Hyperventilation was not performed.    -Photic stimulation was performed and did not elicit any abnormalities.      Artifacts:  Intermittent myogenic and movement artifacts were noted.    ECG:  The heart rate on single channel ECG was predominantly between 80-90 BPM.    EEG Classification / Summary:  Abnormal  EEG in the awake / drowsy / asleep state(s).  Background slowing, generalized, mild     Clinical Impression:    Evidence for right hemisphere cerebral dysfunction  Mild diffuse/multifocal cerebral dysfunction, not specific as to etiology  There were no epileptiform abnormalities recorded.      -------------------------------------------------------------------------------------------------------  [facility] EEG Reading Room Ph#: [phone]  Epilepsy Answering Service after 5PM and before 8:30AM: Ph#: [phone]    Sander Sethi M.D.   Epilepsy fellow

## 2022-10-06 NOTE — CONSULT NOTE ADULT - ASSESSMENT
Patient is a 37y old  Male with a PMH of HTN ( noncompliant with medications), HLD presents with complaining of HA. Of note, patient had recent admission last month with syncope and found to have left basal ganglia hemorrhage, IVH and hydrocephalus. Neurosurgery was consulted and deemed no intervention at the time. Patient is here this time for headache. Neuro is consulted with serial CTH and pending MRI. Currently on EEG. Patient had one episode of near syncopal episode yesterday when he stood up urinating on the floor, and reports he felt lightheaded for few secs and " feeling of passing out". Subsequently he fell on his back , denies head trauma, LOC. Denies palpitation, CP, SOB. Denies hx of cardiac disease. EP is consulted for near syncope. EKG reveals SR at 81bpm, LVH and early repolarization.       Near Syncope     RECOMMENDATIONS:  - please place on continuous telemetric monitoring  - Obtain TTE to assess LV / valvular function  - Check orthostatic V/S  - Monitor electrolytes and replete K to 4 and Mg to 2  - Continue care per primary team/neuro     Patient to be staffed with attending. Please await attending addendum

## 2022-10-06 NOTE — PROGRESS NOTE ADULT - SUBJECTIVE AND OBJECTIVE BOX
Patient is a 37y old  Male who presents with a chief complaint of Headache (05 Oct 2022 11:42)      SUBJECTIVE / OVERNIGHT EVENTS: Pt has no complaints today, getting EEG.    MEDICATIONS  (STANDING):  amLODIPine   Tablet 10 milliGRAM(s) Oral daily  hydrALAZINE 75 milliGRAM(s) Oral every 8 hours  labetalol 300 milliGRAM(s) Oral every 8 hours  levETIRAcetam 500 milliGRAM(s) Oral two times a day  sodium chloride 0.9%. 1000 milliLiter(s) (75 mL/Hr) IV Continuous <Continuous>    MEDICATIONS  (PRN):  acetaminophen     Tablet .. 650 milliGRAM(s) Oral every 6 hours PRN Temp greater or equal to 38C (100.4F), Mild Pain (1 - 3)      Vital Signs Last 24 Hrs  T(C): 37.1 (06 Oct 2022 09:49), Max: 37.2 (06 Oct 2022 06:28)  T(F): 98.8 (06 Oct 2022 09:49), Max: 98.9 (06 Oct 2022 06:28)  HR: 88 (06 Oct 2022 09:49) (85 - 90)  BP: 145/92 (06 Oct 2022 09:49) (124/73 - 145/92)  BP(mean): --  RR: 18 (06 Oct 2022 09:49) (16 - 20)  SpO2: 100% (06 Oct 2022 09:49) (97% - 100%)    Parameters below as of 06 Oct 2022 09:49  Patient On (Oxygen Delivery Method): room air      CAPILLARY BLOOD GLUCOSE        I&O's Summary    05 Oct 2022 07:01  -  06 Oct 2022 07:00  --------------------------------------------------------  IN: 998 mL / OUT: 550 mL / NET: 448 mL    06 Oct 2022 07:01  -  06 Oct 2022 14:50  --------------------------------------------------------  IN: 200 mL / OUT: 0 mL / NET: 200 mL        PHYSICAL EXAM:  GENERAL: NAD, well-developed  HEAD:  Atraumatic, Normocephalic  EYES: EOMI, PERRLA, conjunctiva and sclera clear  NECK: Supple, No JVD  CHEST/LUNG: Clear to auscultation bilaterally; No wheeze  HEART: Regular rate and rhythm; No murmurs, rubs, or gallops  ABDOMEN: Soft, Nontender, Nondistended; Bowel sounds present  EXTREMITIES:  2+ Peripheral Pulses, No clubbing, cyanosis, or edema  PSYCH: AAOx3  NEUROLOGY: non-focal  SKIN: No rashes or lesions    LABS:                        9.3    5.47  )-----------( 388      ( 06 Oct 2022 06:00 )             29.1     10-06    139  |  112<H>  |  24<H>  ----------------------------<  79  3.6   |  16<L>  |  1.46<H>    Ca    7.8<L>      06 Oct 2022 06:00  Phos  3.2     10-06  Mg     1.40     10-06                RADIOLOGY & ADDITIONAL TESTS:    Imaging Personally Reviewed:    Consultant(s) Notes Reviewed:      Care Discussed with Consultants/Other Providers:

## 2022-10-06 NOTE — CONSULT NOTE ADULT - NS ATTEND AMEND GEN_ALL_CORE FT
37y old  Male with a PMH of HTN ( noncompliant with medications), HLD presents with complaining of HA. Of note, patient had recent admission last month with syncope and found to have left basal ganglia hemorrhage, IVH and hydrocephalus. Neurosurgery was consulted and deemed no intervention at the time. Patient is here this time for headache. Neuro is consulted with serial CTH and pending MRI. Currently on EEG. Patient had one episode of near syncopal episode yesterday when he stood up urinating on the floor, and reports he felt lightheaded for few secs and " feeling of passing out". Subsequently he fell on his back , denies head trauma, LOC. Denies palpitation, CP, SOB. Denies hx of cardiac disease. EP is consulted for near syncope. EKG reveals SR at 81bpm, LVH and early repolarization. Mechanism is likley vagal autonomic bradycardia. Will follow on tele- needs to be on telemetry.

## 2022-10-06 NOTE — CONSULT NOTE ADULT - SUBJECTIVE AND OBJECTIVE BOX
Source: patient and Chart    HPI:  Patient is a 37y old  Male with a PMH of HTN ( noncompliant with medications), HLD presents with complaining of HA. Of note, patient had recent admission last month with syncope and found to have left basal ganglia hemorrhage, IVH and hydrocephalus. Neurosurgery was consulted and deemed no intervention at the time. Patient is here this time for headache. Neuro is consulted with serial CTH and pending MRI. Currently on EEG. Patient had one episode of near syncopal episode yesterday when he stood up urinating on the floor, and reports he felt lightheaded for few secs and " feeling of passing out". Subsequently he fell on his back , denies head trauma, LOC. Denies palpitation, CP, SOB. Denies hx of cardiac disease. EP is consulted for near syncope. EKG reveals SR at 81bpm, LVH and early repolarization.       PAST MEDICAL & SURGICAL HISTORY:  High cholesterol      Hypertension            MEDICATIONS  (STANDING):  amLODIPine   Tablet 10 milliGRAM(s) Oral daily  hydrALAZINE 75 milliGRAM(s) Oral every 8 hours  labetalol 300 milliGRAM(s) Oral every 8 hours  levETIRAcetam 500 milliGRAM(s) Oral two times a day  sodium chloride 0.9%. 1000 milliLiter(s) (75 mL/Hr) IV Continuous <Continuous>    MEDICATIONS  (PRN):  acetaminophen     Tablet .. 650 milliGRAM(s) Oral every 6 hours PRN Temp greater or equal to 38C (100.4F), Mild Pain (1 - 3)      FAMILY HISTORY:  FH: diabetes mellitus        SOCIAL HISTORY:    LIVING SITUATION:  CIGARETTES: Denied  ALCOHOL: denied   ILLICIT DRUG USES: denied    REVIEW OF SYSTEMS:  CONSTITUTIONAL: No fever, weight loss, chills, shakes, or fatigue, + HA  EYES: No eye pain, visual disturbances, or discharge  ENMT:  No difficulty hearing, tinnitus, vertigo; No sinus or throat pain  NECK: No pain or stiffness  RESPIRATORY: No cough, wheezing, hemoptysis, or shortness of breath  CARDIOVASCULAR: No chest pain, dyspnea, palpitations, dizziness, syncope, paroxysmal nocturnal dyspnea, orthopnea, or arm or leg swelling  GASTROINTESTINAL: No abdominal  or epigastric pain, nausea, vomiting, hematemesis, diarrhea, constipation, melena or bright red blood.  GENITOURINARY: No dysuria, nocturia, hematuria, or urinary incontinence  NEUROLOGICAL: No headaches, memory loss, slurred speech, limb weakness, loss of strength, numbness, or tremors  MUSCULOSKELETAL: No joint pain or swelling, muscle, back, or extremity pain  PSYCHIATRIC: No depression, anxiety, or difficulty sleeping        Vital Signs Last 24 Hrs  T(C): 36.8 (06 Oct 2022 15:12), Max: 37.2 (06 Oct 2022 06:28)  T(F): 98.2 (06 Oct 2022 15:12), Max: 98.9 (06 Oct 2022 06:28)  HR: 93 (06 Oct 2022 15:12) (85 - 93)  BP: 134/86 (06 Oct 2022 15:12) (124/73 - 145/92)  BP(mean): --  RR: 16 (06 Oct 2022 15:12) (16 - 20)  SpO2: 100% (06 Oct 2022 15:12) (97% - 100%)    Parameters below as of 06 Oct 2022 15:12  Patient On (Oxygen Delivery Method): room air        PHYSICAL EXAM:  GENERAL: Well appearing, speaking in full sentence, in NAD  HEAD:  Atraumatic, Normocephalic  EYES: EOMI, PERRLA, conjunctiva and sclera clear  ENMT: No tonsillar erythema, exudates, or enlargement; Moist mucous membranes, Good dentition, No lesions  NECK: Supple and normal thyroid.  No JVD or carotid bruit.  Carotid pulse is 2+ bilaterally.  HEART: S1S2 RRR; No murmurs, rubs, or gallops appreciated .  PULMONARY:CTABL, normal respiratory effort.  No rales, wheezing, or rhonchi appreciated bilaterally  ABDOMEN: Bowel sounds present, soft, NDNT  EXTREMITIES:  Warm, well -perfused, no pedal edema, distal pulses present  NEUROLOGICAL:AOx3 ,  motor function grossly  intact    INTERPRETATION OF TELEMETRY: Patient is not on Tele    ECG:  SR at 81bpm, LVH and early repolarization.       I&O's Detail    05 Oct 2022 07:01  -  06 Oct 2022 07:00  --------------------------------------------------------  IN:    Oral Fluid: 998 mL  Total IN: 998 mL    OUT:    Voided (mL): 550 mL  Total OUT: 550 mL    Total NET: 448 mL      06 Oct 2022 07:01  -  06 Oct 2022 15:57  --------------------------------------------------------  IN:    Oral Fluid: 418 mL  Total IN: 418 mL    OUT:  Total OUT: 0 mL    Total NET: 418 mL          LABS:                        9.3    5.47  )-----------( 388      ( 06 Oct 2022 06:00 )             29.1     10-06    139  |  112<H>  |  24<H>  ----------------------------<  79  3.6   |  16<L>  |  1.46<H>    Ca    7.8<L>      06 Oct 2022 06:00  Phos  3.2     10-06  Mg     1.40     10-06              BNP  I&O's Detail    05 Oct 2022 07:01  -  06 Oct 2022 07:00  --------------------------------------------------------  IN:    Oral Fluid: 998 mL  Total IN: 998 mL    OUT:    Voided (mL): 550 mL  Total OUT: 550 mL    Total NET: 448 mL      06 Oct 2022 07:01  -  06 Oct 2022 15:57  --------------------------------------------------------  IN:    Oral Fluid: 418 mL  Total IN: 418 mL    OUT:  Total OUT: 0 mL    Total NET: 418 mL        Daily     Daily     RADIOLOGY & ADDITIONAL STUDIES:

## 2022-10-07 LAB
ANION GAP SERPL CALC-SCNC: 13 MMOL/L — SIGNIFICANT CHANGE UP (ref 7–14)
BUN SERPL-MCNC: 18 MG/DL — SIGNIFICANT CHANGE UP (ref 7–23)
CALCIUM SERPL-MCNC: 9.3 MG/DL — SIGNIFICANT CHANGE UP (ref 8.4–10.5)
CHLORIDE SERPL-SCNC: 101 MMOL/L — SIGNIFICANT CHANGE UP (ref 98–107)
CO2 SERPL-SCNC: 20 MMOL/L — LOW (ref 22–31)
CREAT SERPL-MCNC: 1.5 MG/DL — HIGH (ref 0.5–1.3)
EGFR: 61 ML/MIN/1.73M2 — SIGNIFICANT CHANGE UP
GLUCOSE SERPL-MCNC: 94 MG/DL — SIGNIFICANT CHANGE UP (ref 70–99)
MAGNESIUM SERPL-MCNC: 1.7 MG/DL — SIGNIFICANT CHANGE UP (ref 1.6–2.6)
PHOSPHATE SERPL-MCNC: 3.5 MG/DL — SIGNIFICANT CHANGE UP (ref 2.5–4.5)
POTASSIUM SERPL-MCNC: 4.1 MMOL/L — SIGNIFICANT CHANGE UP (ref 3.5–5.3)
POTASSIUM SERPL-SCNC: 4.1 MMOL/L — SIGNIFICANT CHANGE UP (ref 3.5–5.3)
SODIUM SERPL-SCNC: 134 MMOL/L — LOW (ref 135–145)

## 2022-10-07 PROCEDURE — 99233 SBSQ HOSP IP/OBS HIGH 50: CPT

## 2022-10-07 PROCEDURE — 99232 SBSQ HOSP IP/OBS MODERATE 35: CPT

## 2022-10-07 PROCEDURE — 99232 SBSQ HOSP IP/OBS MODERATE 35: CPT | Mod: GC

## 2022-10-07 RX ADMIN — Medication 650 MILLIGRAM(S): at 06:38

## 2022-10-07 RX ADMIN — Medication 75 MILLIGRAM(S): at 17:51

## 2022-10-07 RX ADMIN — LEVETIRACETAM 500 MILLIGRAM(S): 250 TABLET, FILM COATED ORAL at 17:51

## 2022-10-07 RX ADMIN — SODIUM CHLORIDE 75 MILLILITER(S): 9 INJECTION INTRAMUSCULAR; INTRAVENOUS; SUBCUTANEOUS at 02:51

## 2022-10-07 RX ADMIN — Medication 650 MILLIGRAM(S): at 06:31

## 2022-10-07 RX ADMIN — Medication 75 MILLIGRAM(S): at 10:18

## 2022-10-07 RX ADMIN — Medication 75 MILLIGRAM(S): at 23:41

## 2022-10-07 RX ADMIN — Medication 75 MILLIGRAM(S): at 01:15

## 2022-10-07 RX ADMIN — Medication 300 MILLIGRAM(S): at 12:38

## 2022-10-07 RX ADMIN — LEVETIRACETAM 500 MILLIGRAM(S): 250 TABLET, FILM COATED ORAL at 06:56

## 2022-10-07 RX ADMIN — Medication 300 MILLIGRAM(S): at 03:56

## 2022-10-07 RX ADMIN — Medication 650 MILLIGRAM(S): at 23:41

## 2022-10-07 RX ADMIN — AMLODIPINE BESYLATE 10 MILLIGRAM(S): 2.5 TABLET ORAL at 06:31

## 2022-10-07 RX ADMIN — Medication 300 MILLIGRAM(S): at 22:29

## 2022-10-07 NOTE — PROGRESS NOTE ADULT - SUBJECTIVE AND OBJECTIVE BOX
Patient is a 37y old  Male who presents with a chief complaint of Headache (07 Oct 2022 10:56)      SUBJECTIVE / OVERNIGHT EVENTS: Pt has no complaints, denies any HA, dizziness     MEDICATIONS  (STANDING):  amLODIPine   Tablet 10 milliGRAM(s) Oral daily  hydrALAZINE 75 milliGRAM(s) Oral every 8 hours  labetalol 300 milliGRAM(s) Oral every 8 hours  levETIRAcetam 500 milliGRAM(s) Oral two times a day  sodium chloride 0.9%. 1000 milliLiter(s) (75 mL/Hr) IV Continuous <Continuous>    MEDICATIONS  (PRN):  acetaminophen     Tablet .. 650 milliGRAM(s) Oral every 6 hours PRN Temp greater or equal to 38C (100.4F), Mild Pain (1 - 3)      Vital Signs Last 24 Hrs  T(C): 37.1 (07 Oct 2022 12:15), Max: 37.7 (07 Oct 2022 01:13)  T(F): 98.7 (07 Oct 2022 12:15), Max: 99.9 (07 Oct 2022 01:13)  HR: 89 (07 Oct 2022 12:15) (86 - 95)  BP: 151/77 (07 Oct 2022 12:15) (131/72 - 151/77)  BP(mean): --  RR: 18 (07 Oct 2022 12:15) (15 - 18)  SpO2: 100% (07 Oct 2022 12:15) (95% - 100%)    Parameters below as of 07 Oct 2022 12:15  Patient On (Oxygen Delivery Method): room air      CAPILLARY BLOOD GLUCOSE        I&O's Summary    06 Oct 2022 07:01  -  07 Oct 2022 07:00  --------------------------------------------------------  IN: 743 mL / OUT: 700 mL / NET: 43 mL    07 Oct 2022 07:01  -  07 Oct 2022 13:31  --------------------------------------------------------  IN: 0 mL / OUT: 400 mL / NET: -400 mL        PHYSICAL EXAM:  GENERAL: NAD, well-developed  HEAD:  Atraumatic, Normocephalic  EYES: EOMI, PERRLA, conjunctiva and sclera clear  NECK: Supple, No JVD  CHEST/LUNG: Clear to auscultation bilaterally; No wheeze  HEART: Regular rate and rhythm; No murmurs, rubs, or gallops  ABDOMEN: Soft, Nontender, Nondistended; Bowel sounds present  EXTREMITIES:  2+ Peripheral Pulses, No clubbing, cyanosis, or edema  PSYCH: AAOx3  NEUROLOGY: non-focal  SKIN: No rashes or lesions    LABS:                        9.3    5.47  )-----------( 388      ( 06 Oct 2022 06:00 )             29.1     10-06    134<L>  |  102  |  23  ----------------------------<  134<H>  4.1   |  19<L>  |  1.62<H>    Ca    9.6      06 Oct 2022 18:20  Phos  2.9     10-06  Mg     1.70     10-06      < from: Transthoracic Echocardiogram (10.06.22 @ 16:14) >  CONCLUSIONS:  1. Normal mitral valve. Minimal mitral regurgitation.  2. Mild concentric left ventricular hypertrophy.  3. Normal left ventricular systolic function. No segmental  wall motion abnormalities.  4. Normal right ventricular size and function.  ------------------------------------------------------------------------  Confirmed on  10/6/2022 - 17:24:43 by Antoni Ríos M.D.,  [facility], [place]    < end of copied text >            RADIOLOGY & ADDITIONAL TESTS:    Imaging Personally Reviewed:    Consultant(s) Notes Reviewed:  EPS    Care Discussed with Consultants/Other Providers:

## 2022-10-07 NOTE — PROGRESS NOTE ADULT - SUBJECTIVE AND OBJECTIVE BOX
Interval history:  NO acute overnight event  pt. denies palpitation, CP, SOB      PAST MEDICAL & SURGICAL HISTORY:  High cholesterol    Hypertension        MEDICATIONS  (STANDING):  amLODIPine   Tablet 10 milliGRAM(s) Oral daily  hydrALAZINE 75 milliGRAM(s) Oral every 8 hours  labetalol 300 milliGRAM(s) Oral every 8 hours  levETIRAcetam 500 milliGRAM(s) Oral two times a day  sodium chloride 0.9%. 1000 milliLiter(s) (75 mL/Hr) IV Continuous <Continuous>    MEDICATIONS  (PRN):  acetaminophen     Tablet .. 650 milliGRAM(s) Oral every 6 hours PRN Temp greater or equal to 38C (100.4F), Mild Pain (1 - 3)            Vital Signs Last 24 Hrs  T(C): 36.9 (07 Oct 2022 08:36), Max: 37.7 (07 Oct 2022 01:13)  T(F): 98.5 (07 Oct 2022 08:36), Max: 99.9 (07 Oct 2022 01:13)  HR: 86 (07 Oct 2022 08:36) (86 - 95)  BP: 136/76 (07 Oct 2022 08:36) (131/72 - 142/72)  BP(mean): --  RR: 18 (07 Oct 2022 08:36) (15 - 18)  SpO2: 98% (07 Oct 2022 08:36) (95% - 100%)    Parameters below as of 07 Oct 2022 08:36  Patient On (Oxygen Delivery Method): room air                INTERPRETATION OF TELEMETRY: SR at 70s-90s    ECG:        LABS:                        9.3    5.47  )-----------( 388      ( 06 Oct 2022 06:00 )             29.1     10-06    134<L>  |  102  |  23  ----------------------------<  134<H>  4.1   |  19<L>  |  1.62<H>    Ca    9.6      06 Oct 2022 18:20  Phos  2.9     10-06  Mg     1.70     10-06              I&O's Summary    06 Oct 2022 07:01  -  07 Oct 2022 07:00  --------------------------------------------------------  IN: 743 mL / OUT: 700 mL / NET: 43 mL      BNP  RADIOLOGY & ADDITIONAL STUDIES:      PHYSICAL EXAM:    GENERAL: In no apparent distress, well nourished, and hydrated.  HEART: Regular rate and rhythm; No murmurs, rubs, or gallops.  PULMONARY: Clear to auscultation and percussion.  No rales, wheezing, or rhonchi bilaterally.  ABDOMEN: Soft, Nontender, Nondistended; Bowel sounds present  EXTREMITIES:  2+ Peripheral Pulses, No clubbing, cyanosis, or edema  NEUROLOGICAL: Grossly nonfocal

## 2022-10-07 NOTE — PROGRESS NOTE ADULT - NS ATTEND AMEND GEN_ALL_CORE FT
37y old  Male with a PMH of HTN ( noncompliant with medications), HLD presents with complaining of HA. Of note, patient had recent admission last month with syncope and found to have left basal ganglia hemorrhage, IVH and hydrocephalus. Neurosurgery was consulted and deemed no intervention at the time. Patient is here this time for headache. Neuro is consulted with serial CTH and pending MRI. Currently on EEG. Patient had one episode of near syncopal episode yesterday when he stood up urinating on the floor, and reports he felt lightheaded for few secs and " feeling of passing out". Subsequently he fell on his back , denies head trauma, LOC. Denies palpitation, CP, SOB. Denies hx of cardiac disease. EP is consulted for near syncope. EKG reveals SR at 81bpm, LVH and early repolarization. Mechanism is likely vagal autonomic bradycardia. Will follow on tele.

## 2022-10-07 NOTE — PROGRESS NOTE ADULT - SUBJECTIVE AND OBJECTIVE BOX
[facility] DIVISION OF KIDNEY DISEASES AND HYPERTENSION   FOLLOW UP NOTE    --------------------------------------------------------------------------------  Chief Complaint: MEHDI/Hyponatremia    24 hour events/subjective: Pt. was seen and examined today. Pt reports feeling better. Pt with recent hx of MEHDI. Upon review of labs, SCr was 1.26 on 9/26/22. Scr was elevated at 2.39 on admission on 9/30. Pt was on HCTZ and ARBs therapy prior to presentation that was held on admission. SNa was also mildly low at 131 on admission. Pt received HTS given concerns for cerebral edema on imaging. Pt also found to have orthostatic hypotension. Pt received IVF. Last Scr improved to 1.62 on 10/6/22 and SNa at 134. Pt denies fever, chills, nausea, vomiting , HA and CP.     PAST HISTORY  --------------------------------------------------------------------------------  No significant changes to PMH, PSH, FHx, SHx, unless otherwise noted    ALLERGIES & MEDICATIONS  --------------------------------------------------------------------------------  Allergies  No Known Allergies    Intolerances    Standing Inpatient Medications  amLODIPine   Tablet 10 milliGRAM(s) Oral daily  hydrALAZINE 75 milliGRAM(s) Oral every 8 hours  labetalol 300 milliGRAM(s) Oral every 8 hours  levETIRAcetam 500 milliGRAM(s) Oral two times a day  sodium chloride 0.9%. 1000 milliLiter(s) IV Continuous <Continuous>    PRN Inpatient Medications  acetaminophen     Tablet .. 650 milliGRAM(s) Oral every 6 hours PRN    REVIEW OF SYSTEMS  --------------------------------------------------------------------------------  Gen: No fevers/chills  Head/Eyes/Ears: No HA   Respiratory: No dyspnea, cough  CV: No chest pain  GI: No abdominal pain, diarrhea  : No dysuria, hematuria  MSK: No edema  Skin: No rashes  Heme: No easy bruising or bleeding    All other systems were reviewed and are negative, except as noted.    VITALS/PHYSICAL EXAM  --------------------------------------------------------------------------------  T(C): 36.9 (10-07-22 @ 08:36), Max: 37.7 (10-07-22 @ 01:13)  HR: 86 (10-07-22 @ 08:36) (86 - 95)  BP: 136/76 (10-07-22 @ 08:36) (131/72 - 142/72)  RR: 18 (10-07-22 @ 08:36) (15 - 18)  SpO2: 98% (10-07-22 @ 08:36) (95% - 100%)  Wt(kg): --    10-06-22 @ 07:01  -  10-07-22 @ 07:00  --------------------------------------------------------  IN: 743 mL / OUT: 700 mL / NET: 43 mL    Physical Exam:  	Gen: NAD  	HEENT: Anicteric  	Pulm: CTA B/L  	CV: S1S2+  	Abd: Soft, +BS  	Ext: No LE edema B/L  	Neuro: Awake       	Skin: Warm and dry    LABS/STUDIES  --------------------------------------------------------------------------------              9.3    5.47  >-----------<  388      [10-06-22 @ 06:00]              29.1     134  |  102  |  23  ----------------------------<  134      [10-06-22 @ 18:20]  4.1   |  19  |  1.62        Ca     9.6     [10-06-22 @ 18:20]      Mg     1.70     [10-06-22 @ 18:20]      Phos  2.9     [10-06-22 @ 18:20]    Creatinine Trend:  SCr 1.62 [10-06 @ 18:20]  SCr 1.46 [10-06 @ 06:00]  SCr 1.74 [10-05 @ 19:20]  SCr 1.90 [10-05 @ 07:15]  SCr 1.89 [10-05 @ 02:03]    Urinalysis - [10-01-22 @ 13:04]      Color Yellow / Appearance Clear / SG 1.024 / pH 5.5      Gluc Negative / Ketone Negative  / Bili Negative / Urobili <2 mg/dL       Blood Negative / Protein 30 mg/dL / Leuk Est Negative / Nitrite Negative      RBC 2 / WBC 2 / Hyaline 2 / Gran  / Sq Epi  / Non Sq Epi 1 / Bacteria Negative    Urine Creatinine 190      [10-01-22 @ 14:00]  Urine Sodium 47      [10-01-22 @ 14:00]  Urine Urea Nitrogen 909.9      [10-01-22 @ 04:36]  Urine Osmolality 613      [10-02-22 @ 13:55] [facility] DIVISION OF KIDNEY DISEASES AND HYPERTENSION   FOLLOW UP NOTE    --------------------------------------------------------------------------------  Chief Complaint: MEHDI/Hyponatremia    24 hour events/subjective: Pt with recent history of MEHDI. Upon review of labs, Scr was 1.26 on 9/26/22. Scr was elevated at 2.39 on admission on 9/30. Pt was on HCTZ and ARBs therapy prior to presentation that was held on admission. SNa was also mildly low at 131 on admission. Pt received HTS given concerns for cerebral edema on imaging. Pt also found to have orthostatic hypotension. Pt received IVF. Last Scr stable at 1.62 and SNa was 134 on 10/6/22.    Pt. was seen and examined today. Pt reports feeling better. Pt denies fever, chills, nausea, vomiting , HA and CP.     PAST HISTORY  --------------------------------------------------------------------------------  No significant changes to PMH, PSH, FHx, SHx, unless otherwise noted    ALLERGIES & MEDICATIONS  --------------------------------------------------------------------------------  Allergies  No Known Allergies    Intolerances    Standing Inpatient Medications  amLODIPine   Tablet 10 milliGRAM(s) Oral daily  hydrALAZINE 75 milliGRAM(s) Oral every 8 hours  labetalol 300 milliGRAM(s) Oral every 8 hours  levETIRAcetam 500 milliGRAM(s) Oral two times a day  sodium chloride 0.9%. 1000 milliLiter(s) IV Continuous <Continuous>    PRN Inpatient Medications  acetaminophen     Tablet .. 650 milliGRAM(s) Oral every 6 hours PRN    REVIEW OF SYSTEMS  --------------------------------------------------------------------------------  Gen: No fever  Head/Eyes/Ears: No HA   Respiratory: No dyspnea, cough  CV: No chest pain  GI: No abdominal pain  : No dysuria  MSK: No edema  Skin: No rash  Heme: No easy bruising or bleeding    All other systems were reviewed and are negative, except as noted.    VITALS/PHYSICAL EXAM  --------------------------------------------------------------------------------  T(C): 36.9 (10-07-22 @ 08:36), Max: 37.7 (10-07-22 @ 01:13)  HR: 86 (10-07-22 @ 08:36) (86 - 95)  BP: 136/76 (10-07-22 @ 08:36) (131/72 - 142/72)  RR: 18 (10-07-22 @ 08:36) (15 - 18)  SpO2: 98% (10-07-22 @ 08:36) (95% - 100%)  Wt(kg): --    10-06-22 @ 07:01  -  10-07-22 @ 07:00  --------------------------------------------------------  IN: 743 mL / OUT: 700 mL / NET: 43 mL    Physical Exam:  	Gen: resting, NAD  	HEENT: Anicteric  	Pulm: CTA B/L  	CV: S1S2+  	Abd: Soft, +BS  	Ext: No LE edema B/L  	Neuro: Awake, alert       	Skin: Warm and dry    LABS/STUDIES  --------------------------------------------------------------------------------              9.3    5.47  >-----------<  388      [10-06-22 @ 06:00]              29.1     134  |  102  |  23  ----------------------------<  134      [10-06-22 @ 18:20]  4.1   |  19  |  1.62        Ca     9.6     [10-06-22 @ 18:20]      Mg     1.70     [10-06-22 @ 18:20]      Phos  2.9     [10-06-22 @ 18:20]    Creatinine Trend:  SCr 1.62 [10-06 @ 18:20]  SCr 1.46 [10-06 @ 06:00]  SCr 1.74 [10-05 @ 19:20]  SCr 1.90 [10-05 @ 07:15]  SCr 1.89 [10-05 @ 02:03]

## 2022-10-07 NOTE — EEG REPORT - NS EEG TEXT BOX
ABHISHEK RIDDLE [id number]     Study Date: 		10-06-22 08:00 to 10-6-22 16:04  Duration x Hours: 8 hrs  --------------------------------------------------------------------------------------------------  History:  CC/ HPI Patient is a 37y old  Male who presents with a chief complaint of Headache (05 Oct 2022 11:42)    MEDICATIONS  (STANDING):  amLODIPine   Tablet 10 milliGRAM(s) Oral daily  hydrALAZINE 75 milliGRAM(s) Oral every 8 hours  labetalol 300 milliGRAM(s) Oral every 8 hours  levETIRAcetam 500 milliGRAM(s) Oral two times a day  sodium chloride 0.9%. 1000 milliLiter(s) (75 mL/Hr) IV Continuous <Continuous>    --------------------------------------------------------------------------------------------------  Study Interpretation:    [[[Abbreviation Key:  PDR=alpha rhythm/posterior dominant rhythm. A-P=anterior posterior.  Amplitude: ‘very low’:<20; ‘low’:20-49; ‘medium’:; ‘high’:>150uV.  Persistence for periodic/rhythmic patterns (% of epoch) ‘rare’:<1%; ‘occasional’:1-10%; ‘frequent’:10-50%; ‘abundant’:50-90%; ‘continuous’:>90%.  Persistence for sporadic discharges: ‘rare’:<1/hr; ‘occasional’:1/min-1/hr; ‘frequent’:>1/min; ‘abundant’:>1/10 sec.  RPP=rhythmic and periodic patterns; GRDA=generalized rhythmic delta activity; FIRDA=frontal intermittent GRDA; LRDA=lateralized rhythmic delta activity; TIRDA=temporal intermittent rhythmic delta activity;  LPD=PLED=lateralized periodic discharges; GPD=generalized periodic discharges; BIPDs =bilateral independent periodic discharges; Mf=multifocal; SIRPDs=stimulus induced rhythmic, periodic, or ictal appearing discharges; BIRDs=brief potentially ictal rhythmic discharges >4 Hz, lasting .5-10s; PFA (paroxysmal bursts >13 Hz or =8 Hz <10s).  Modifiers: +F=with fast component; +S=with spike component; +R=with rhythmic component.  S-B=burst suppression pattern.  Max=maximal. N1-drowsy; N2-stage II sleep; N3-slow wave sleep. SSS/BETS=small sharp spikes/benign epileptiform transients of sleep. HV=hyperventilation; PS=photic stimulation]]]    Daily EEG Visual Analysis    FINDINGS:      Background:  Continuous: continuous  Symmetry: symmetric  PDR:  9 Hz activity, not as well formed on the right, with amplitude to 40 uV, that attenuated to eye opening.  Low amplitude frontal beta noted in wakefulness.  Reactivity: present  Voltage: normal, mostly 20-150uV  Anterior Posterior Gradient: present  Other background findings: none  Breach: absent    Background Slowing:  Generalized slowing: Persistent theta and delta even during wakefulness    State Changes:   -Drowsiness noted with increased slowing, attenuation of fast activity, vertex transients.  -Present with N2 sleep transients with symmetric spindles and K-complexes.    Sporadic Epileptiform Discharges:    None    Rhythmic and Periodic Patterns (RPPs):  None     Electrographic and Electroclinical seizures:  None    Other Clinical Events:  None    Activation Procedures:   -Hyperventilation was not performed.    -Photic stimulation was performed and did not elicit any abnormalities.      Artifacts:  Intermittent myogenic and movement artifacts were noted.    ECG:  The heart rate on single channel ECG was predominantly between 80-90 BPM.    EEG Classification / Summary:  Abnormal  EEG in the awake / drowsy / asleep state(s).  Background slowing, generalized, mild     Clinical Impression:    Evidence for right hemisphere cerebral dysfunction  Mild diffuse/multifocal cerebral dysfunction, not specific as to etiology  There were no epileptiform abnormalities recorded.      This is a prelim report only, pending review with attending prior to finalization.    -------------------------------------------------------------------------------------------------------  [facility] EEG Reading Room Ph#: [phone]  Epilepsy Answering Service after 5PM and before 8:30AM: Ph#: [phone]    Sander Sethi M.D.   Epilepsy fellow

## 2022-10-07 NOTE — PROGRESS NOTE ADULT - ASSESSMENT
Patient is a 37y old  Male with a PMH of HTN ( noncompliant with medications), HLD presents with complaining of HA. Of note, patient had recent admission last month with syncope and found to have left basal ganglia hemorrhage, IVH and hydrocephalus. Neurosurgery was consulted and deemed no intervention at the time. Patient is here this time for headache. Neuro is consulted with serial CTH and pending MRI. Currently on EEG. Patient had one episode of near syncopal episode yesterday when he stood up urinating on the floor, and reports he felt lightheaded for few secs and " feeling of passing out". Subsequently he fell on his back , denies head trauma, LOC. Denies palpitation, CP, SOB. Denies hx of cardiac disease. EP is consulted for near syncope. EKG reveals SR at 81bpm, LVH and early repolarization.       Near Syncope -- orthostatic positive, likely due to orthostatic hypotention    RECOMMENDATIONS:  - Continuous telemetric monitoring, overnight SR   - TTE with normal LVEF, Valvular function, Mild concentric LVH  - Orthostatic Positive, IVF  - Monitor electrolytes and replete K to 4 and Mg to 2  - Continue care per primary team/neuro  Patient is a 37y old  Male with a PMH of HTN ( noncompliant with medications), HLD presents with complaining of HA. Of note, patient had recent admission last month with syncope and found to have left basal ganglia hemorrhage, IVH and hydrocephalus. Neurosurgery was consulted and deemed no intervention at the time. Patient is here this time for headache. Neuro is consulted with serial CTH and pending MRI. Currently on EEG. Patient had one episode of near syncopal episode yesterday when he stood up urinating on the floor, and reports he felt lightheaded for few secs and " feeling of passing out". Subsequently he fell on his back , denies head trauma, LOC. Denies palpitation, CP, SOB. Denies hx of cardiac disease. EP is consulted for near syncope. EKG reveals SR at 81bpm, LVH and early repolarization.       Near Syncope -- orthostatic positive, likely due to orthostatic hypotention    RECOMMENDATIONS:  - Continuous telemetric monitoring, overnight SR   - TTE with normal LVEF, Valvular function, Mild concentric LVH  - Orthostatic Positive, IVF  - Monitor electrolytes and replete K to 4 and Mg to 2  - No EP intervention at this time   - Continue care per primary team/neuro

## 2022-10-07 NOTE — PROGRESS NOTE ADULT - PROBLEM SELECTOR PLAN 1
Pt with nonoliguric MEHDI in setting of recent diuretic and ARB therapy use. Pt with recent hx of MEHDI. Upon review of labs, SCr was 1.26 on 9/26/22. Scr was elevated at 2.39 on admission on 9/30. Pt also found to have orthostatic hypotension. Pt receiving IVF. Last Scr improved to 1.6 today. Avoid nephrotoxin. Dose meds per egfr. Pt. with nonoliguric MEHDI in setting of recent diuretic and ARB therapy use. Pt with recent history of MEHDI. Upon review of labs, Scr was 1.26 on 9/26/22. Scr was elevated at 2.39 on admission on 9/30. Pt also found to have orthostatic hypotension. Pt. receiving IVF. Last Scr stable at 1.6 on 10/6/22. Monitor labs and urine output. Avoid any potential nephrotoxins. Dose medications as per eGFR.

## 2022-10-07 NOTE — PROGRESS NOTE ADULT - ATTENDING COMMENTS
Pt. with MEHDI and hyponatremia. Scr was 1.6 and SNa was 134 on 10/6/22. Assessment and plan for MEHDI and hyponatremia as outlined above. Monitor labs and urine output. Avoid any potential nephrotoxins. Dose medications as per eGFR.

## 2022-10-08 LAB
ALBUMIN SERPL ELPH-MCNC: 4 G/DL — SIGNIFICANT CHANGE UP (ref 3.3–5)
ALP SERPL-CCNC: 90 U/L — SIGNIFICANT CHANGE UP (ref 40–120)
ALT FLD-CCNC: 36 U/L — SIGNIFICANT CHANGE UP (ref 4–41)
ANION GAP SERPL CALC-SCNC: 12 MMOL/L — SIGNIFICANT CHANGE UP (ref 7–14)
ANION GAP SERPL CALC-SCNC: 12 MMOL/L — SIGNIFICANT CHANGE UP (ref 7–14)
ANION GAP SERPL CALC-SCNC: 15 MMOL/L — HIGH (ref 7–14)
AST SERPL-CCNC: 20 U/L — SIGNIFICANT CHANGE UP (ref 4–40)
BASOPHILS # BLD AUTO: 0.02 K/UL — SIGNIFICANT CHANGE UP (ref 0–0.2)
BASOPHILS NFR BLD AUTO: 0.4 % — SIGNIFICANT CHANGE UP (ref 0–2)
BILIRUB SERPL-MCNC: 0.3 MG/DL — SIGNIFICANT CHANGE UP (ref 0.2–1.2)
BUN SERPL-MCNC: 16 MG/DL — SIGNIFICANT CHANGE UP (ref 7–23)
BUN SERPL-MCNC: 16 MG/DL — SIGNIFICANT CHANGE UP (ref 7–23)
BUN SERPL-MCNC: 17 MG/DL — SIGNIFICANT CHANGE UP (ref 7–23)
CALCIUM SERPL-MCNC: 9.3 MG/DL — SIGNIFICANT CHANGE UP (ref 8.4–10.5)
CALCIUM SERPL-MCNC: 9.4 MG/DL — SIGNIFICANT CHANGE UP (ref 8.4–10.5)
CALCIUM SERPL-MCNC: 9.8 MG/DL — SIGNIFICANT CHANGE UP (ref 8.4–10.5)
CHLORIDE SERPL-SCNC: 100 MMOL/L — SIGNIFICANT CHANGE UP (ref 98–107)
CHLORIDE SERPL-SCNC: 101 MMOL/L — SIGNIFICANT CHANGE UP (ref 98–107)
CHLORIDE SERPL-SCNC: 102 MMOL/L — SIGNIFICANT CHANGE UP (ref 98–107)
CO2 SERPL-SCNC: 20 MMOL/L — LOW (ref 22–31)
CREAT SERPL-MCNC: 1.52 MG/DL — HIGH (ref 0.5–1.3)
CREAT SERPL-MCNC: 1.52 MG/DL — HIGH (ref 0.5–1.3)
CREAT SERPL-MCNC: 1.55 MG/DL — HIGH (ref 0.5–1.3)
D DIMER BLD IA.RAPID-MCNC: 828 NG/ML DDU — HIGH
EGFR: 59 ML/MIN/1.73M2 — LOW
EGFR: 60 ML/MIN/1.73M2 — SIGNIFICANT CHANGE UP
EGFR: 60 ML/MIN/1.73M2 — SIGNIFICANT CHANGE UP
EOSINOPHIL # BLD AUTO: 0.15 K/UL — SIGNIFICANT CHANGE UP (ref 0–0.5)
EOSINOPHIL NFR BLD AUTO: 2.8 % — SIGNIFICANT CHANGE UP (ref 0–6)
FERRITIN SERPL-MCNC: 376 NG/ML — SIGNIFICANT CHANGE UP (ref 30–400)
GLUCOSE SERPL-MCNC: 98 MG/DL — SIGNIFICANT CHANGE UP (ref 70–99)
GLUCOSE SERPL-MCNC: 99 MG/DL — SIGNIFICANT CHANGE UP (ref 70–99)
GLUCOSE SERPL-MCNC: 99 MG/DL — SIGNIFICANT CHANGE UP (ref 70–99)
HCT VFR BLD CALC: 33.4 % — LOW (ref 39–50)
HGB BLD-MCNC: 10.6 G/DL — LOW (ref 13–17)
IANC: 3.34 K/UL — SIGNIFICANT CHANGE UP (ref 1.8–7.4)
IMM GRANULOCYTES NFR BLD AUTO: 0.2 % — SIGNIFICANT CHANGE UP (ref 0–0.9)
LDH SERPL L TO P-CCNC: 159 U/L — SIGNIFICANT CHANGE UP (ref 135–225)
LYMPHOCYTES # BLD AUTO: 1.03 K/UL — SIGNIFICANT CHANGE UP (ref 1–3.3)
LYMPHOCYTES # BLD AUTO: 19 % — SIGNIFICANT CHANGE UP (ref 13–44)
MAGNESIUM SERPL-MCNC: 1.9 MG/DL — SIGNIFICANT CHANGE UP (ref 1.6–2.6)
MAGNESIUM SERPL-MCNC: 1.9 MG/DL — SIGNIFICANT CHANGE UP (ref 1.6–2.6)
MCHC RBC-ENTMCNC: 26.6 PG — LOW (ref 27–34)
MCHC RBC-ENTMCNC: 31.7 GM/DL — LOW (ref 32–36)
MCV RBC AUTO: 83.7 FL — SIGNIFICANT CHANGE UP (ref 80–100)
MONOCYTES # BLD AUTO: 0.86 K/UL — SIGNIFICANT CHANGE UP (ref 0–0.9)
MONOCYTES NFR BLD AUTO: 15.9 % — HIGH (ref 2–14)
NEUTROPHILS # BLD AUTO: 3.34 K/UL — SIGNIFICANT CHANGE UP (ref 1.8–7.4)
NEUTROPHILS NFR BLD AUTO: 61.7 % — SIGNIFICANT CHANGE UP (ref 43–77)
NRBC # BLD: 0 /100 WBCS — SIGNIFICANT CHANGE UP (ref 0–0)
NRBC # FLD: 0 K/UL — SIGNIFICANT CHANGE UP (ref 0–0)
PHOSPHATE SERPL-MCNC: 3.3 MG/DL — SIGNIFICANT CHANGE UP (ref 2.5–4.5)
PHOSPHATE SERPL-MCNC: 4.1 MG/DL — SIGNIFICANT CHANGE UP (ref 2.5–4.5)
PLATELET # BLD AUTO: 390 K/UL — SIGNIFICANT CHANGE UP (ref 150–400)
POTASSIUM SERPL-MCNC: 4 MMOL/L — SIGNIFICANT CHANGE UP (ref 3.5–5.3)
POTASSIUM SERPL-MCNC: 4 MMOL/L — SIGNIFICANT CHANGE UP (ref 3.5–5.3)
POTASSIUM SERPL-MCNC: 4.3 MMOL/L — SIGNIFICANT CHANGE UP (ref 3.5–5.3)
POTASSIUM SERPL-SCNC: 4 MMOL/L — SIGNIFICANT CHANGE UP (ref 3.5–5.3)
POTASSIUM SERPL-SCNC: 4 MMOL/L — SIGNIFICANT CHANGE UP (ref 3.5–5.3)
POTASSIUM SERPL-SCNC: 4.3 MMOL/L — SIGNIFICANT CHANGE UP (ref 3.5–5.3)
PROT SERPL-MCNC: 8 G/DL — SIGNIFICANT CHANGE UP (ref 6–8.3)
RBC # BLD: 3.99 M/UL — LOW (ref 4.2–5.8)
RBC # FLD: 13.2 % — SIGNIFICANT CHANGE UP (ref 10.3–14.5)
SARS-COV-2 RNA SPEC QL NAA+PROBE: DETECTED
SODIUM SERPL-SCNC: 132 MMOL/L — LOW (ref 135–145)
SODIUM SERPL-SCNC: 133 MMOL/L — LOW (ref 135–145)
SODIUM SERPL-SCNC: 137 MMOL/L — SIGNIFICANT CHANGE UP (ref 135–145)
WBC # BLD: 5.41 K/UL — SIGNIFICANT CHANGE UP (ref 3.8–10.5)
WBC # FLD AUTO: 5.41 K/UL — SIGNIFICANT CHANGE UP (ref 3.8–10.5)

## 2022-10-08 PROCEDURE — 99233 SBSQ HOSP IP/OBS HIGH 50: CPT

## 2022-10-08 RX ORDER — SODIUM CHLORIDE 9 MG/ML
1 INJECTION INTRAMUSCULAR; INTRAVENOUS; SUBCUTANEOUS THREE TIMES A DAY
Refills: 0 | Status: DISCONTINUED | OUTPATIENT
Start: 2022-10-08 | End: 2022-10-10

## 2022-10-08 RX ADMIN — Medication 300 MILLIGRAM(S): at 17:00

## 2022-10-08 RX ADMIN — Medication 75 MILLIGRAM(S): at 07:59

## 2022-10-08 RX ADMIN — AMLODIPINE BESYLATE 10 MILLIGRAM(S): 2.5 TABLET ORAL at 06:33

## 2022-10-08 RX ADMIN — LEVETIRACETAM 500 MILLIGRAM(S): 250 TABLET, FILM COATED ORAL at 06:32

## 2022-10-08 RX ADMIN — Medication 75 MILLIGRAM(S): at 16:40

## 2022-10-08 RX ADMIN — SODIUM CHLORIDE 1 GRAM(S): 9 INJECTION INTRAMUSCULAR; INTRAVENOUS; SUBCUTANEOUS at 22:25

## 2022-10-08 RX ADMIN — Medication 650 MILLIGRAM(S): at 13:24

## 2022-10-08 RX ADMIN — SODIUM CHLORIDE 1 GRAM(S): 9 INJECTION INTRAMUSCULAR; INTRAVENOUS; SUBCUTANEOUS at 13:24

## 2022-10-08 RX ADMIN — Medication 650 MILLIGRAM(S): at 13:54

## 2022-10-08 RX ADMIN — Medication 300 MILLIGRAM(S): at 06:32

## 2022-10-08 RX ADMIN — LEVETIRACETAM 500 MILLIGRAM(S): 250 TABLET, FILM COATED ORAL at 17:00

## 2022-10-08 NOTE — PROGRESS NOTE ADULT - SUBJECTIVE AND OBJECTIVE BOX
Patient is a 37y old  Male who presents with a chief complaint of Headache (07 Oct 2022 13:30)      SUBJECTIVE / OVERNIGHT EVENTS: Feels better. No complaints. No HA or dizziness    MEDICATIONS  (STANDING):  amLODIPine   Tablet 10 milliGRAM(s) Oral daily  hydrALAZINE 75 milliGRAM(s) Oral every 8 hours  labetalol 300 milliGRAM(s) Oral every 8 hours  levETIRAcetam 500 milliGRAM(s) Oral two times a day  sodium chloride 1 Gram(s) Oral three times a day  sodium chloride 0.9%. 1000 milliLiter(s) (75 mL/Hr) IV Continuous <Continuous>    MEDICATIONS  (PRN):  acetaminophen     Tablet .. 650 milliGRAM(s) Oral every 6 hours PRN Temp greater or equal to 38C (100.4F), Mild Pain (1 - 3)      Vital Signs Last 24 Hrs  T(C): 37.2 (08 Oct 2022 07:56), Max: 37.8 (07 Oct 2022 22:55)  T(F): 98.9 (08 Oct 2022 07:56), Max: 100 (07 Oct 2022 22:55)  HR: 89 (08 Oct 2022 09:47) (84 - 92)  BP: 108/63 (08 Oct 2022 09:47) (108/63 - 151/77)  BP(mean): --  RR: 17 (08 Oct 2022 07:56) (17 - 18)  SpO2: 100% (08 Oct 2022 07:56) (98% - 100%)    Parameters below as of 08 Oct 2022 07:56  Patient On (Oxygen Delivery Method): room air      CAPILLARY BLOOD GLUCOSE        I&O's Summary    07 Oct 2022 07:01  -  08 Oct 2022 07:00  --------------------------------------------------------  IN: 0 mL / OUT: 1900 mL / NET: -1900 mL        PHYSICAL EXAM:  GENERAL: NAD, well-developed  HEAD:  Atraumatic, Normocephalic  EYES: EOMI, PERRLA, conjunctiva and sclera clear  NECK: Supple, No JVD  CHEST/LUNG: Clear to auscultation bilaterally; No wheeze  HEART: Regular rate and rhythm; No murmurs, rubs, or gallops  ABDOMEN: Soft, Nontender, Nondistended; Bowel sounds present  EXTREMITIES:  2+ Peripheral Pulses, No clubbing, cyanosis, or edema  PSYCH: AAOx3  NEUROLOGY: non-focal  SKIN: No rashes or lesions    LABS:                        10.6   5.41  )-----------( 390      ( 08 Oct 2022 05:38 )             33.4     10-08    132<L>  |  100  |  16  ----------------------------<  99  4.0   |  20<L>  |  1.52<H>    Ca    9.4      08 Oct 2022 05:38  Phos  4.1     10-08  Mg     1.90     10-08                RADIOLOGY & ADDITIONAL TESTS:    Imaging Personally Reviewed:    Consultant(s) Notes Reviewed:  EPS, Renal     Care Discussed with Consultants/Other Providers:

## 2022-10-08 NOTE — CHART NOTE - NSCHARTNOTEFT_GEN_A_CORE
Vital Signs Last 24 Hrs  T(C): 37.1 (08 Oct 2022 13:10), Max: 37.8 (07 Oct 2022 22:55)  T(F): 98.7 (08 Oct 2022 13:10), Max: 100 (07 Oct 2022 22:55)  HR: 90 (08 Oct 2022 13:10) (84 - 92)  BP: 125/87 (08 Oct 2022 13:10) (108/63 - 149/86)  BP(mean): --  RR: 17 (08 Oct 2022 13:10) (17 - 18)  SpO2: 100% (08 Oct 2022 13:10) (98% - 100%)    Parameters below as of 08 Oct 2022 13:10  Patient On (Oxygen Delivery Method): caron    Provider seen pt at bedside and informed him he tested positive for COVID-19, pt at this time is complaining of a headache but otherwise asymtpomatic, denies SOB, chest pain, wheezing, fever or any discomfort, and VS within normal range. Isolation orders and COVID labs was ordered for pt and is now awaiting isolation room to open up. Attending was made away and pt will continue to be monitored. Pt has a known ICH and neurology was consulted for new headache, w/ no new neuro deficits repeat head CT is not needed and pt will continued to be monitored.       Alfred Schmitz PA-C   P. [phone]

## 2022-10-08 NOTE — PROGRESS NOTE ADULT - PROBLEM SELECTOR PLAN 2
Na at 131, ct head with worsening edema, renal consulted, per renal Pt. now with symptomatic hyponatremia with worsening brain edema.    Per renal Would ideally raise SNa by 3 meq in the next hour due to worsening brain edema by giving 100cc bolus of 3% HTS.   -If cannot give this on the floor then, start 3% HTS @ 30cc/hr x 4 hours. Avoid over-correction by >4-6mEQ in 24 hours. Ultimate SNa goal is 145-150 to reduce cerebral edema. Fluid restriction to <1L/day for now.  Liberalize salt intake. Increase PO solute (protein) intake. Avoid isotonic or hypotonic fluids. Monitor SNa Q4 hours.  Would re-call Neurosurgery.    Discussed with micu on 10/1 per micu pt can be on the floor with 3%HTS at 30cc/hr, started on it, monitor bmp closely, f/u with renal  -on 3% HTS at 30cc/hr, monitor Na closely, pt refusing HTS.  No indication for HTS as per Renal   -Na 132 today, down trending, will cont. Free water restriction and add Salt tab 1 gram 3X/day  -F/U BMP in AM   -nsx /Neuro follg

## 2022-10-09 ENCOUNTER — TRANSCRIPTION ENCOUNTER (OUTPATIENT)
Age: 37
End: 2022-10-09

## 2022-10-09 DIAGNOSIS — U07.1 COVID-19: ICD-10-CM

## 2022-10-09 LAB
ANION GAP SERPL CALC-SCNC: 13 MMOL/L — SIGNIFICANT CHANGE UP (ref 7–14)
BASOPHILS # BLD AUTO: 0.02 K/UL — SIGNIFICANT CHANGE UP (ref 0–0.2)
BASOPHILS NFR BLD AUTO: 0.4 % — SIGNIFICANT CHANGE UP (ref 0–2)
BUN SERPL-MCNC: 17 MG/DL — SIGNIFICANT CHANGE UP (ref 7–23)
CALCIUM SERPL-MCNC: 9.4 MG/DL — SIGNIFICANT CHANGE UP (ref 8.4–10.5)
CHLORIDE SERPL-SCNC: 101 MMOL/L — SIGNIFICANT CHANGE UP (ref 98–107)
CO2 SERPL-SCNC: 19 MMOL/L — LOW (ref 22–31)
CREAT SERPL-MCNC: 1.58 MG/DL — HIGH (ref 0.5–1.3)
EGFR: 57 ML/MIN/1.73M2 — LOW
EOSINOPHIL # BLD AUTO: 0.15 K/UL — SIGNIFICANT CHANGE UP (ref 0–0.5)
EOSINOPHIL NFR BLD AUTO: 2.8 % — SIGNIFICANT CHANGE UP (ref 0–6)
GLUCOSE SERPL-MCNC: 90 MG/DL — SIGNIFICANT CHANGE UP (ref 70–99)
HCT VFR BLD CALC: 32.3 % — LOW (ref 39–50)
HGB BLD-MCNC: 10.2 G/DL — LOW (ref 13–17)
IANC: 3.28 K/UL — SIGNIFICANT CHANGE UP (ref 1.8–7.4)
IMM GRANULOCYTES NFR BLD AUTO: 0.4 % — SIGNIFICANT CHANGE UP (ref 0–0.9)
LYMPHOCYTES # BLD AUTO: 1.18 K/UL — SIGNIFICANT CHANGE UP (ref 1–3.3)
LYMPHOCYTES # BLD AUTO: 21.8 % — SIGNIFICANT CHANGE UP (ref 13–44)
MAGNESIUM SERPL-MCNC: 1.8 MG/DL — SIGNIFICANT CHANGE UP (ref 1.6–2.6)
MCHC RBC-ENTMCNC: 26.3 PG — LOW (ref 27–34)
MCHC RBC-ENTMCNC: 31.6 GM/DL — LOW (ref 32–36)
MCV RBC AUTO: 83.2 FL — SIGNIFICANT CHANGE UP (ref 80–100)
MONOCYTES # BLD AUTO: 0.76 K/UL — SIGNIFICANT CHANGE UP (ref 0–0.9)
MONOCYTES NFR BLD AUTO: 14 % — SIGNIFICANT CHANGE UP (ref 2–14)
NEUTROPHILS # BLD AUTO: 3.28 K/UL — SIGNIFICANT CHANGE UP (ref 1.8–7.4)
NEUTROPHILS NFR BLD AUTO: 60.6 % — SIGNIFICANT CHANGE UP (ref 43–77)
NRBC # BLD: 0 /100 WBCS — SIGNIFICANT CHANGE UP (ref 0–0)
NRBC # FLD: 0 K/UL — SIGNIFICANT CHANGE UP (ref 0–0)
PHOSPHATE SERPL-MCNC: 3.7 MG/DL — SIGNIFICANT CHANGE UP (ref 2.5–4.5)
PLATELET # BLD AUTO: 381 K/UL — SIGNIFICANT CHANGE UP (ref 150–400)
POTASSIUM SERPL-MCNC: 3.9 MMOL/L — SIGNIFICANT CHANGE UP (ref 3.5–5.3)
POTASSIUM SERPL-SCNC: 3.9 MMOL/L — SIGNIFICANT CHANGE UP (ref 3.5–5.3)
RBC # BLD: 3.88 M/UL — LOW (ref 4.2–5.8)
RBC # FLD: 13.3 % — SIGNIFICANT CHANGE UP (ref 10.3–14.5)
SODIUM SERPL-SCNC: 133 MMOL/L — LOW (ref 135–145)
WBC # BLD: 5.41 K/UL — SIGNIFICANT CHANGE UP (ref 3.8–10.5)
WBC # FLD AUTO: 5.41 K/UL — SIGNIFICANT CHANGE UP (ref 3.8–10.5)

## 2022-10-09 PROCEDURE — 99233 SBSQ HOSP IP/OBS HIGH 50: CPT

## 2022-10-09 RX ADMIN — Medication 75 MILLIGRAM(S): at 09:06

## 2022-10-09 RX ADMIN — SODIUM CHLORIDE 1 GRAM(S): 9 INJECTION INTRAMUSCULAR; INTRAVENOUS; SUBCUTANEOUS at 13:08

## 2022-10-09 RX ADMIN — Medication 75 MILLIGRAM(S): at 01:01

## 2022-10-09 RX ADMIN — AMLODIPINE BESYLATE 10 MILLIGRAM(S): 2.5 TABLET ORAL at 05:16

## 2022-10-09 RX ADMIN — Medication 650 MILLIGRAM(S): at 23:15

## 2022-10-09 RX ADMIN — Medication 75 MILLIGRAM(S): at 17:23

## 2022-10-09 RX ADMIN — SODIUM CHLORIDE 1 GRAM(S): 9 INJECTION INTRAMUSCULAR; INTRAVENOUS; SUBCUTANEOUS at 22:35

## 2022-10-09 RX ADMIN — Medication 300 MILLIGRAM(S): at 22:34

## 2022-10-09 RX ADMIN — Medication 300 MILLIGRAM(S): at 01:02

## 2022-10-09 RX ADMIN — SODIUM CHLORIDE 1 GRAM(S): 9 INJECTION INTRAMUSCULAR; INTRAVENOUS; SUBCUTANEOUS at 05:16

## 2022-10-09 RX ADMIN — Medication 650 MILLIGRAM(S): at 22:38

## 2022-10-09 RX ADMIN — Medication 650 MILLIGRAM(S): at 15:10

## 2022-10-09 RX ADMIN — Medication 300 MILLIGRAM(S): at 13:07

## 2022-10-09 RX ADMIN — LEVETIRACETAM 500 MILLIGRAM(S): 250 TABLET, FILM COATED ORAL at 05:15

## 2022-10-09 RX ADMIN — Medication 650 MILLIGRAM(S): at 16:10

## 2022-10-09 RX ADMIN — LEVETIRACETAM 500 MILLIGRAM(S): 250 TABLET, FILM COATED ORAL at 17:23

## 2022-10-09 NOTE — PROGRESS NOTE ADULT - PROBLEM SELECTOR PLAN 5
-left basal ganglia edema  -neurosurgery follg

## 2022-10-09 NOTE — PROGRESS NOTE ADULT - PROBLEM SELECTOR PROBLEM 5
Intracranial edema

## 2022-10-09 NOTE — PROGRESS NOTE ADULT - PROBLEM SELECTOR PLAN 4
-stable 6 mm subfalcine herniation  -continue to monitor

## 2022-10-09 NOTE — PROGRESS NOTE ADULT - PROBLEM SELECTOR PLAN 6
-pt w/ HTN c/w amlodipine 10 mg, HLZ 75 mg TID, and labetalol 300 mg Q8  -hold HCT and valsartan given MEHDI, if BP is elevated increase HLZ to 100 mg TID
-platelet count of 606
-pt w/ HTN c/w amlodipine 10 mg, HLZ 75 mg TID, and labetalol 300 mg Q8  -hold HCT and valsartan given MEHDI, if BP is elevated increase HLZ to 100 mg TID
-platelet count of 606

## 2022-10-09 NOTE — DISCHARGE NOTE PROVIDER - HOSPITAL COURSE
Patient is a 37 year old M hx of l. ICH, HTN who is presenting due to headaches.       l. basal ganglia edema.   Stable 6 mm left-to-right subfalcine herniation.     H/O spontaneous intraparenchymal intracranial hemorrhage.    -pt w/ ICH last admission, and still present  -likely headache from this  -CT head showed Worsening edema in the left basal ganglia extending into the internal capsule, in spite of significant decrease in size of acute hematoma. Evolving infarct is possible. Consider repeat MRI. Small acute hemorrhage in the head of the left caudate, significantly decreased in size compared to the prior. Resolution of previously seen intraventricular hemorrhage. Stable 6 mm left-to-right subfalcine herniation.  - MRI preformed Left thalamic parenchymal hematoma with intraventricular rupture, vasogenic edema unchanged since 9/21/2022  - Neuro Sx recs: No acute neurosurgical intervention  - No indication for steroids at this time  - Headache management, pt c/o HA this morning, now resolved.  -repeat CT of head as per Neuro   - Control BP  -can give tylenol for headaches manage rest as below.     Hyponatremia.   -Na at 131, ct head with worsening edema, renal consulted, per renal Pt. now with symptomatic hyponatremia with worsening brain edema.    Per renal Would ideally raise SNa by 3 meq in the next hour due to worsening brain edema by giving 100cc bolus of 3% HTS.   -If cannot give this on the floor then, start 3% HTS @ 30cc/hr x 4 hours. Avoid over-correction by >4-6mEQ in 24 hours. Ultimate SNa goal is 145-150 to reduce cerebral edema. Fluid restriction to <1L/day for now.  Liberalize salt intake. Increase PO solute (protein) intake. Avoid isotonic or hypotonic fluids. Monitor SNa Q4 hours.  Would re-call Neurosurgery.    Discussed with micu on 10/1 per micu pt can be on the floor with 3%HTS at 30cc/hr, started on it, monitor bmp closely, f/u with renal  -on 3% HTS at 30cc/hr, monitor Na closely, pt refusing HTS.  No indication for HTS as per Renal   -Na 132 today, down trending, will cont. Free water restriction and add Salt tab 1 gram 3X/day  -F/U BMP in AM   -nsx /Neuro follg.  Renal consutl: Pt with hypo-osmolar hyponatremia in setting of HCTZ use. SNa was mildly low at 131 on admission. Pt received HTS given concerns for cerebral edema on imaging. Pt. receiving IVF. Last SNa improved to 134. Restrict fluid intake (<1L/day). Avoid hypotonic fluids. Monitor BMP daily.     At risk for headache.   -tylenol prn.    Cerebral herniation.   -stable 6 mm subfalcine herniation  -continue to monitor.     Intracranial edema.    -left basal ganglia edema  -neurosurgery follg.    Mild HTN.   -pt w/ HTN c/w amlodipine 10 mg, HLZ 75 mg TID, and labetalol 300 mg Q8  -hold HCT and valsartan given MEHDI, if BP is elevated increase HLZ to 100 mg TID.    MEHDI (acute kidney injury).    Pt still clinically dehydrated. Fall ? due to orthostasis due to dehydration   -renal US showed Sonographically normal kidneys. No hydronephrosis.  -creat 1.5 today.  -off IVF  -f/u BMP in AM       Syncope.    Pt reports multiple episodes of near syncope, falls. Need to r/o Cardiac etiology   -Tele: Sinus   - 2D echo, noted.   -EPS consult f/u, no further EPS intervention as per EP.    On ___ this case was reviewed with  ____, the patient is medically stable and optimized for discharge. All medications were reviewed and prescriptions were sent to mutually agreed upon pharmacy. The patient agrees to follow up with providers as recommended.     Patient is a 37 year old M hx of l. ICH, HTN who is presenting due to headaches.       l. basal ganglia edema.   Stable 6 mm left-to-right subfalcine herniation.     H/O spontaneous intraparenchymal intracranial hemorrhage.    -pt w/ ICH last admission, and still present  -likely headache from this  -CT head showed Worsening edema in the left basal ganglia extending into the internal capsule, in spite of significant decrease in size of acute hematoma. Evolving infarct is possible. Consider repeat MRI. Small acute hemorrhage in the head of the left caudate, significantly decreased in size compared to the prior. Resolution of previously seen intraventricular hemorrhage. Stable 6 mm left-to-right subfalcine herniation.  - MRI preformed Left thalamic parenchymal hematoma with intraventricular rupture, vasogenic edema unchanged since 9/21/2022  - Neuro Sx recs: No acute neurosurgical intervention  - No indication for steroids at this time  - Headache management, pt c/o HA this morning, now resolved.  -repeat CT of head as per Neuro   - Control BP  -can give tylenol for headaches manage rest as below.     Hyponatremia.   -Na at 131, ct head with worsening edema, renal consulted, per renal Pt. now with symptomatic hyponatremia with worsening brain edema.    Per renal Would ideally raise SNa by 3 meq in the next hour due to worsening brain edema by giving 100cc bolus of 3% HTS.   -If cannot give this on the floor then, start 3% HTS @ 30cc/hr x 4 hours. Avoid over-correction by >4-6mEQ in 24 hours. Ultimate SNa goal is 145-150 to reduce cerebral edema. Fluid restriction to <1L/day for now.  Liberalize salt intake. Increase PO solute (protein) intake. Avoid isotonic or hypotonic fluids. Monitor SNa Q4 hours.  Would re-call Neurosurgery.    Discussed with micu on 10/1 per micu pt can be on the floor with 3%HTS at 30cc/hr, started on it, monitor bmp closely, f/u with renal  -on 3% HTS at 30cc/hr, monitor Na closely, pt refusing HTS.  No indication for HTS as per Renal   -Na 132 today, down trending, will cont. Free water restriction and add Salt tab 1 gram 3X/day  -F/U BMP in AM   -nsx /Neuro follg.  Renal consutl: Pt with hypo-osmolar hyponatremia in setting of HCTZ use. SNa was mildly low at 131 on admission. Pt received HTS given concerns for cerebral edema on imaging. Pt. receiving IVF. Last SNa improved to 134. Restrict fluid intake (<1L/day). Avoid hypotonic fluids. Monitor BMP daily.  -per discussion with attg, discharge on salt tabs BID     At risk for headache.   -tylenol prn.    Cerebral herniation.   -stable 6 mm subfalcine herniation  -continue to monitor.     Intracranial edema.    -left basal ganglia edema  -neurosurgery follg.    Mild HTN.   -pt w/ HTN c/w amlodipine 10 mg, HLZ 75 mg TID, and labetalol 300 mg Q8  -hold HCT and valsartan given MEHDI, if BP is elevated increase HLZ to 100 mg TID.    MEHDI (acute kidney injury).    Pt still clinically dehydrated. Fall ? due to orthostasis due to dehydration   -renal US showed Sonographically normal kidneys. No hydronephrosis.  -creat 1.5 today.  -off IVF  -f/u BMP in AM       Syncope.    Pt reports multiple episodes of near syncope, falls. Need to r/o Cardiac etiology   -Tele: Sinus   - 2D echo, noted.   -EPS consult f/u, no further EPS intervention as per EP.    On 10/10/2022 this case was reviewed with Dr. Madrid, the patient is medically stable and optimized for discharge. All medications were reviewed and prescriptions were sent to mutually agreed upon pharmacy. The patient agrees to follow up with providers as recommended.     Patient is a 37 year old M hx of l. ICH, HTN who is presenting due to headaches.       l. basal ganglia edema.   Stable 6 mm left-to-right subfalcine herniation.     H/O spontaneous intraparenchymal intracranial hemorrhage.    -pt w/ ICH last admission, and still present  -likely headache from this  -CT head showed Worsening edema in the left basal ganglia extending into the internal capsule, in spite of significant decrease in size of acute hematoma. Evolving infarct is possible. Consider repeat MRI. Small acute hemorrhage in the head of the left caudate, significantly decreased in size compared to the prior. Resolution of previously seen intraventricular hemorrhage. Stable 6 mm left-to-right subfalcine herniation.  - MRI preformed Left thalamic parenchymal hematoma with intraventricular rupture, vasogenic edema unchanged since 9/21/2022  - Neuro Sx recs: No acute neurosurgical intervention  - No indication for steroids at this time  - Headache management, pt c/o HA this morning, now resolved.  -repeat CT of head as per Neuro   - Control BP  -can give tylenol for headaches manage rest as below.     Hyponatremia.   -Na at 131, ct head with worsening edema, renal consulted, per renal Pt. now with symptomatic hyponatremia with worsening brain edema.    Per renal Would ideally raise SNa by 3 meq in the next hour due to worsening brain edema by giving 100cc bolus of 3% HTS.   -If cannot give this on the floor then, start 3% HTS @ 30cc/hr x 4 hours. Avoid over-correction by >4-6mEQ in 24 hours. Ultimate SNa goal is 145-150 to reduce cerebral edema. Fluid restriction to <1L/day for now.  Liberalize salt intake. Increase PO solute (protein) intake. Avoid isotonic or hypotonic fluids. Monitor SNa Q4 hours.  Would re-call Neurosurgery.    Discussed with micu on 10/1 per micu pt can be on the floor with 3%HTS at 30cc/hr, started on it, monitor bmp closely, f/u with renal  -on 3% HTS at 30cc/hr, monitor Na closely, pt refusing HTS.  No indication for HTS as per Renal   -Na 132 today, down trending, will cont. Free water restriction and add Salt tab 1 gram 3X/day  -F/U BMP in AM   -nsx /Neuro follg.  Renal consutl: Pt with hypo-osmolar hyponatremia in setting of HCTZ use. SNa was mildly low at 131 on admission. Pt received HTS given concerns for cerebral edema on imaging. Pt. receiving IVF. Last SNa improved to 134. Restrict fluid intake (<1L/day). Avoid hypotonic fluids. Monitor BMP daily.  -per discussion with attg, discharge on salt tabs BID     At risk for headache.   -tylenol prn.    Cerebral herniation.   -stable 6 mm subfalcine herniation  -continue to monitor.     Intracranial edema.    -left basal ganglia edema  -neurosurgery follg.    Mild HTN.   -pt w/ HTN c/w amlodipine 10 mg, HLZ 75 mg TID, and labetalol 300 mg Q8  -hold HCT and valsartan given MEHDI.  - continue to hold on d/c until follow up with pcp.     MEHDI (acute kidney injury).    Pt still clinically dehydrated. Fall ? due to orthostasis due to dehydration   -renal US showed Sonographically normal kidneys. No hydronephrosis.  -creat 1.5 today.  -off IVF  -Cr. stable ~1.5, outpatient follow up with PCP      Syncope.    Pt reports multiple episodes of near syncope, falls. Need to r/o Cardiac etiology   -Tele: Sinus   - 2D echo, noted.   -EPS consult f/u, no further EPS intervention as per EP.    On 10/10/2022 this case was reviewed with Dr. Madrid, the patient is medically stable and optimized for discharge. All medications were reviewed and prescriptions were sent to mutually agreed upon pharmacy. The patient agrees to follow up with providers as recommended.

## 2022-10-09 NOTE — PROGRESS NOTE ADULT - PROVIDER SPECIALTY LIST ADULT
Electrophysiology
Hospitalist
Nephrology
Hospitalist

## 2022-10-09 NOTE — PROGRESS NOTE ADULT - PROBLEM SELECTOR PLAN 7
Pt still clinically dehydrated. Fall ? due to orthostasis due to dehydration   -renal US showed Sonographically normal kidneys. No hydronephrosis.  -Pt was given NS at 75cc/H X 24H and f/u BMP serum sodium Q12H with improving Renal function, creat 1.46 today. Will continue NS at 75cc?H X 24 H and Monitor BMP Q12H, stop IVF if Na 134 or lower.   -f/u renal rec
-pt w/ HTN c/w amlodipine 10 mg, HLZ 75 mg TID, and labetalol 300 mg Q8  -hold HCT and valsartan given MEHDI, if BP is elevated increase HLZ to 100 mg TID
-pt w/ HTN c/w amlodipine 10 mg, HLZ 75 mg TID, and labetalol 300 mg Q8  -hold HCT and valsartan given MEHDI, if BP is elevated increase HLZ to 100 mg TID
Pt tested (+) for Covid 19 yesterday, asymptomatic.   -No indication for antiviral treatment as per [facility] Protocol   -Airborne precaution.  -Consider d/c home tomorrow if na improves with Self quarantine
-pt w/ HTN c/w amlodipine 10 mg, HLZ 75 mg TID, and labetalol 300 mg Q8  -hold HCT and valsartan given MEHDI, if BP is elevated increase HLZ to 100 mg TID
-pt w/ HTN c/w amlodipine 10 mg, HLZ 75 mg TID, and labetalol 300 mg Q8  -hold HCT and valsartan given MEHDI, if BP is elevated increase HLZ to 100 mg TID
Pt still clinically dehydrated. Fall ? due to orthostasis due to dehydration   -renal US showed Sonographically normal kidneys. No hydronephrosis.  -creat 1.5 today.  -off IVF  -f/u BMP in AM   -f/u renal rec
Pt still clinically dehydrated. Fall ? due to orthostasis due to dehydration   -renal US showed Sonographically normal kidneys. No hydronephrosis.  -Pt was given NS at 75cc/H X 24H and f/u BMP serum sodium Q12H with improving Renal function, creat 1.46 today.  -Na 134 last night, off IVF  -Will check BMP today.   -f/u renal rec
-pt w/ HTN c/w amlodipine 10 mg, HLZ 75 mg TID, and labetalol 300 mg Q8  -hold HCT and valsartan given MEHDI, if BP is elevated increase HLZ to 100 mg TID

## 2022-10-09 NOTE — PROGRESS NOTE ADULT - PROBLEM SELECTOR PLAN 9
F-none  E-replete prn  N-DASH TLC diet  improve score 0 SCD for DVT prophylaxis (not AC given ICH)  PT eval     Plan discussed with ACP
F-none  E-replete prn  N-DASH TLC diet  improve score 0 SCD for DVT prophylaxis (not AC given ICH)  PT eval     Plan discussed with ACP
F-none  E-replete prn  N-DASH TLC diet  improve score 0 SCD for DVT prophylaxis (not AC given ICH)    Plan discussed with ACP
F-none  E-replete prn  N-DASH TLC diet  improve score 0 SCD for DVT prophylaxis (not AC given ICH)    Plan discussed with ACP
F-none  E-replete prn  N-DASH TLC diet  improve score 0 SCD for DVT prophylaxis (not AC given ICH)  PT eval     Plan discussed with ACP
F-none  E-replete prn  N-DASH TLC diet  improve score 0 SCD for DVT prophylaxis (not AC given ICH)  PT eval     Plan discussed with ACP
Pt reports multiple episodes of near syncope, falls. Need to r/o Cardiac etiology   -Tele: Sinus   - 2D echo, noted.   -EPS consult f/u, no further EPS intervention as per EP
F-none  E-replete prn  N-DASH TLC diet  improve score 0 SCD for DVT prophylaxis (not AC given ICH)  PT eval     Plan discussed with ACP
F-none  E-replete prn  N-DASH TLC diet  improve score 0 SCD for DVT prophylaxis (not AC given ICH)  PT eval     Plan discussed with ACP

## 2022-10-09 NOTE — PROGRESS NOTE ADULT - PROBLEM SELECTOR PROBLEM 2
Hyponatremia

## 2022-10-09 NOTE — PROGRESS NOTE ADULT - PROBLEM SELECTOR PLAN 2
Na at 131, ct head with worsening edema, renal consulted, per renal Pt. now with symptomatic hyponatremia with worsening brain edema.    Per renal Would ideally raise SNa by 3 meq in the next hour due to worsening brain edema by giving 100cc bolus of 3% HTS.   -If cannot give this on the floor then, start 3% HTS @ 30cc/hr x 4 hours. Avoid over-correction by >4-6mEQ in 24 hours. Ultimate SNa goal is 145-150 to reduce cerebral edema. Fluid restriction to <1L/day for now.  Liberalize salt intake. Increase PO solute (protein) intake. Avoid isotonic or hypotonic fluids. Monitor SNa Q4 hours.  Would re-call Neurosurgery.    Discussed with micu on 10/1 per micu pt can be on the floor with 3%HTS at 30cc/hr, started on it, monitor bmp closely, f/u with renal  -on 3% HTS at 30cc/hr, monitor Na closely, pt refusing HTS.  No indication for HTS as per Renal   -Na 132 today, down trending, will cont. Free water restriction and add Salt tab 1 gram 3X/day, Na 133 today   -Will F/U BMP in AM and consider d/c if Na improves

## 2022-10-09 NOTE — PROGRESS NOTE ADULT - PROBLEM SELECTOR PROBLEM 9
Nutrition, metabolism, and development symptoms
Syncope
Nutrition, metabolism, and development symptoms

## 2022-10-09 NOTE — PROGRESS NOTE ADULT - PROBLEM SELECTOR PLAN 3
-tylenol prn

## 2022-10-09 NOTE — PROGRESS NOTE ADULT - PROBLEM SELECTOR PROBLEM 3
At risk for headache

## 2022-10-09 NOTE — PROGRESS NOTE ADULT - PROBLEM SELECTOR PROBLEM 4
Cerebral herniation

## 2022-10-09 NOTE — DISCHARGE NOTE PROVIDER - NSDCCPCAREPLAN_GEN_ALL_CORE_FT
PRINCIPAL DISCHARGE DIAGNOSIS  Diagnosis: H/O spontaneous intraparenchymal intracranial hemorrhage  Assessment and Plan of Treatment: Please follow up with stroke NP at [address] [place] [address] [phone]. It is recommended that you get an MRI brain w/wo gado to exclude an underlying lesion that bled around 11/18/22.      SECONDARY DISCHARGE DIAGNOSES  Diagnosis: Hyponatremia  Assessment and Plan of Treatment:     Diagnosis: At risk for headache  Assessment and Plan of Treatment:     Diagnosis: Intracranial edema  Assessment and Plan of Treatment:     Diagnosis: Cerebral herniation  Assessment and Plan of Treatment:     Diagnosis: Syncope  Assessment and Plan of Treatment:     Diagnosis: MEHDI (acute kidney injury)  Assessment and Plan of Treatment:     Diagnosis: Mild HTN  Assessment and Plan of Treatment:      PRINCIPAL DISCHARGE DIAGNOSIS  Diagnosis: H/O spontaneous intraparenchymal intracranial hemorrhage  Assessment and Plan of Treatment: You were admitted to the hospital for further evaluation and management of your headaches in the setting of your history of Intraparenchymal Intracranial hemorrhage. Please follow up with stroke NP at [address] [place] [address] [phone]. It is recommended that you get an MRI brain w/wo gado to exclude an underlying lesion that bled around 11/18/22.      SECONDARY DISCHARGE DIAGNOSES  Diagnosis: Hyponatremia  Assessment and Plan of Treatment: You were found to have low sodium through your blood tests. This is now resolved. Continue to take your medications as prescribed. Please follow up with your primary care provider in 1-2 weeks for a repeat blood test to check your sodium levels.    Diagnosis: At risk for headache  Assessment and Plan of Treatment:     Diagnosis: Intracranial edema  Assessment and Plan of Treatment:     Diagnosis: Cerebral herniation  Assessment and Plan of Treatment:     Diagnosis: Syncope  Assessment and Plan of Treatment:     Diagnosis: MEHDI (acute kidney injury)  Assessment and Plan of Treatment:     Diagnosis: Mild HTN  Assessment and Plan of Treatment:      PRINCIPAL DISCHARGE DIAGNOSIS  Diagnosis: H/O spontaneous intraparenchymal intracranial hemorrhage  Assessment and Plan of Treatment: You were admitted to the hospital for further evaluation and management of your headaches in the setting of your history of Intraparenchymal Intracranial hemorrhage. Please follow up with stroke NP at [address] [place] [address] [phone]. It is recommended that you get an MRI brain w/wo gado to exclude an underlying lesion that bled around 11/18/22.      SECONDARY DISCHARGE DIAGNOSES  Diagnosis: Hyponatremia  Assessment and Plan of Treatment: You were found to have low sodium through your blood tests. This is now resolved. This condition may have been contributing to your symptoms. Continue to take your medications as prescribed. Please follow up with your primary care provider in 1-2 weeks for a repeat blood test to check your sodium levels.    Diagnosis: At risk for headache  Assessment and Plan of Treatment: Follow up with the neurologist for further evaluation. You may take tylenol for your headaches as directed by the packaging.    Diagnosis: Intracranial edema  Assessment and Plan of Treatment: Your CT shows left basal ganglia edema. Neurosurgery reviewed this and evaluated you. They determined that there is no need for acute surgical intervention, but you should follow up with the neurology team at [address] [place] [address] [phone].    Diagnosis: Cerebral herniation  Assessment and Plan of Treatment: Your CT showed cerebral herniation. Neurosurgery reviewed this and evaluated you. They determined that there is no need for acute surgical intervention, but you should follow up with the neurology team at [address] [place] [address] [phone].    Diagnosis: Syncope  Assessment and Plan of Treatment: You reported several episodes of syncope and near syncope. Your echocardiogram of the heart was negative for any contributing factors. Continue to take your medications as prescribed. Please follow up with your primary care provider.      Diagnosis: MEHDI (acute kidney injury)  Assessment and Plan of Treatment: You were noted to have elevated creatinine on your blood test which indicates acute kidney injury. This is now resolved. Follow up with your primary care provider for a repeat blood test to check this in 1-2 weeks.    Diagnosis: Mild HTN  Assessment and Plan of Treatment: Continue blood pressure medication regimen as directed. Monitor for any visual changes, headaches or dizziness.  Monitor blood pressure regularly.  Follow up with your primary care provider for further management for high blood pressure.       PRINCIPAL DISCHARGE DIAGNOSIS  Diagnosis: H/O spontaneous intraparenchymal intracranial hemorrhage  Assessment and Plan of Treatment: You were admitted to the hospital for further evaluation and management of your headaches in the setting of your history of Intraparenchymal Intracranial hemorrhage. Please follow up with stroke NP at [address] [place] [address] [phone]. It is recommended that you get an MRI brain w/wo gado to exclude an underlying lesion that bled around 11/18/22.      SECONDARY DISCHARGE DIAGNOSES  Diagnosis: Hyponatremia  Assessment and Plan of Treatment: You were found to have low sodium through your blood tests. This is now resolved. This condition may have been contributing to your symptoms. Continue to take your medications as prescribed. You should continue to restrict your daily fluid intake to 800mL or less.  Please follow up with your primary care provider in 1-2 weeks for a repeat blood test to check your sodium levels.    Diagnosis: At risk for headache  Assessment and Plan of Treatment: Follow up with the neurologist for further evaluation. You may take tylenol for your headaches as directed by the packaging.    Diagnosis: 2019 novel coronavirus disease (COVID-19)  Assessment and Plan of Treatment: You tested positive for COVID-19 on 10/8/2022. Please follow up    Diagnosis: Intracranial edema  Assessment and Plan of Treatment: Your CT shows left basal ganglia edema. Neurosurgery reviewed this and evaluated you. They determined that there is no need for acute surgical intervention, but you should follow up with the neurology team at [address] [place] [address] [phone].    Diagnosis: Cerebral herniation  Assessment and Plan of Treatment: Your CT showed cerebral herniation. Neurosurgery reviewed this and evaluated you. They determined that there is no need for acute surgical intervention, but you should follow up with the neurology team at [address] [place] [address] [phone].    Diagnosis: Syncope  Assessment and Plan of Treatment: You reported several episodes of syncope and near syncope. Your echocardiogram of the heart was negative for any contributing factors. Continue to take your medications as prescribed. Please follow up with your primary care provider.      Diagnosis: MEHDI (acute kidney injury)  Assessment and Plan of Treatment: You were noted to have elevated creatinine on your blood test which indicates acute kidney injury. This is now resolved. Follow up with your primary care provider for a repeat blood test to check this in 1-2 weeks.    Diagnosis: Mild HTN  Assessment and Plan of Treatment: Continue blood pressure medication regimen as directed. Monitor for any visual changes, headaches or dizziness.  Monitor blood pressure regularly.  Follow up with your primary care provider for further management for high blood pressure.       PRINCIPAL DISCHARGE DIAGNOSIS  Diagnosis: H/O spontaneous intraparenchymal intracranial hemorrhage  Assessment and Plan of Treatment: You were admitted to the hospital for further evaluation and management of your headaches in the setting of your history of Intraparenchymal Intracranial hemorrhage. Please follow up with stroke NP at [address] [place] [address] [phone]. It is recommended that you get an MRI brain w/wo gado to exclude an underlying lesion that bled around 11/18/22.      SECONDARY DISCHARGE DIAGNOSES  Diagnosis: Hyponatremia  Assessment and Plan of Treatment: You were found to have low sodium through your blood tests. This is now resolved. This condition may have been contributing to your symptoms. Continue to take your medications as prescribed. You should continue to restrict your daily fluid intake to 800mL or less.  Please follow up with your primary care provider in 1-2 weeks for a repeat blood test to check your sodium levels.    Diagnosis: At risk for headache  Assessment and Plan of Treatment: Follow up with the neurologist for further evaluation. You may take tylenol for your headaches as directed by the packaging.    Diagnosis: 2019 novel coronavirus disease (COVID-19)  Assessment and Plan of Treatment: You tested positive for COVID-19 on 10/8/2022. Please follow your local Covid Guidelines. Continue to take your medications as prescribed. Please follow up with your primary care provider.      Diagnosis: Intracranial edema  Assessment and Plan of Treatment: Your CT shows left basal ganglia edema. Neurosurgery reviewed this and evaluated you. They determined that there is no need for acute surgical intervention, but you should follow up with the neurology team at [address] [place] [address] [phone].    Diagnosis: Cerebral herniation  Assessment and Plan of Treatment: Your CT showed cerebral herniation. Neurosurgery reviewed this and evaluated you. They determined that there is no need for acute surgical intervention, but you should follow up with the neurology team at [address] [place] [address] [phone].    Diagnosis: Syncope  Assessment and Plan of Treatment: You reported several episodes of syncope and near syncope. Your echocardiogram of the heart was negative for any contributing factors. Continue to take your medications as prescribed. Please follow up with your primary care provider.      Diagnosis: MEHDI (acute kidney injury)  Assessment and Plan of Treatment: You were noted to have elevated creatinine on your blood test which indicates acute kidney injury. This is now resolved. Follow up with your primary care provider for a repeat blood test to check this in 1-2 weeks.    Diagnosis: Mild HTN  Assessment and Plan of Treatment: Continue blood pressure medication regimen as directed. Monitor for any visual changes, headaches or dizziness.  Monitor blood pressure regularly.  Follow up with your primary care provider for further management for high blood pressure.       PRINCIPAL DISCHARGE DIAGNOSIS  Diagnosis: H/O spontaneous intraparenchymal intracranial hemorrhage  Assessment and Plan of Treatment: You were admitted to the hospital for further evaluation and management of your headaches in the setting of your history of Intraparenchymal Intracranial hemorrhage. Please follow up with stroke NP at [address] [place] [address] [phone]. It is recommended that you get an MRI brain w/wo gado to exclude an underlying lesion that bled around 11/18/22.      SECONDARY DISCHARGE DIAGNOSES  Diagnosis: Hyponatremia  Assessment and Plan of Treatment: You were found to have low sodium through your blood tests. This is now resolved. This condition may have been contributing to your symptoms. Continue to take your medications as prescribed. You should continue to restrict your daily fluid intake to 800mL or less.  Please follow up with your primary care provider in 1-2 weeks for a repeat blood test to check your sodium levels.    Diagnosis: At risk for headache  Assessment and Plan of Treatment: Follow up with the neurologist for further evaluation. You may take tylenol for your headaches as directed by the packaging.    Diagnosis: 2019 novel coronavirus disease (COVID-19)  Assessment and Plan of Treatment: You tested positive for COVID-19 on 10/8/2022. Please follow your local Covid Guidelines for isolation and other requirements. Continue to take your medications as prescribed. Please follow up with your primary care provider. If you have difficulty breathing please follow up at your nearest urgent care or emergency room. You may take tylenol for fever or cough medicine for cough as directed by packaging.    Diagnosis: Intracranial edema  Assessment and Plan of Treatment: Your CT shows left basal ganglia edema. Neurosurgery reviewed this and evaluated you. They determined that there is no need for acute surgical intervention, but you should follow up with the neurology team at [address] [place] [address] [phone].    Diagnosis: Cerebral herniation  Assessment and Plan of Treatment: Your CT showed cerebral herniation. Neurosurgery reviewed this and evaluated you. They determined that there is no need for acute surgical intervention, but you should follow up with the neurology team at [address] [place] [address] [phone].    Diagnosis: Syncope  Assessment and Plan of Treatment: You reported several episodes of syncope and near syncope. Your echocardiogram of the heart was negative for any contributing factors. Continue to take your medications as prescribed. Please follow up with your primary care provider.      Diagnosis: MEHDI (acute kidney injury)  Assessment and Plan of Treatment: You were noted to have elevated creatinine on your blood test which indicates acute kidney injury. This is now resolved. Follow up with your primary care provider for a repeat blood test to check this in 1-2 weeks.    Diagnosis: Mild HTN  Assessment and Plan of Treatment: Continue blood pressure medication regimen as directed. Monitor for any visual changes, headaches or dizziness.  Monitor blood pressure regularly.  Follow up with your primary care provider for further management for high blood pressure.       PRINCIPAL DISCHARGE DIAGNOSIS  Diagnosis: H/O spontaneous intraparenchymal intracranial hemorrhage  Assessment and Plan of Treatment: You were admitted to the hospital for further evaluation and management of your headaches in the setting of your history of Intraparenchymal Intracranial hemorrhage. Please follow up with stroke NP at [address] [place] [address] [phone]. It is recommended that you get an MRI brain w/wo gado to exclude an underlying lesion that bled around 11/18/22.      SECONDARY DISCHARGE DIAGNOSES  Diagnosis: Hyponatremia  Assessment and Plan of Treatment: You were found to have low sodium through your blood tests. This is now resolved. This condition may have been contributing to your symptoms. Continue to take your medications as prescribed. You should continue to restrict your daily fluid intake to 800mL or less.  Please follow up with your primary care provider in 1-2 weeks for a repeat blood test to check your sodium levels.    Diagnosis: At risk for headache  Assessment and Plan of Treatment: Follow up with the neurologist for further evaluation. You may take tylenol for your headaches as directed by the packaging. Your medication reconciliation indicated that you were taking Oxycodone at home for pain. If you require this level of pain relief and you do not have any pills left, please speak with the doctor who prescribed that medication to you or the neurologist at your follow up. You headaches have been managed on tylenol while you were in the hospital.    Diagnosis: 2019 novel coronavirus disease (COVID-19)  Assessment and Plan of Treatment: You tested positive for COVID-19 on 10/8/2022. Please follow your local Covid Guidelines for isolation and other requirements. Continue to take your medications as prescribed. Please follow up with your primary care provider. If you have difficulty breathing please follow up at your nearest urgent care or emergency room. You may take tylenol for fever or cough medicine for cough as directed by packaging.    Diagnosis: Intracranial edema  Assessment and Plan of Treatment: Your CT shows left basal ganglia edema. Neurosurgery reviewed this and evaluated you. They determined that there is no need for acute surgical intervention, but you should follow up with the neurology team at [address] [place] [address] [phone].    Diagnosis: Cerebral herniation  Assessment and Plan of Treatment: Your CT showed cerebral herniation. Neurosurgery reviewed this and evaluated you. They determined that there is no need for acute surgical intervention, but you should follow up with the neurology team at [address] [place] [id number] [phone].    Diagnosis: Syncope  Assessment and Plan of Treatment: You reported several episodes of syncope and near syncope. Your echocardiogram of the heart was negative for any contributing factors. Continue to take your medications as prescribed. Please follow up with your primary care provider.      Diagnosis: MEHDI (acute kidney injury)  Assessment and Plan of Treatment: You were noted to have elevated creatinine on your blood test which indicates acute kidney injury. This is now resolved. Follow up with your primary care provider for a repeat blood test to check this in 1-2 weeks.    Diagnosis: Mild HTN  Assessment and Plan of Treatment: Continue blood pressure medication regimen as directed. Monitor for any visual changes, headaches or dizziness.  Monitor blood pressure regularly.  Follow up with your primary care provider for further management for high blood pressure.

## 2022-10-09 NOTE — PROGRESS NOTE ADULT - PROBLEM SELECTOR PLAN 8
none
Pt still clinically dehydrated. Fall ? due to orthostasis due to dehydration   -renal US showed Sonographically normal kidneys. No hydronephrosis.  -creat 1.58 today.  -off IVF  -f/u BMP in AM   -f/u renal rec
Pt still clinically dehydrated. Fall ? due to orthostasis due to dehydration   -renal US showed Sonographically normal kidneys. No hydronephrosis.  -Pt was given NS 75cc/H X 10 H yesterday, Na stable, will give NS at 75cc/H X 24H and f/u BMP serum sodium Q12H  -f/u renal recs  -Will check 2D echo   Will check EEG as per Neuro
Pt reports multiple episodes of near syncope, falls. Need to r/o Cardiac etiology   -Transfer pt to telemetry  -Check 2D echo  -EPS consult
-likely from longstanding HTN and valsartan use, hold hctz and valsartan  -check urine lytes  -renal US showed Sonographically normal kidneys. No hydronephrosis.  -Renal consulted, per renal to place ruvalcaba however pt declined
Pt reports multiple episodes of near syncope, falls. Need to r/o Cardiac etiology   -Tele: with episodes of sinus vanessa during sleep.   - 2D echo, noted.   -EPS consult f/u
-likely from longstanding HTN and valsartan use, hold hctz and valsartan  -renal US showed Sonographically normal kidneys. No hydronephrosis.  -Renal consulted, per renal to place ruvalcaba however pt declined  -f/u renal recs
Pt reports multiple episodes of near syncope, falls. Need to r/o Cardiac etiology   -Tele: Sinus   - 2D echo, noted.   -EPS consult f/u, no further EPS intervention as per EP
Pt clinically dehydrated.   -renal US showed Sonographically normal kidneys. No hydronephrosis.  -Will give Isotonic IVF hydration cautiously,  NS 75cc/H X 10 H and check BMP in 6 H, if Na stable will give 10 more hours of NS at 75cc/H   -f/u renal recs
-likely from longstanding HTN and valsartan use, hold hctz and valsartan  -renal US showed Sonographically normal kidneys. No hydronephrosis.  -Renal consulted, per renal to place ruvalcaba however pt declined  -f/u renal recs

## 2022-10-09 NOTE — PROGRESS NOTE ADULT - SUBJECTIVE AND OBJECTIVE BOX
Patient is a 37y old  Male who presents with a chief complaint of Headache (09 Oct 2022 10:53)      SUBJECTIVE / OVERNIGHT EVENTS: pt has no complaints, denies any fever, chills, sore throat, running nose, cough, SOB. No HA, N/V     MEDICATIONS  (STANDING):  amLODIPine   Tablet 10 milliGRAM(s) Oral daily  hydrALAZINE 75 milliGRAM(s) Oral every 8 hours  labetalol 300 milliGRAM(s) Oral every 8 hours  levETIRAcetam 500 milliGRAM(s) Oral two times a day  sodium chloride 1 Gram(s) Oral three times a day  sodium chloride 0.9%. 1000 milliLiter(s) (75 mL/Hr) IV Continuous <Continuous>    MEDICATIONS  (PRN):  acetaminophen     Tablet .. 650 milliGRAM(s) Oral every 6 hours PRN Temp greater or equal to 38C (100.4F), Mild Pain (1 - 3)      Vital Signs Last 24 Hrs  T(C): 37 (09 Oct 2022 13:00), Max: 37.3 (09 Oct 2022 00:57)  T(F): 98.6 (09 Oct 2022 13:00), Max: 99.1 (09 Oct 2022 00:57)  HR: 96 (09 Oct 2022 13:00) (84 - 96)  BP: 136/71 (09 Oct 2022 13:00) (129/74 - 142/92)  BP(mean): --  RR: 18 (09 Oct 2022 13:00) (17 - 18)  SpO2: 100% (09 Oct 2022 13:00) (97% - 100%)    Parameters below as of 09 Oct 2022 13:00  Patient On (Oxygen Delivery Method): room air      CAPILLARY BLOOD GLUCOSE        I&O's Summary    08 Oct 2022 07:01  -  09 Oct 2022 07:00  --------------------------------------------------------  IN: 240 mL / OUT: 750 mL / NET: -510 mL        PHYSICAL EXAM:  GENERAL: NAD, well-developed  HEAD:  Atraumatic, Normocephalic  EYES: EOMI, PERRLA, conjunctiva and sclera clear  NECK: Supple, No JVD  CHEST/LUNG: Clear to auscultation bilaterally; No wheeze  HEART: Regular rate and rhythm; No murmurs, rubs, or gallops  ABDOMEN: Soft, Nontender, Nondistended; Bowel sounds present  EXTREMITIES:  2+ Peripheral Pulses, No clubbing, cyanosis, or edema  PSYCH: AAOx3  NEUROLOGY: non-focal  SKIN: No rashes or lesions    LABS:                        10.2   5.41  )-----------( 381      ( 09 Oct 2022 03:25 )             32.3     10-09    133<L>  |  101  |  17  ----------------------------<  90  3.9   |  19<L>  |  1.58<H>    Ca    9.4      09 Oct 2022 03:25  Phos  3.7     10-09  Mg     1.80     10-09    TPro  8.0  /  Alb  4.0  /  TBili  0.3  /  DBili  x   /  AST  20  /  ALT  36  /  AlkPhos  90  10-08      COVID-19 PCR (10.08.22 @ 07:25)   COVID-19 PCR: Detected: You can help in the fight against COVID-19. [facility] may contact   you to see if you are interested in voluntarily participating in one of   our clinical trials.   Testing is performed using polymerase chain reaction (PCR) or   transcription mediated amplification (TMA). This COVID-19 (SARS-CoV-2)   nucleic acid amplification test was validated by [facility] and is   in use under the FDA Emergency Use Authorization (EUA) for clinical labs   CLIA-certified to perform high complexity testing. Test results should be   correlated with clinical presentation, patient history, and epidemiology.         RADIOLOGY & ADDITIONAL TESTS:    Imaging Personally Reviewed:    Consultant(s) Notes Reviewed:      Care Discussed with Consultants/Other Providers:

## 2022-10-09 NOTE — DISCHARGE NOTE PROVIDER - INSTRUCTIONS
It is recommended that you have a <1000mL fluid restriction.  It is recommended that you have a 800mL fluid restriction.

## 2022-10-09 NOTE — PROGRESS NOTE ADULT - PROBLEM SELECTOR PROBLEM 1
H/O spontaneous intraparenchymal intracranial hemorrhage
MEHDI (acute kidney injury)
H/O spontaneous intraparenchymal intracranial hemorrhage

## 2022-10-09 NOTE — PROGRESS NOTE ADULT - PROBLEM SELECTOR PROBLEM 6
Thrombocytosis
Thrombocytosis
Mild HTN
Thrombocytosis
Mild HTN
Thrombocytosis
Thrombocytosis
Mild HTN
Mild HTN

## 2022-10-09 NOTE — PROGRESS NOTE ADULT - NSPROGADDITIONALINFOA_GEN_ALL_CORE
D/W ACP
D/W ACP  Spoke to pt at length about plan of care. He understood and agreed with the above plan.
D/W ACP
D/W ACP
D/W ACP.
D/W ACP
D/w ACP

## 2022-10-09 NOTE — DISCHARGE NOTE PROVIDER - NSDCMRMEDTOKEN_GEN_ALL_CORE_FT
acetaminophen 325 mg oral tablet: 2 tab(s) orally every 4 hours, As needed, Temp greater or equal to 38C (100.4F)  amLODIPine 10 mg oral tablet: 1 tab(s) orally once a day  hydrALAZINE 25 mg oral tablet: 3 tab(s) orally every 8 hours  hydroCHLOROthiazide 25 mg oral tablet: 1 tab(s) orally once a day  labetalol 300 mg oral tablet: 1 tab(s) orally every 8 hours  levETIRAcetam 500 mg oral tablet: 1 tab(s) orally 2 times a day  oxyCODONE 5 mg oral tablet: 1 tab(s) orally every 6 hours, As Needed -Moderate Pain (4 - 6) MDD:4  valsartan 320 mg oral tablet: 1 tab(s) orally once a day   acetaminophen 325 mg oral tablet: 2 tab(s) orally every 4 hours, As needed, Temp greater or equal to 38C (100.4F)  amLODIPine 10 mg oral tablet: 1 tab(s) orally once a day  hydrALAZINE 25 mg oral tablet: 3 tab(s) orally every 8 hours  labetalol 300 mg oral tablet: 1 tab(s) orally every 8 hours  levETIRAcetam 500 mg oral tablet: 1 tab(s) orally 2 times a day  oxyCODONE 5 mg oral tablet: 1 tab(s) orally every 6 hours, As Needed -Moderate Pain (4 - 6) MDD:4  sodium chloride 1 g oral tablet: 1 tab(s) orally 2 times a day

## 2022-10-09 NOTE — PROGRESS NOTE ADULT - PROBLEM SELECTOR PROBLEM 8
MEHDI (acute kidney injury)
Syncope
MEHDI (acute kidney injury)
Syncope
Syncope
MEHDI (acute kidney injury)

## 2022-10-09 NOTE — PROGRESS NOTE ADULT - PROBLEM SELECTOR PLAN 10
F-none  E-replete prn  N-DASH TLC diet  improve score 0 SCD for DVT prophylaxis (not AC given ICH)  PT eval     Plan discussed with ACP

## 2022-10-09 NOTE — DISCHARGE NOTE PROVIDER - CARE PROVIDER_API CALL
Isidro Esteban (MD; MS)  Unallocated  [address] [facility], [address] [place] [id number]  Phone: [phone]  Fax: [phone]  Follow Up Time:

## 2022-10-10 ENCOUNTER — TRANSCRIPTION ENCOUNTER (OUTPATIENT)
Age: 37
End: 2022-10-10

## 2022-10-10 VITALS
HEART RATE: 93 BPM | OXYGEN SATURATION: 100 % | RESPIRATION RATE: 18 BRPM | DIASTOLIC BLOOD PRESSURE: 91 MMHG | TEMPERATURE: 98 F | SYSTOLIC BLOOD PRESSURE: 126 MMHG

## 2022-10-10 LAB
ANION GAP SERPL CALC-SCNC: 12 MMOL/L — SIGNIFICANT CHANGE UP (ref 7–14)
BASOPHILS # BLD AUTO: 0.02 K/UL — SIGNIFICANT CHANGE UP (ref 0–0.2)
BASOPHILS NFR BLD AUTO: 0.4 % — SIGNIFICANT CHANGE UP (ref 0–2)
BUN SERPL-MCNC: 17 MG/DL — SIGNIFICANT CHANGE UP (ref 7–23)
CALCIUM SERPL-MCNC: 9.8 MG/DL — SIGNIFICANT CHANGE UP (ref 8.4–10.5)
CHLORIDE SERPL-SCNC: 104 MMOL/L — SIGNIFICANT CHANGE UP (ref 98–107)
CO2 SERPL-SCNC: 22 MMOL/L — SIGNIFICANT CHANGE UP (ref 22–31)
CREAT SERPL-MCNC: 1.53 MG/DL — HIGH (ref 0.5–1.3)
EGFR: 60 ML/MIN/1.73M2 — SIGNIFICANT CHANGE UP
EOSINOPHIL # BLD AUTO: 0.11 K/UL — SIGNIFICANT CHANGE UP (ref 0–0.5)
EOSINOPHIL NFR BLD AUTO: 2.5 % — SIGNIFICANT CHANGE UP (ref 0–6)
GLUCOSE SERPL-MCNC: 89 MG/DL — SIGNIFICANT CHANGE UP (ref 70–99)
HCT VFR BLD CALC: 33.6 % — LOW (ref 39–50)
HGB BLD-MCNC: 10.6 G/DL — LOW (ref 13–17)
IANC: 2.58 K/UL — SIGNIFICANT CHANGE UP (ref 1.8–7.4)
IMM GRANULOCYTES NFR BLD AUTO: 0.2 % — SIGNIFICANT CHANGE UP (ref 0–0.9)
LYMPHOCYTES # BLD AUTO: 1.34 K/UL — SIGNIFICANT CHANGE UP (ref 1–3.3)
LYMPHOCYTES # BLD AUTO: 29.9 % — SIGNIFICANT CHANGE UP (ref 13–44)
MAGNESIUM SERPL-MCNC: 1.9 MG/DL — SIGNIFICANT CHANGE UP (ref 1.6–2.6)
MCHC RBC-ENTMCNC: 26.7 PG — LOW (ref 27–34)
MCHC RBC-ENTMCNC: 31.5 GM/DL — LOW (ref 32–36)
MCV RBC AUTO: 84.6 FL — SIGNIFICANT CHANGE UP (ref 80–100)
MONOCYTES # BLD AUTO: 0.42 K/UL — SIGNIFICANT CHANGE UP (ref 0–0.9)
MONOCYTES NFR BLD AUTO: 9.4 % — SIGNIFICANT CHANGE UP (ref 2–14)
NEUTROPHILS # BLD AUTO: 2.58 K/UL — SIGNIFICANT CHANGE UP (ref 1.8–7.4)
NEUTROPHILS NFR BLD AUTO: 57.6 % — SIGNIFICANT CHANGE UP (ref 43–77)
NRBC # BLD: 0 /100 WBCS — SIGNIFICANT CHANGE UP (ref 0–0)
NRBC # FLD: 0 K/UL — SIGNIFICANT CHANGE UP (ref 0–0)
PHOSPHATE SERPL-MCNC: 3.6 MG/DL — SIGNIFICANT CHANGE UP (ref 2.5–4.5)
PLATELET # BLD AUTO: 404 K/UL — HIGH (ref 150–400)
POTASSIUM SERPL-MCNC: 4 MMOL/L — SIGNIFICANT CHANGE UP (ref 3.5–5.3)
POTASSIUM SERPL-SCNC: 4 MMOL/L — SIGNIFICANT CHANGE UP (ref 3.5–5.3)
RBC # BLD: 3.97 M/UL — LOW (ref 4.2–5.8)
RBC # FLD: 13.3 % — SIGNIFICANT CHANGE UP (ref 10.3–14.5)
SODIUM SERPL-SCNC: 138 MMOL/L — SIGNIFICANT CHANGE UP (ref 135–145)
WBC # BLD: 4.48 K/UL — SIGNIFICANT CHANGE UP (ref 3.8–10.5)
WBC # FLD AUTO: 4.48 K/UL — SIGNIFICANT CHANGE UP (ref 3.8–10.5)

## 2022-10-10 PROCEDURE — 99239 HOSP IP/OBS DSCHRG MGMT >30: CPT

## 2022-10-10 RX ORDER — SODIUM CHLORIDE 9 MG/ML
1 INJECTION INTRAMUSCULAR; INTRAVENOUS; SUBCUTANEOUS
Qty: 60 | Refills: 0
Start: 2022-10-10 | End: 2022-11-08

## 2022-10-10 RX ADMIN — Medication 650 MILLIGRAM(S): at 07:30

## 2022-10-10 RX ADMIN — Medication 300 MILLIGRAM(S): at 01:33

## 2022-10-10 RX ADMIN — Medication 300 MILLIGRAM(S): at 11:55

## 2022-10-10 RX ADMIN — AMLODIPINE BESYLATE 10 MILLIGRAM(S): 2.5 TABLET ORAL at 06:14

## 2022-10-10 RX ADMIN — SODIUM CHLORIDE 1 GRAM(S): 9 INJECTION INTRAMUSCULAR; INTRAVENOUS; SUBCUTANEOUS at 06:13

## 2022-10-10 RX ADMIN — Medication 75 MILLIGRAM(S): at 11:55

## 2022-10-10 RX ADMIN — LEVETIRACETAM 500 MILLIGRAM(S): 250 TABLET, FILM COATED ORAL at 06:13

## 2022-10-10 RX ADMIN — Medication 75 MILLIGRAM(S): at 01:33

## 2022-10-10 RX ADMIN — Medication 650 MILLIGRAM(S): at 06:13

## 2022-10-10 NOTE — CHART NOTE - NSCHARTNOTEFT_GEN_A_CORE
Patient was seen and examined at bedside today. labs and vitals reviewed.  Hyponatremia improved. patient is AOx4, eager to be discharged.   medically stable for d/c home today. outpatient follow up with PCP in 1 week for repeat blood work to monitor renal function and Na lvl.     35 minutes spent coordinating discharge     T(C): 36.5 (10-10-22 @ 11:55), Max: 37.2 (10-10-22 @ 06:00)  HR: 93 (10-10-22 @ 11:55) (75 - 93)  BP: 126/91 (10-10-22 @ 11:55) (126/76 - 140/74)  RR: 18 (10-10-22 @ 11:55) (18 - 18)  SpO2: 100% (10-10-22 @ 11:55) (100% - 100%)    GENERAL: no apparent distress, on room air  HEAD:  Atraumatic, Normocephalic  EYES: EOMI, PERRLA, conjunctiva and sclera clear b/l  NECK: No cervical lymphadenopathy; no obvious masses.   CHEST/LUNG: Clear to auscultation bilaterally; No wheezing or crackles  HEART: Regular rate and rhythm; No obvious murmurs; nl S1/S2; No peripheral edema  ABDOMEN: Soft, Nontender, Nondistended; Bowel sounds present  EXTREMITIES:  2+ Peripheral Pulses; No joint swelling.  PSYCH: normal affect, calm demeanor  NEUROLOGY: Awake and alert; CN 2-12 grossly intact; no obvious FND  SKIN: No rashes or lesions    CAPILLARY BLOOD GLUCOSE                              10.6   4.48  )-----------( 404      ( 10 Oct 2022 06:40 )             33.6     10-10    138  |  104  |  17  ----------------------------<  89  4.0   |  22  |  1.53<H>    Ca    9.8      10 Oct 2022 06:40  Phos  3.6     10-10  Mg     1.90     10-10    TPro  8.0  /  Alb  4.0  /  TBili  0.3  /  DBili  x   /  AST  20  /  ALT  36  /  AlkPhos  90  10-08

## 2022-10-10 NOTE — DISCHARGE NOTE NURSING/CASE MANAGEMENT/SOCIAL WORK - PATIENT PORTAL LINK FT
You can access the FollowMyHealth Patient Portal offered by [facility] by registering at the following website: [email or web address]. By joining [facility]’s FollowMyHealth portal, you will also be able to view your health information using other applications (apps) compatible with our system.

## 2022-10-10 NOTE — DISCHARGE NOTE NURSING/CASE MANAGEMENT/SOCIAL WORK - NSTRANSFERBELONGINGSDISPO_GEN_A_NUR
Poonam Lopez  1956  [id number]    DATE: 9/22/2022  SURGEON:  Dr. Aleja Goldberg M.D, MD MD  PREOPERATIVE DIAGNOSIS: Right sided kidney and ureteral stones  POSTOPERATIVE DIAGNOSIS: same  PROCEDURES PERFORMED:  1. Cystoscopy. 2. Right sided ureteral dilation and ureteroscopy with holmium laser lithotripsy  3. Right sided stent placement. Modifier XS      DRAINS: A Right sided 6x26 double J ureteral stent ( with string)  SPECIMEN: none  ANESTHESIA: General  ESTIMATED BLOOD LOSS: None. COMPLICATIONS: None. INDICATIONS FOR PROCEDURE:  Poonam Lopez is a 77 y.o. male presents for Right sided calculus. After the risks, benefits, alternatives, of the procedure were discussed with the patient, informed consent was obtained. The patient elected to proceed. DETAILS OF THE PROCEDURE:  The patient was brought back from the preoperative holding area to the  operating suite, and was transferred to the operating table where the patient lay in  supine position. EPC's were in place, connected to the machine and the machine was turned on before induction. General endotracheal anesthesia was induced, and patient was prepped and draped in standard surgical fashion after being placed in dorsolithotomy position. A proper timeout was performed, preoperative antibiotics were given. We began by inserting a cystoscope with a 22 Panamanian sheath and 30 degree lens into the patient's urethral meatus and advancing into the bladder without complication. A pan cystoscopy was preformed and the bladder appeared unremarkable. We then focused our attention on the Right ureteral orifice, which we cannulated with our glidewire, advanced up to renal pelvis. We then used a  dual lumen catheter to place a second wire. Once in good position, we advanced our flexible ureteroscope over the working wire to the renal pelvis under direct visualization.   We identified the renal calculus and using a 200 micron holmium laser phone, pants, shirt, slippers/with patient

## 2022-10-10 NOTE — DISCHARGE NOTE NURSING/CASE MANAGEMENT/SOCIAL WORK - HISTORY OF COVID-19 VACCINATION
verified at the start of the visit: Yes    Services were provided through a video synchronous discussion virtually to substitute for in-person clinic visit. Patient and provider were located at their individual homes. --Bob Bingham MD on 5/21/2020 at 8:29 AM    An electronic signature was used to authenticate this note. Advance Care Planning   Advanced Care Planning: Discussed the patients choices for care and treatment in case of a health event that adversely affects decision-making abilities. Also discussed the patients long-term treatment options. Reviewed with the patient the [address] [place] [address] Declaration forms  Reviewed the process of designating a competent adult as an Agent (or -in-fact) that could take make health care decisions for the patient if incompetent. Patient was asked to complete the declaration forms, either acknowledge the forms by a public notary or an eligible witness and provide a signed copy to the practice office. Time spent (minutes): 20    Cardiovascular Disease Risk Counseling: Assessed the patient's risk to develop cardiovascular disease and reviewed main risk factors. Reviewed steps to reduce disease risk including:   · Quitting tobacco use, reducing amount smoked, or not starting the habit  · Making healthy food choices  · Being physically active and gradualy increasing activity levels   · Reduce weight and determine a healthy BMI goal  · Monitor blood pressure and treat if higher than 140/90 mmHg  · Maintain blood total cholesterol levels under 5 mmol/l or 190 mg/dl  · Maintain LDL cholesterol levels under 3.0 mmol/l or 115 mg/dl   · Control blood glucose levels  · Consider taking aspirin (75 mg daily), once blood pressure is controlled   Provided a follow up plan.   Time spent (minutes): 20
Vaccine status unknown

## 2022-10-10 NOTE — DISCHARGE NOTE NURSING/CASE MANAGEMENT/SOCIAL WORK - NSDCPEFALRISK_GEN_ALL_CORE
For information on Fall & Injury Prevention, visit: [email or web address] OR  [email or web address] OR  https://www.cdc.gov/steadi/patient.html

## 2022-10-17 NOTE — ED ADULT NURSE NOTE - NSSEPSISCRITERIAMET_ED_A_ED
Abnormal VS & WBC Picato Counseling:  I discussed with the patient the risks of Picato including but not limited to erythema, scaling, itching, weeping, crusting, and pain.
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
## 2023-03-27 NOTE — DISCHARGE NOTE NURSING/CASE MANAGEMENT/SOCIAL WORK - NSPROEXTENSIONSOFSELF_GEN_A_NUR
Rx Refill Note  Requested Prescriptions     Pending Prescriptions Disp Refills   • levothyroxine (SYNTHROID, LEVOTHROID) 75 MCG tablet 90 tablet 1     Sig: Take 1 tablet by mouth Daily.      Last office visit with prescribing clinician: 12/16/2022   Last telemedicine visit with prescribing clinician: Visit date not found   Next office visit with prescribing clinician: 1/29/2024                         Would you like a call back once the refill request has been completed: [] Yes [] No    If the office needs to give you a call back, can they leave a voicemail: [] Yes [] No    Ubaldo Suresh MA  03/27/23, 11:39 EDT   none
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
## 2023-07-26 NOTE — ED PROVIDER NOTE - IV ALTEPLASE ADMIN OUTSIDE HIDDEN
1. Generalized anxiety disorder  It is clear from the PDMP that she didn't get her 8 extra tabs that I ordered earlier this month. To keep her from experiencing withdrawals (and because we have had such issues with her insurance and the Xanax) I will bridge her with Klonopin so she can just return to Xanax on time. 2mg is likely roughly equivalent but she will still take 1/2 tab for her first dose. We discussed future approaches to her controlled substances when she notices that they haven't been given to her and not waiting until last minute.    Patient notes that she is now functioning great (on both her mental health and ankle) and would like to return to work without restriction. Letter sent. Also, her long term disability was denied.  - clonazePAM (KLONOPIN) 2 MG tablet; Take 1 tablet (2 mg) by mouth 2 times daily as needed for anxiety  Dispense: 8 tablet; Refill: 0    2. Panic disorder with agoraphobia  - clonazePAM (KLONOPIN) 2 MG tablet; Take 1 tablet (2 mg) by mouth 2 times daily as needed for anxiety  Dispense: 8 tablet; Refill: 0      Arnaud Garcia III, MD, FAAFP  [facility]  07/26/23 5:37 PM    show

## 2023-08-20 NOTE — DIETITIAN INITIAL EVALUATION ADULT - CALCULATED TO (ML/KG)
DVT PPx: 8676 Chronic  - Continue home simvastatin 40mg therapeutically interchanged to atorvastatin 40mg Hb 12.9  -Iron studies, Folate, B12  -Target Hb>7.0  -Trend hb.

## 2023-10-16 NOTE — ED ADULT NURSE NOTE - IS THE PATIENT ABLE TO BE SCREENED?
SUBJECTIVE:    Yamel Wang is a pleasant 56 EUGENIA OLMEDO MD       Yes

## 2023-10-21 NOTE — PHYSICAL THERAPY INITIAL EVALUATION ADULT - DIAGNOSIS, PT EVAL
impaired functional mobility
Bed in lowest position, wheels locked, appropriate side rails in place/Call bell, personal items and telephone in reach/Instruct patient to call for assistance before getting out of bed or chair/Non-slip footwear when patient is out of bed/[place] to call system/Physically safe environment - no spills, clutter or unnecessary equipment/Purposeful Proactive Rounding/Room/bathroom lighting operational, light cord in reach

## 2024-04-17 NOTE — ED ADULT NURSE NOTE - CHIEF COMPLAINT QUOTE
"History  Chief Complaint   Patient presents with    Shortness of Breath     Patient presents to the ER via EMS with report of having SOB and \"feeling lousy.\"  Patient states:  \"I haven't been able to sleep in a couple days.\"     69-year-old male presents for evaluation of cough and shortness of breath that started a few days ago.  Patient with a history of advanced COPD.  Reports PCP prescribed steroids and doxycycline with no improvement of symptoms.  Patient is currently on 4 L nasal cannula.  Reports using nebulizer at home with minimal improvement.        Prior to Admission Medications   Prescriptions Last Dose Informant Patient Reported? Taking?   ALPRAZolam (XANAX) 0.5 mg tablet  Self No No   Sig: Take 1 tablet (0.5 mg total) by mouth 2 (two) times a day as needed for anxiety or sleep Provider aware of co-existing clonazepam prescription. Patient is palliative care patient.   Budeson-Glycopyrrol-Formoterol (Breztri Aerosphere) 160-9-4.8 MCG/ACT AERO  Self No No   Sig: Inhale 2 puffs 2 (two) times a day Rinse mouth after use.   NON FORMULARY  Self Yes No   Sig: Medical marijuana   abiraterone (ZYTIGA) 250 mg tablet   No No   Sig: Take 4 tablets (1,000 mg total) by mouth once daily.   albuterol (2.5 mg/3 mL) 0.083 % nebulizer solution  Self No No   Sig: Take 3 mL (2.5 mg total) by nebulization every 6 (six) hours as needed for wheezing or shortness of breath   albuterol (PROVENTIL HFA,VENTOLIN HFA) 90 mcg/act inhaler  Self No No   Sig: TAKE 2 PUFFS BY MOUTH EVERY 4 HOURS AS NEEDED FOR WHEEZE   atorvastatin (LIPITOR) 40 mg tablet  Self No No   Sig: Take 1 tablet (40 mg total) by mouth daily with dinner   benzonatate (TESSALON PERLES) 100 mg capsule   No No   Sig: Take 1 capsule (100 mg total) by mouth 3 (three) times a day as needed for cough   clonazePAM (KlonoPIN) 0.5 MG disintegrating tablet  Self No No   Sig: Take 1 tablet (0.5 mg total) by mouth daily at bedtime as needed for anxiety   doxycycline " monohydrate (MONODOX) 100 mg capsule   No No   Sig: Take 1 capsule (100 mg total) by mouth 2 (two) times a day for 7 days   gabapentin (NEURONTIN) 300 mg capsule  Self No No   Sig: Take 1 capsule (300 mg total) by mouth 2 (two) times a day   guaiFENesin 1200 MG TB12  Self No No   Sig: Take 1 tablet (1,200 mg total) by mouth every 12 (twelve) hours for 15 days   lidocaine (LIDODERM) 5 %  Self No No   Sig: Apply 3 patches topically daily Remove & Discard patch within 12 hours or as directed by MD   methylPREDNISolone 4 MG tablet therapy pack  Self No No   Sig: Use as directed on package   Patient not taking: Reported on 2024   methylPREDNISolone 4 MG tablet therapy pack   No No   Sig: Use as directed on package   morphine (MS CONTIN) 15 mg 12 hr tablet  Self No No   Sig: Take 1 tablet (15 mg total) by mouth 2 (two) times a day Ongoing therapy Max Daily Amount: 30 mg   naloxone (NARCAN) 4 mg/0.1 mL nasal spray  Self No No   Sig: Administer 1 spray into a nostril. If no response after 2-3 minutes, give another dose in the other nostril using a new spray.   predniSONE 10 mg tablet   No No   Si for 3 days, 3 for 3 days, 2 for 3 days, 1 for 3 days      Facility-Administered Medications: None       Past Medical History:   Diagnosis Date    Bone cancer (HCC)     Colon polyp     COPD (chronic obstructive pulmonary disease) (HCC)     Emphysema lung (HCC)     Hyperlipidemia     Prostate cancer (HCC)        Past Surgical History:   Procedure Laterality Date    APPENDECTOMY      COLONOSCOPY      IR BIOPSY BONE  2021    JOINT REPLACEMENT      UPPER GASTROINTESTINAL ENDOSCOPY         Family History   Problem Relation Age of Onset    Lung cancer Mother     Breast cancer Sister     Colon polyps Brother     Colon cancer Neg Hx      I have reviewed and agree with the history as documented.    E-Cigarette/Vaping    E-Cigarette Use Never User      E-Cigarette/Vaping Substances    Nicotine No     THC No     CBD No      Flavoring No     Other No     Unknown No      Social History     Tobacco Use    Smoking status: Former     Current packs/day: 0.00     Average packs/day: 1 pack/day for 42.0 years (42.0 ttl pk-yrs)     Types: Cigarettes     Start date:      Quit date: 2012     Years since quittin.3    Smokeless tobacco: Never   Vaping Use    Vaping status: Never Used   Substance Use Topics    Alcohol use: Never    Drug use: Never       Review of Systems   Respiratory:  Positive for cough and shortness of breath.        Physical Exam  Physical Exam  Vitals and nursing note reviewed.   Constitutional:       General: He is not in acute distress.     Appearance: He is well-developed.   HENT:      Head: Normocephalic and atraumatic.      Right Ear: External ear normal.      Left Ear: External ear normal.      Nose: Nose normal.   Eyes:      General: No scleral icterus.  Pulmonary:      Effort: Pulmonary effort is normal. No respiratory distress.      Breath sounds: Wheezing present.   Abdominal:      General: There is no distension.      Palpations: Abdomen is soft.   Musculoskeletal:         General: No deformity. Normal range of motion.      Cervical back: Normal range of motion and neck supple.   Skin:     General: Skin is warm.      Findings: No rash.   Neurological:      General: No focal deficit present.      Mental Status: He is alert.      Gait: Gait normal.   Psychiatric:         Mood and Affect: Mood normal.         Vital Signs  ED Triage Vitals   Temperature Pulse Respirations Blood Pressure SpO2   24 0853 24 0853 24 0853 24 0853 24 0853   98.4 °F (36.9 °C) 87 19 (!) 173/94 97 %      Temp Source Heart Rate Source Patient Position - Orthostatic VS BP Location FiO2 (%)   24 0853 24 1000 24 0853 24 0853 --   Oral Monitor Lying Right arm       Pain Score       24 0853       No Pain           Vitals:    24 0853 24 1000 24 1100   BP: (!) 173/94  154/77 139/76   Pulse: 87 85 85   Patient Position - Orthostatic VS: Lying Lying          Visual Acuity      ED Medications  Medications   albuterol inhalation solution 10 mg (10 mg Nebulization Given 4/17/24 0911)   ipratropium (ATROVENT) 0.02 % inhalation solution 1 mg (1 mg Nebulization Given 4/17/24 0910)   methylPREDNISolone sodium succinate (Solu-MEDROL) injection 80 mg (80 mg Intravenous Given 4/17/24 0911)   azithromycin (ZITHROMAX) 500 mg in sodium chloride 0.9% 250mL IVPB 500 mg (500 mg Intravenous New Bag 4/17/24 0955)   magnesium sulfate 2 g/50 mL IVPB (premix) 2 g (0 g Intravenous Stopped 4/17/24 0954)       Diagnostic Studies  Results Reviewed       Procedure Component Value Units Date/Time    HS Troponin I 4hr [[id number]]     Lab Status: No result Specimen: Blood     FLU/RSV/COVID - if FLU/RSV clinically relevant [[id number]]  (Normal) Collected: 04/17/24 0907    Lab Status: Final result Specimen: Nares from Nose Updated: 04/17/24 1004     SARS-CoV-2 Negative     INFLUENZA A PCR Negative     INFLUENZA B PCR Negative     RSV PCR Negative    Narrative:      FOR PEDIATRIC PATIENTS - copy/paste COVID Guidelines URL to browser: [email or web address]    SARS-CoV-2 assay is a Nucleic Acid Amplification assay intended for the  qualitative detection of nucleic acid from SARS-CoV-2 in nasopharyngeal  swabs. Results are for the presumptive identification of SARS-CoV-2 RNA.    Positive results are indicative of infection with SARS-CoV-2, the virus  causing COVID-19, but do not rule out bacterial infection or co-infection  with other viruses. Laboratories within the [place] and its  territories are required to report all positive results to the appropriate  public health authorities. Negative results do not preclude SARS-CoV-2  infection and should not be used as the sole basis for treatment or other  patient management decisions. Negative results must be  combined with  clinical observations, patient history, and epidemiological information.  This test has not been FDA cleared or approved.    This test has been authorized by FDA under an Emergency Use Authorization  (EUA). This test is only authorized for the duration of time the  declaration that circumstances exist justifying the authorization of the  emergency use of an in vitro diagnostic tests for detection of SARS-CoV-2  virus and/or diagnosis of COVID-19 infection under section 564(b)(1) of  the Act, 21 U.S.C. 360bbb-3(b)(1), unless the authorization is terminated  or revoked sooner. The test has been validated but independent review by FDA  and CLIA is pending.    Test performed using [employer] GeneXpert: This RT-PCR assay targets N2,  a region unique to SARS-CoV-2. A conserved region in the E-gene was chosen  for pan-Sarbecovirus detection which includes SARS-CoV-2.    According to CMS-2020-01-R, this platform meets the definition of high-throughput technology.    Protime-INR [[id number]]  (Normal) Collected: 04/17/24 0907    Lab Status: Final result Specimen: Blood from Arm, Right Updated: 04/17/24 0953     Protime 12.8 seconds      INR 0.93    APTT [[id number]]  (Normal) Collected: 04/17/24 0907    Lab Status: Final result Specimen: Blood from Arm, Right Updated: 04/17/24 0953     PTT 31 seconds     B-Type Natriuretic Peptide(BNP) [[id number]]  (Normal) Collected: 04/17/24 0907    Lab Status: Final result Specimen: Blood from Arm, Right Updated: 04/17/24 0952     BNP 46 pg/mL     Procalcitonin [[id number]]  (Normal) Collected: 04/17/24 0907    Lab Status: Final result Specimen: Blood from Arm, Right Updated: 04/17/24 0945     Procalcitonin 0.07 ng/ml     HS Troponin I 2hr [[id number]]     Lab Status: No result Specimen: Blood     HS Troponin 0hr (reflex protocol) [[id number]]  (Normal) Collected: 04/17/24 0907    Lab Status: Final result Specimen: Blood from Arm, Right Updated: 04/17/24 0942     hs TnI 0hr 4 ng/L      Lactic acid [[id number]]  (Normal) Collected: 04/17/24 0907    Lab Status: Final result Specimen: Blood from Arm, Right Updated: 04/17/24 0936     LACTIC ACID 0.6 mmol/L     Narrative:      Result may be elevated if tourniquet was used during collection.    Comprehensive metabolic panel [[id number]]  (Abnormal) Collected: 04/17/24 0907    Lab Status: Final result Specimen: Blood from Arm, Right Updated: 04/17/24 0935     Sodium 141 mmol/L      Potassium 3.6 mmol/L      Chloride 97 mmol/L      CO2 43 mmol/L      ANION GAP 1 mmol/L      BUN 7 mg/dL      Creatinine 0.37 mg/dL      Glucose 136 mg/dL      Calcium 9.2 mg/dL      AST 11 U/L      ALT 10 U/L      Alkaline Phosphatase 48 U/L      Total Protein 6.9 g/dL      Albumin 4.0 g/dL      Total Bilirubin 0.50 mg/dL      eGFR 125 ml/min/1.73sq m     Narrative:      National Kidney Disease Foundation guidelines for Chronic Kidney Disease (CKD):     Stage 1 with normal or high GFR (GFR > 90 mL/min/1.73 square meters)    Stage 2 Mild CKD (GFR = 60-89 mL/min/1.73 square meters)    Stage 3A Moderate CKD (GFR = 45-59 mL/min/1.73 square meters)    Stage 3B Moderate CKD (GFR = 30-44 mL/min/1.73 square meters)    Stage 4 Severe CKD (GFR = 15-29 mL/min/1.73 square meters)    Stage 5 End Stage CKD (GFR <15 mL/min/1.73 square meters)  Note: GFR calculation is accurate only with a steady state creatinine    CBC and differential [[id number]]  (Abnormal) Collected: 04/17/24 0907    Lab Status: Final result Specimen: Blood from Arm, Right Updated: 04/17/24 0915     WBC 10.16 Thousand/uL      RBC 3.84 Million/uL      Hemoglobin 12.0 g/dL      Hematocrit 38.3 %       fL      MCH 31.3 pg      MCHC 31.3 g/dL      RDW 13.2 %      MPV 8.3 fL      Platelets 306 Thousands/uL      nRBC 0 /100 WBCs      Segmented % 79 %      Immature Grans % 0 %      Lymphocytes % 11 %      Monocytes % 7 %      Eosinophils Relative 2 %      Basophils Relative 1 %      Absolute Neutrophils 8.01  Thousands/µL      Absolute Immature Grans 0.03 Thousand/uL      Absolute Lymphocytes 1.08 Thousands/µL      Absolute Monocytes 0.75 Thousand/µL      Eosinophils Absolute 0.20 Thousand/µL      Basophils Absolute 0.09 Thousands/µL     Blood culture #2 [[id number]] Collected: 04/17/24 0907    Lab Status: In process Specimen: Blood from Arm, Right Updated: 04/17/24 0913    Blood culture #1 [[id number]] Collected: 04/17/24 0907    Lab Status: In process Specimen: Blood from Arm, Right Updated: 04/17/24 0913                   XR chest portable    (Results Pending)              Procedures  Procedures         ED Course                               SBIRT 22yo+      Flowsheet Row Most Recent Value   Initial Alcohol Screen: US AUDIT-C     1. How often do you have a drink containing alcohol? 0 Filed at: 04/17/2024 0855   3a. Male UNDER 65: How often do you have five or more drinks on one occasion? 0 Filed at: 04/17/2024 0855   Audit-C Score 0 Filed at: 04/17/2024 0855   YOUSIF: How many times in the past year have you...    Used an illegal drug or used a prescription medication for non-medical reasons? Never Filed at: 04/17/2024 0855                      Medical Decision Making  69-year-old male with COPD presenting with shortness of breath and URI symptoms.  Suspect viral illness exacerbating COPD.  Obtain cardiopulmonary/sepsis evaluation.  Breathing treatment and reassess.    Upon reassessment, patient with mild improvement of symptoms but still with diffuse wheezing.  Will admit for COPD exacerbation.    Amount and/or Complexity of Data Reviewed  Labs: ordered.  Radiology: ordered.    Risk  Prescription drug management.  Decision regarding hospitalization.             Disposition  Final diagnoses:   COPD exacerbation (HCC)     Time reflects when diagnosis was documented in both MDM as applicable and the Disposition within this note       Time User Action Codes Description Comment    4/17/2024 11:05 AM Antonio Granda Add [J44.1]  COPD exacerbation (HCC)           ED Disposition       ED Disposition   Admit    Condition   Stable    Date/Time   Wed Apr 17, 2024 1105    Comment   Case was discussed with SHARYN and the patient's admission status was agreed to be Admission Status: inpatient status to the service of Dr. Wyman .               Follow-up Information    None         Patient's Medications   Discharge Prescriptions    No medications on file       No discharge procedures on file.    PDMP Review         Value Time User    PDMP Reviewed  Yes 4/4/2024  2:23 PM Arleth Scales PA-C            ED Provider  Electronically Signed by             Antonio Granda DO  04/17/24 1144     R flank pain radiating to R abd since this morning. Pt denies fevers, hematuria, N+V.

## 2024-05-23 NOTE — DISCHARGE NOTE PROVIDER - NSDCQMCOGNITION_NEU_ALL_CORE
Pb Suarez was seen and treated in our emergency department on 5/23/2024.                Diagnosis:     Pb  may return to work on return date.    She may return on this date: 05/24/2024         If you have any questions or concerns, please don't hesitate to call.      Ksenia Cano PA-C    ______________________________           _______________          _______________  Hospital Representative                              Date                                Time No difficulties

## 2024-11-22 NOTE — ED PROVIDER NOTE - IV ALTEPLASE DOOR HIDDEN
Subjective   Maricarmen Hayes is a 60 y.o. female. Patient is here today for   Chief Complaint   Patient presents with    Annual Exam          Vitals:    11/22/24 0831   BP: 118/76   Pulse: 77   Resp: 16   Temp: 98 °F (36.7 °C)   SpO2: 95%     Body mass index is 25.78 kg/m².    The following portions of the patient's history were reviewed and updated as appropriate: allergies, current medications, past family history, past medical history, past social history, past surgical history and problem list.    Past Medical History:   Diagnosis Date    Abnormal Pap smear of cervix     Anemia     Fibrocystic breast     Hyperlipidemia     S/P reverse total shoulder arthroplasty, right 02/24/2020      No Known Allergies   Social History     Socioeconomic History    Marital status:      Spouse name: Ralph    Number of children: 2    Years of education: 14   Tobacco Use    Smoking status: Never     Passive exposure: Never    Smokeless tobacco: Never   Vaping Use    Vaping status: Never Used   Substance and Sexual Activity    Alcohol use: Yes     Comment: Social events 1 glass wine    Drug use: No    Sexual activity: Yes     Partners: Male     Birth control/protection: Surgical     Comment: HYSTERECTOMY  2015        Current Outpatient Medications:     estradiol (ESTRACE) 0.1 MG/GM vaginal cream, Insert 2 g into the vagina Daily., Disp: , Rfl:     Ped Multivitamins-Fl-Iron (Multi-Vitamin/Fluoride/Iron) 0.25-10 MG/ML solution, Take  by mouth., Disp: , Rfl:     rosuvastatin (CRESTOR) 10 MG tablet, TAKE 1 TABLET BY MOUTH DAILY, Disp: 60 tablet, Rfl: 2    trospium 60 MG capsule sustained-release 24 hr capsule, Take 1 capsule by mouth Every Morning., Disp: , Rfl:      EKG not done    Objective   History of Present Illness   History of Present Illness  The patient is a 60-year-old female who is here for an annual physical examination.    She has been experiencing episodes of severe back pain, which have left her feeling  incapacitated. The cause of these episodes is unknown. An MRI scan conducted at [facility] revealed severe back issues. She was presented with the options of pain management or consultation with a neurosurgeon. She has chosen to consult with a neurosurgeon for further insight. Her pain is not constant but can be triggered by minor activities. She is not interested in surgical intervention for her back.    Despite her condition, she was able to engage in extensive hiking and walking during a trip to [place] in late September 2024 without any issues. However, she did experience occasional urinary incontinence during this period. She consulted a urologist who prescribed trospium for her bladder and a cream to enhance her sexual activity.    She maintains a healthy diet and engages in regular physical activity, including cycling and walking 2 to 3 times a week. She also performs light strength training with 3-pound dumbbells. She has not tried yoga. She uses a stand-up desk at work to avoid prolonged sitting. She has experienced numbness and weakness in her legs, but these symptoms have lessened since she started walking regularly and cycling. She occasionally experiences numbness and tingling in her toes.    She has been monitoring her blood pressure during her medical appointments. She has not reviewed her recent lab results.    She goes to the eye doctor every year and wears glasses. She had a hearing test and was told she was not deaf yet. She goes to the dentist twice a year and is on a regimen for her gums because she had a little bit of bleeding. She uses a water pick to clean her teeth and adds a little bit of bleach in her water pick. She went to a dermatologist when advised. She wears sunblock and was given Retin-A for wrinkles. She uses moisturizer. She has not done any big sun trips in the last 2 years. She had a cardiac CT a couple of years ago.    She has declined a COVID-19  vaccine.    IMMUNIZATIONS  She has received shingles and Tdap vaccines.       Review of Systems   Constitutional:  Negative for activity change and unexpected weight change.   Eyes:         Annual exam   Respiratory: Negative.     Cardiovascular: Negative.    Gastrointestinal: Negative.    Musculoskeletal:  Positive for gait problem.   Neurological:  Negative for weakness and numbness.   Psychiatric/Behavioral: Negative.         Physical Exam  Vitals reviewed.   Constitutional:       Appearance: Normal appearance.   HENT:      Right Ear: Tympanic membrane normal.      Left Ear: Tympanic membrane normal.   Neck:      Vascular: No carotid bruit.   Cardiovascular:      Rate and Rhythm: Normal rate and regular rhythm.      Pulses: Normal pulses.      Heart sounds: Normal heart sounds.   Pulmonary:      Effort: Pulmonary effort is normal.      Breath sounds: Normal breath sounds.   Musculoskeletal:      Right lower leg: No edema.      Left lower leg: Edema present.   Lymphadenopathy:      Cervical: No cervical adenopathy.   Neurological:      Mental Status: She is alert.   Psychiatric:         Mood and Affect: Mood normal.         Behavior: Behavior normal.         Thought Content: Thought content normal.         Judgment: Judgment normal.       Physical Exam      Results  Laboratory Studies  Total cholesterol decreased from 200 to 165. Triglycerides are now normal. HDL is 52. LDL cholesterol decreased from 110 to 98. Fasting glucose was 94. Kidney functions are normal. Liver functions are normal. Blood count shows no anemia. White blood cell count is slightly decreased. Platelet count is normal. RDW is slightly decreased. TSH is normal. Urinalysis is normal.    Imaging  MRI scan shows degenerative disc disease, facet arthritis, and severe central canal and lateral recess stenosis at L4-L5, similar to the prior exam from 2021.      Assessment   ASSESSMENT    Problems Addressed this Visit          Cardiac and Vasculature     Hyperlipidemia - Primary       Health Encounters    Annual physical exam       Neuro    Spinal stenosis of lumbar region without neurogenic claudication     Other Visit Diagnoses       Need for immunization against influenza        Relevant Orders    Fluzone >6mos (Completed)          Diagnoses         Codes Comments    Familial hypercholesterolemia    -  Primary ICD-10-CM: E78.01  ICD-9-CM: 272.0     Annual physical exam     ICD-10-CM: Z00.00  ICD-9-CM: V70.0     Spinal stenosis of lumbar region without neurogenic claudication     ICD-10-CM: M48.061  ICD-9-CM: 724.02     Need for immunization against influenza     ICD-10-CM: Z23  ICD-9-CM: V04.81           Assessment & Plan  1. Degenerative Disc Disease.  The MRI scan reveals degenerative disc disease, facet arthritis, and severe central canal and lateral recess stenosis at L4-L5, similar to the previous exam from 2021. The same issue is present at L2-L3 and L3-L4. She was advised to incorporate beginner's yoga into her routine, focusing on core and upper body exercises. Proper spine posture was emphasized. She will meet with a neurosurgeon to discuss further options.    2. Overactive Bladder.  She has been experiencing some incontinence and was prescribed trospium for an overactive bladder. She will follow up with her urologist in a few months to revisit the treatment plan.    3. Hyperlipidemia.  Her cholesterol levels have improved, with a decrease in total cholesterol from 200 to 165, and triglycerides now within the normal range. Her HDL is at a satisfactory level of 52, and LDL has decreased from 110 to 98. She will continue her current medication regimen of rosuvastatin 10 mg.    4. Health Maintenance.  Her blood pressure readings are within the normal range. Fasting glucose, kidney function, liver function, and platelet count are all within normal limits. She is not anemic, although her white blood cell count is slightly decreased. Her RDW is also  slightly decreased. TSH levels are normal, ruling out subclinical hypothyroidism. Urinalysis results are normal. Vascular screening conducted in 02/2024 was also normal. She will receive an influenza vaccine today. The RSV vaccine was recommended, which she can obtain from a pharmacy.      PLAN  There are no Patient Instructions on file for this visit.    Return in about 1 year (around 11/22/2025) for lipid, Annual physical, cmp, cbc, tsh.    Patient or patient representative verbalized consent for the use of Ambient Listening during the visit with  Yordy Mishra MD for chart documentation. 11/22/2024  09:12 EST   show

## 2025-02-28 ENCOUNTER — INPATIENT (INPATIENT)
Facility: HOSPITAL | Age: 40
LOS: 3 days | Discharge: HOME | End: 2025-03-04
Attending: INTERNAL MEDICINE | Admitting: INTERNAL MEDICINE
Payer: COMMERCIAL

## 2025-02-28 VITALS
WEIGHT: 240.08 LBS | RESPIRATION RATE: 18 BRPM | HEIGHT: 73 IN | OXYGEN SATURATION: 98 % | SYSTOLIC BLOOD PRESSURE: 220 MMHG | HEART RATE: 97 BPM | DIASTOLIC BLOOD PRESSURE: 153 MMHG | TEMPERATURE: 98 F

## 2025-02-28 DIAGNOSIS — R03.0 ELEVATED BLOOD-PRESSURE READING, WITHOUT DIAGNOSIS OF HYPERTENSION: ICD-10-CM

## 2025-02-28 LAB
ADD ON TEST-SPECIMEN IN LAB: SIGNIFICANT CHANGE UP
ALBUMIN SERPL ELPH-MCNC: 4.4 G/DL — SIGNIFICANT CHANGE UP (ref 3.3–5)
ALP SERPL-CCNC: 85 U/L — SIGNIFICANT CHANGE UP (ref 40–120)
ALT FLD-CCNC: 27 U/L — SIGNIFICANT CHANGE UP (ref 4–41)
ANION GAP SERPL CALC-SCNC: 13 MMOL/L — SIGNIFICANT CHANGE UP (ref 7–14)
AST SERPL-CCNC: 33 U/L — SIGNIFICANT CHANGE UP (ref 4–40)
BASOPHILS # BLD AUTO: 0.04 K/UL — SIGNIFICANT CHANGE UP (ref 0–0.2)
BASOPHILS NFR BLD AUTO: 0.6 % — SIGNIFICANT CHANGE UP (ref 0–2)
BILIRUB SERPL-MCNC: 0.4 MG/DL — SIGNIFICANT CHANGE UP (ref 0.2–1.2)
BUN SERPL-MCNC: 22 MG/DL — SIGNIFICANT CHANGE UP (ref 7–23)
CALCIUM SERPL-MCNC: 10 MG/DL — SIGNIFICANT CHANGE UP (ref 8.4–10.5)
CHLORIDE SERPL-SCNC: 102 MMOL/L — SIGNIFICANT CHANGE UP (ref 98–107)
CO2 SERPL-SCNC: 25 MMOL/L — SIGNIFICANT CHANGE UP (ref 22–31)
CREAT SERPL-MCNC: 2.02 MG/DL — HIGH (ref 0.5–1.3)
EGFR: 42 ML/MIN/1.73M2 — LOW
EGFR: 42 ML/MIN/1.73M2 — LOW
EOSINOPHIL # BLD AUTO: 0.1 K/UL — SIGNIFICANT CHANGE UP (ref 0–0.5)
EOSINOPHIL NFR BLD AUTO: 1.6 % — SIGNIFICANT CHANGE UP (ref 0–6)
GLUCOSE SERPL-MCNC: 95 MG/DL — SIGNIFICANT CHANGE UP (ref 70–99)
HCT VFR BLD CALC: 48.1 % — SIGNIFICANT CHANGE UP (ref 39–50)
HGB BLD-MCNC: 15.4 G/DL — SIGNIFICANT CHANGE UP (ref 13–17)
IANC: 3.72 K/UL — SIGNIFICANT CHANGE UP (ref 1.8–7.4)
IMM GRANULOCYTES NFR BLD AUTO: 0.3 % — SIGNIFICANT CHANGE UP (ref 0–0.9)
LYMPHOCYTES # BLD AUTO: 1.78 K/UL — SIGNIFICANT CHANGE UP (ref 1–3.3)
LYMPHOCYTES # BLD AUTO: 28.2 % — SIGNIFICANT CHANGE UP (ref 13–44)
MCHC RBC-ENTMCNC: 26.7 PG — LOW (ref 27–34)
MCHC RBC-ENTMCNC: 32 G/DL — SIGNIFICANT CHANGE UP (ref 32–36)
MCV RBC AUTO: 83.4 FL — SIGNIFICANT CHANGE UP (ref 80–100)
MONOCYTES # BLD AUTO: 0.66 K/UL — SIGNIFICANT CHANGE UP (ref 0–0.9)
MONOCYTES NFR BLD AUTO: 10.4 % — SIGNIFICANT CHANGE UP (ref 2–14)
NEUTROPHILS # BLD AUTO: 3.72 K/UL — SIGNIFICANT CHANGE UP (ref 1.8–7.4)
NEUTROPHILS NFR BLD AUTO: 58.9 % — SIGNIFICANT CHANGE UP (ref 43–77)
NRBC # BLD AUTO: 0 K/UL — SIGNIFICANT CHANGE UP (ref 0–0)
NRBC # FLD: 0 K/UL — SIGNIFICANT CHANGE UP (ref 0–0)
NRBC BLD AUTO-RTO: 0 /100 WBCS — SIGNIFICANT CHANGE UP (ref 0–0)
PLATELET # BLD AUTO: 289 K/UL — SIGNIFICANT CHANGE UP (ref 150–400)
POTASSIUM SERPL-MCNC: 4.9 MMOL/L — SIGNIFICANT CHANGE UP (ref 3.5–5.3)
POTASSIUM SERPL-SCNC: 4.9 MMOL/L — SIGNIFICANT CHANGE UP (ref 3.5–5.3)
PROT SERPL-MCNC: 8.6 G/DL — HIGH (ref 6–8.3)
RBC # BLD: 5.77 M/UL — SIGNIFICANT CHANGE UP (ref 4.2–5.8)
RBC # FLD: 13.3 % — SIGNIFICANT CHANGE UP (ref 10.3–14.5)
SODIUM SERPL-SCNC: 140 MMOL/L — SIGNIFICANT CHANGE UP (ref 135–145)
TROPONIN T, HIGH SENSITIVITY RESULT: 41 NG/L — SIGNIFICANT CHANGE UP
TROPONIN T, HIGH SENSITIVITY RESULT: 45 NG/L — SIGNIFICANT CHANGE UP
WBC # BLD: 6.32 K/UL — SIGNIFICANT CHANGE UP (ref 3.8–10.5)
WBC # FLD AUTO: 6.32 K/UL — SIGNIFICANT CHANGE UP (ref 3.8–10.5)

## 2025-02-28 PROCEDURE — 99291 CRITICAL CARE FIRST HOUR: CPT

## 2025-02-28 PROCEDURE — 99223 1ST HOSP IP/OBS HIGH 75: CPT

## 2025-02-28 RX ORDER — AMLODIPINE BESYLATE 10 MG/1
10 TABLET ORAL ONCE
Refills: 0 | Status: COMPLETED | OUTPATIENT
Start: 2025-02-28 | End: 2025-02-28

## 2025-02-28 RX ORDER — LABETALOL HYDROCHLORIDE 200 MG/1
300 TABLET, FILM COATED ORAL ONCE
Refills: 0 | Status: COMPLETED | OUTPATIENT
Start: 2025-02-28 | End: 2025-02-28

## 2025-02-28 RX ADMIN — AMLODIPINE BESYLATE 10 MILLIGRAM(S): 10 TABLET ORAL at 16:15

## 2025-02-28 RX ADMIN — LABETALOL HYDROCHLORIDE 300 MILLIGRAM(S): 200 TABLET, FILM COATED ORAL at 16:15

## 2025-02-28 RX ADMIN — Medication 75 MILLIGRAM(S): at 16:15

## 2025-02-28 NOTE — ED PROVIDER NOTE - ATTENDING CONTRIBUTION TO CARE
Meliton Buchanan DO:  patient seen and evaluated with the resident.  I was present for key portions of the History & Physical, and I agree with the Impression & Plan. 38 yo m pmh elevated bmi, htn, pw elevated blood pressure. Patient reports had routine physical yesterday, found to be hypertensive, 200/140.  Was told to come in by PCP yesterday for evaluation and possible treatment.  Patient states he has a flight coming up tomorrow so he came today instead.  Denies drug and alcohol use.  Denies N/B,/C, vision changes, CP, cough congestion.  Patient denies all acute medical complaints.  Reports would not come here unless he was directed to by his physician.  Reports compliance with medications.  Patient arrives HDS well-appearing, neurovasc intact.  Pending EKG.  Suspect asymptomatic hypertension.   do not suspect hypertensive emergency/urgency.  Will check labs, reach out to PCP, reassess.  Patient does not know the names of his medications. Meliton Buchanan DO:  patient seen and evaluated with the resident.  I was present for key portions of the History & Physical, and I agree with the Impression & Plan. 40 yo m pmh elevated bmi, htn, pw elevated blood pressure. Patient reports had routine physical yesterday, found to be hypertensive, 200/140.  Was told to come in by PCP yesterday for evaluation and possible treatment.  Patient states he has a flight coming up tomorrow so he came today instead.  Denies drug and alcohol use.  Denies N/B,/C, vision changes, CP, cough congestion.  Patient denies all acute medical complaints.  Reports would not come here unless he was directed to by his physician.  Reports compliance with medications.  Patient arrives HDS well-appearing, neurovasc intact.  Pending EKG.  Suspect asymptomatic hypertension.   do not suspect hypertensive emergency/urgency.  Will check labs, reach out to PCP, reassess.  Patient does not know the names of his medications.. Meliton Buchanan, DO:  I have personally provided the amount of critical care time documented below, concurrently with the Resident/Fellow. This time excludes time spent on separate procedures and time spent teaching. I have reviewed the Resident's/Fellow's documentation and I agree with the assessment and plan of care.  38 yo m pmh elevated bmi, htn, pw elevated blood pressure. Patient reports had routine physical yesterday, found to be hypertensive, 200/140.  Was told to come in by PCP yesterday for evaluation and possible treatment.  Patient states he has a flight coming up tomorrow so he came today instead.  Denies drug and alcohol use.  Denies N/B,/C, vision changes, CP, cough congestion.  Patient denies all acute medical complaints.  Reports would not come here unless he was directed to by his physician.  Reports compliance with medications.  Patient arrives HDS well-appearing, neurovasc intact.  Pending EKG.  Suspect asymptomatic hypertension.   do not suspect hypertensive emergency/urgency however will do screening.  Will check labs, reach out to PCP, reassess.  Patient does not know the names of his medications..

## 2025-02-28 NOTE — ED ADULT TRIAGE NOTE - CHIEF COMPLAINT QUOTE
c/o high BP since yesterday refereed PMD to come to ER for further eval, Denies Phx, denies having any symptoms.

## 2025-02-28 NOTE — H&P ADULT - NSHPLABSRESULTS_GEN_ALL_CORE
15.4   6.32  )-----------( 289      ( 28 Feb 2025 13:42 )             48.1     140  |  102  |  22  ----------------------------<  95     02-28  4.9   |  25  |  2.02[H]    Ca    10.0      28 Feb 2025 13:42    TPro  8.6[H]  /  Alb  4.4  /  TBili  0.4  /  DBili  x   /  AST  33  /  ALT  27  /  AlkPhos  85  02-28                hs Troponin, T - 45 ng/L (02-28-25 @ 19:05)  hs Troponin, T - 41 ng/L (02-28-25 @ 17:17)  hs Troponin, T - 37 ng/L (02-28-25 @ 13:42)          EKG reviewed: NSR with LVH

## 2025-02-28 NOTE — CONSULT NOTE ADULT - ASSESSMENT
40 y/o M with pmh of HTN presents to ED with elevated BP.          # Hypertensive urgency.   ·  Plan: - Restarted home meds of labetalol, hydralazine and amlodipine  - monitor BP q4.        #  MEHDI (acute kidney injury).   ·  Plan: In setting of hypertensive urgency.  Baseline Cr 1.5-1.7, now 2.0  - will trend bmp with BP improvement.    # Elevated troponins  Likely demand  TTE

## 2025-02-28 NOTE — ED PROVIDER NOTE - OBJECTIVE STATEMENT
39-year-old male with history of HTN presenting to the emergency department for elevated blood pressure sent in by PCP.  Patient reports he went to his routine physical yesterday was found to be hypertensive to 200/140.  PCP urged him to come yesterday for evaluation and possible treatment however he has a flight tomorrow so he came today instead.  He denies any drug or alcohol usage.  Denies any vision changes, chest pain, shortness of breath, palpitations, lower extremity swelling.  He reports he is eating and drinking well, urinating without difficulty.  No abdominal pain, nausea vomiting or diarrhea.  Patient reports he has not taken his medication in 1-1/2 to 2 weeks.  He is unsure of what medications he takes.     PCP Dr. Crispin Velasquez

## 2025-02-28 NOTE — ED ADULT NURSE REASSESSMENT NOTE - NS ED NURSE REASSESS COMMENT FT1
Patient received, appears to be resting comfortably in bed, no complaints noted at this time. Breathing even and unlabored, pallor/diaphoresis not noted. waiting for MD to contact PCP for discharge, will continue to monitor.

## 2025-02-28 NOTE — ED ADULT TRIAGE NOTE - HEIGHT IN FEET
Patient is calling regarding a referral to rheumatology.  Patient states the MD he was referred to is unable to see him in the hours he is available.  Patient would like to know if there is anyone else Dr. Wolf can recommend.  Please advise.    [phone]   6

## 2025-02-28 NOTE — ED PROVIDER NOTE - CHIEF COMPLAINT
The patient is a 39y Male complaining of  The patient is a 39y Male complaining of high blood pressure.

## 2025-02-28 NOTE — H&P ADULT - HISTORY OF PRESENT ILLNESS
40 y/o M with pmh of HTN presents to ED with elevated BP.  Pt reports he ran out of his medications about 1 week ago.  He saw his PCP for routine physical yesterday, and was found to have BP of 200/140.  He was advised to go to the ED yesterday, but was hesitant at first, and came today.  Pt reports he has not had chest pain, headache, palpitations, dyspnea, swelling or blurred vision.      In the ED, pt had BP of 234/171.  He was given PO labetalol, hydralazine, and amlodipine

## 2025-02-28 NOTE — H&P ADULT - NSHPPHYSICALEXAM_GEN_ALL_CORE
Vital Signs Last 24 Hrs  T(C): 36.6 (01 Mar 2025 00:47), Max: 37.1 (28 Feb 2025 16:18)  T(F): 97.8 (01 Mar 2025 00:47), Max: 98.8 (28 Feb 2025 16:18)  HR: 89 (01 Mar 2025 00:47) (76 - 97)  BP: 172/101 (01 Mar 2025 00:47) (131/76 - 234/171)  BP(mean): --  RR: 18 (01 Mar 2025 00:47) (16 - 20)  SpO2: 100% (01 Mar 2025 00:47) (98% - 100%)    Parameters below as of 01 Mar 2025 00:47  Patient On (Oxygen Delivery Method): room air        PHYSICAL EXAM:  GENERAL: No Acute Distress  EYES: conjunctiva and sclera clear  ENMT: Moist mucous membranes   NECK: Supple  PULMONARY: Clear to auscultation bilaterally  CARDIAC: Regular rate and rhythm; No murmurs, rubs, or gallops  GASTROINTESTINAL: Abdomen soft, Nontender, Nondistended; Bowel sounds normal  EXTREMITIES:   No clubbing, cyanosis, or pedal edema  PSYCH: Normal Affect, Normal Behavior  NEUROLOGY:   - Mental status A&O x 3  SKIN: No rashes or lesions  MUSCULOSKELETAL: No joint swelling

## 2025-02-28 NOTE — ED ADULT NURSE NOTE - OBJECTIVE STATEMENT
39 year old male, received to 7A. A&Ox4, ambulatory. Respirations equal and unlabored. Hx of HTN. Pt states he was sent in by his MD due to elevated BP. Pt states he ran out of his meds 1 week ago and went to his MD to get new meds, but his MD sent him to the ED for elevated BP. Pt also states that he has a flight tomorrow and his MD did not think it was safe to travel with his elevated BP. Pt denies chest pain, SOB, palpitations, dizziness, blurry vision, headache, numbness, tingling, n/v/d. Neuro intact. Equal strength and sensation in upper and lower extremities. Pending MD orders. No acute distress noted. Safety maintained.

## 2025-02-28 NOTE — ED PROVIDER NOTE - PROGRESS NOTE DETAILS
Ever Uriostegui MD PGY2: Patient reassessed, stable. Patient still asymptomatic. Creatinine elevated to ~2. Able to speak with sending PCP Dr. Velasquez who reports wanted patient to come yesterday. Has been out of meds for roughly one week. Baseline creatinine around 1.2-1.3. Spoke with patient and offered CDU vs admission but deferring at this time as he wants to go on a trip he has scheduled tomorrow. Will give home BP meds and trend BP. EKG with evidence of LVH and XXXXXXXXXXXX. Will get troponin even though patient is without chest pain to screen for further end organ damage. Final disposition pending further discussion with patient, who acknowledges at this time that leaving is not the recommended plan and he is at risk for complications from such significant hypertension acutely and in future. Eevr Uriostegui MD PGY2: Patient reassessed, stable. Patient still asymptomatic. Creatinine elevated to ~2. Able to speak with sending PCP Dr. Velasquez who reports wanted patient to come yesterday. Has been out of meds for roughly one week. Baseline creatinine around 1.2-1.3. Spoke with patient and offered CDU vs admission but deferring at this time as he wants to go on a trip he has scheduled tomorrow. Will give home BP meds and trend BP. EKG with evidence of LVH and TWI in I, aVL, II, III, aVF, V5, V6 that are new from EKG in 2022. Will get troponin even though patient is without chest pain to screen for further end organ damage. Final disposition pending further discussion with patient, who acknowledges at this time that leaving is not the recommended plan and he is at risk for complications from such significant hypertension acutely and in future. Ever Uriostegui MD, PGY2: Patient reassessed, stable.  After home BP regimen patient's BP improved to 131/76.  Still asymptomatic, ambulating without difficulty.  Initial troponin 41.  Will get repeat troponin for delta. Kylee Davis PGY3 - sign out -39-year-old male with a history of poorly controlled HTN presenting with significantly elevated blood pressure, mild headache.  Admit to telemetry doc for further cardiac workup and HTN control.

## 2025-02-28 NOTE — ED ADULT NURSE NOTE - AS PAIN REST
0 (no pain/absence of nonverbal indicators of pain) no dermatitis, no environmental allergies, no food allergies, no immunosuppressive disorder, and no pruritus.

## 2025-02-28 NOTE — ED ADULT NURSE REASSESSMENT NOTE - NS ED NURSE REASSESS COMMENT FT1
As per MD EMILY fischer to give all 3 meds, these are patients home medication, will continue to monitor.

## 2025-02-28 NOTE — ED PROVIDER NOTE - CLINICAL SUMMARY MEDICAL DECISION MAKING FREE TEXT BOX
39-year-old male history of HTN, has not taken his BP medications in at least 1-1/2 to 2 weeks, sent in by PCP for hypertension after being screened at annual appointment yesterday.  Was 200/140 yesterday.  He is completely asymptomatic at this time.  He is afebrile, hemodynamically normal.  Neurovascularly intact.  Will get screening EKG.  However suspect that this is likely asymptomatic hypertension without urgency or emergency.  Will get screening labs to eval creatinine.  Will attempt to reach out to PCP and get home medication regimen.

## 2025-02-28 NOTE — ED PROVIDER NOTE - PHYSICAL EXAMINATION
GEN: Patient awake and alert. No acute distress, non-toxic. Well appearing.   Head: Normocephalic, atraumatic.  Neck: Nontender, full ROM.   Eyes: PERRLA b/l. EOMI, no scleral icterus, no conjunctival injection. Moist mucous membranes.  CARDIAC: RRR. Normal S1, S2. No murmur, rubs, or gallops. No peripheral edema noted.  PULM: Speaking in full sentences. CTA B/L no wheeze, rales or rhonchi. No signs of respiratory distress, no accessory muscle usage or nasal flaring.  ABD: Soft, nontender, nondistended. No rebound, no involuntary guarding. BS x 4, no auscultated bruit. No HSM appreciated.  NEURO: A&Ox3, no focal neurological deficits, CN 2-12 grossly intact. Following simple commands. FTN coordination intact. Gait normal.  SKIN: Warm, dry, no rash, no lesions, no open wounds. No urticaria. No jaundice.

## 2025-02-28 NOTE — ED ADULT NURSE REASSESSMENT NOTE - NS ED NURSE REASSESS COMMENT FT1
report given to RAAD Cintron   patient laying in semi fowlers position on the stretcher. patient alert and oriented times four. patient denies shortness of breath, chest pain, nausea, vomiting, chill, fever. Patient normal sinus on the monitor. Respirations equal and adequate. Patients IV patent, no signs of infiltration. Safety measures in place, call bell within reach. Patient stable upon assessment.

## 2025-02-28 NOTE — H&P ADULT - PROBLEM SELECTOR PLAN 2
In setting of hypertensive urgency.  Baseline Cr 1.5-1.7, now 2.0  - will trend bmp with BP improvement

## 2025-02-28 NOTE — ED PROVIDER NOTE - CRTICAL CARE TIME SPENT (MIN)
44 GOAL: Patient will improve strength by 1/4 grade in 2 weeks to improve overall functional mobility including gait, transfers, bed mobility and decrease risk of falls.

## 2025-02-28 NOTE — CONSULT NOTE ADULT - TIME BILLING
- Review of records, telemetry, vital signs and daily labs.   - General and cardiovascular physical examination.  - Generation of cardiovascular treatment plan and completion of note .  - Coordination of care.      Patient was seen and examined by me on 02/28/2025,interim events noted,labs and radiology studies reviewed.  Wai Mai MD,FACC.  [address] [place], [place] [id number].  [phone]  Availabe to call or text on [employer] TE[employer].

## 2025-03-01 DIAGNOSIS — I16.0 HYPERTENSIVE URGENCY: ICD-10-CM

## 2025-03-01 DIAGNOSIS — N17.9 ACUTE KIDNEY FAILURE, UNSPECIFIED: ICD-10-CM

## 2025-03-01 LAB
ANION GAP SERPL CALC-SCNC: 14 MMOL/L — SIGNIFICANT CHANGE UP (ref 7–14)
APPEARANCE UR: CLEAR — SIGNIFICANT CHANGE UP
BACTERIA # UR AUTO: NEGATIVE /HPF — SIGNIFICANT CHANGE UP
BILIRUB UR-MCNC: NEGATIVE — SIGNIFICANT CHANGE UP
BUN SERPL-MCNC: 20 MG/DL — SIGNIFICANT CHANGE UP (ref 7–23)
CALCIUM SERPL-MCNC: 9.2 MG/DL — SIGNIFICANT CHANGE UP (ref 8.4–10.5)
CAST: 10 /LPF — HIGH (ref 0–4)
CHLORIDE SERPL-SCNC: 100 MMOL/L — SIGNIFICANT CHANGE UP (ref 98–107)
CO2 SERPL-SCNC: 23 MMOL/L — SIGNIFICANT CHANGE UP (ref 22–31)
COLOR SPEC: SIGNIFICANT CHANGE UP
CREAT SERPL-MCNC: 1.91 MG/DL — HIGH (ref 0.5–1.3)
DIFF PNL FLD: NEGATIVE — SIGNIFICANT CHANGE UP
EGFR: 45 ML/MIN/1.73M2 — LOW
EGFR: 45 ML/MIN/1.73M2 — LOW
GLUCOSE SERPL-MCNC: 100 MG/DL — HIGH (ref 70–99)
GLUCOSE UR QL: NEGATIVE MG/DL — SIGNIFICANT CHANGE UP
HCT VFR BLD CALC: 43 % — SIGNIFICANT CHANGE UP (ref 39–50)
HGB BLD-MCNC: 14.1 G/DL — SIGNIFICANT CHANGE UP (ref 13–17)
HYALINE CASTS # UR AUTO: PRESENT
KETONES UR-MCNC: ABNORMAL MG/DL
LEUKOCYTE ESTERASE UR-ACNC: NEGATIVE — SIGNIFICANT CHANGE UP
MAGNESIUM SERPL-MCNC: 2.1 MG/DL — SIGNIFICANT CHANGE UP (ref 1.6–2.6)
MCHC RBC-ENTMCNC: 26.7 PG — LOW (ref 27–34)
MCHC RBC-ENTMCNC: 32.8 G/DL — SIGNIFICANT CHANGE UP (ref 32–36)
MCV RBC AUTO: 81.3 FL — SIGNIFICANT CHANGE UP (ref 80–100)
NITRITE UR-MCNC: NEGATIVE — SIGNIFICANT CHANGE UP
NRBC # BLD AUTO: 0 K/UL — SIGNIFICANT CHANGE UP (ref 0–0)
NRBC # FLD: 0 K/UL — SIGNIFICANT CHANGE UP (ref 0–0)
NRBC BLD AUTO-RTO: 0 /100 WBCS — SIGNIFICANT CHANGE UP (ref 0–0)
PH UR: 5.5 — SIGNIFICANT CHANGE UP (ref 5–8)
PHOSPHATE SERPL-MCNC: 4 MG/DL — SIGNIFICANT CHANGE UP (ref 2.5–4.5)
PLATELET # BLD AUTO: 272 K/UL — SIGNIFICANT CHANGE UP (ref 150–400)
POTASSIUM SERPL-MCNC: 3.3 MMOL/L — LOW (ref 3.5–5.3)
POTASSIUM SERPL-SCNC: 3.3 MMOL/L — LOW (ref 3.5–5.3)
PROT UR-MCNC: 300 MG/DL
RBC # BLD: 5.29 M/UL — SIGNIFICANT CHANGE UP (ref 4.2–5.8)
RBC # FLD: 13.3 % — SIGNIFICANT CHANGE UP (ref 10.3–14.5)
RBC CASTS # UR COMP ASSIST: 1 /HPF — SIGNIFICANT CHANGE UP (ref 0–4)
REVIEW: SIGNIFICANT CHANGE UP
SODIUM SERPL-SCNC: 137 MMOL/L — SIGNIFICANT CHANGE UP (ref 135–145)
SP GR SPEC: 1.03 — SIGNIFICANT CHANGE UP (ref 1–1.03)
SQUAMOUS # UR AUTO: 3 /HPF — SIGNIFICANT CHANGE UP (ref 0–5)
TROPONIN T, HIGH SENSITIVITY RESULT: 103 NG/L — CRITICAL HIGH
UROBILINOGEN FLD QL: 1 MG/DL — SIGNIFICANT CHANGE UP (ref 0.2–1)
WBC # BLD: 8.45 K/UL — SIGNIFICANT CHANGE UP (ref 3.8–10.5)
WBC # FLD AUTO: 8.45 K/UL — SIGNIFICANT CHANGE UP (ref 3.8–10.5)
WBC UR QL: 2 /HPF — SIGNIFICANT CHANGE UP (ref 0–5)

## 2025-03-01 PROCEDURE — 76770 US EXAM ABDO BACK WALL COMP: CPT | Mod: 26

## 2025-03-01 RX ORDER — AMLODIPINE BESYLATE 10 MG/1
10 TABLET ORAL DAILY
Refills: 0 | Status: DISCONTINUED | OUTPATIENT
Start: 2025-03-01 | End: 2025-03-04

## 2025-03-01 RX ORDER — ACETAMINOPHEN 500 MG/5ML
650 LIQUID (ML) ORAL EVERY 6 HOURS
Refills: 0 | Status: DISCONTINUED | OUTPATIENT
Start: 2025-03-01 | End: 2025-03-04

## 2025-03-01 RX ORDER — INFLUENZA A VIRUS A/IDAHO/07/2018 (H1N1) ANTIGEN (MDCK CELL DERIVED, PROPIOLACTONE INACTIVATED, INFLUENZA A VIRUS A/INDIANA/08/2018 (H3N2) ANTIGEN (MDCK CELL DERIVED, PROPIOLACTONE INACTIVATED), INFLUENZA B VIRUS B/SINGAPORE/INFTT-16-0610/2016 ANTIGEN (MDCK CELL DERIVED, PROPIOLACTONE INACTIVATED), INFLUENZA B VIRUS B/IOWA/06/2017 ANTIGEN (MDCK CELL DERIVED, PROPIOLACTONE INACTIVATED) 15; 15; 15; 15 UG/.5ML; UG/.5ML; UG/.5ML; UG/.5ML
0.5 INJECTION, SUSPENSION INTRAMUSCULAR ONCE
Refills: 0 | Status: DISCONTINUED | OUTPATIENT
Start: 2025-03-01 | End: 2025-03-04

## 2025-03-01 RX ORDER — LABETALOL HYDROCHLORIDE 200 MG/1
300 TABLET, FILM COATED ORAL EVERY 8 HOURS
Refills: 0 | Status: DISCONTINUED | OUTPATIENT
Start: 2025-03-01 | End: 2025-03-04

## 2025-03-01 RX ADMIN — Medication 40 MILLIEQUIVALENT(S): at 08:55

## 2025-03-01 RX ADMIN — LABETALOL HYDROCHLORIDE 300 MILLIGRAM(S): 200 TABLET, FILM COATED ORAL at 22:10

## 2025-03-01 RX ADMIN — AMLODIPINE BESYLATE 10 MILLIGRAM(S): 10 TABLET ORAL at 05:39

## 2025-03-01 RX ADMIN — Medication 75 MILLIGRAM(S): at 14:31

## 2025-03-01 RX ADMIN — LABETALOL HYDROCHLORIDE 300 MILLIGRAM(S): 200 TABLET, FILM COATED ORAL at 14:32

## 2025-03-01 RX ADMIN — Medication 75 MILLIGRAM(S): at 05:39

## 2025-03-01 RX ADMIN — LABETALOL HYDROCHLORIDE 300 MILLIGRAM(S): 200 TABLET, FILM COATED ORAL at 05:39

## 2025-03-01 RX ADMIN — Medication 75 MILLIGRAM(S): at 22:10

## 2025-03-02 LAB
ADD ON TEST-SPECIMEN IN LAB: SIGNIFICANT CHANGE UP
ANION GAP SERPL CALC-SCNC: 12 MMOL/L — SIGNIFICANT CHANGE UP (ref 7–14)
BASOPHILS # BLD AUTO: 0.03 K/UL — SIGNIFICANT CHANGE UP (ref 0–0.2)
BASOPHILS NFR BLD AUTO: 0.5 % — SIGNIFICANT CHANGE UP (ref 0–2)
BUN SERPL-MCNC: 21 MG/DL — SIGNIFICANT CHANGE UP (ref 7–23)
CALCIUM SERPL-MCNC: 8.8 MG/DL — SIGNIFICANT CHANGE UP (ref 8.4–10.5)
CHLORIDE SERPL-SCNC: 103 MMOL/L — SIGNIFICANT CHANGE UP (ref 98–107)
CO2 SERPL-SCNC: 22 MMOL/L — SIGNIFICANT CHANGE UP (ref 22–31)
CREAT ?TM UR-MCNC: 402 MG/DL — SIGNIFICANT CHANGE UP
CREAT ?TM UR-MCNC: 406 MG/DL — SIGNIFICANT CHANGE UP
CREAT SERPL-MCNC: 2 MG/DL — HIGH (ref 0.5–1.3)
EGFR: 43 ML/MIN/1.73M2 — LOW
EGFR: 43 ML/MIN/1.73M2 — LOW
EOSINOPHIL # BLD AUTO: 0.05 K/UL — SIGNIFICANT CHANGE UP (ref 0–0.5)
EOSINOPHIL NFR BLD AUTO: 0.8 % — SIGNIFICANT CHANGE UP (ref 0–6)
GLUCOSE SERPL-MCNC: 95 MG/DL — SIGNIFICANT CHANGE UP (ref 70–99)
HCT VFR BLD CALC: 40.5 % — SIGNIFICANT CHANGE UP (ref 39–50)
HGB BLD-MCNC: 13.2 G/DL — SIGNIFICANT CHANGE UP (ref 13–17)
IANC: 3.69 K/UL — SIGNIFICANT CHANGE UP (ref 1.8–7.4)
IMM GRANULOCYTES NFR BLD AUTO: 0.3 % — SIGNIFICANT CHANGE UP (ref 0–0.9)
LYMPHOCYTES # BLD AUTO: 1.94 K/UL — SIGNIFICANT CHANGE UP (ref 1–3.3)
LYMPHOCYTES # BLD AUTO: 30.4 % — SIGNIFICANT CHANGE UP (ref 13–44)
MAGNESIUM SERPL-MCNC: 2 MG/DL — SIGNIFICANT CHANGE UP (ref 1.6–2.6)
MCHC RBC-ENTMCNC: 26.7 PG — LOW (ref 27–34)
MCHC RBC-ENTMCNC: 32.6 G/DL — SIGNIFICANT CHANGE UP (ref 32–36)
MCV RBC AUTO: 81.8 FL — SIGNIFICANT CHANGE UP (ref 80–100)
MONOCYTES # BLD AUTO: 0.65 K/UL — SIGNIFICANT CHANGE UP (ref 0–0.9)
MONOCYTES NFR BLD AUTO: 10.2 % — SIGNIFICANT CHANGE UP (ref 2–14)
NEUTROPHILS # BLD AUTO: 3.69 K/UL — SIGNIFICANT CHANGE UP (ref 1.8–7.4)
NEUTROPHILS NFR BLD AUTO: 57.8 % — SIGNIFICANT CHANGE UP (ref 43–77)
NRBC # BLD AUTO: 0 K/UL — SIGNIFICANT CHANGE UP (ref 0–0)
NRBC # FLD: 0 K/UL — SIGNIFICANT CHANGE UP (ref 0–0)
NRBC BLD AUTO-RTO: 0 /100 WBCS — SIGNIFICANT CHANGE UP (ref 0–0)
PHOSPHATE SERPL-MCNC: 3.1 MG/DL — SIGNIFICANT CHANGE UP (ref 2.5–4.5)
PLATELET # BLD AUTO: 248 K/UL — SIGNIFICANT CHANGE UP (ref 150–400)
POTASSIUM SERPL-MCNC: 3.5 MMOL/L — SIGNIFICANT CHANGE UP (ref 3.5–5.3)
POTASSIUM SERPL-SCNC: 3.5 MMOL/L — SIGNIFICANT CHANGE UP (ref 3.5–5.3)
PROT ?TM UR-MCNC: 116 MG/DL — SIGNIFICANT CHANGE UP
PROT/CREAT UR-RTO: 0.3 RATIO — HIGH (ref 0–0.2)
RBC # BLD: 4.95 M/UL — SIGNIFICANT CHANGE UP (ref 4.2–5.8)
RBC # FLD: 13.9 % — SIGNIFICANT CHANGE UP (ref 10.3–14.5)
SODIUM SERPL-SCNC: 137 MMOL/L — SIGNIFICANT CHANGE UP (ref 135–145)
SODIUM UR-SCNC: 27 MMOL/L — SIGNIFICANT CHANGE UP
TROPONIN T, HIGH SENSITIVITY RESULT: 138 NG/L — CRITICAL HIGH
TROPONIN T, HIGH SENSITIVITY RESULT: 139 NG/L — CRITICAL HIGH
WBC # BLD: 6.38 K/UL — SIGNIFICANT CHANGE UP (ref 3.8–10.5)
WBC # FLD AUTO: 6.38 K/UL — SIGNIFICANT CHANGE UP (ref 3.8–10.5)

## 2025-03-02 RX ADMIN — AMLODIPINE BESYLATE 10 MILLIGRAM(S): 10 TABLET ORAL at 05:50

## 2025-03-02 RX ADMIN — LABETALOL HYDROCHLORIDE 300 MILLIGRAM(S): 200 TABLET, FILM COATED ORAL at 13:57

## 2025-03-02 RX ADMIN — LABETALOL HYDROCHLORIDE 300 MILLIGRAM(S): 200 TABLET, FILM COATED ORAL at 05:50

## 2025-03-02 RX ADMIN — Medication 75 MILLIGRAM(S): at 21:24

## 2025-03-02 RX ADMIN — Medication 75 MILLIGRAM(S): at 13:57

## 2025-03-02 RX ADMIN — Medication 75 MILLIGRAM(S): at 05:50

## 2025-03-02 RX ADMIN — LABETALOL HYDROCHLORIDE 300 MILLIGRAM(S): 200 TABLET, FILM COATED ORAL at 21:24

## 2025-03-02 NOTE — CONSULT NOTE ADULT - SUBJECTIVE AND OBJECTIVE BOX
PATIENT SEEN AND EXAMINED BY MARIPOSA GRISSOM M.D. ON :-02/28/2025  DATE OF SERVICE:    02/28/2025         Interim events noted,Labs ,Radiological studies and Cardiology tests reviewed .      History of Present Illness:   38 y/o M with pmh of HTN presents to ED with elevated BP.  Pt reports he ran out of his medications about 1 week ago.  He saw his PCP for routine physical yesterday, and was found to have BP of 200/140.  He was advised to go to the ED yesterday, but was hesitant at first, and came today.  Pt reports he has not had chest pain, headache, palpitations, dyspnea, swelling or blurred vision.      In the ED, pt had BP of 234/171.  He was given PO labetalol, hydralazine, and amlodipine         Review of Systems:  Other Review of Systems: All other review of systems negative, except as noted in HPI       Allergies and Intolerances:        Allergies:  	No Known Allergies:     Home Medications:   * Patient Currently Takes Medications as of 01-Mar-2025 00:27 documented in Structured Notes  · 	hydrALAZINE 25 mg oral tablet: Last Dose Taken:  , 3 tab(s) orally every 8 hours  · 	amLODIPine 10 mg oral tablet: Last Dose Taken:  , 1 tab(s) orally once a day  · 	labetalol 300 mg oral tablet: Last Dose Taken:  , 1 tab(s) orally every 8 hours    Patient History:   Past Medical, Past Surgical, and Family History:  PAST MEDICAL HISTORY:  High cholesterol     Hypertension.     FAMILY HISTORY:  FH: diabetes mellitus  FH: hypertension.    Social History:  · Substance use	No     Tobacco Screening:  · Core Measure Site	No    Risk Assessment:   Present on Admission:  Deep Venous Thrombosis	no   Pulmonary Embolus	no     HIV Screening:  · In accordance with [place] State law, we offer every patient who comes to our ED an HIV test. Would you like to be tested today?	Opt out     Hepatitis C Test Questions:  · In accordance with [place] State Law, we offer every patient a Hepatitis C test. Would you like to be tested today?	Opt out    Physical Exam:   GENERAL: No Acute Distress  EYES: conjunctiva and sclera clear  ENMT: Moist mucous membranes   NECK: Supple  PULMONARY: Clear to auscultation bilaterally  CARDIAC: Regular rate and rhythm; No murmurs, rubs, or gallops  GASTROINTESTINAL: Abdomen soft, Nontender, Nondistended; Bowel sounds normal  EXTREMITIES:   No clubbing, cyanosis, or pedal edema  PSYCH: Normal Affect, Normal Behavior  NEUROLOGY:   - Mental status A&O x 3  SKIN: No rashes or lesions  MUSCULOSKELETAL: No joint swelling      Vital Signs Last 24 Hrs  T(C): 36.6 (01 Mar 2025 00:47), Max: 37.1 (28 Feb 2025 16:18)  T(F): 97.8 (01 Mar 2025 00:47), Max: 98.8 (28 Feb 2025 16:18)  HR: 89 (01 Mar 2025 00:47) (76 - 97)  BP: 172/101 (01 Mar 2025 00:47) (131/76 - 234/171)  RR: 18 (01 Mar 2025 00:47) (16 - 20)  SpO2: 100% (01 Mar 2025 00:47) (98% - 100%)    Parameters below as of 01 Mar 2025 00:47  Patient On (Oxygen Delivery Method): room air      LABS:                15.4   6.32  )-----------( 289      ( 28 Feb 2025 13:42 )             48.1     140  |  102  |  22  ----------------------------<  95     02-28  4.9   |  25  |  2.02[H]    Ca    10.0      28 Feb 2025 13:42    TPro  8.6[H]  /  Alb  4.4  /  TBili  0.4  /  DBili  x   /  AST  33  /  ALT  27  /  AlkPhos  85  02-28                hs Troponin, T - 45 ng/L (02-28-25 @ 19:05)  hs Troponin, T - 41 ng/L (02-28-25 @ 17:17)  hs Troponin, T - 37 ng/L (02-28-25 @ 13:42)         ECG:  Normal sinus rhythm  Voltage criteria for left ventricular hypertrophy  ST elevation, consider early repolarization
[facility] NEPHROLOGY- CONSULTATION NOTE    39y Male with history of below presents with uncontrolled HTN. Nephrology consulted for elevated Scr.    Patient with h/o MEHDI in 2022 with Scr decreased to 1.5 on discharge. Patient does not follow with a nephrologist and is unaware of h/o renal failure, proteinuria, microscopic hematuria. Patient with h/o HTN for 3 years. Patient referred by PMD for uncontrolled HTN as patient had ran out of anti-hypertensive medications. Home medications restarted with improvement in BP.    Patient denies any h/o hepatitis, HIV, IVDA, tattoos, herbal medications, chronic NSAIDS and kidney stones.    REVIEW OF SYSTEMS:  Gen: no fevers  HEENT: no rhinorrhea  Neck: no sore throat  Cards: no chest pain  Resp: no dyspnea  GI: no nausea or vomiting or diarrhea  : no dysuria or hematuria  Vascular: no LE edema  Derm: no rashes  Neuro: no numbness/tingling    No Known Allergies      Home Medications Reviewed  Hospital Medications:   MEDICATIONS  (STANDING):  amLODIPine   Tablet 10 milliGRAM(s) Oral daily  hydrALAZINE 75 milliGRAM(s) Oral every 8 hours  influenza   Vaccine 0.5 milliLiter(s) IntraMuscular once  labetalol 300 milliGRAM(s) Oral every 8 hours      PAST MEDICAL & SURGICAL HISTORY:  High cholesterol      Hypertension          FAMILY HISTORY:  FH: diabetes mellitus    FH: hypertension        SOCIAL HISTORY:  Denies toxic substance use     VITALS:  T(F): 99 (03-02-25 @ 10:09), Max: 99 (03-02-25 @ 10:09)  HR: 88 (03-02-25 @ 13:56)  BP: 144/84 (03-02-25 @ 13:56)  RR: 18 (03-02-25 @ 13:56)  SpO2: 100% (03-02-25 @ 13:56)  Wt(kg): --        PHYSICAL EXAM:  Gen: NAD, calm  HEENT: MMM  Neck: no JVD  Cards: RRR, +S1/S2, no M/G/R  Resp: CTA B/L  GI: soft, NT/ND, NABS  : no CVA tenderness  Vascular: no LE edema B/L  Derm: no rashes  Neuro: non-focal    LABS:  03-02    137  |  103  |  21  ----------------------------<  95  3.5   |  22  |  2.00[H]    Ca    8.8      02 Mar 2025 05:45  Phos  3.1     03-02  Mg     2.00     03-02      Creatinine Trend: 2.00 <--, 1.91 <--, 2.02 <--                        13.2   6.38  )-----------( 248      ( 02 Mar 2025 05:45 )             40.5     Urine Studies:  Urinalysis Basic - ( 02 Mar 2025 05:45 )    Color:  / Appearance:  / SG:  / pH:   Gluc: 95 mg/dL / Ketone:   / Bili:  / Urobili:    Blood:  / Protein:  / Nitrite:    Leuk Esterase:  / RBC:  / WBC    Sq Epi:  / Non Sq Epi:  / Bacteria:           RADIOLOGY & ADDITIONAL STUDIES:    < from:  Kidney and Bladder (03.01.25 @ 09:18) >  FINDINGS:  Right kidney: 10.5 cm. No renal mass, hydronephrosis or calculi.    Left kidney: 11.0 cm. No renal mass, hydronephrosis or calculi.    Urinary bladder: Minimally distended.    Prostate: Within normal limits in size. Calculated volume of   approximately 24 mL.    Miscellaneous: Left pleural effusion.    IMPRESSION:  No hydronephrosis.    Left pleural effusion.    --- End of Report ---    < end of copied text >

## 2025-03-02 NOTE — CONSULT NOTE ADULT - ASSESSMENT
39y Male with history of HTN presents with uncontrolled HTN. Nephrology consulted for elevated Scr.    1) MEHDI: Unclear if patient with MEHDI thought to be hemodynamically mediated versus progressive CKD as no recent baseline Scr available. UA bland with proteinuria. Check urine sodium and urine creatinine. Renal US without any evidence of obstruction. Continue with supportive care. Avoid nephrotoxins.    2) CKD-3a: likely due to HTN. Mj Scr 1.5 as per labs in 2022 however unclear if patient with progression since that time. Check spot urine TP/CR given proteinuria on UA. Serological work up pending results. Will likely treat with SGLT2 inhibitor on discharge. Monitor electrolytes.    3) HTN with CKD: BP controlled. Continue with current medications.    4) Abnormal EKG: Follow up pending TTE and NST.      [facility] NEPHROLOGY  Vitaly Shultz M.D.  Fidencio Young D.O.  Marj Heaton M.D.  MD Karli Snow, MSN, ANP-C    Telephone: [phone]  Facsimile: [phone] [place] [address], [address]  [place] [id number]

## 2025-03-03 ENCOUNTER — RESULT REVIEW (OUTPATIENT)
Age: 40
End: 2025-03-03

## 2025-03-03 LAB
ANION GAP SERPL CALC-SCNC: 10 MMOL/L — SIGNIFICANT CHANGE UP (ref 7–14)
BASOPHILS # BLD AUTO: 0.02 K/UL — SIGNIFICANT CHANGE UP (ref 0–0.2)
BASOPHILS NFR BLD AUTO: 0.3 % — SIGNIFICANT CHANGE UP (ref 0–2)
BUN SERPL-MCNC: 19 MG/DL — SIGNIFICANT CHANGE UP (ref 7–23)
CALCIUM SERPL-MCNC: 8.9 MG/DL — SIGNIFICANT CHANGE UP (ref 8.4–10.5)
CHLORIDE SERPL-SCNC: 104 MMOL/L — SIGNIFICANT CHANGE UP (ref 98–107)
CO2 SERPL-SCNC: 23 MMOL/L — SIGNIFICANT CHANGE UP (ref 22–31)
CREAT SERPL-MCNC: 2 MG/DL — HIGH (ref 0.5–1.3)
EGFR: 43 ML/MIN/1.73M2 — LOW
EGFR: 43 ML/MIN/1.73M2 — LOW
EOSINOPHIL # BLD AUTO: 0.04 K/UL — SIGNIFICANT CHANGE UP (ref 0–0.5)
EOSINOPHIL NFR BLD AUTO: 0.6 % — SIGNIFICANT CHANGE UP (ref 0–6)
GLUCOSE SERPL-MCNC: 109 MG/DL — HIGH (ref 70–99)
HCT VFR BLD CALC: 42.7 % — SIGNIFICANT CHANGE UP (ref 39–50)
HGB BLD-MCNC: 13.8 G/DL — SIGNIFICANT CHANGE UP (ref 13–17)
IANC: 5.1 K/UL — SIGNIFICANT CHANGE UP (ref 1.8–7.4)
IMM GRANULOCYTES NFR BLD AUTO: 0.4 % — SIGNIFICANT CHANGE UP (ref 0–0.9)
LYMPHOCYTES # BLD AUTO: 1.26 K/UL — SIGNIFICANT CHANGE UP (ref 1–3.3)
LYMPHOCYTES # BLD AUTO: 17.9 % — SIGNIFICANT CHANGE UP (ref 13–44)
MAGNESIUM SERPL-MCNC: 2 MG/DL — SIGNIFICANT CHANGE UP (ref 1.6–2.6)
MCHC RBC-ENTMCNC: 26.8 PG — LOW (ref 27–34)
MCHC RBC-ENTMCNC: 32.3 G/DL — SIGNIFICANT CHANGE UP (ref 32–36)
MCV RBC AUTO: 83.1 FL — SIGNIFICANT CHANGE UP (ref 80–100)
MONOCYTES # BLD AUTO: 0.58 K/UL — SIGNIFICANT CHANGE UP (ref 0–0.9)
MONOCYTES NFR BLD AUTO: 8.3 % — SIGNIFICANT CHANGE UP (ref 2–14)
NEUTROPHILS # BLD AUTO: 5.1 K/UL — SIGNIFICANT CHANGE UP (ref 1.8–7.4)
NEUTROPHILS NFR BLD AUTO: 72.5 % — SIGNIFICANT CHANGE UP (ref 43–77)
NRBC # BLD AUTO: 0 K/UL — SIGNIFICANT CHANGE UP (ref 0–0)
NRBC # FLD: 0 K/UL — SIGNIFICANT CHANGE UP (ref 0–0)
NRBC BLD AUTO-RTO: 0 /100 WBCS — SIGNIFICANT CHANGE UP (ref 0–0)
PHOSPHATE SERPL-MCNC: 2.6 MG/DL — SIGNIFICANT CHANGE UP (ref 2.5–4.5)
PLATELET # BLD AUTO: 276 K/UL — SIGNIFICANT CHANGE UP (ref 150–400)
POTASSIUM SERPL-MCNC: 3.9 MMOL/L — SIGNIFICANT CHANGE UP (ref 3.5–5.3)
POTASSIUM SERPL-SCNC: 3.9 MMOL/L — SIGNIFICANT CHANGE UP (ref 3.5–5.3)
RBC # BLD: 5.14 M/UL — SIGNIFICANT CHANGE UP (ref 4.2–5.8)
RBC # FLD: 14.2 % — SIGNIFICANT CHANGE UP (ref 10.3–14.5)
SODIUM SERPL-SCNC: 137 MMOL/L — SIGNIFICANT CHANGE UP (ref 135–145)
TROPONIN T, HIGH SENSITIVITY RESULT: 166 NG/L — CRITICAL HIGH
TROPONIN T, HIGH SENSITIVITY RESULT: 169 NG/L — CRITICAL HIGH
TROPONIN T, HIGH SENSITIVITY RESULT: 177 NG/L — CRITICAL HIGH
WBC # BLD: 7.03 K/UL — SIGNIFICANT CHANGE UP (ref 3.8–10.5)
WBC # FLD AUTO: 7.03 K/UL — SIGNIFICANT CHANGE UP (ref 3.8–10.5)

## 2025-03-03 PROCEDURE — 93010 ELECTROCARDIOGRAM REPORT: CPT

## 2025-03-03 PROCEDURE — 93306 TTE W/DOPPLER COMPLETE: CPT | Mod: 26

## 2025-03-03 PROCEDURE — 93454 CORONARY ARTERY ANGIO S&I: CPT | Mod: 26

## 2025-03-03 RX ORDER — ACETAMINOPHEN 500 MG/5ML
1000 LIQUID (ML) ORAL ONCE
Refills: 0 | Status: COMPLETED | OUTPATIENT
Start: 2025-03-03 | End: 2025-03-03

## 2025-03-03 RX ADMIN — LABETALOL HYDROCHLORIDE 300 MILLIGRAM(S): 200 TABLET, FILM COATED ORAL at 05:07

## 2025-03-03 RX ADMIN — Medication 650 MILLIGRAM(S): at 17:56

## 2025-03-03 RX ADMIN — Medication 75 MILLIGRAM(S): at 05:07

## 2025-03-03 RX ADMIN — Medication 650 MILLIGRAM(S): at 16:56

## 2025-03-03 RX ADMIN — LABETALOL HYDROCHLORIDE 300 MILLIGRAM(S): 200 TABLET, FILM COATED ORAL at 21:01

## 2025-03-03 RX ADMIN — Medication 75 MILLILITER(S): at 12:17

## 2025-03-03 RX ADMIN — AMLODIPINE BESYLATE 10 MILLIGRAM(S): 10 TABLET ORAL at 05:07

## 2025-03-03 RX ADMIN — Medication 75 MILLIGRAM(S): at 21:02

## 2025-03-03 RX ADMIN — Medication 400 MILLIGRAM(S): at 23:05

## 2025-03-03 RX ADMIN — Medication 1000 MILLILITER(S): at 12:17

## 2025-03-03 RX ADMIN — Medication 75 MILLIGRAM(S): at 15:51

## 2025-03-03 RX ADMIN — LABETALOL HYDROCHLORIDE 300 MILLIGRAM(S): 200 TABLET, FILM COATED ORAL at 15:51

## 2025-03-03 RX ADMIN — Medication 100 MILLILITER(S): at 15:55

## 2025-03-03 NOTE — PROGRESS NOTE ADULT - PROBLEM/PLAN-1
H&P done.   KALYAN RIDLEY, OMAR - CNP on 9/28/2020 at 11:51 AM
DISPLAY PLAN FREE TEXT

## 2025-03-03 NOTE — CHART NOTE - NSCHARTNOTEFT_GEN_A_CORE
S/p cath. Right radial site without bleeding or hematoma. Dressing is clean, dry and intact. 2+ pulses. Continue to monitor.     Dayday Cifuentes NP-BC  [facility]  In-house pager #[phone]

## 2025-03-03 NOTE — PRE PROCEDURE NOTE - PRE PROCEDURE EVALUATION
Pre Procedural Sedation Evaluation    History and physical reviewed  Medications reviewed  Labs reviewed  EKG reviewed    Anticoagulation: NA  Dentures: None  Last PO intake: 3/3 at 0800    Pre-Procedure Physical Assessment  Mental status: Alert and oriented x 3  Level of consciousness: 1  Cardiac assessment: Stable  Pulmonary assessment: Stable  Obstructive sleep apnea: No  Aspiration risk: No  Mallampati score: 2  ASA Classification: 3  Prior Sedative or Anesthesia Experience: No complications  Informed consent by responsible adult: Yes  Based on today's assessment, anesthesia consult requested: No

## 2025-03-03 NOTE — PROGRESS NOTE ADULT - PROBLEM SELECTOR PLAN 1
- Restarted home meds of labetalol, hydralazine and amlodipine  reanl sono to r/o Renal artery stenosis

## 2025-03-04 ENCOUNTER — TRANSCRIPTION ENCOUNTER (OUTPATIENT)
Age: 40
End: 2025-03-04

## 2025-03-04 VITALS
RESPIRATION RATE: 18 BRPM | HEART RATE: 91 BPM | SYSTOLIC BLOOD PRESSURE: 138 MMHG | TEMPERATURE: 99 F | DIASTOLIC BLOOD PRESSURE: 96 MMHG | OXYGEN SATURATION: 100 %

## 2025-03-04 LAB — TROPONIN T, HIGH SENSITIVITY RESULT: 207 NG/L — CRITICAL HIGH

## 2025-03-04 RX ADMIN — Medication 75 MILLIGRAM(S): at 05:36

## 2025-03-04 RX ADMIN — LABETALOL HYDROCHLORIDE 300 MILLIGRAM(S): 200 TABLET, FILM COATED ORAL at 05:36

## 2025-03-04 RX ADMIN — AMLODIPINE BESYLATE 10 MILLIGRAM(S): 10 TABLET ORAL at 05:37

## 2025-03-04 RX ADMIN — Medication 1000 MILLIGRAM(S): at 00:00

## 2025-03-04 NOTE — DISCHARGE NOTE PROVIDER - CARE PROVIDER_API CALL
ARNEL VERA  Phone: [phone]  Fax: [phone]  Follow Up Time: 1 week    Wai Mai  Cardiology  [address] [place] [id number]  Phone: [phone]  Fax: [phone]  Follow Up Time: 1 week

## 2025-03-04 NOTE — DISCHARGE NOTE PROVIDER - NSDCCPCAREPLAN_GEN_ALL_CORE_FT
PRINCIPAL DISCHARGE DIAGNOSIS  Diagnosis: Elevated blood pressure reading  Assessment and Plan of Treatment: Continue blood pressure medication regimen as directed. Monitor for any visual changes, headaches or dizziness.  Monitor blood pressure regularly.  Follow up with your PCP for further management for high blood pressure.      SECONDARY DISCHARGE DIAGNOSES  Diagnosis: MEHDI (acute kidney injury)  Assessment and Plan of Treatment:   Please continue hydration and follow up with your PCP.  - You presented with Elevated Creatinine unknown baseline  - Chronic kidney injury likely multifactorial due to severe dehydration , Hypertension.  - renal ultrasound was negative. In order to prevent further disease progression, continue to follow recommendations made by your primary provider/nephrologist. Continue a diet that is low in sodium and avoid foods that are concentrated in potassium and phosphorus. Continue your medications/supplementations as directed and avoid over-the-counter drugs that are harmful to kidneys, such as, Non-Steroidal Anti-Inflammatory Drugs (NSAIDs). Follow-up as outpatient to monitor your kidney function, as well as, vitamin D, Calcium, potassium, and phosphorus levels..     - Please continue oral hydration as much as possible within the daily drinking limit of 2 L per day  - nephrologist was consulted   - You are medically stable to be discharged from the hospital.  - You need to follow up with your Primary Care Physician and Your Urologist to get blood work and check your Creatinine level  in 1 week.  - You need to continue your medications regularly as prescribed.

## 2025-03-04 NOTE — DISCHARGE NOTE PROVIDER - NSDCMRMEDTOKEN_GEN_ALL_CORE_FT
amLODIPine 10 mg oral tablet: 1 tab(s) orally once a day  hydrALAZINE 100 mg oral tablet: 1 tab(s) orally 3 times a day  labetalol 300 mg oral tablet: 1 tab(s) orally every 8 hours

## 2025-03-04 NOTE — PROGRESS NOTE ADULT - PROVIDER SPECIALTY LIST ADULT
Cardiology
Internal Medicine
Nephrology
Cardiology
Internal Medicine
Internal Medicine

## 2025-03-04 NOTE — DISCHARGE NOTE NURSING/CASE MANAGEMENT/SOCIAL WORK - PATIENT PORTAL LINK FT
You can access the FollowMyHealth Patient Portal offered by [facility] by registering at the following website: [email or web address]. By joining [facility] FollowMyHealth portal, you will also be able to view your health information using other applications (apps) compatible with our system.

## 2025-03-04 NOTE — DISCHARGE NOTE NURSING/CASE MANAGEMENT/SOCIAL WORK - FINANCIAL ASSISTANCE
[facility] provides services at a reduced cost to those who are determined to be eligible through [facility]’s financial assistance program. Information regarding [facility]’s financial assistance program can be found by going to [email or web address] or by calling [phone].

## 2025-03-04 NOTE — PROGRESS NOTE ADULT - SUBJECTIVE AND OBJECTIVE BOX
SUBJECTIVE / OVERNIGHT EVENTS:pt seen and examined  03-03-25     MEDICATIONS  (STANDING):  amLODIPine   Tablet 10 milliGRAM(s) Oral daily  hydrALAZINE 75 milliGRAM(s) Oral every 8 hours  influenza   Vaccine 0.5 milliLiter(s) IntraMuscular once  labetalol 300 milliGRAM(s) Oral every 8 hours  sodium chloride 0.9%. 500 milliLiter(s) (75 mL/Hr) IV Continuous <Continuous>  sodium chloride 0.9%. 500 milliLiter(s) (75 mL/Hr) IV Continuous <Continuous>  sodium chloride 0.9%. 1000 milliLiter(s) (100 mL/Hr) IV Continuous <Continuous>    MEDICATIONS  (PRN):  acetaminophen     Tablet .. 650 milliGRAM(s) Oral every 6 hours PRN Temp greater or equal to 38C (100.4F), Mild Pain (1 - 3), Moderate Pain (4 - 6)      Constitutional: No fever, fatigue  Skin: No rash.  Eyes: No recent vision problems or eye pain.  ENT: No congestion, ear pain, or sore throat.  Cardiovascular: No chest pain or palpation.  Vital Signs Last 24 Hrs  T(C): 37.1 (03-03-25 @ 20:54), Max: 37.2 (03-03-25 @ 05:00)  T(F): 98.8 (03-03-25 @ 20:54), Max: 98.9 (03-03-25 @ 05:00)  HR: 83 (03-03-25 @ 20:54) (80 - 94)  BP: 132/72 (03-03-25 @ 20:54) (132/72 - 177/86)  BP(mean): --  RR: 17 (03-03-25 @ 20:54) (16 - 19)  SpO2: 99% (03-03-25 @ 20:54) (97% - 100%)    Respiratory: No cough, shortness of breath, congestion, or wheezing.  Gastrointestinal: No abdominal pain, nausea, vomiting, or diarrhea.  Genitourinary: No dysuria.  Musculoskeletal: No joint swelling.  Neurologic: No headache.    PHYSICAL EXAM:  GENERAL: NAD  EYES: EOMI, PERRLA  NECK: Supple, No JVD  CHEST/LUNG: dec breath soundsa t bases  HEART:  S1 , S2 +  ABDOMEN: soft , bs+  EXTREMITIES:  trace edema  NEUROLOGY:alert awake      LABS:  03-03    137  |  104  |  19  ----------------------------<  109[H]  3.9   |  23  |  2.00[H]    Ca    8.9      03 Mar 2025 22:11  Phos  2.6     03-03  Mg     2.00     03-03      Creatinine Trend: 2.00 <--, 2.00 <--, 1.91 <--, 2.02 <--                        13.8   7.03  )-----------( 276      ( 03 Mar 2025 22:11 )             42.7     Urine Studies:  Urinalysis Basic - ( 03 Mar 2025 22:11 )    Color:  / Appearance:  / SG:  / pH:   Gluc: 109 mg/dL / Ketone:   / Bili:  / Urobili:    Blood:  / Protein:  / Nitrite:    Leuk Esterase:  / RBC:  / WBC    Sq Epi:  / Non Sq Epi:  / Bacteria:       Creatinine, Random Urine: 402 mg/dL (03-02 @ 19:28)  Protein/Creatinine Ratio Calculation: 0.3 Ratio (03-02 @ 19:28)  Sodium, Random Urine: 27 mmol/L (03-02 @ 19:28)  Creatinine, Random Urine: 406 mg/dL (03-02 @ 19:28)                                RADIOLOGY & ADDITIONAL TESTS:    Imaging Personally Reviewed:    Consultant(s) Notes Reviewed:      Care Discussed with Consultants/Other Providers:  
PATIENT SEEN AND EXAMINED BY MARIPOSA GRISSOM M.D. ON :-2025  DATE OF SERVICE:     2025        Interim events noted,Labs ,Radiological studies and Cardiology tests reviewed .      History of Present Illness:   40 y/o M with pmh of HTN presents to ED with elevated BP.  Pt reports he ran out of his medications about 1 week ago.  He saw his PCP for routine physical yesterday, and was found to have BP of 200/140.  He was advised to go to the ED yesterday, but was hesitant at first, and came today.  Pt reports he has not had chest pain, headache, palpitations, dyspnea, swelling or blurred vision.      In the ED, pt had BP of 234/171.  He was given PO labetalol, hydralazine, and amlodipine           SUBJECTIVE / OVERNIGHT EVENTS:pt seen and examined  Awake alert no dyspnea chest pain BP is better albeit noted troponin elevation        MEDICATIONS  (STANDING):  amLODIPine   Tablet 10 milliGRAM(s) Oral daily  hydrALAZINE 75 milliGRAM(s) Oral every 8 hours  influenza   Vaccine 0.5 milliLiter(s) IntraMuscular once  labetalol 300 milliGRAM(s) Oral every 8 hours    MEDICATIONS  (PRN):  acetaminophen     Tablet .. 650 milliGRAM(s) Oral every 6 hours PRN Temp greater or equal to 38C (100.4F), Mild Pain (1 - 3), Moderate Pain (4 - 6)    VITALS:  T(C): 36.8 (25 @ 18:02), Max: 36.8 (25 @ 04:41)  HR: 92 (25 @ 18:02) (86 - 92)  BP: 141/75 (25 @ 18:02) (114/66 - 185/118)  RR: 18 (25 @ 18:02) (16 - 18)  SpO2: 97% (25 @ 18:02) (97% - 100%)    PHYSICAL EXAMINATION:  GENERAL: NAD  EYES: EOMI, PERRLA  NECK: Supple, No JVD  CHEST/LUNG: dec breath soundsa t bases  HEART:  S1 , S2 +  ABDOMEN: soft , bs+  EXTREMITIES:  trace edema  NEUROLOGY:alert awake    LABS:                        14.1   8.45  )-----------( 272      ( 01 Mar 2025 05:50 )             43.0     03-01    137  |  100  |  20  ----------------------------<  100[H]  3.3[L]   |  23  |  1.91[H]    Ca    9.2      01 Mar 2025 05:50  Phos  4.0     03-  Mg     2.10     -    TPro  8.6[H]  /  Alb  4.4  /  TBili  0.4  /  DBili  x   /  AST  33  /  ALT  27  /  AlkPhos  85            Urinalysis Basic - ( 01 Mar 2025 11:45 )    Color: Dark Yellow / Appearance: Clear / S.029 / pH: x  Gluc: x / Ketone: Trace mg/dL  / Bili: Negative / Urobili: 1.0 mg/dL   Blood: x / Protein: 300 mg/dL / Nitrite: Negative   Leuk Esterase: Negative / RBC: 1 /HPF / WBC 2 /HPF   Sq Epi: x / Non Sq Epi: 3 /HPF / Bacteria: Negative /HPF      Transthoracic Echocardiogram (10.06.22 @ 16:14) >  CONCLUSIONS:  1. Normal mitral valve. Minimal mitral regurgitation.  2. Mild concentric left ventricular hypertrophy.  3. Normal left ventricular systolic function. No segmental wall motion abnormalities.  4. Normal right ventricular size and function.          
  PATIENT SEEN AND EXAMINED BY MARIPOSA GRISSOM M.D. ON :-03/02/2025  DATE OF SERVICE:      03/02/2025       Interim events noted,Labs ,Radiological studies and Cardiology tests reviewed .      History of Present Illness:   38 y/o M with pmh of HTN presents to ED with elevated BP.  Pt reports he ran out of his medications about 1 week ago.  He saw his PCP for routine physical yesterday, and was found to have BP of 200/140.  He was advised to go to the ED yesterday, but was hesitant at first, and came today.  Pt reports he has not had chest pain, headache, palpitations, dyspnea, swelling or blurred vision.      In the ED, pt had BP of 234/171.  He was given PO labetalol, hydralazine, and amlodipine           SUBJECTIVE / OVERNIGHT EVENTS:pt seen and examined  Awake alert no dyspnea chest pain BP is better albeit noted troponin elevation      MEDICATIONS  (STANDING):  amLODIPine   Tablet 10 milliGRAM(s) Oral daily  hydrALAZINE 75 milliGRAM(s) Oral every 8 hours  influenza   Vaccine 0.5 milliLiter(s) IntraMuscular once  labetalol 300 milliGRAM(s) Oral every 8 hours    MEDICATIONS  (PRN):  acetaminophen     Tablet .. 650 milliGRAM(s) Oral every 6 hours PRN Temp greater or equal to 38C (100.4F), Mild Pain (1 - 3), Moderate Pain (4 - 6)    Vital Signs Last 24 Hrs  T(C): 37.2 (03-02-25 @ 10:09), Max: 37.2 (03-02-25 @ 05:45)  T(F): 99 (03-02-25 @ 10:09), Max: 99 (03-02-25 @ 10:09)  HR: 88 (03-02-25 @ 13:56) (82 - 93)  BP: 144/84 (03-02-25 @ 13:56) (125/71 - 144/84)  RR: 18 (03-02-25 @ 13:56) (17 - 20)  SpO2: 100% (03-02-25 @ 13:56) (98% - 100%)      Constitutional: No fever, fatigue  Skin: No rash.  Eyes: No recent vision problems or eye pain.  ENT: No congestion, ear pain, or sore throat.  Cardiovascular: No chest pain or palpation.  Respiratory: No cough, shortness of breath, congestion, or wheezing.  Gastrointestinal: No abdominal pain, nausea, vomiting, or diarrhea.  Genitourinary: No dysuria.  Musculoskeletal: No joint swelling.  Neurologic: No headache.    PHYSICAL EXAM:  GENERAL: NAD  EYES: EOMI, PERRLA  NECK: Supple, No JVD  CHEST/LUNG: dec breath soundsa t bases  HEART:  S1 , S2 +  ABDOMEN: soft , bs+  EXTREMITIES:  trace edema  NEUROLOGY:alert awake      LABS:  03-02    137  |  103  |  21  ----------------------------<  95  3.5   |  22  |  2.00[H]    Ca    8.8      02 Mar 2025 05:45  Phos  3.1     03-02  Mg     2.00     03-02      Creatinine Trend: 2.00 <--, 1.91 <--, 2.02 <--                        13.2   6.38  )-----------( 248      ( 02 Mar 2025 05:45 )             40.5     Urine Studies:  Urinalysis Basic - ( 02 Mar 2025 05:45 )    Color:  / Appearance:  / SG:  / pH:   Gluc: 95 mg/dL / Ketone:   / Bili:  / Urobili:    Blood:  / Protein:  / Nitrite:    Leuk Esterase:  / RBC:  / WBC    Sq Epi:  / Non Sq Epi:  / Bacteria:                   
[facility] NEPHROLOGY- PROGRESS NOTE    39y Male with history of HTN presents with uncontrolled HTN. Nephrology consulted for elevated Scr.    REVIEW OF SYSTEMS:  Gen: no fevers  Cards: no chest pain  Resp: no dyspnea  GI: no nausea or vomiting or diarrhea  Vascular: no LE edema    No Known Allergies      Hospital Medications: Medications reviewed    VITALS:  T(F): 98.4 (03-03-25 @ 11:11), Max: 98.9 (03-03-25 @ 05:00)  HR: 84 (03-03-25 @ 11:11)  BP: 141/87 (03-03-25 @ 11:11)  RR: 16 (03-03-25 @ 11:11)  SpO2: 99% (03-03-25 @ 05:00)  Wt(kg): --  Height (cm): 185.4 (02-28 @ 12:25)  Weight (kg): 108.9 (02-28 @ 12:25)  BMI (kg/m2): 31.7 (02-28 @ 12:25)  BSA (m2): 2.33 (02-28 @ 12:25)    03-02 @ 07:01  -  03-03 @ 07:00  --------------------------------------------------------  IN: 0 mL / OUT: 0 mL / NET: 0 mL        PHYSICAL EXAM:    Gen: NAD, calm  Cards: RRR, +S1/S2, no M/G/R  Resp: CTA B/L  GI: soft, NT/ND, NABS  Vascular: no LE edema B/L    LABS:  03-02    137  |  103  |  21  ----------------------------<  95  3.5   |  22  |  2.00[H]    Ca    8.8      02 Mar 2025 05:45  Phos  3.1     03-02  Mg     2.00     03-02      Creatinine Trend: 2.00 <--, 1.91 <--, 2.02 <--                        13.2   6.38  )-----------( 248      ( 02 Mar 2025 05:45 )             40.5     Urine Studies:  Urinalysis Basic - ( 02 Mar 2025 05:45 )    Color:  / Appearance:  / SG:  / pH:   Gluc: 95 mg/dL / Ketone:   / Bili:  / Urobili:    Blood:  / Protein:  / Nitrite:    Leuk Esterase:  / RBC:  / WBC    Sq Epi:  / Non Sq Epi:  / Bacteria:       Creatinine, Random Urine: 402 mg/dL (03-02 @ 19:28)  Protein/Creatinine Ratio Calculation: 0.3 Ratio (03-02 @ 19:28)  Sodium, Random Urine: 27 mmol/L (03-02 @ 19:28)  Creatinine, Random Urine: 406 mg/dL (03-02 @ 19:28)      RADIOLOGY & ADDITIONAL STUDIES:
    SUBJECTIVE / OVERNIGHT EVENTS:pt seen and examined  25     MEDICATIONS  (STANDING):  amLODIPine   Tablet 10 milliGRAM(s) Oral daily  hydrALAZINE 75 milliGRAM(s) Oral every 8 hours  influenza   Vaccine 0.5 milliLiter(s) IntraMuscular once  labetalol 300 milliGRAM(s) Oral every 8 hours    MEDICATIONS  (PRN):  acetaminophen     Tablet .. 650 milliGRAM(s) Oral every 6 hours PRN Temp greater or equal to 38C (100.4F), Mild Pain (1 - 3), Moderate Pain (4 - 6)    T(C): 36.8 (25 @ 18:02), Max: 36.8 (25 @ 04:41)  HR: 92 (25 @ 18:02) (86 - 92)  BP: 141/75 (25 @ 18:02) (114/66 - 185/118)  RR: 18 (25 @ 18:02) (16 - 18)  SpO2: 97% (25 @ 18:02) (97% - 100%)    CAPILLARY BLOOD GLUCOSE        I&O's Summary      Constitutional: No fever, fatigue  Skin: No rash.  Eyes: No recent vision problems or eye pain.  ENT: No congestion, ear pain, or sore throat.  Cardiovascular: No chest pain or palpation.  Respiratory: No cough, shortness of breath, congestion, or wheezing.  Gastrointestinal: No abdominal pain, nausea, vomiting, or diarrhea.  Genitourinary: No dysuria.  Musculoskeletal: No joint swelling.  Neurologic: No headache.    PHYSICAL EXAM:  GENERAL: NAD  EYES: EOMI, PERRLA  NECK: Supple, No JVD  CHEST/LUNG: dec breath soundsa t bases  HEART:  S1 , S2 +  ABDOMEN: soft , bs+  EXTREMITIES:  trace edema  NEUROLOGY:alert awake      LABS:                        14.1   8.45  )-----------( 272      ( 01 Mar 2025 05:50 )             43.0     -    137  |  100  |  20  ----------------------------<  100[H]  3.3[L]   |  23  |  1.91[H]    Ca    9.2      01 Mar 2025 05:50  Phos  4.0     -  Mg     2.10     03-    TPro  8.6[H]  /  Alb  4.4  /  TBili  0.4  /  DBili  x   /  AST  33  /  ALT  27  /  AlkPhos  85            Urinalysis Basic - ( 01 Mar 2025 11:45 )    Color: Dark Yellow / Appearance: Clear / S.029 / pH: x  Gluc: x / Ketone: Trace mg/dL  / Bili: Negative / Urobili: 1.0 mg/dL   Blood: x / Protein: 300 mg/dL / Nitrite: Negative   Leuk Esterase: Negative / RBC: 1 /HPF / WBC 2 /HPF   Sq Epi: x / Non Sq Epi: 3 /HPF / Bacteria: Negative /HPF        RADIOLOGY & ADDITIONAL TESTS:    Imaging Personally Reviewed:    Consultant(s) Notes Reviewed:      Care Discussed with Consultants/Other Providers:  
Patient is a 39y old  Male who presents with a chief complaint of Elevated BP (03 Mar 2025 12:15)      HPI:  40 y/o M with pmh of HTN presents to ED with elevated BP.  Pt reports he ran out of his medications about 1 week ago.  He saw his PCP for routine physical yesterday, and was found to have BP of 200/140.  He was advised to go to the ED yesterday, but was hesitant at first, and came today.  Pt reports he has not had chest pain, headache, palpitations, dyspnea, swelling or blurred vision.      In the ED, pt had BP of 234/171.  He was given PO labetalol, hydralazine, and amlodipine  (28 Feb 2025 23:57)      PAST MEDICAL & SURGICAL HISTORY:  High cholesterol      Hypertension          PREVIOUS DIAGNOSTIC TESTING:      ECHO  FINDINGS:    STRESS  FINDINGS:    CATHETERIZATION  FINDINGS:    MEDICATIONS  (STANDING):  amLODIPine   Tablet 10 milliGRAM(s) Oral daily  hydrALAZINE 75 milliGRAM(s) Oral every 8 hours  influenza   Vaccine 0.5 milliLiter(s) IntraMuscular once  labetalol 300 milliGRAM(s) Oral every 8 hours  sodium chloride 0.9%. 500 milliLiter(s) (75 mL/Hr) IV Continuous <Continuous>  sodium chloride 0.9%. 500 milliLiter(s) (75 mL/Hr) IV Continuous <Continuous>  sodium chloride 0.9%. 1000 milliLiter(s) (100 mL/Hr) IV Continuous <Continuous>    MEDICATIONS  (PRN):  acetaminophen     Tablet .. 650 milliGRAM(s) Oral every 6 hours PRN Temp greater or equal to 38C (100.4F), Mild Pain (1 - 3), Moderate Pain (4 - 6)      FAMILY HISTORY:  FH: diabetes mellitus    FH: hypertension        SOCIAL HISTORY:    CIGARETTES:    ALCOHOL:    REVIEW OF SYSTEMS:  CONSTITUTIONAL: No fever, weight loss, or fatigue  EYES: No eye pain, visual disturbances, or discharge  ENMT:  No difficulty hearing, tinnitus, vertigo; No sinus or throat pain  NECK: No pain or stiffness  RESPIRATORY: No cough, wheezing, chills or hemoptysis; No shortness of breath  CARDIOVASCULAR: No chest pain, palpitations, dizziness, or leg swelling  GASTROINTESTINAL: No abdominal or epigastric pain. No nausea, vomiting, or hematemesis; No diarrhea or constipation. No melena or hematochezia.  GENITOURINARY: No dysuria, frequency, hematuria, or incontinence  NEUROLOGICAL: No headaches, memory loss, loss of strength, numbness, or tremors  SKIN: No itching, burning, rashes, or lesions   LYMPH NODES: No enlarged glands  ENDOCRINE: No heat or cold intolerance; No hair loss  MUSCULOSKELETAL: No joint pain or swelling; No muscle, back, or extremity pain  PSYCHIATRIC: No depression, anxiety, mood swings, or difficulty sleeping  HEME/LYMPH: No easy bruising, or bleeding gums  ALLERY AND IMMUNOLOGIC: No hives or eczema    Vital Signs Last 24 Hrs  T(C): 36.7 (03 Mar 2025 14:40), Max: 37.2 (03 Mar 2025 05:00)  T(F): 98 (03 Mar 2025 14:40), Max: 98.9 (03 Mar 2025 05:00)  HR: 82 (03 Mar 2025 15:00) (80 - 94)  BP: 149/96 (03 Mar 2025 15:00) (136/93 - 168/90)  BP(mean): --  RR: 18 (03 Mar 2025 15:00) (16 - 19)  SpO2: 100% (03 Mar 2025 15:00) (98% - 100%)    Parameters below as of 03 Mar 2025 15:00  Patient On (Oxygen Delivery Method): room air          PHYSICAL EXAM:  GENERAL: NAD, well-groomed, well-developed  HEAD:  Atraumatic, Normocephalic  EYES: EOMI, PERRLA, conjunctiva and sclera clear  ENMT: No tonsillar erythema, exudates, or enlargement; Moist mucous membranes, Good dentition, No lesions  NECK: Supple, No JVD, Normal thyroid  NERVOUS SYSTEM:  Alert & Oriented X3, Good concentration; Motor Strength 5/5 B/L upper and lower extremities; DTRs 2+ intact and symmetric  CHEST/LUNG: Clear to percussion bilaterally; No rales, rhonchi, wheezing, or rubs  HEART: Regular rate and rhythm; No murmurs, rubs, or gallops  ABDOMEN: Soft, Nontender, Nondistended; Bowel sounds present  EXTREMITIES:  2+ Peripheral Pulses, No clubbing, cyanosis, or edema  LYMPH: No lymphadenopathy noted  SKIN: No rashes or lesions      INTERPRETATION OF TELEMETRY:    ECG:    [facility]:     LABS:                        13.2   6.38  )-----------( 248      ( 02 Mar 2025 05:45 )             40.5     03-02    137  |  103  |  21  ----------------------------<  95  3.5   |  22  |  2.00[H]    Ca    8.8      02 Mar 2025 05:45  Phos  3.1     03-02  Mg     2.00     03-02            Urinalysis Basic - ( 02 Mar 2025 05:45 )    Color: x / Appearance: x / SG: x / pH: x  Gluc: 95 mg/dL / Ketone: x  / Bili: x / Urobili: x   Blood: x / Protein: x / Nitrite: x   Leuk Esterase: x / RBC: x / WBC x   Sq Epi: x / Non Sq Epi: x / Bacteria: x      Lipid Panel:   I&O's Summary    02 Mar 2025 07:01  -  03 Mar 2025 07:00  --------------------------------------------------------  IN: 0 mL / OUT: 0 mL / NET: 0 mL        RADIOLOGY & ADDITIONAL STUDIES:
MR#[id number]  PATIENT NAME:SHI RIDDLE    DATE OF SERVICE: 03-04-25 @ 09:15  Patient was seen and examined by Wai Mai MD on    03-04-25 @ 09:15 .  Interim events noted.Consultant notes ,Labs,Telemetry reviewed by me       HOSPITAL COURSE: HPI:  38 y/o M with pmh of HTN presents to ED with elevated BP.  Pt reports he ran out of his medications about 1 week ago.  He saw his PCP for routine physical yesterday, and was found to have BP of 200/140.  He was advised to go to the ED yesterday, but was hesitant at first, and came today.  Pt reports he has not had chest pain, headache, palpitations, dyspnea, swelling or blurred vision.      In the ED, pt had BP of 234/171.  He was given PO labetalol, hydralazine, and amlodipine  (28 Feb 2025 23:57)      INTERIM EVENTS:Patient seen at bedside ,interim events noted.  Awake alert had cardiac cath Non obstructive CAD no dyspnea chest pain  BP controlled    PMH -reviewed admission note, no change since admission  HEART FAILURE: Acute[ ]Chronic[ ] Systolic[ ] Diastolic[ ] Combined Systolic and Diastolic[ ]  CAD[ ] CABG[ ] PCI[ ]  DEVICES[ ] PPM[ ] ICD[ ] ILR[ ]  ATRIAL FIBRILLATION[ ] Paroxysmal[ ] Permanent[ ] CHADS2-[  ]  MEHDI[ ] CKD1[ ] CKD2[ ] CKD3[ ] CKD4[ ] ESRD[ ]  COPD[ ] HTN[ x]   DM[ ] Type1[ ] Type 2[ ]   CVA[ ] Paresis[ ]    AMBULATION: Assisted[ ] Cane/walker[ ] Independent[x ]    MEDICATIONS  (STANDING):  amLODIPine   Tablet 10 milliGRAM(s) Oral daily  hydrALAZINE 75 milliGRAM(s) Oral every 8 hours  influenza   Vaccine 0.5 milliLiter(s) IntraMuscular once  labetalol 300 milliGRAM(s) Oral every 8 hours  sodium chloride 0.9%. 500 milliLiter(s) (75 mL/Hr) IV Continuous <Continuous>  sodium chloride 0.9%. 500 milliLiter(s) (75 mL/Hr) IV Continuous <Continuous>  sodium chloride 0.9%. 1000 milliLiter(s) (100 mL/Hr) IV Continuous <Continuous>    MEDICATIONS  (PRN):  acetaminophen     Tablet .. 650 milliGRAM(s) Oral every 6 hours PRN Temp greater or equal to 38C (100.4F), Mild Pain (1 - 3), Moderate Pain (4 - 6)            REVIEW OF SYSTEMS:  Constitutional: [ ] fever, [ ]weight loss,  [ x]fatigue [ ]weight gain  Eyes: [ ] visual changes  Respiratory: [ ]shortness of breath;  [ ] cough, [ ]wheezing, [ ]chills, [ ]hemoptysis  Cardiovascular: [ ] chest pain, [ ]palpitations, [ ]dizziness,  [ ]leg swelling[ ]orthopnea[ ]PND  Gastrointestinal: [ ] abdominal pain, [ ]nausea, [ ]vomiting,  [ ]diarrhea [ ]Constipation [ ]Melena  Genitourinary: [ ] dysuria, [ ] hematuria [ ]Frankel  Neurologic: [ ] headaches [ ] tremors[ ]weakness [ ]Paralysis Right[ ] Left[ ]  Skin: [ ] itching, [ ]burning, [ ] rashes  Endocrine: [ ] heat or cold intolerance  Musculoskeletal: [ ] joint pain or swelling; [ ] muscle, back, or extremity pain  Psychiatric: [ ] depression, [ ]anxiety, [ ]mood swings, or [ ]difficulty sleeping  Hematologic: [ ] easy bruising, [ ] bleeding gums    [ ] All remaining systems negative except as per above.   [ ]Unable to obtain.  [x] No change in ROS since admission      Vital Signs Last 24 Hrs  T(C): 37 (04 Mar 2025 05:30), Max: 37.1 (03 Mar 2025 20:54)  T(F): 98.6 (04 Mar 2025 05:30), Max: 98.8 (03 Mar 2025 20:54)  HR: 91 (04 Mar 2025 05:30) (80 - 91)  BP: 138/96 (04 Mar 2025 05:30) (132/72 - 177/86)  RR: 18 (04 Mar 2025 05:30) (16 - 19)  SpO2: 100% (04 Mar 2025 05:30) (97% - 100%)    Parameters below as of 04 Mar 2025 05:30  Patient On (Oxygen Delivery Method): room air      I&O's Summary      PHYSICAL EXAM:  General: No acute distress BMI-28  HEENT: EOMI, PERRL  Neck: Supple, [ ] JVD  Lungs: Equal air entry bilaterally; [ ] rales [ ] wheezing [ ] rhonchi  Heart: Regular rate and rhythm; [x ] murmur   2/6 [ x] systolic [ ] diastolic [ ] radiation[ ] rubs [ ]  gallops  Abdomen: Nontender, bowel sounds present  Extremities: No clubbing, cyanosis, [ ] edema [ ]Pulses  equal and intact  Nervous system:  Alert & Oriented X3, no focal deficits  Psychiatric: Normal affect  Skin: No rashes or lesions    LABS:  03-03    137  |  104  |  19  ----------------------------<  109[H]  3.9   |  23  |  2.00[H]    Ca    8.9      03 Mar 2025 22:11  Phos  2.6     03-03  Mg     2.00     03-03      Creatinine Trend: 2.00<--, 2.00<--, 1.91<--, 2.02<--                        13.8   7.03  )-----------( 276      ( 03 Mar 2025 22:11 )             42.7         TTE W or WO Ultrasound Enhancing Agent (03.03.25 @ 08:53) >  CONCLUSIONS:      1. The left ventricular cavity is normal in size. Left ventricular wall thickness is moderately increased. Left ventricular systolic function is normal with an ejection fraction visually estimated at 60 to 65%. There are no regional wall motion abnormalities seen. Moderate left ventricular hypertrophy. There is increased LV mass and concentric hypertrophy. There is moderate (grade 2) left ventricular diastolic dysfunction.   2. Normal right ventricular cavity size and normal right ventricular systolic function. Tricuspid annular plane systolic excursion (TAPSE) is 2.3 cm (normal >=1.7 cm).   3. Structurally normal mitral valve with normal leaflet excursion. There is trace mitral regurgitation.      Cardiac Catheterization (03.03.25 @ 14:09) >  Diagnostic Conclusions:   Coronaries with minor luminal irregularities.     Recommendations:   Medical management.             
    SUBJECTIVE / OVERNIGHT EVENTS:pt seen and examined  03-02-25     MEDICATIONS  (STANDING):  amLODIPine   Tablet 10 milliGRAM(s) Oral daily  hydrALAZINE 75 milliGRAM(s) Oral every 8 hours  influenza   Vaccine 0.5 milliLiter(s) IntraMuscular once  labetalol 300 milliGRAM(s) Oral every 8 hours    MEDICATIONS  (PRN):  acetaminophen     Tablet .. 650 milliGRAM(s) Oral every 6 hours PRN Temp greater or equal to 38C (100.4F), Mild Pain (1 - 3), Moderate Pain (4 - 6)    Vital Signs Last 24 Hrs  T(C): 37.2 (03-02-25 @ 10:09), Max: 37.2 (03-02-25 @ 05:45)  T(F): 99 (03-02-25 @ 10:09), Max: 99 (03-02-25 @ 10:09)  HR: 88 (03-02-25 @ 13:56) (82 - 93)  BP: 144/84 (03-02-25 @ 13:56) (125/71 - 144/84)  BP(mean): --  RR: 18 (03-02-25 @ 13:56) (17 - 20)  SpO2: 100% (03-02-25 @ 13:56) (98% - 100%)      Constitutional: No fever, fatigue  Skin: No rash.  Eyes: No recent vision problems or eye pain.  ENT: No congestion, ear pain, or sore throat.  Cardiovascular: No chest pain or palpation.  Respiratory: No cough, shortness of breath, congestion, or wheezing.  Gastrointestinal: No abdominal pain, nausea, vomiting, or diarrhea.  Genitourinary: No dysuria.  Musculoskeletal: No joint swelling.  Neurologic: No headache.    PHYSICAL EXAM:  GENERAL: NAD  EYES: EOMI, PERRLA  NECK: Supple, No JVD  CHEST/LUNG: dec breath soundsa t bases  HEART:  S1 , S2 +  ABDOMEN: soft , bs+  EXTREMITIES:  trace edema  NEUROLOGY:alert awake      LABS:  03-02    137  |  103  |  21  ----------------------------<  95  3.5   |  22  |  2.00[H]    Ca    8.8      02 Mar 2025 05:45  Phos  3.1     03-02  Mg     2.00     03-02      Creatinine Trend: 2.00 <--, 1.91 <--, 2.02 <--                        13.2   6.38  )-----------( 248      ( 02 Mar 2025 05:45 )             40.5     Urine Studies:  Urinalysis Basic - ( 02 Mar 2025 05:45 )    Color:  / Appearance:  / SG:  / pH:   Gluc: 95 mg/dL / Ketone:   / Bili:  / Urobili:    Blood:  / Protein:  / Nitrite:    Leuk Esterase:  / RBC:  / WBC    Sq Epi:  / Non Sq Epi:  / Bacteria:                         RADIOLOGY & ADDITIONAL TESTS:    Imaging Personally Reviewed:    Consultant(s) Notes Reviewed:      Care Discussed with Consultants/Other Providers:

## 2025-03-04 NOTE — PROGRESS NOTE ADULT - TIME BILLING
- Review of records, telemetry, vital signs and daily labs.   - General and cardiovascular physical examination.  - Generation of cardiovascular treatment plan and completion of note .  - Coordination of care.      Patient was seen and examined by me on 3/3/25 ,interim events noted,labs and radiology studies reviewed.  Wai Mai MD,FACC.  [address] [place], [place] [id number].  [phone]  Availabe to call or text on [employer] TEAMS.
- Review of records, telemetry, vital signs and daily labs.   - General and cardiovascular physical examination.  - Generation of cardiovascular treatment plan and completion of note .  - Coordination of care.      Patient was seen and examined by me on  03/04/2025 ,interim events noted,labs and radiology studies reviewed.  Wai Mai MD,FACC.  [address] [place], [place] [id number].  [phone]  Availabe to call or text on Microsoft TEAMS.
- Review of records, telemetry, vital signs and daily labs.   - General and cardiovascular physical examination.  - Generation of cardiovascular treatment plan and completion of note .  - Coordination of care.      Patient was seen and examined by me on  03/02/2025 ,interim events noted,labs and radiology studies reviewed.  Wai Mai MD,FACC.  [address] [place], [place] [id number].  [phone]  Availabe to call or text on Microsoft TEAMS.
- Review of records, telemetry, vital signs and daily labs.   - General and cardiovascular physical examination.  - Generation of cardiovascular treatment plan and completion of note .  - Coordination of care.      Patient was seen and examined by me on 03/01/2025,interim events noted,labs and radiology studies reviewed.  Wai Mai MD,FACC.  [address] [place], [place] [id number].  [phone]  Availabe to call or text on Microsoft TEAMS.

## 2025-03-04 NOTE — PROGRESS NOTE ADULT - ASSESSMENT
39y Male with history of HTN presents with uncontrolled HTN. Nephrology consulted for elevated Scr.    1) MEHDI: Unclear if patient with MEHDI thought to be hemodynamically mediated versus progressive CKD as no recent baseline Scr available. Given plans for [facility], agree with IVF pre-hydration and will give an additional NS @ 100 ml/hour X 6hours post-cath. UA bland with proteinuria. FeNa low. Renal US without any evidence of obstruction. Avoid nephrotoxins.    2) CKD-3a: likely due to HTN. Mj Scr 1.5 with mild proteinuria (spot urine TP/CR 0.3) as per labs in 2022 however unclear if patient with progression since that time. Would start low dose ARB on discharge and have patient follow up as an outpatient for complete serological work up. Monitor electrolytes.    3) HTN with CKD: BP controlled. Continue with current medications.    4) Abnormal EKG: For [facility] today.      [facility] NEPHROLOGY  Vitaly Shultz M.D.  Fidencio Young D.O.  Marj Heaton M.D.  MD Karli Snow, MSN, ANP-C    Telephone: [phone]  Facsimile: [phone]    [facility] [place] [address], [address]  [place] [address]  
    Hypertensive Urgency  - BP: 185/118 - improved  - TTE - Moderate left ventricular hypertrophy. There is increased LV mass and concentric hypertrophy. There is moderate (grade 2) left ventricular diastolic dysfunction  -s/p Cath- Diagnostic Conclusions: Coronaries with minor luminal irregularities.       MEHDI on CKD  - Baseline Cr 1.5-1.7, now 2.0  - in the setting of HTN urgency  - 3/1 Renal US - No hydronephrosis. Left pleural effusion.  
38 y/o M with pmh of HTN presents to ED with elevated BP.          # Hypertensive urgency.   ·  Plan: - Restarted home meds of labetalol, hydralazine and amlodipine  - monitor BP q4.  -BP better on meds      #  MEHDI (acute kidney injury).   ·  Plan: In setting of hypertensive urgency.  Baseline Cr 1.5-1.7, now 2.0  - will trend bmp with BP improvement.  -Renal consult noted      # Elevated troponins  Likely demand  TTE  Will need ischemic work-up
40 y/o M with pmh of HTN presents to ED with elevated BP.  
40 y/o M with pmh of HTN presents to ED with elevated BP.          # Hypertensive urgency.   ·  Plan: - Restarted home meds of labetalol, hydralazine and amlodipine  - monitor BP q4.  -BP better on meds      #  MEHDI (acute kidney injury).   ·  Plan: In setting of hypertensive urgency.  Baseline Cr 1.5-1.7, now 2.0  - will trend bmp with BP improvement.  -Renal consult noted      # Elevated troponins  Likely demand  TTE  Will need ischemic work-up  
40 y/o M with pmh of HTN presents to ED with elevated BP.  
38 y/o M with pmh of HTN presents to ED with elevated BP.          # Hypertensive urgency.   ·  Plan: - Restarted home meds of labetalol, hydralazine and amlodipine  - monitor BP q4.  -BP better on meds  Incease Htdrallazine 100 mg TID      #  MEHDI (acute kidney injury).   ·  Plan: In setting of hypertensive urgency.  Baseline Cr 1.5-1.7, now 2.0  - will trend bmp with BP improvement.  -Renal consult noted      # Elevated troponins  Likely demand  TTE LVH Normal LVEDF  Cath Non obstructive CAD      DISCHARGE HOME TODAY  
40 y/o M with pmh of HTN presents to ED with elevated BP.

## 2025-03-04 NOTE — DISCHARGE NOTE PROVIDER - HOSPITAL COURSE
40 y/o M with pmh of HTN presents to ED with elevated BP.          # Hypertensive urgency.   - Restarted home meds of labetalol, hydralazine and amlodipine  - monitor BP q4.  -BP better on meds  Incease Htdrallazine 100 mg TID      #  MEHDI (acute kidney injury).   - In setting of hypertensive urgency.  Baseline Cr 1.5-1.7, now 2.0  - will trend bmp with BP improvement.  -Renal consult noted      # Elevated troponins  Likely demand  TTE LVH Normal LVEDF  Cath Non obstructive CAD      DISCHARGE HOME TODAY

## 2025-03-04 NOTE — DISCHARGE NOTE PROVIDER - PROVIDER TOKENS
PROVIDER:[TOKEN:[[id number]],FOLLOWUP:[1 week]],PROVIDER:[TOKEN:[[id number]],FOLLOWUP:[1 week]]